# Patient Record
Sex: FEMALE | Race: WHITE
[De-identification: names, ages, dates, MRNs, and addresses within clinical notes are randomized per-mention and may not be internally consistent; named-entity substitution may affect disease eponyms.]

---

## 2018-10-02 ENCOUNTER — HOSPITAL ENCOUNTER (EMERGENCY)
Dept: HOSPITAL 8 - ED | Age: 83
Discharge: HOME | End: 2018-10-02
Payer: COMMERCIAL

## 2018-10-02 VITALS — SYSTOLIC BLOOD PRESSURE: 159 MMHG | DIASTOLIC BLOOD PRESSURE: 83 MMHG

## 2018-10-02 VITALS — BODY MASS INDEX: 28.6 KG/M2 | WEIGHT: 155.43 LBS | HEIGHT: 62 IN

## 2018-10-02 DIAGNOSIS — I10: Primary | ICD-10-CM

## 2018-10-02 LAB
ALBUMIN SERPL-MCNC: 3.3 G/DL (ref 3.4–5)
ALP SERPL-CCNC: 118 U/L (ref 45–117)
ALT SERPL-CCNC: 16 U/L (ref 12–78)
ANION GAP SERPL CALC-SCNC: 9 MMOL/L (ref 5–15)
BASOPHILS # BLD AUTO: 0.01 X10^3/UL (ref 0–0.1)
BASOPHILS NFR BLD AUTO: 0 % (ref 0–1)
BILIRUB SERPL-MCNC: 0.7 MG/DL (ref 0.2–1)
CALCIUM SERPL-MCNC: 9.1 MG/DL (ref 8.5–10.1)
CHLORIDE SERPL-SCNC: 109 MMOL/L (ref 98–107)
CREAT SERPL-MCNC: 0.66 MG/DL (ref 0.55–1.02)
EOSINOPHIL # BLD AUTO: 0.03 X10^3/UL (ref 0–0.4)
EOSINOPHIL NFR BLD AUTO: 1 % (ref 1–7)
ERYTHROCYTE [DISTWIDTH] IN BLOOD BY AUTOMATED COUNT: 14.5 % (ref 9.6–15.2)
LYMPHOCYTES # BLD AUTO: 1.26 X10^3/UL (ref 1–3.4)
LYMPHOCYTES NFR BLD AUTO: 38 % (ref 22–44)
MCH RBC QN AUTO: 30.1 PG (ref 27–34.8)
MCHC RBC AUTO-ENTMCNC: 33.5 G/DL (ref 32.4–35.8)
MCV RBC AUTO: 89.7 FL (ref 80–100)
MD: NO
MONOCYTES # BLD AUTO: 0.24 X10^3/UL (ref 0.2–0.8)
MONOCYTES NFR BLD AUTO: 7 % (ref 2–9)
NEUTROPHILS # BLD AUTO: 1.74 X10^3/UL (ref 1.8–6.8)
NEUTROPHILS NFR BLD AUTO: 53 % (ref 42–75)
PLATELET # BLD AUTO: 171 X10^3/UL (ref 130–400)
PMV BLD AUTO: 7.6 FL (ref 7.4–10.4)
PROT SERPL-MCNC: 7.2 G/DL (ref 6.4–8.2)
RBC # BLD AUTO: 4.39 X10^6/UL (ref 3.82–5.3)
T4 (THYROXINE): 10.2 MCG/DL (ref 4.8–13.9)
TROPONIN I SERPL-MCNC: < 0.015 NG/ML (ref 0–0.04)
TSH SERPL-ACNC: 2.06 MIU/L (ref 0.36–3.74)

## 2018-10-02 PROCEDURE — 84443 ASSAY THYROID STIM HORMONE: CPT

## 2018-10-02 PROCEDURE — 84484 ASSAY OF TROPONIN QUANT: CPT

## 2018-10-02 PROCEDURE — 96376 TX/PRO/DX INJ SAME DRUG ADON: CPT

## 2018-10-02 PROCEDURE — 96374 THER/PROPH/DIAG INJ IV PUSH: CPT

## 2018-10-02 PROCEDURE — 80053 COMPREHEN METABOLIC PANEL: CPT

## 2018-10-02 PROCEDURE — 85025 COMPLETE CBC W/AUTO DIFF WBC: CPT

## 2018-10-02 PROCEDURE — 71045 X-RAY EXAM CHEST 1 VIEW: CPT

## 2018-10-02 PROCEDURE — 84436 ASSAY OF TOTAL THYROXINE: CPT

## 2018-10-02 PROCEDURE — 99285 EMERGENCY DEPT VISIT HI MDM: CPT

## 2018-10-02 PROCEDURE — 36415 COLL VENOUS BLD VENIPUNCTURE: CPT

## 2020-12-17 ENCOUNTER — APPOINTMENT (OUTPATIENT)
Dept: RADIOLOGY | Facility: MEDICAL CENTER | Age: 85
DRG: 065 | End: 2020-12-17
Attending: EMERGENCY MEDICINE
Payer: MEDICARE

## 2020-12-17 ENCOUNTER — APPOINTMENT (OUTPATIENT)
Dept: RADIOLOGY | Facility: MEDICAL CENTER | Age: 85
DRG: 065 | End: 2020-12-17
Attending: INTERNAL MEDICINE
Payer: MEDICARE

## 2020-12-17 ENCOUNTER — HOSPITAL ENCOUNTER (INPATIENT)
Facility: MEDICAL CENTER | Age: 85
LOS: 2 days | DRG: 065 | End: 2020-12-20
Attending: EMERGENCY MEDICINE | Admitting: INTERNAL MEDICINE
Payer: MEDICARE

## 2020-12-17 DIAGNOSIS — R09.89 SYMPTOMS OF CEREBROVASCULAR ACCIDENT (CVA): ICD-10-CM

## 2020-12-17 DIAGNOSIS — I48.91 ATRIAL FIBRILLATION, UNSPECIFIED TYPE (HCC): ICD-10-CM

## 2020-12-17 DIAGNOSIS — M79.604 RIGHT LEG PAIN: ICD-10-CM

## 2020-12-17 PROBLEM — E87.1 HYPONATREMIA: Status: ACTIVE | Noted: 2020-12-17

## 2020-12-17 PROBLEM — R79.89 ELEVATED TROPONIN: Status: ACTIVE | Noted: 2020-12-17

## 2020-12-17 PROBLEM — D72.829 LEUKOCYTOSIS: Status: ACTIVE | Noted: 2020-12-17

## 2020-12-17 PROBLEM — E78.5 HYPERLIPIDEMIA: Status: ACTIVE | Noted: 2020-12-17

## 2020-12-17 PROBLEM — R29.898 WEAKNESS OF RIGHT LEG: Status: ACTIVE | Noted: 2020-12-17

## 2020-12-17 PROBLEM — I10 ESSENTIAL HYPERTENSION: Status: ACTIVE | Noted: 2020-12-17

## 2020-12-17 LAB
ABO + RH BLD: NORMAL
ABO GROUP BLD: ABNORMAL
ALBUMIN SERPL BCP-MCNC: 2.9 G/DL (ref 3.2–4.9)
ALBUMIN/GLOB SERPL: 0.8 G/DL
ALP SERPL-CCNC: 124 U/L (ref 30–99)
ALT SERPL-CCNC: 10 U/L (ref 2–50)
ANION GAP SERPL CALC-SCNC: 11 MMOL/L (ref 7–16)
APTT PPP: 27.2 SEC (ref 24.7–36)
AST SERPL-CCNC: 13 U/L (ref 12–45)
BASOPHILS # BLD AUTO: 0.3 % (ref 0–1.8)
BASOPHILS # BLD: 0.03 K/UL (ref 0–0.12)
BILIRUB SERPL-MCNC: 1.8 MG/DL (ref 0.1–1.5)
BLD GP AB SCN SERPL QL: ABNORMAL
BUN SERPL-MCNC: 28 MG/DL (ref 8–22)
CALCIUM SERPL-MCNC: 9.3 MG/DL (ref 8.5–10.5)
CHLORIDE SERPL-SCNC: 95 MMOL/L (ref 96–112)
CO2 SERPL-SCNC: 21 MMOL/L (ref 20–33)
CORTIS SERPL-MCNC: 30.3 UG/DL (ref 0–23)
COVID ORDER STATUS COVID19: NORMAL
CREAT SERPL-MCNC: 0.75 MG/DL (ref 0.5–1.4)
EKG IMPRESSION: NORMAL
EOSINOPHIL # BLD AUTO: 0.03 K/UL (ref 0–0.51)
EOSINOPHIL NFR BLD: 0.3 % (ref 0–6.9)
ERYTHROCYTE [DISTWIDTH] IN BLOOD BY AUTOMATED COUNT: 44.5 FL (ref 35.9–50)
EST. AVERAGE GLUCOSE BLD GHB EST-MCNC: 131 MG/DL
GLOBULIN SER CALC-MCNC: 3.7 G/DL (ref 1.9–3.5)
GLUCOSE SERPL-MCNC: 139 MG/DL (ref 65–99)
HBA1C MFR BLD: 6.2 % (ref 0–5.6)
HCT VFR BLD AUTO: 41.4 % (ref 37–47)
HGB BLD-MCNC: 13.5 G/DL (ref 12–16)
IMM GRANULOCYTES # BLD AUTO: 0.1 K/UL (ref 0–0.11)
IMM GRANULOCYTES NFR BLD AUTO: 0.8 % (ref 0–0.9)
INR PPP: 1.03 (ref 0.87–1.13)
LYMPHOCYTES # BLD AUTO: 0.82 K/UL (ref 1–4.8)
LYMPHOCYTES NFR BLD: 6.9 % (ref 22–41)
MAGNESIUM SERPL-MCNC: 1.9 MG/DL (ref 1.5–2.5)
MCH RBC QN AUTO: 28.5 PG (ref 27–33)
MCHC RBC AUTO-ENTMCNC: 32.6 G/DL (ref 33.6–35)
MCV RBC AUTO: 87.5 FL (ref 81.4–97.8)
MONOCYTES # BLD AUTO: 1.18 K/UL (ref 0–0.85)
MONOCYTES NFR BLD AUTO: 10 % (ref 0–13.4)
NEUTROPHILS # BLD AUTO: 9.68 K/UL (ref 2–7.15)
NEUTROPHILS NFR BLD: 81.7 % (ref 44–72)
NRBC # BLD AUTO: 0 K/UL
NRBC BLD-RTO: 0 /100 WBC
OSMOLALITY SERPL: 286 MOSM/KG H2O (ref 278–298)
PLATELET # BLD AUTO: 183 K/UL (ref 164–446)
PMV BLD AUTO: 9.2 FL (ref 9–12.9)
POTASSIUM SERPL-SCNC: 3.6 MMOL/L (ref 3.6–5.5)
PROCALCITONIN SERPL-MCNC: 2.55 NG/ML
PROT SERPL-MCNC: 6.6 G/DL (ref 6–8.2)
PROTHROMBIN TIME: 13.8 SEC (ref 12–14.6)
RBC # BLD AUTO: 4.73 M/UL (ref 4.2–5.4)
RH BLD: ABNORMAL
SARS-COV-2 RNA RESP QL NAA+PROBE: NOTDETECTED
SODIUM SERPL-SCNC: 127 MMOL/L (ref 135–145)
SPECIMEN SOURCE: NORMAL
TROPONIN T SERPL-MCNC: 99 NG/L (ref 6–19)
TSH SERPL DL<=0.005 MIU/L-ACNC: 2.04 UIU/ML (ref 0.38–5.33)
WBC # BLD AUTO: 11.8 K/UL (ref 4.8–10.8)

## 2020-12-17 PROCEDURE — 71045 X-RAY EXAM CHEST 1 VIEW: CPT

## 2020-12-17 PROCEDURE — 85730 THROMBOPLASTIN TIME PARTIAL: CPT

## 2020-12-17 PROCEDURE — 86850 RBC ANTIBODY SCREEN: CPT

## 2020-12-17 PROCEDURE — G0378 HOSPITAL OBSERVATION PER HR: HCPCS

## 2020-12-17 PROCEDURE — 73552 X-RAY EXAM OF FEMUR 2/>: CPT | Mod: RT

## 2020-12-17 PROCEDURE — 72148 MRI LUMBAR SPINE W/O DYE: CPT

## 2020-12-17 PROCEDURE — 80053 COMPREHEN METABOLIC PANEL: CPT

## 2020-12-17 PROCEDURE — 84484 ASSAY OF TROPONIN QUANT: CPT

## 2020-12-17 PROCEDURE — 94760 N-INVAS EAR/PLS OXIMETRY 1: CPT

## 2020-12-17 PROCEDURE — 700117 HCHG RX CONTRAST REV CODE 255: Performed by: EMERGENCY MEDICINE

## 2020-12-17 PROCEDURE — 99285 EMERGENCY DEPT VISIT HI MDM: CPT

## 2020-12-17 PROCEDURE — 82533 TOTAL CORTISOL: CPT

## 2020-12-17 PROCEDURE — 700102 HCHG RX REV CODE 250 W/ 637 OVERRIDE(OP): Performed by: INTERNAL MEDICINE

## 2020-12-17 PROCEDURE — U0003 INFECTIOUS AGENT DETECTION BY NUCLEIC ACID (DNA OR RNA); SEVERE ACUTE RESPIRATORY SYNDROME CORONAVIRUS 2 (SARS-COV-2) (CORONAVIRUS DISEASE [COVID-19]), AMPLIFIED PROBE TECHNIQUE, MAKING USE OF HIGH THROUGHPUT TECHNOLOGIES AS DESCRIBED BY CMS-2020-01-R: HCPCS

## 2020-12-17 PROCEDURE — 70551 MRI BRAIN STEM W/O DYE: CPT

## 2020-12-17 PROCEDURE — C9803 HOPD COVID-19 SPEC COLLECT: HCPCS | Performed by: EMERGENCY MEDICINE

## 2020-12-17 PROCEDURE — 99220 PR INITIAL OBSERVATION CARE,LEVL III: CPT | Mod: AI | Performed by: INTERNAL MEDICINE

## 2020-12-17 PROCEDURE — 86900 BLOOD TYPING SEROLOGIC ABO: CPT

## 2020-12-17 PROCEDURE — 700105 HCHG RX REV CODE 258: Performed by: INTERNAL MEDICINE

## 2020-12-17 PROCEDURE — 72170 X-RAY EXAM OF PELVIS: CPT

## 2020-12-17 PROCEDURE — 72146 MRI CHEST SPINE W/O DYE: CPT

## 2020-12-17 PROCEDURE — 83930 ASSAY OF BLOOD OSMOLALITY: CPT

## 2020-12-17 PROCEDURE — 96374 THER/PROPH/DIAG INJ IV PUSH: CPT

## 2020-12-17 PROCEDURE — 73610 X-RAY EXAM OF ANKLE: CPT | Mod: RT

## 2020-12-17 PROCEDURE — 700101 HCHG RX REV CODE 250: Performed by: STUDENT IN AN ORGANIZED HEALTH CARE EDUCATION/TRAINING PROGRAM

## 2020-12-17 PROCEDURE — 70496 CT ANGIOGRAPHY HEAD: CPT

## 2020-12-17 PROCEDURE — 84145 PROCALCITONIN (PCT): CPT

## 2020-12-17 PROCEDURE — 87040 BLOOD CULTURE FOR BACTERIA: CPT

## 2020-12-17 PROCEDURE — 70450 CT HEAD/BRAIN W/O DYE: CPT

## 2020-12-17 PROCEDURE — 70498 CT ANGIOGRAPHY NECK: CPT

## 2020-12-17 PROCEDURE — 93005 ELECTROCARDIOGRAM TRACING: CPT | Performed by: EMERGENCY MEDICINE

## 2020-12-17 PROCEDURE — 83036 HEMOGLOBIN GLYCOSYLATED A1C: CPT

## 2020-12-17 PROCEDURE — 85025 COMPLETE CBC W/AUTO DIFF WBC: CPT

## 2020-12-17 PROCEDURE — 85610 PROTHROMBIN TIME: CPT

## 2020-12-17 PROCEDURE — 51798 US URINE CAPACITY MEASURE: CPT

## 2020-12-17 PROCEDURE — 86901 BLOOD TYPING SEROLOGIC RH(D): CPT

## 2020-12-17 PROCEDURE — 84443 ASSAY THYROID STIM HORMONE: CPT

## 2020-12-17 PROCEDURE — 0042T CT-CEREBRAL PERFUSION ANALYSIS: CPT

## 2020-12-17 PROCEDURE — A9270 NON-COVERED ITEM OR SERVICE: HCPCS | Performed by: INTERNAL MEDICINE

## 2020-12-17 PROCEDURE — 83735 ASSAY OF MAGNESIUM: CPT

## 2020-12-17 RX ORDER — POLYETHYLENE GLYCOL 3350 17 G/17G
1 POWDER, FOR SOLUTION ORAL
Status: DISCONTINUED | OUTPATIENT
Start: 2020-12-17 | End: 2020-12-20 | Stop reason: HOSPADM

## 2020-12-17 RX ORDER — IBUPROFEN 200 MG
200 TABLET ORAL EVERY 6 HOURS PRN
Status: ON HOLD | COMMUNITY
End: 2020-12-20

## 2020-12-17 RX ORDER — AMOXICILLIN 250 MG
2 CAPSULE ORAL 2 TIMES DAILY
Status: DISCONTINUED | OUTPATIENT
Start: 2020-12-17 | End: 2020-12-20 | Stop reason: HOSPADM

## 2020-12-17 RX ORDER — ASPIRIN 81 MG/1
324 TABLET, CHEWABLE ORAL DAILY
Status: DISCONTINUED | OUTPATIENT
Start: 2020-12-17 | End: 2020-12-18

## 2020-12-17 RX ORDER — ACETAMINOPHEN 325 MG/1
650 TABLET ORAL EVERY 6 HOURS PRN
Status: DISCONTINUED | OUTPATIENT
Start: 2020-12-17 | End: 2020-12-20 | Stop reason: HOSPADM

## 2020-12-17 RX ORDER — ONDANSETRON 4 MG/1
TABLET, FILM COATED ORAL
COMMUNITY
Start: 2020-12-16 | End: 2020-12-17

## 2020-12-17 RX ORDER — METOPROLOL TARTRATE 1 MG/ML
2.5 INJECTION, SOLUTION INTRAVENOUS ONCE
Status: COMPLETED | OUTPATIENT
Start: 2020-12-17 | End: 2020-12-17

## 2020-12-17 RX ORDER — METOPROLOL SUCCINATE 100 MG/1
150 TABLET, EXTENDED RELEASE ORAL EVERY MORNING
Status: ON HOLD | COMMUNITY
Start: 2020-12-03 | End: 2021-01-08

## 2020-12-17 RX ORDER — ONDANSETRON 4 MG/1
4 TABLET, ORALLY DISINTEGRATING ORAL EVERY 4 HOURS PRN
Status: DISCONTINUED | OUTPATIENT
Start: 2020-12-17 | End: 2020-12-20 | Stop reason: HOSPADM

## 2020-12-17 RX ORDER — ASPIRIN 325 MG
325 TABLET ORAL DAILY
Status: DISCONTINUED | OUTPATIENT
Start: 2020-12-17 | End: 2020-12-18

## 2020-12-17 RX ORDER — LABETALOL HYDROCHLORIDE 5 MG/ML
10 INJECTION, SOLUTION INTRAVENOUS EVERY 4 HOURS PRN
Status: DISCONTINUED | OUTPATIENT
Start: 2020-12-17 | End: 2020-12-20 | Stop reason: HOSPADM

## 2020-12-17 RX ORDER — ATORVASTATIN CALCIUM 80 MG/1
80 TABLET, FILM COATED ORAL EVERY EVENING
Status: DISCONTINUED | OUTPATIENT
Start: 2020-12-17 | End: 2020-12-18

## 2020-12-17 RX ORDER — BISACODYL 10 MG
10 SUPPOSITORY, RECTAL RECTAL
Status: DISCONTINUED | OUTPATIENT
Start: 2020-12-17 | End: 2020-12-20 | Stop reason: HOSPADM

## 2020-12-17 RX ORDER — SODIUM CHLORIDE 9 MG/ML
INJECTION, SOLUTION INTRAVENOUS CONTINUOUS
Status: DISCONTINUED | OUTPATIENT
Start: 2020-12-17 | End: 2020-12-20

## 2020-12-17 RX ORDER — ASPIRIN 300 MG/1
300 SUPPOSITORY RECTAL DAILY
Status: DISCONTINUED | OUTPATIENT
Start: 2020-12-17 | End: 2020-12-18

## 2020-12-17 RX ORDER — ONDANSETRON 2 MG/ML
4 INJECTION INTRAMUSCULAR; INTRAVENOUS EVERY 4 HOURS PRN
Status: DISCONTINUED | OUTPATIENT
Start: 2020-12-17 | End: 2020-12-20 | Stop reason: HOSPADM

## 2020-12-17 RX ADMIN — IOHEXOL 100 ML: 350 INJECTION, SOLUTION INTRAVENOUS at 07:31

## 2020-12-17 RX ADMIN — SODIUM CHLORIDE: 9 INJECTION, SOLUTION INTRAVENOUS at 14:10

## 2020-12-17 RX ADMIN — IOHEXOL 40 ML: 350 INJECTION, SOLUTION INTRAVENOUS at 07:28

## 2020-12-17 RX ADMIN — ATORVASTATIN CALCIUM 80 MG: 80 TABLET, FILM COATED ORAL at 17:16

## 2020-12-17 RX ADMIN — ASPIRIN 324 MG: 81 TABLET, CHEWABLE ORAL at 10:24

## 2020-12-17 RX ADMIN — METOPROLOL TARTRATE 2.5 MG: 1 INJECTION, SOLUTION INTRAVENOUS at 21:56

## 2020-12-17 RX ADMIN — DOCUSATE SODIUM 50 MG AND SENNOSIDES 8.6 MG 2 TABLET: 8.6; 5 TABLET, FILM COATED ORAL at 17:16

## 2020-12-17 SDOH — HEALTH STABILITY: MENTAL HEALTH: HOW OFTEN DO YOU HAVE A DRINK CONTAINING ALCOHOL?: NEVER

## 2020-12-17 ASSESSMENT — LIFESTYLE VARIABLES
TOTAL SCORE: 0
ALCOHOL_USE: NO
HAVE PEOPLE ANNOYED YOU BY CRITICIZING YOUR DRINKING: NO
AVERAGE NUMBER OF DAYS PER WEEK YOU HAVE A DRINK CONTAINING ALCOHOL: 0
DOES PATIENT WANT TO STOP DRINKING: NO
ON A TYPICAL DAY WHEN YOU DRINK ALCOHOL HOW MANY DRINKS DO YOU HAVE: 0
EVER_SMOKED: NEVER
HAVE YOU EVER FELT YOU SHOULD CUT DOWN ON YOUR DRINKING: NO
EVER HAD A DRINK FIRST THING IN THE MORNING TO STEADY YOUR NERVES TO GET RID OF A HANGOVER: NO
TOTAL SCORE: 0
HAVE YOU EVER FELT YOU SHOULD CUT DOWN ON YOUR DRINKING: NO
TOTAL SCORE: 0
HAVE PEOPLE ANNOYED YOU BY CRITICIZING YOUR DRINKING: NO
TOTAL SCORE: 0
HOW MANY TIMES IN THE PAST YEAR HAVE YOU HAD 5 OR MORE DRINKS IN A DAY: 0
CONSUMPTION TOTAL: INCOMPLETE
ALCOHOL_USE: NO
EVER HAD A DRINK FIRST THING IN THE MORNING TO STEADY YOUR NERVES TO GET RID OF A HANGOVER: NO
DOES PATIENT WANT TO STOP DRINKING: NO
EVER FELT BAD OR GUILTY ABOUT YOUR DRINKING: NO
EVER FELT BAD OR GUILTY ABOUT YOUR DRINKING: NO
TOTAL SCORE: 0
CONSUMPTION TOTAL: NEGATIVE
TOTAL SCORE: 0

## 2020-12-17 ASSESSMENT — COGNITIVE AND FUNCTIONAL STATUS - GENERAL
SUGGESTED CMS G CODE MODIFIER MOBILITY: CL
STANDING UP FROM CHAIR USING ARMS: A LOT
TOILETING: A LOT
MOVING TO AND FROM BED TO CHAIR: A LOT
PERSONAL GROOMING: A LOT
PERSONAL GROOMING: A LOT
TURNING FROM BACK TO SIDE WHILE IN FLAT BAD: A LOT
MOVING TO AND FROM BED TO CHAIR: A LITTLE
MOBILITY SCORE: 14
WALKING IN HOSPITAL ROOM: A LOT
HELP NEEDED FOR BATHING: A LOT
DRESSING REGULAR UPPER BODY CLOTHING: A LOT
DRESSING REGULAR LOWER BODY CLOTHING: A LOT
HELP NEEDED FOR BATHING: A LOT
MOVING FROM LYING ON BACK TO SITTING ON SIDE OF FLAT BED: A LOT
TOILETING: A LOT
STANDING UP FROM CHAIR USING ARMS: A LOT
TURNING FROM BACK TO SIDE WHILE IN FLAT BAD: A LITTLE
SUGGESTED CMS G CODE MODIFIER MOBILITY: CL
DRESSING REGULAR LOWER BODY CLOTHING: A LOT
CLIMB 3 TO 5 STEPS WITH RAILING: A LOT
SUGGESTED CMS G CODE MODIFIER DAILY ACTIVITY: CL
DRESSING REGULAR UPPER BODY CLOTHING: A LOT
MOBILITY SCORE: 12
WALKING IN HOSPITAL ROOM: A LOT
CLIMB 3 TO 5 STEPS WITH RAILING: A LOT
MOVING FROM LYING ON BACK TO SITTING ON SIDE OF FLAT BED: A LOT
EATING MEALS: A LOT
DAILY ACTIVITIY SCORE: 12

## 2020-12-17 ASSESSMENT — PATIENT HEALTH QUESTIONNAIRE - PHQ9
SUM OF ALL RESPONSES TO PHQ9 QUESTIONS 1 AND 2: 0
2. FEELING DOWN, DEPRESSED, IRRITABLE, OR HOPELESS: NOT AT ALL
1. LITTLE INTEREST OR PLEASURE IN DOING THINGS: NOT AT ALL

## 2020-12-17 ASSESSMENT — COPD QUESTIONNAIRES
COPD SCREENING SCORE: 0
DURING THE PAST 4 WEEKS HOW MUCH DID YOU FEEL SHORT OF BREATH: NONE/LITTLE OF THE TIME
HAVE YOU SMOKED AT LEAST 100 CIGARETTES IN YOUR ENTIRE LIFE: NO/DON'T KNOW
DO YOU EVER COUGH UP ANY MUCUS OR PHLEGM?: NO/ONLY WITH OCCASIONAL COLDS OR INFECTIONS

## 2020-12-17 NOTE — ED NOTES
Pt is currently still at MRI. Report was called to Cherie RODRIGES for S182-2. Pt will go from MRI to the floor. Family was made aware visiting hours arent until 3pm upstairs.

## 2020-12-17 NOTE — ED NOTES
Updated daughter in law ([place]) on pt progress and plan of care. Patient able to pass swallow screen without issues. Pt awaiting MRI/Admission

## 2020-12-17 NOTE — ASSESSMENT & PLAN NOTE
Seems to be new onset  Started on Eliquis   Continue on metoprolol  Telemetry  TTE shows mod AS  TSH is normal

## 2020-12-17 NOTE — PROGRESS NOTES
Two RN skin check completed with Bess SEAMAN RN    Skin is fragile throughout.   Dry, flaky, boggy heels.   Moisture related blanchable redness and forming fissure to coccyx.

## 2020-12-17 NOTE — ED NOTES
Pharmacy Medication Reconciliation      Medication reconciliation updated and complete per pt at bedside  Allergies have been verified and updated   No oral ABX within the last 14 days  Patient home pharmacy:[facility]

## 2020-12-17 NOTE — ED NOTES
Liudmila Pulido  Chief Complaint   Patient presents with   • Possible Stroke     R sided weakness, noticed by daughter around 0300 this morning; LKN 1900 yesterday     Pt JUAN C MELANIE from home. Pt lives at home with daughter. Per TAYO NAVARRO around 190 last night. MELANIE report pt had GLF around 0300 this morning which daughter noticed R sided facial droop and R leg/arm weakness. No thinners. On arrival, pt oriented and able to respond appropriately. JOSE MARIA.     During assessment, pt states she cannot move her R leg - when moved by staff, pt c/o R knee and R ankle pain. Pt has small abrasion to R knee cap.    Report given to ANTELMO Serrano.

## 2020-12-17 NOTE — ASSESSMENT & PLAN NOTE
She denies chest pain  EKG shows afib and LBBB, no prior to compare to.   Record of angiogram in 1/2019 at [facility], with nonobstructive CAD  Likely demand ischemia from afib  She has been started on Eliquis and lipitor

## 2020-12-17 NOTE — ED PROVIDER NOTES
ED Provider Note    Chief Complaint:   Right sided weakness    HPI:  Liudmila Pulido is a 88 y.o. female who presents as a stroke IR activation out of concern for CVA outside of the window for alteplase.  Family last noticed she was normal at 7:00 yesterday evening, 12 hours prior to arrival.  At 3:00 this morning they tried to help her into bed and she seemed a little weaker than usual, possibly a little more off balance.  When she woke from sleep this morning, family reports that she had right-sided weakness, so they activated EMS.  On arrival to the emergency department, she states she is not having any headache, no neck or back pain, no chest pain, no shortness of breath.  Paramedics report that she was seen at [facility] yesterday and treated for dehydration.  She is not having any active nausea or vomiting, her speech is understandable.  She has not had any recent fevers, no upper respiratory symptoms, no known COVID-19 positive contacts.  She is unable to identify any exacerbating or alleviating factors.  She was taken immediately from EMS triage to CT.    Review of Systems:  See HPI for pertinent positives and negatives. All other systems negative.    Past Medical History:       Social History:  Social History     Tobacco Use   • Smoking status: Not on file   • Smokeless tobacco: Never Used   Substance and Sexual Activity   • Alcohol use: Never     Frequency: Never   • Drug use: Not on file   • Sexual activity: Not on file       Surgical History:  patient denies any surgical history    Current Medications:  Home Medications     Reviewed by Justina Lira R.N. (Registered Nurse) on 12/17/20 at 0733  Med List Status: Unable to Obtain   Medication Last Dose Status        Patient Keny Taking any Medications                       Allergies:  Allergies   Allergen Reactions   • Demerol    • Erythromycin        Physical Exam:  Vital Signs: /92   Pulse (!) 141   Temp 36.6 °C (97.9 °F)  "(Oral)   Resp 17   Ht 1.676 m (5' 6\")   Wt 68 kg (150 lb)   SpO2 96%   BMI 24.21 kg/m²   Constitutional: Alert, no acute distress  HENT: Normocephalic, atraumatic  Eyes: Pupils equal and reactive, normal conjunctiva  Neck: Supple, normal range of motion, no stridor  Cardiovascular: Extremities are warm and well perfused, irregular rhythm  Pulmonary: No respiratory distress, normal work of breathing, no accessory muscule usage  Abdomen: Soft, non-distended, non-tender to palpation, no peritoneal signs  Skin: Warm, dry, no rashes or lesions  Musculoskeletal: Normal range of motion in all extremities, no swelling or deformity noted, right hip, right knee, right ankle are nontender to palpation however are painful when passively ranged, soft compartments throughout the right lower extremity  Neurologic: Alert, awake, responsive, mild right-sided facial asymmetry, oriented to self and age, easily able to name objects, no aphasia, no neglect, 5/5 upper extremity strength, 5 out of 5 left lower extremity strength, 2/5 strength in the right lower extremity though this appears as though it may be limited by pain  Psychiatric: Normal and appropriate mood and affect    Medical records reviewed for continuity of care.  No prior medical records available for review.  Per EMS report, patient has previously been seen at [facility], as well as [facility] here in [place].    EKG: Rate 121, no definitive P waves identified, multiple PVCs present, no ST elevation    Labs:  Labs Reviewed   CBC WITH DIFFERENTIAL - Abnormal; Notable for the following components:       Result Value    WBC 11.8 (*)     MCHC 32.6 (*)     Neutrophils-Polys 81.70 (*)     Lymphocytes 6.90 (*)     Neutrophils (Absolute) 9.68 (*)     Lymphs (Absolute) 0.82 (*)     Monos (Absolute) 1.18 (*)     All other components within normal limits    Narrative:     Indicate which anticoagulants the patient is on:->UNKNOWN   COMP METABOLIC PANEL " - Abnormal; Notable for the following components:    Sodium 127 (*)     Chloride 95 (*)     Glucose 139 (*)     Bun 28 (*)     Alkaline Phosphatase 124 (*)     Total Bilirubin 1.8 (*)     Albumin 2.9 (*)     Globulin 3.7 (*)     All other components within normal limits    Narrative:     Indicate which anticoagulants the patient is on:->UNKNOWN   COD (ADULT) - Abnormal; Notable for the following components:    ABO Grouping Only A (*)     All other components within normal limits   TROPONIN - Abnormal; Notable for the following components:    Troponin T 99 (*)     All other components within normal limits    Narrative:     Indicate which anticoagulants the patient is on:->UNKNOWN   PROTHROMBIN TIME    Narrative:     Indicate which anticoagulants the patient is on:->UNKNOWN   APTT    Narrative:     Indicate which anticoagulants the patient is on:->UNKNOWN   COVID/SARS COV-2    Narrative:     Is patient being admitted?->Yes  Does this patient meet criteria for Rush/Cepheid per [facility]  Inpatient Workflow? (See workflow link below)->No  Expected turn around time?->Routine (In-House PCR up to 24  hours)  Have you been in close contact with a person who is suspected  or known to be positive for COVID-19 within the last 30 days  (e.g. last seen that person < 30 days ago)->Unknown   ESTIMATED GFR    Narrative:     Indicate which anticoagulants the patient is on:->UNKNOWN   SARS-COV-2, PCR (IN-HOUSE)    Narrative:     Is patient being admitted?->Yes  Does this patient meet criteria for Rush/Cepheid per [facility]  Inpatient Workflow? (See workflow link below)->No  Expected turn around time?->Routine (In-House PCR up to 24  hours)  Have you been in close contact with a person who is suspected  or known to be positive for COVID-19 within the last 30 days  (e.g. last seen that person < 30 days ago)->Unknown   ABO RH CONFIRM       Radiology:  DX-ANKLE 3+ VIEWS RIGHT   Final Result         1.  No acute traumatic bony injury.   2.  Pes  planus   3.  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  No radiographic evidence of acute traumatic injury.      DX-PELVIS-1 OR 2 VIEWS   Final Result         1.  No acute traumatic bony injury.   2.  Atherosclerosis   3.  Calcified uterine fibroid      DX-CHEST-PORTABLE (1 VIEW)   Final Result         1.  No focal infiltrates.   2.  Perihilar interstitial prominence and bronchial wall cuffing, appearance suggests changes of underlying bronchial inflammation, consider bronchitis.      CT-CTA NECK WITH & W/O-POST PROCESSING   Final Result         1.  CT angiogram of the neck within normal limits.         CT-CTA HEAD WITH & W/O-POST PROCESS   Final Result         1.  No large vessel occlusion or aneurysm identified.   2.  Approximately 50% narrowing of the left P2 segment, could represent vessel spasm, stenosis, or small nonocclusive thrombus.      CT-CEREBRAL PERFUSION ANALYSIS   Final Result         1.  Cerebral blood flow less than 30% likely representing completed infarct = 0 mL.      2.  T Max more than 6 seconds likely representing combination of completed infarct and ischemia = 0 mL.      3.  Mismatched volume likely representing ischemic brain/penumbra = None      4.  Please note that the cerebral perfusion was performed on the limited brain tissue around the basal ganglia region. Infarct/ischemia outside the CT perfusion sections can be missed in this study.      CT-HEAD W/O   Final Result         1.  No acute intracranial abnormality is identified, there are nonspecific white matter changes, commonly associated with small vessel ischemic disease.  Associated mild cerebral atrophy is noted.   2.  Atherosclerosis.           ED Medications Administered:  Medications   iohexol (OMNIPAQUE) 350 mg/mL (40 mL Intravenous Given 12/17/20 0728)   iohexol (OMNIPAQUE) 350 mg/mL (100 mL Intravenous Given 12/17/20 0731)       Differential diagnosis:  CVA, electrolyte  abnormality, Accu-Chek normal in the prehospital setting less likely hypoglycemia, intracranial bleed, TIA    MDM:  Patient presents as a stroke IR activation 12 hours after her last known normal.  She is outside of the window for alteplase.  Her NIH stroke scale on presentation is 4.  She denies any chest pain, denies thoracic back pain, this is less concerning for aortic dissection.  On my initial physical exam, it is unclear if her right lower extremity movement is limited by true weakness, or pain.    On laboratory evaluation troponin is 99, EKG shows multiple abnormalities but no evidence of ST elevation MI.  NSTEMI is a consideration, as is new onset atrial fibrillation.  Sodium is 127 with no prior value available for comparison, creatinine is within normal limits.  White blood count is mildly elevated to 11.8, she has a normal hemoglobin and normal platelet count.    Plain film of the right ankle demonstrates no acute bony injury, plain film of the right femur is negative for acute bony injury, plain film of the pelvis is negative for acute traumatic bony injury.  Calcified uterine fibroid noted.  Chest x-ray demonstrates no focal infiltrates.    CTA of the neck is within normal limits, CT of the head is negative for large vessel occlusion or aneurysm.  Noncontrasted CT of the head is negative for evidence of acute bleed.  EKG is significant for atrial fibrillation, with heart rate ranging between 90 and 120.  No rate controlling medications were immediately given, as we do not have any medical history at all on this patient, she is not hypotensive with systolic blood pressure of 150, she has no altered mental status, and on my reassessment remains awake and alert.  Medical records were obtained from [facility] and reviewed by the hospitalist.    At this time, she does not have any residual deficit, she does continue to report pain localized to the right lower extremity, however plain films  are negative, I do not see any evidence of septic joint, I do not see any evidence of traumatic injury, she has no evidence of compartment syndrome, and no evidence of arterial embolism.    Personal protective equipment including N95 surgical respirator, goggles, and gloves were used during this encounter.       Disposition:  Admit to hospitalist in guarded condition    Final Impression:  1. Symptoms of cerebrovascular accident (CVA)    2. Right leg pain    3. Atrial fibrillation, unspecified type (HCC)        Electronically signed by: Sari Coello MD, 12/17/2020 10:19 AM

## 2020-12-18 ENCOUNTER — APPOINTMENT (OUTPATIENT)
Dept: CARDIOLOGY | Facility: MEDICAL CENTER | Age: 85
DRG: 065 | End: 2020-12-18
Attending: INTERNAL MEDICINE
Payer: MEDICARE

## 2020-12-18 PROBLEM — E11.9 TYPE 2 DIABETES MELLITUS WITHOUT COMPLICATION, WITHOUT LONG-TERM CURRENT USE OF INSULIN (HCC): Status: ACTIVE | Noted: 2020-12-18

## 2020-12-18 PROBLEM — I35.0 AORTIC STENOSIS: Status: ACTIVE | Noted: 2020-12-18

## 2020-12-18 PROBLEM — I63.9 CVA (CEREBRAL VASCULAR ACCIDENT) (HCC): Status: ACTIVE | Noted: 2020-12-17

## 2020-12-18 LAB
ALBUMIN SERPL BCP-MCNC: 2.7 G/DL (ref 3.2–4.9)
ALP SERPL-CCNC: 113 U/L (ref 30–99)
ALT SERPL-CCNC: 10 U/L (ref 2–50)
ANION GAP SERPL CALC-SCNC: 11 MMOL/L (ref 7–16)
AST SERPL-CCNC: 28 U/L (ref 12–45)
BASOPHILS # BLD AUTO: 0.4 % (ref 0–1.8)
BASOPHILS # BLD: 0.05 K/UL (ref 0–0.12)
BILIRUB CONJ SERPL-MCNC: 0.7 MG/DL (ref 0.1–0.5)
BILIRUB INDIRECT SERPL-MCNC: 0.6 MG/DL (ref 0–1)
BILIRUB SERPL-MCNC: 1.3 MG/DL (ref 0.1–1.5)
BUN SERPL-MCNC: 26 MG/DL (ref 8–22)
CALCIUM SERPL-MCNC: 9.3 MG/DL (ref 8.5–10.5)
CHLORIDE SERPL-SCNC: 99 MMOL/L (ref 96–112)
CHOLEST SERPL-MCNC: 86 MG/DL (ref 100–199)
CO2 SERPL-SCNC: 21 MMOL/L (ref 20–33)
CREAT SERPL-MCNC: 0.65 MG/DL (ref 0.5–1.4)
EKG IMPRESSION: NORMAL
EOSINOPHIL # BLD AUTO: 0.02 K/UL (ref 0–0.51)
EOSINOPHIL NFR BLD: 0.2 % (ref 0–6.9)
ERYTHROCYTE [DISTWIDTH] IN BLOOD BY AUTOMATED COUNT: 44.3 FL (ref 35.9–50)
GLUCOSE BLD-MCNC: 159 MG/DL (ref 65–99)
GLUCOSE BLD-MCNC: 160 MG/DL (ref 65–99)
GLUCOSE BLD-MCNC: 175 MG/DL (ref 65–99)
GLUCOSE SERPL-MCNC: 126 MG/DL (ref 65–99)
HCT VFR BLD AUTO: 38.8 % (ref 37–47)
HDLC SERPL-MCNC: 17 MG/DL
HGB BLD-MCNC: 12.8 G/DL (ref 12–16)
IMM GRANULOCYTES # BLD AUTO: 0.18 K/UL (ref 0–0.11)
IMM GRANULOCYTES NFR BLD AUTO: 1.6 % (ref 0–0.9)
LDLC SERPL CALC-MCNC: 48 MG/DL
LV EJECT FRACT  99904: 75
LV EJECT FRACT MOD 2C 99903: 73.86
LV EJECT FRACT MOD 4C 99902: 73.4
LV EJECT FRACT MOD BP 99901: 73.43
LYMPHOCYTES # BLD AUTO: 0.75 K/UL (ref 1–4.8)
LYMPHOCYTES NFR BLD: 6.5 % (ref 22–41)
MCH RBC QN AUTO: 28.8 PG (ref 27–33)
MCHC RBC AUTO-ENTMCNC: 33 G/DL (ref 33.6–35)
MCV RBC AUTO: 87.2 FL (ref 81.4–97.8)
MONOCYTES # BLD AUTO: 1.13 K/UL (ref 0–0.85)
MONOCYTES NFR BLD AUTO: 9.8 % (ref 0–13.4)
NEUTROPHILS # BLD AUTO: 9.35 K/UL (ref 2–7.15)
NEUTROPHILS NFR BLD: 81.5 % (ref 44–72)
NRBC # BLD AUTO: 0 K/UL
NRBC BLD-RTO: 0 /100 WBC
PLATELET # BLD AUTO: 184 K/UL (ref 164–446)
PMV BLD AUTO: 9.8 FL (ref 9–12.9)
POTASSIUM SERPL-SCNC: 3.5 MMOL/L (ref 3.6–5.5)
PROCALCITONIN SERPL-MCNC: 1.6 NG/ML
PROT SERPL-MCNC: 5.8 G/DL (ref 6–8.2)
RBC # BLD AUTO: 4.45 M/UL (ref 4.2–5.4)
SODIUM SERPL-SCNC: 131 MMOL/L (ref 135–145)
TRIGL SERPL-MCNC: 105 MG/DL (ref 0–149)
TROPONIN T SERPL-MCNC: 210 NG/L (ref 6–19)
TROPONIN T SERPL-MCNC: 223 NG/L (ref 6–19)
TROPONIN T SERPL-MCNC: 251 NG/L (ref 6–19)
WBC # BLD AUTO: 11.5 K/UL (ref 4.8–10.8)

## 2020-12-18 PROCEDURE — 80076 HEPATIC FUNCTION PANEL: CPT

## 2020-12-18 PROCEDURE — 97162 PT EVAL MOD COMPLEX 30 MIN: CPT

## 2020-12-18 PROCEDURE — 96372 THER/PROPH/DIAG INJ SC/IM: CPT

## 2020-12-18 PROCEDURE — 80048 BASIC METABOLIC PNL TOTAL CA: CPT

## 2020-12-18 PROCEDURE — 770020 HCHG ROOM/CARE - TELE (206)

## 2020-12-18 PROCEDURE — 700102 HCHG RX REV CODE 250 W/ 637 OVERRIDE(OP): Performed by: INTERNAL MEDICINE

## 2020-12-18 PROCEDURE — 700111 HCHG RX REV CODE 636 W/ 250 OVERRIDE (IP): Performed by: INTERNAL MEDICINE

## 2020-12-18 PROCEDURE — 36415 COLL VENOUS BLD VENIPUNCTURE: CPT

## 2020-12-18 PROCEDURE — 97165 OT EVAL LOW COMPLEX 30 MIN: CPT

## 2020-12-18 PROCEDURE — 80061 LIPID PANEL: CPT

## 2020-12-18 PROCEDURE — 84145 PROCALCITONIN (PCT): CPT

## 2020-12-18 PROCEDURE — 93306 TTE W/DOPPLER COMPLETE: CPT

## 2020-12-18 PROCEDURE — 92610 EVALUATE SWALLOWING FUNCTION: CPT

## 2020-12-18 PROCEDURE — A9270 NON-COVERED ITEM OR SERVICE: HCPCS | Performed by: NURSE PRACTITIONER

## 2020-12-18 PROCEDURE — 85025 COMPLETE CBC W/AUTO DIFF WBC: CPT

## 2020-12-18 PROCEDURE — 82962 GLUCOSE BLOOD TEST: CPT | Mod: 91

## 2020-12-18 PROCEDURE — A9270 NON-COVERED ITEM OR SERVICE: HCPCS | Performed by: INTERNAL MEDICINE

## 2020-12-18 PROCEDURE — 99223 1ST HOSP IP/OBS HIGH 75: CPT | Performed by: PSYCHIATRY & NEUROLOGY

## 2020-12-18 PROCEDURE — 93010 ELECTROCARDIOGRAM REPORT: CPT | Performed by: INTERNAL MEDICINE

## 2020-12-18 PROCEDURE — 93306 TTE W/DOPPLER COMPLETE: CPT | Mod: 26 | Performed by: INTERNAL MEDICINE

## 2020-12-18 PROCEDURE — 84484 ASSAY OF TROPONIN QUANT: CPT

## 2020-12-18 PROCEDURE — 99233 SBSQ HOSP IP/OBS HIGH 50: CPT | Performed by: INTERNAL MEDICINE

## 2020-12-18 PROCEDURE — 93005 ELECTROCARDIOGRAM TRACING: CPT | Performed by: INTERNAL MEDICINE

## 2020-12-18 PROCEDURE — 700102 HCHG RX REV CODE 250 W/ 637 OVERRIDE(OP): Performed by: NURSE PRACTITIONER

## 2020-12-18 RX ORDER — ATORVASTATIN CALCIUM 40 MG/1
40 TABLET, FILM COATED ORAL EVERY EVENING
Status: DISCONTINUED | OUTPATIENT
Start: 2020-12-18 | End: 2020-12-20 | Stop reason: HOSPADM

## 2020-12-18 RX ADMIN — INSULIN HUMAN 1 UNITS: 100 INJECTION, SOLUTION PARENTERAL at 15:23

## 2020-12-18 RX ADMIN — ENOXAPARIN SODIUM 40 MG: 40 INJECTION SUBCUTANEOUS at 04:35

## 2020-12-18 RX ADMIN — ATORVASTATIN CALCIUM 40 MG: 40 TABLET, FILM COATED ORAL at 17:23

## 2020-12-18 RX ADMIN — APIXABAN 5 MG: 5 TABLET, FILM COATED ORAL at 17:23

## 2020-12-18 RX ADMIN — DOCUSATE SODIUM 50 MG AND SENNOSIDES 8.6 MG 2 TABLET: 8.6; 5 TABLET, FILM COATED ORAL at 17:23

## 2020-12-18 RX ADMIN — INSULIN HUMAN 1 UNITS: 100 INJECTION, SOLUTION PARENTERAL at 20:09

## 2020-12-18 RX ADMIN — ACETAMINOPHEN 650 MG: 325 TABLET, FILM COATED ORAL at 09:04

## 2020-12-18 RX ADMIN — INSULIN HUMAN 1 UNITS: 100 INJECTION, SOLUTION PARENTERAL at 17:37

## 2020-12-18 RX ADMIN — ASPIRIN 300 MG: 300 SUPPOSITORY RECTAL at 04:35

## 2020-12-18 SDOH — HEALTH STABILITY: MENTAL HEALTH: HOW OFTEN DO YOU HAVE 6 OR MORE DRINKS ON ONE OCCASION?: NEVER

## 2020-12-18 SDOH — HEALTH STABILITY: MENTAL HEALTH: HOW MANY STANDARD DRINKS CONTAINING ALCOHOL DO YOU HAVE ON A TYPICAL DAY?: 1 OR 2

## 2020-12-18 SDOH — HEALTH STABILITY: MENTAL HEALTH: HOW OFTEN DO YOU HAVE A DRINK CONTAINING ALCOHOL?: MONTHLY OR LESS

## 2020-12-18 ASSESSMENT — FIBROSIS 4 INDEX: FIB4 SCORE: 1.97

## 2020-12-18 ASSESSMENT — COGNITIVE AND FUNCTIONAL STATUS - GENERAL
CLIMB 3 TO 5 STEPS WITH RAILING: TOTAL
DRESSING REGULAR UPPER BODY CLOTHING: A LITTLE
MOBILITY SCORE: 7
HELP NEEDED FOR BATHING: A LOT
MOVING FROM LYING ON BACK TO SITTING ON SIDE OF FLAT BED: UNABLE
DAILY ACTIVITIY SCORE: 16
MOVING TO AND FROM BED TO CHAIR: UNABLE
WALKING IN HOSPITAL ROOM: TOTAL
SUGGESTED CMS G CODE MODIFIER DAILY ACTIVITY: CK
DRESSING REGULAR LOWER BODY CLOTHING: A LOT
SUGGESTED CMS G CODE MODIFIER MOBILITY: CM
TOILETING: TOTAL
STANDING UP FROM CHAIR USING ARMS: A LOT
TURNING FROM BACK TO SIDE WHILE IN FLAT BAD: UNABLE

## 2020-12-18 ASSESSMENT — ENCOUNTER SYMPTOMS
COUGH: 0
NAUSEA: 0
PALPITATIONS: 0
FOCAL WEAKNESS: 1
BACK PAIN: 0
ABDOMINAL PAIN: 0
FEVER: 0
HEADACHES: 0
TINGLING: 1
BLURRED VISION: 0
DIZZINESS: 0
VOMITING: 0
SENSORY CHANGE: 1
SHORTNESS OF BREATH: 0
CHILLS: 0
NERVOUS/ANXIOUS: 0
SPEECH CHANGE: 0

## 2020-12-18 ASSESSMENT — PAIN DESCRIPTION - PAIN TYPE
TYPE: ACUTE PAIN

## 2020-12-18 ASSESSMENT — ACTIVITIES OF DAILY LIVING (ADL): TOILETING: INDEPENDENT

## 2020-12-18 ASSESSMENT — GAIT ASSESSMENTS: GAIT LEVEL OF ASSIST: UNABLE TO PARTICIPATE

## 2020-12-18 NOTE — DIETARY
"Nutrition services: Day 0 of admit.  Liudmila Pulido is a 88 y.o. female with admitting DX of Weakness of right lower extremity. History includes Diabetes, HTN, Hyperlipidemia.    Consult received for MST score of 5 per nutrition screen for unplanned weight loss of 34 or more lb x 6 months with poor PO intake. Consult for Diabetes education.    Assessment:  Height: 167.6 cm (5' 6\")  Weight: 77.9 kg (171 lb 11.8 oz)  Body mass index is 27.72 kg/m²., BMI classification: Overweight  Diet/Intake: Level 6 - soft and bite sized, thin liquid. PO intake not yet recorded.    Evaluation:   1. Pt states she weighed 240 lb \"some time ago\". She was unclear on time frame.   2. States she eats small meals at home. Often 2 meals per day. Her daughter cooks.  3. No significant fat or muscle loss noted.  4. PO intake not yet recorded. Per RN, pt eating well today and ate 50-75% of breakfast and lunch.  5. Labs: A1C 6.2 (H). Glu 126 - 139.  6. Medications include SSI. Pt takes metformin at home.  7. Diet education provided for Diabetes as ordered. See education tab. Pt states she has a book at home on Diabetes. A1C does indicate glucose under good control.    Malnutrition Risk: Criteria not met.    Recommendations/Plan:  1. Level 6 diet per SLP. Consider Diabetic diet restriction if glucose control worsens.  2. Encourage continued good intake of meals.  3. Document intake of all meals as % taken in ADL's to provide interdisciplinary communication across all shifts.   4. Monitor weight.  5. Nutrition rep will continue to see patient for ongoing meal and snack preferences.             "

## 2020-12-18 NOTE — THERAPY
Physical Therapy   Initial Evaluation     Patient Name: Liudmila Pulido  Age:  88 y.o., Sex:  female  Medical Record #: [id number]  Today's Date: 12/18/2020     Precautions: Fall Risk, Swallow Precautions ( See Comments)    Assessment  Patient is 88 y.o. female admitted for CVA, noted to have L posterior frontal and parietal lobe infarct on MRI, with acute onset Afib. 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 Pt presents to physical therapy with flaccid R LE, intact light touch to R LE and slight UE impairment (see OT notes for details), with R lateral lean upon sitting which becomes more profound in standing. PT to follow while in house.     Plan    Recommend Physical Therapy 4 times per week until therapy goals are met for the following treatments:  Bed Mobility, Equipment, Gait Training, Neuro Re-Education / Balance, Self Care/Home Evaluation, Stair Training, Therapeutic Activities and Therapeutic Exercises    DC Equipment Recommendations: Unable to determine at this time  Discharge Recommendations: Recommend post-acute placement for additional physical therapy services prior to discharge home       12/18/20 1226   Precautions   Precautions Fall Risk;Swallow Precautions ( See Comments)   Comments R LE flaccid   Vitals   O2 Delivery Device Nasal Cannula   Pain 0 - 10 Group   Therapist Pain Assessment During Activity;0;Nurse Notified   Prior Living Situation   Housing / Facility 1 Story House   Steps Into Home 3  (8 steps with railing depending on entrance)   Steps In Home 0   Bathroom Set up Walk In Shower;Grab Bars   Equipment Owned Front-Wheel Walker   Lives with - Patient's Self Care Capacity Adult Children  (Son and DIL)   Comments pt is from [place], [place] and has good family support locally upon DC home (Dtr also lives nearby)   Prior Level of Functional Mobility   Bed Mobility Independent   Transfer Status Independent    Ambulation Independent   Distance Ambulation (Feet)   (community)   Assistive Devices Used Front-Wheel Walker   Stairs Independent   Comments pt reports she recently started using FWW (past couple days following onset of symptoms)   Cognition    Cognition / Consciousness WDL   Level of Consciousness Alert   Comments pleasant and cooperative. pt is a retired RN, very thankful for the help   Passive ROM Lower Body   Passive ROM Lower Body WDL   Comments no report of pain with PROM   Active ROM Lower Body    Active ROM Lower Body  X   Comments L LE WFL, R LE flaccid   Strength Lower Body   Lower Body Strength  X   Comments as above   Neurological Concerns   Neurological Concerns Yes   Sitting Posture During ADL's Lateral Lean Right   Standing Posture During ADL's Lateral Lean Right  (stronger than in sitting)   Comments pt aware of the lean; however, requires cues to correct   Balance Assessment   Sitting Balance (Static) Poor +   Sitting Balance (Dynamic) Poor   Standing Balance (Static) Trace +   Standing Balance (Dynamic) Trace   Weight Shift Sitting Fair   Weight Shift Standing Poor   Comments w/ B HHA in standing   Gait Analysis   Gait Level Of Assist Unable to Participate   Bed Mobility    Supine to Sit Maximal Assist  (OOB to the L)   Sit to Supine Moderate Assist  (to B LE)   Scooting Moderate Assist   Rolling Maximum Assist to Lt.   Functional Mobility   Sit to Stand Maximal Assist  (x2 people, no R knee buckling noted)   Bed, Chair, Wheelchair Transfer Unable to Participate   Comments reported dizziness while EOB, BP taken noted to be 120/101- RN aware   Patient / Family Goals    Patient / Family Goal #1 to return home   Short Term Goals    Short Term Goal # 1 pt will perform supine <> sit with bed features and Min A in 6 vistis   Short Term Goal # 2 pt will maintain midline seated balance x5 min with intermittent Min A to prepare for transfers in 6 visits   Short Term Goal # 3 pt will perform sit <> stand  and functional transfers with LRAD and Mod A to improve mobility in 6 vistis   Short Term Goal # 4 pt will self propel WC x75 ft with Min A to improve mobility independence in 6 vistis   Short Term Goal # 5 pt will ambulate > 15 ft with LRAD and Max A in 6 visits   Problem List    Problems Impaired Bed Mobility;Impaired Transfers;Impaired Ambulation;Functional Strength Deficit;Impaired Balance;Decreased Activity Tolerance   Anticipated Discharge Equipment and Recommendations   DC Equipment Recommendations Unable to determine at this time   Discharge Recommendations Recommend post-acute placement for additional physical therapy services prior to discharge home

## 2020-12-18 NOTE — THERAPY
"Speech Language Pathology   Clinical Swallow Evaluation     Patient Name: Liudmila Pulido  AGE:  88 y.o., SEX:  female  Medical Record #: [id number]  Today's Date: 12/18/2020     Precautions  Precautions: (P) Swallow Precautions ( See Comments)    Assessment    89 YO female admitted 12/17/20 2/2 right leg weakness. PMHx: HLD, HTN. CMHx: weakness of right leg, AF, leukocytosis, elevated troponin, hyponatremia, HLD, HTN. Brain MRI 12/17/20: \"Small and clustered gyriform foci of bright diffusion signal involving the left far posterior parasagittal frontal lobe and left parietal lobe consistent with acute cerebral infarction. No hemorrhagic transformation.\" CXR 12/17 was unremarkable. Not seen by SLP at [facility].    Pt seen this date for clinical swallow evaluation 2/2 R facial droop. Pt denies hx of dysphagia. Oral [facility] exam completed, mild R labial droop appreciated, restriction retraction and protrusion of right-sided labial structures appreciated. Lingual ROM WNL. Pt is edentulous with upper dentures in place, pt reports lower dentures at home. PO trials of ice, MTL, liquidized, soft and bite sized and thins via cup and straw assessed. Hyolaryngeal elevation palpated as complete, timely initiation of swallow appreciated and vocal quality remained clear. Pt demonstrated functional mastication with tongue and upper denture plate for soft solids. No s/sx of aspiration appreciated with any consistencies consumed.     Recommend initiation of a soft and bite sized/thin liquid diet with adherence to the following: upright at 90* for PO, monitoring during meals, slow rate, straws okay. Anticipate advancement of diet once family brings lower denture plate. SLP following.         Plan    Recommend Speech Therapy 3 times per week until therapy goals are met for the following treatments:  Dysphagia Training and Patient / Family / Caregiver Education.    Discharge Recommendations: (P) Anticipate that the patient will have " no further speech therapy needs after discharge from the hospital    Subjective    Pt was awake, alert, followed directions and eager for PO.      Objective       12/18/20 0840   Oral Motor Eval    Is Patient Able to Complete Oral Motor Eval Yes but Impaired  (mild right labial droop)   Labial Function   Labial Structure At Rest Moderate   Labial Vowel Production / I /, / U / Minimal   Labial Sequence / I /, / U / Not Tested   Frown, Pucker Within Functional Limits   Lingual Function   Lingual Structure At Rest Within Functional Limits   Lingual Protrude Within Functional Limits   Lingual Retract Within Functional Limits   Elevate In Mouth Within Functional Limits   Elevate Outside Mouth Within Functional Limits   Lateralization No Impairment Right;No Impairment Left   Jaw   Jaw Structure At Rest Within Functional Limits   Bite ([place]) Not Tested   Chew ([place]) Within Functional Limits   Jaw Open / Resist Within Functional Limits   Jaw Close / Resist Within Functional Limits   Velar Function   / A / Prolonged Within Functional Limits   Laryngeal Function   Voice Quality Within Functional Limits   Volutional Cough Within Functional Limits   Excursion Upon Swallow Complete   Oral Food Presentation   Ice Chips Within Functional Limits   Single Swallow Mildly Thick (2) - (Nectar Thick)  Within Functional Limits   Single Swallow Thin (0) Within Functional Limits   Serial Swallow Thin (0) Within Functional Limits   Liquidised (3) Within Functional Limits   Soft & Bite-Sized (6) - (Dysphagia III) Within Functional Limits   Regular (7) Not Tested   Regular-Easy to Chew (7) Not Tested   Self Feeding Independent   Tracheostomy   Tracheostomy  No   Dysphagia Strategies / Recommendations   Strategies / Interventions Recommended (Yes / No) Yes   Compensatory Strategies Monitor During Meals;Head of Bed 90 Degrees During Eating / Drinking   Diet / Liquid Recommendation Thin (0);Soft & Bite-Sized (6) - (Dysphagia III)  "  Medication Administration  Whole with Liquid Wash   Therapy Interventions Dysphagia Therapy By Speech Language Pathologist   Dysphagia Rating   Nutritional Liquid Intake Rating Scale Non thickened beverages   Nutritional Food Intake Rating Scale Total oral diet with multiple consistencies but requiring special preparation or compensations   Patient / Family Goals   Patient / Family Goal #1 \"water\"   Short Term Goals   Short Term Goal # 1 Pt will consume a diet of SB6/TN0 with no s/sx of aspiration and min cues.          "

## 2020-12-18 NOTE — PROGRESS NOTES
Hospital Medicine Daily Progress Note    Date of Service  12/18/2020    Chief Complaint  88 y.o. female admitted 12/17/2020 with right side weakness    Hospital Course  89 yo woman with DM, HTN, HLD who woke with right sided weakness. CT stroke workup was unremarkable. She was found in new onset afib. MRI brain showed acute infarct of left frontal and left parietal lobes, old lacunar infarct left posterior frontal lobe. MRI T and L spine showed kyphosis and degenerative disease.     Interval Problem Update  She still has right leg weakness. She denies chest pain, palpitations, SOB, nausea, headache, dizziness, diaphoresis. She says she is followed by cardiology at [facility] and treated with metoprolol for high blood pressure. I spoke with her daughter that the patient had angiogram in 2019 after concerning testing, but did not have any stent placement. Daughter denies a history of valve disease, afib, or heart attacks.    Consultants/Specialty  Neurology    Code Status  Full Code    Disposition  PTOT  Eliquis new start    Review of Systems  Review of Systems   Constitutional: Negative for chills and fever.   HENT: Negative for congestion.    Eyes: Negative for blurred vision.   Respiratory: Negative for cough and shortness of breath.    Cardiovascular: Negative for chest pain, palpitations and leg swelling.   Gastrointestinal: Negative for abdominal pain, nausea and vomiting.   Musculoskeletal: Negative for back pain.   Neurological: Positive for tingling, sensory change and focal weakness. Negative for dizziness, speech change and headaches.   Psychiatric/Behavioral: The patient is not nervous/anxious.         Physical Exam  Temp:  [36.1 °C (96.9 °F)-37.2 °C (99 °F)] 36.5 °C (97.7 °F)  Pulse:  [] 105  Resp:  [16-17] 16  BP: ()/() 130/61  SpO2:  [92 %-97 %] 94 %    Physical Exam  Vitals signs and nursing note reviewed.   Constitutional:       General: She is not in acute distress.      Appearance: She is not ill-appearing.      Comments: Thin, frail   HENT:      Head: Normocephalic.      Mouth/Throat:      Mouth: Mucous membranes are moist.   Eyes:      General:         Right eye: No discharge.         Left eye: No discharge.      Extraocular Movements: Extraocular movements intact.      Pupils: Pupils are equal, round, and reactive to light.   Cardiovascular:      Rate and Rhythm: Normal rate. Rhythm irregular.   Pulmonary:      Effort: Pulmonary effort is normal. No respiratory distress.      Breath sounds: No wheezing or rales.   Abdominal:      Palpations: Abdomen is soft.      Tenderness: There is no abdominal tenderness.   Musculoskeletal:      Right lower leg: No edema.      Left lower leg: No edema.   Skin:     General: Skin is warm and dry.   Neurological:      Mental Status: She is alert and oriented to person, place, and time.      Comments: 5/5 strength UE and LLE  No movement of RLE   Psychiatric:         Mood and Affect: Mood normal.         Behavior: Behavior normal.         Fluids  No intake or output data in the 24 hours ending 12/18/20 1542    Laboratory  Recent Labs     12/17/20  0706 12/18/20  0245   WBC 11.8* 11.5*   RBC 4.73 4.45   HEMOGLOBIN 13.5 12.8   HEMATOCRIT 41.4 38.8   MCV 87.5 87.2   MCH 28.5 28.8   MCHC 32.6* 33.0*   RDW 44.5 44.3   PLATELETCT 183 184   MPV 9.2 9.8     Recent Labs     12/17/20  0706 12/18/20  0245   SODIUM 127* 131*   POTASSIUM 3.6 3.5*   CHLORIDE 95* 99   CO2 21 21   GLUCOSE 139* 126*   BUN 28* 26*   CREATININE 0.75 0.65   CALCIUM 9.3 9.3     Recent Labs     12/17/20  0706   APTT 27.2   INR 1.03         Recent Labs     12/18/20  0245   TRIGLYCERIDE 105   HDL 17*   LDL 48       Imaging  EC-ECHOCARDIOGRAM COMPLETE W/O CONT   Final Result      MR-THORACIC SPINE-W/O   Final Result      1.  Midthoracic dorsal kyphosis. No underlying vertebral abnormalities or wedge compression deformities. No evidence of old or recent/subacute insufficiency compression  fracture.   2.  T2-T3, T7-T8, and T8-T9 ventral extradural defects consistent with disc or disc/osteophyte change. This is most prominent at T7-T8 where there is effacement of ventral subarachnoid space and contour impression on the ventral cord. However, there is    no overall central stenosis as the dorsal subarachnoid space is generous at all of these levels.      MR-LUMBAR SPINE-W/O   Final Result      1.  Cholelithiasis.   2.  L5-S1 grade 2 spondylolisthesis with bilateral L5 spondylolysis. Degenerative disc space narrowing and vacuum disc phenomenon at this level.   3.  L3-4 minimal left lateral disc bulging. No central or foraminal stenosis.   4.  L4-5 tiny midline disc bulge or protrusion with underlying tiny midline annular fissure. No central or foraminal stenosis.   5.  L5-S1 mild pseudo-disc bulging associated with spondylolisthesis. No central stenosis. Marked left foraminal stenosis and severe right foraminal stenosis with horizontal deformity of the neural foramina associated with the spondylolisthesis.      MR-BRAIN-W/O   Final Result      1.  Moderate-advanced cerebral atrophy. Age-appropriate.   2.  Small and clustered gyriform foci of bright diffusion signal involving the left far posterior parasagittal frontal lobe and left parietal lobe consistent with acute cerebral infarction. No hemorrhagic transformation.   3.  Moderate supratentorial white matter disease most consistent with microvascular ischemic change.   4.  Old lacunar infarct left posterior frontal lobe white matter at the level of the centrum semiovale.   5.  No evidence of acute hemorrhage or mass lesion.      DX-ANKLE 3+ VIEWS RIGHT   Final Result         1.  No acute traumatic bony injury.   2.  Pes planus   3.  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  No radiographic evidence of acute traumatic injury.      DX-PELVIS-1 OR 2 VIEWS   Final Result         1.  No acute traumatic bony  injury.   2.  Atherosclerosis   3.  Calcified uterine fibroid      DX-CHEST-PORTABLE (1 VIEW)   Final Result         1.  No focal infiltrates.   2.  Perihilar interstitial prominence and bronchial wall cuffing, appearance suggests changes of underlying bronchial inflammation, consider bronchitis.      CT-CTA NECK WITH & W/O-POST PROCESSING   Final Result         1.  CT angiogram of the neck within normal limits.         CT-CTA HEAD WITH & W/O-POST PROCESS   Final Result         1.  No large vessel occlusion or aneurysm identified.   2.  Approximately 50% narrowing of the left P2 segment, could represent vessel spasm, stenosis, or small nonocclusive thrombus.      CT-CEREBRAL PERFUSION ANALYSIS   Final Result         1.  Cerebral blood flow less than 30% likely representing completed infarct = 0 mL.      2.  T Max more than 6 seconds likely representing combination of completed infarct and ischemia = 0 mL.      3.  Mismatched volume likely representing ischemic brain/penumbra = None      4.  Please note that the cerebral perfusion was performed on the limited brain tissue around the basal ganglia region. Infarct/ischemia outside the CT perfusion sections can be missed in this study.      CT-HEAD W/O   Final Result         1.  No acute intracranial abnormality is identified, there are nonspecific white matter changes, commonly associated with small vessel ischemic disease.  Associated mild cerebral atrophy is noted.   2.  Atherosclerosis.           Assessment/Plan  * CVA (cerebral vascular accident) (HCC)- (present on admission)  Assessment & Plan  MRI brain showed acute infarct of left frontal and left parietal lobes, old lacunar infarct left posterior frontal lobe.  Neurology consulted  Due to afib  Started on Eliquis, lipitor  Telemetry  PTOTST    AF (atrial fibrillation) (HCC)- (present on admission)  Assessment & Plan  Seems to be new onset  Rate controlled. Holding jabari blockers in setting of permissive  HTN  Started on Eliquis   Telemetry  TTE shows mod AS  TSH is normal  Obtaining further records from [facility]    Elevated troponin- (present on admission)  Assessment & Plan  Troponin rising  She denies chest pain  EKG shows afib and LBBB, no prior to compare to. Record has written note that LBBB was not present before  Record of angiogram in 1/2019 at [facility], with nonobstructive CAD  May be demand ischemia, but I will consult cardiology  Trend troponin  She has been started on Eliquis and lipitor    Leukocytosis- (present on admission)  Assessment & Plan  No localizing signs of infection  Monitor clinically      Hyponatremia- (present on admission)  Assessment & Plan  Improved with IVF, continue to trend    Aortic stenosis  Assessment & Plan  Moderate on TTE  Seen previously on TTE at [facility]    Type 2 diabetes mellitus without complication, without long-term current use of insulin (HCC)  Assessment & Plan  A1c 6.2%  Takes metformin, hold for now  ISS    Hyperlipidemia- (present on admission)  Assessment & Plan  LDL 48  On lipitor    Essential hypertension- (present on admission)  Assessment & Plan  Permissive HTN for stroke       VTE prophylaxis: Eliquis

## 2020-12-18 NOTE — THERAPY
"Occupational Therapy   Initial Evaluation     Patient Name: Liudmila Pulido  Age:  88 y.o., Sex:  female  Medical Record #: [id number]  Today's Date: 12/18/2020       Precautions: Fall Risk, Swallow Precautions   Comments: R LE flaccid    Assessment    Patient is 88 y.o. female with h/o HTN, HLD, admitted with RLE weakness. Pt dx with L frontal and parietal infarcts. Seen now for OT eval. Pt presents with intact basic cognition, intact BUE AROM/strength/coordination/sensation. Pt demos significant RLE weakness and postural impairments in sitting and standing (R lean). Max A (2-person assist) to EOB, then able to sit with min/mod A. Pt stood x 1 with blocking to RLE. Unable to transfer. Pt appears highly motivated and reports good family support. Will benefit from ongoing acute OT with recommendation for physiatry consult.     Plan    Recommend Occupational Therapy 4 times per week until therapy goals are met for the following treatments:  Adaptive Equipment, Neuro Re-Education / Balance, Self Care/Activities of Daily Living, Therapeutic Activities and Therapeutic Exercises.    DC Equipment Recommendations: Unable to determine at this time  Discharge Recommendations: Recommend post-acute placement for additional occupational therapy services prior to discharge home     Subjective    \"My daughter in law tells me I was falling to the right.\"     Objective       12/18/20 1233   Prior Living Situation   Housing / Facility 1 Story House   Steps Into Home 3   Rail Both Rail (Steps into Home)   Bathroom Set up Walk In Shower;Grab Bars   Equipment Owned Front-Wheel Walker   Comments Pt's son and DIL live with her in [place] and can assist as needed.    Prior Level of ADL Function   Self Feeding Independent   Grooming / Hygiene Independent   Bathing Independent   Dressing Independent   Toileting Independent   Prior Level of IADL Function   Medication Management Independent   Laundry Independent   Kitchen Mobility " Independent   Finances Independent   Home Management Requires Assist   Shopping Requires Assist   Prior Level Of Mobility Independent Without Device in Community;Independent Without Device in Home   Driving / Transportation Driving Independent;Relatives / Others Provide Transportation  (pt drives short local distances )   Cognition    Cognition / Consciousness WDL   Comments pleasant & cooperative   Active ROM Upper Body   Active ROM Upper Body  WDL   Strength Upper Body   Upper Body Strength  WDL   Sensation Upper Body   Upper Extremity Sensation  WDL   Upper Body Muscle Tone   Upper Body Muscle Tone  WDL   Coordination Upper Body   Coordination WDL   Balance Assessment   Sitting Balance (Static) Poor +   Sitting Balance (Dynamic) Poor   Standing Balance (Static) Trace +   Standing Balance (Dynamic) Trace   Weight Shift Sitting Poor   Weight Shift Standing Absent   Bed Mobility    Supine to Sit Maximal Assist   Sit to Supine Maximal Assist   Scooting Moderate Assist   Rolling Maximum Assist to Lt.   ADL Assessment   Grooming Supervision;Seated  (bed level oral care)   Lower Body Dressing Maximal Assist  (don L sock )   Toileting   (NT; Pure Wik, no BM )   Functional Mobility   Sit to Stand Maximal Assist  (2-person )   Bed, Chair, Wheelchair Transfer Unable to Participate   Short Term Goals   Short Term Goal # 1 Pt will complete ADL transfers with min A using slide board as needed    Short Term Goal # 2 Pt will sit EOB with supv for >10 min to participate in ADL task    Short Term Goal # 3 Pt will complete LB dressing with min A using AE

## 2020-12-18 NOTE — CONSULTS
Neurology Initial Consult H&P  Neurohospitalist Service, [facility]    Referring Physician: Lamont Gayle M.D.    Chief Complaint   Patient presents with   • Possible Stroke     R sided weakness, noticed by daughter around 0300 this morning; LKN 1900 yesterday       History of Present Illess: Liudmila Pulido is a 88 y.o. female with history of hypertension on Metoprolol, hyperlipidemia, and DM type II on Metformin who presented to [facility] on 12/17/20 for right leg weakness and difficulty walking and consulted for acute ischemic stroke. Family reported to the ERP and Hospitalist that the patient was last seen normal at 19:00 prior to sleep on 12/16/20. Patient states she first noticed the right leg weakness yesterday morning (12/17/20) at 03:00 when she awoke from sleep and her daughter was helping her get out of bed. EMS was notified and patient was transported to [facility]. As the patient presented greater than 4.5 hours from last known well, she was not given tPA. CTA head and neck revealed no large vessel occlusion, thus patient was not an IR thrombectomy candidate. ECG was completed and revealed new onset Afib.   Currently, patient is lying in bed, awake, alert, fully oriented, and following commands. She complains of persistent right lower extremity weakness and noow distal decreased sensation also. Additionally, reports she fell when getting out of bed prior to hospitalization and has some right posterior head soreness. Otherwise, she denies headache, vision changes, facial weakness, speech or language difficulty, abnormal movements, loss of consciousness, or episodes of confusion.     Past medical history:   Past Medical History:   Diagnosis Date   • Hyperlipidemia    • Hypertension    • Type 2 diabetes mellitus without complication, without long-term current use of insulin ([facility]) 12/18/2020       Past surgical history:   History reviewed. No pertinent surgical  history.    Family history:   History reviewed. No pertinent family history.    Social history:   Social History     Socioeconomic History   • Marital status:      Spouse name: Not on file   • Number of children: 3   • Years of education: Not on file   • Highest education level: Not on file   Occupational History     Employer: Retired   Social Needs   • Financial resource strain: Not on file   • Food insecurity     Worry: Not on file     Inability: Not on file   • Transportation needs     Medical: Not on file     Non-medical: Not on file   Tobacco Use   • Smoking status: Never Smoker   • Smokeless tobacco: Never Used   Substance and Sexual Activity   • Alcohol use: Yes     Frequency: Monthly or less     Drinks per session: 1 or 2     Binge frequency: Never   • Drug use: Never   • Sexual activity: Not on file   Lifestyle   • Physical activity     Days per week: Not on file     Minutes per session: Not on file   • Stress: Not on file   Relationships   • Social connections     Talks on phone: Not on file     Gets together: Not on file     Attends Spiritism service: Not on file     Active member of club or organization: Not on file     Attends meetings of clubs or organizations: Not on file     Relationship status: Not on file   • Intimate partner violence     Fear of current or ex partner: Not on file     Emotionally abused: Not on file     Physically abused: Not on file     Forced sexual activity: Not on file   Other Topics Concern   • Not on file   Social History Narrative   • Not on file       Allergies:  Allergies   Allergen Reactions   • Demerol Unspecified     Convulsions     • Penicillins      Per daughter, Laura   • Erythromycin Vomiting and Nausea       Medications:    Current Facility-Administered Medications:   •  atorvastatin (LIPITOR) tablet 40 mg, 40 mg, Oral, Q EVENING, Ankush Johns, A.P.R.N.  •  apixaban (ELIQUIS) tablet 5 mg, 5 mg, Oral, BID, Ankush Johns, A.P.R.N.  •  insulin regular  (HumuLIN R,NovoLIN R) injection, 1-6 Units, Subcutaneous, 4X/DAY ACHS, Lamont Gayle M.D.  •  Respiratory Therapy Consult, , Nebulization, Continuous RT, Mehul Oreilly M.D.  •  acetaminophen (Tylenol) tablet 650 mg, 650 mg, Oral, Q6HRS PRN, Mehul Oreilly M.D., 650 mg at 12/18/20 0904  •  labetalol (NORMODYNE/TRANDATE) injection 10 mg, 10 mg, Intravenous, Q4HRS PRN, Mehul Oreilly M.D.  •  ondansetron (ZOFRAN) syringe/vial injection 4 mg, 4 mg, Intravenous, Q4HRS PRN, Mehul Oreilly M.D.  •  ondansetron (ZOFRAN ODT) dispertab 4 mg, 4 mg, Oral, Q4HRS PRN, Mehul Oreilly M.D.  •  senna-docusate (PERICOLACE or SENOKOT S) 8.6-50 MG per tablet 2 Tab, 2 Tab, Oral, BID, Stopped at 12/18/20 0600 **AND** polyethylene glycol/lytes (MIRALAX) PACKET 1 Packet, 1 Packet, Oral, QDAY PRN **AND** magnesium hydroxide (MILK OF MAGNESIA) suspension 30 mL, 30 mL, Oral, QDAY PRN **AND** bisacodyl (DULCOLAX) suppository 10 mg, 10 mg, Rectal, QDAY PRN, Mehul Oreilly M.D.  •  NS infusion, , Intravenous, Continuous, Mehul Oreilly M.D., Last Rate: 100 mL/hr at 12/17/20 1410, New Bag at 12/17/20 1410    Review of systems: In addition to what is detailed in the HPI above, all other systems reviewed and are negative.      Physical Examination:     Vitals:    12/18/20 0000 12/18/20 0400 12/18/20 0500 12/18/20 0809   BP: 145/87 119/59  (!) 97/47   Pulse: 93 (!) 102  (!) 109   Resp: 17 17  16   Temp: 36.3 °C (97.4 °F) 36.7 °C (98 °F)  36.1 °C (96.9 °F)   TempSrc: Temporal Temporal  Temporal   SpO2: 97% 93%  93%   Weight:   77.9 kg (171 lb 11.8 oz)    Height:           General: Patient in no acute distress, pleasant and cooperative.  HEENT: Normocephalic, no signs of acute trauma.   Neck: Supple, no meningeal signs or carotid bruits. There is normal range of motion. No tenderness on exam.   Chest: Clear to auscultation. No cough.   CV: RRR, no murmurs.   Skin: No signs of acute rashes or trauma.   Musculoskeletal: Joints exhibit full range  "of motion, without any pain to palpation. There are no signs of joint or muscle swelling. There is no tenderness to deep palpation of muscles.   Psychiatric: No hallucinatory behavior. Denies symptoms of depression or suicidal ideation. Mood and affect appear normal on exam.     NEUROLOGICAL EXAM:   Mental status, orientation: Awake, alert, following commands, and fully oriented.   Speech and language: Speech is clear and fluent. The patient is able to name, repeat, and comprehend.   Memory: There is slightly impaired recollection of recent but not remote events. Patient states having memory problems off and on \"for some time.\"  Cranial nerve exam: Pupils are 3-4 mm bilaterally and equally reactive to light and accommodation. Visual fields are full to field test. There is no nystagmus on primary or secondary gaze. Intact full EOM in all directions of gaze. Face appears asymmetric with slight right nasolabial fold flattening, not present with activation. Sensation in the face is intact to light touch. Uvula is midline. Palate elevates symmetrically. Tongue is midline and without any signs of tongue biting or fasciculations. Sternocleidomastoid muscles exhibit is normal strength bilaterally. Shoulder shrug is intact bilaterally.   Motor exam: Strength is 5/5 in BUE and LLE both distally and proximally. RLE 1/5 with noxious stimuli distally with no movement proximally. Tone is normal. No abnormal movements were seen on exam.   Sensory exam Reveals decreased sense of light touch and pinprick in to anterior distal RLE, but normal in all other extremities.   Deep tendon reflexes:  2+ throughout. Plantar responses are up-going to right, down-going to left. There is no clonus.   Coordination: Shows a normal finger-nose-finger to BUE, normal heel-down-shin to LLE. Unable to perform coordination testing to RLE secondary to hemiplegia. Normal rapidly alternating movements.   Gait: Deferred as patient is high fall risk.     NIH " Stroke Scale  12/18/2020    1a. Level of Consciousness (Alert, drowsy, etc): 0= Alert    1b. LOC Questions (Month, age): 0= Answers both correctly    1c. LOC Commands (Open/close eyes make fist/let go): 0= Obeys both correctly    2.   Best Gaze (Eyes open - patient follows examiner's finger on face): 0= Normal    3.   Visual Fields (introduce visual stimulus/threat to patient's field quadrants): 0= No visual loss    4.   Facial Paresis (Show teeth, raise eyebrows and squeeze eyes shut): 1= Minor     5a. Motor Arm - Left (Elevate arm to 90 degrees if patient is sitting, 45 degrees if  supine): 0= No drift    5b. Motor Arm - Right (Elevate arm to 90 degrees if patient is sitting, 45 degrees if supine): 0= No drift    6a. Motor Leg - Left (Elevate leg 30 degrees with patient supine): 0= No drift    6b. Motor Leg - Right  (Elevate leg 30 degrees with patient supine): 4= No movement    7.   Limb Ataxia (Finger-nose, heel down shin): 0= No ataxia    8.   Sensory (Pin prick to face, arm, trunk and leg - compare side to side): 1= Partial loss    9.  Best Language (Name item, describe a picture and read sentences): 0= No aphasia    10. Dysarthria (Evaluate speech clarity by patient repeating listed words): 0= Normal articulation    11. Extinction and Inattention (Use information from prior testing to identify neglect or  double simultaneous stimuli testing): 0= No neglect    Total NIH Score: 6    Pre-stroke Modified [place] Scale (MRS): 0 = No symptoms    Presumed Mechanism by TOAST:  Cardioembolic    ANCILLARY DATA REVIEWED:     Labs:  Lab Results   Component Value Date/Time    PROTHROMBTM 13.8 12/17/2020 07:06 AM    INR 1.03 12/17/2020 07:06 AM      Lab Results   Component Value Date/Time    WBC 11.5 (H) 12/18/2020 02:45 AM    RBC 4.45 12/18/2020 02:45 AM    HEMOGLOBIN 12.8 12/18/2020 02:45 AM    HEMATOCRIT 38.8 12/18/2020 02:45 AM    MCV 87.2 12/18/2020 02:45 AM    MCH 28.8 12/18/2020 02:45 AM    MCHC 33.0 (L) 12/18/2020  02:45 AM    MPV 9.8 12/18/2020 02:45 AM    NEUTSPOLYS 81.50 (H) 12/18/2020 02:45 AM    LYMPHOCYTES 6.50 (L) 12/18/2020 02:45 AM    MONOCYTES 9.80 12/18/2020 02:45 AM    EOSINOPHILS 0.20 12/18/2020 02:45 AM    BASOPHILS 0.40 12/18/2020 02:45 AM      Lab Results   Component Value Date/Time    SODIUM 131 (L) 12/18/2020 02:45 AM    POTASSIUM 3.5 (L) 12/18/2020 02:45 AM    CHLORIDE 99 12/18/2020 02:45 AM    CO2 21 12/18/2020 02:45 AM    GLUCOSE 126 (H) 12/18/2020 02:45 AM    BUN 26 (H) 12/18/2020 02:45 AM    CREATININE 0.65 12/18/2020 02:45 AM      Lab Results   Component Value Date/Time    CHOLSTRLTOT 86 (L) 12/18/2020 02:45 AM    LDL 48 12/18/2020 02:45 AM    HDL 17 (A) 12/18/2020 02:45 AM    TRIGLYCERIDE 105 12/18/2020 02:45 AM       Lab Results   Component Value Date/Time    ALKPHOSPHAT 124 (H) 12/17/2020 07:06 AM    ASTSGOT 13 12/17/2020 07:06 AM    ALTSGPT 10 12/17/2020 07:06 AM    TBILIRUBIN 1.8 (H) 12/17/2020 07:06 AM        Imaging/Testing:    I interpreted and/or reviewed the patient's neuroimaging    EC-ECHOCARDIOGRAM COMPLETE W/O CONT   Final Result      MR-THORACIC SPINE-W/O   Final Result      1.  Midthoracic dorsal kyphosis. No underlying vertebral abnormalities or wedge compression deformities. No evidence of old or recent/subacute insufficiency compression fracture.   2.  T2-T3, T7-T8, and T8-T9 ventral extradural defects consistent with disc or disc/osteophyte change. This is most prominent at T7-T8 where there is effacement of ventral subarachnoid space and contour impression on the ventral cord. However, there is    no overall central stenosis as the dorsal subarachnoid space is generous at all of these levels.      MR-LUMBAR SPINE-W/O   Final Result      1.  Cholelithiasis.   2.  L5-S1 grade 2 spondylolisthesis with bilateral L5 spondylolysis. Degenerative disc space narrowing and vacuum disc phenomenon at this level.   3.  L3-4 minimal left lateral disc bulging. No central or foraminal stenosis.    4.  L4-5 tiny midline disc bulge or protrusion with underlying tiny midline annular fissure. No central or foraminal stenosis.   5.  L5-S1 mild pseudo-disc bulging associated with spondylolisthesis. No central stenosis. Marked left foraminal stenosis and severe right foraminal stenosis with horizontal deformity of the neural foramina associated with the spondylolisthesis.      MR-BRAIN-W/O   Final Result      1.  Moderate-advanced cerebral atrophy. Age-appropriate.   2.  Small and clustered gyriform foci of bright diffusion signal involving the left far posterior parasagittal frontal lobe and left parietal lobe consistent with acute cerebral infarction. No hemorrhagic transformation.   3.  Moderate supratentorial white matter disease most consistent with microvascular ischemic change.   4.  Old lacunar infarct left posterior frontal lobe white matter at the level of the centrum semiovale.   5.  No evidence of acute hemorrhage or mass lesion.      DX-ANKLE 3+ VIEWS RIGHT   Final Result         1.  No acute traumatic bony injury.   2.  Pes planus   3.  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  No radiographic evidence of acute traumatic injury.      DX-PELVIS-1 OR 2 VIEWS   Final Result         1.  No acute traumatic bony injury.   2.  Atherosclerosis   3.  Calcified uterine fibroid      DX-CHEST-PORTABLE (1 VIEW)   Final Result         1.  No focal infiltrates.   2.  Perihilar interstitial prominence and bronchial wall cuffing, appearance suggests changes of underlying bronchial inflammation, consider bronchitis.      CT-CTA NECK WITH & W/O-POST PROCESSING   Final Result         1.  CT angiogram of the neck within normal limits.         CT-CTA HEAD WITH & W/O-POST PROCESS   Final Result         1.  No large vessel occlusion or aneurysm identified.   2.  Approximately 50% narrowing of the left P2 segment, could represent vessel spasm, stenosis, or small nonocclusive  thrombus.      CT-CEREBRAL PERFUSION ANALYSIS   Final Result         1.  Cerebral blood flow less than 30% likely representing completed infarct = 0 mL.      2.  T Max more than 6 seconds likely representing combination of completed infarct and ischemia = 0 mL.      3.  Mismatched volume likely representing ischemic brain/penumbra = None      4.  Please note that the cerebral perfusion was performed on the limited brain tissue around the basal ganglia region. Infarct/ischemia outside the CT perfusion sections can be missed in this study.      CT-HEAD W/O   Final Result         1.  No acute intracranial abnormality is identified, there are nonspecific white matter changes, commonly associated with small vessel ischemic disease.  Associated mild cerebral atrophy is noted.   2.  Atherosclerosis.            Assessment:    Liudmila Pulido is a 88 y.o. female with relevant history of hypertension on Metoprolol, hyperlipidemia, and DM type II on Metformin who presented on 12/17/20 for right leg weakness and difficulty walking for which neurology was consulted to address acute ischemic stroke. As the patient presented greater than 4.5 hours from last known well, she was not given tPA. CTA head and neck revealed no large vessel occlusion, thus patient was not an IR thrombectomy candidate. MRI brain wo contrast revealed small cluster gyriform foci of left posterior parasagittal frontal and left parietal lobes without hemorrhagic transformation. TTE with bubble study showed EF 70%, moderate aortic stenosis, and mildly dilated left atrium.  These findings are consistent with atrial fibrillation, and correlates with A. fib seen on ECG in ER. Neurological exam is significant for mild right nasolabial fold flattening, right lower extremity hemiplegia, and distal anterior right lower extremity decreased sensation. NIHSS of 6 currently.  Exam correlates with the motor or sensory cortex controlling right lower extremity sensation  and movement.  Etiology of the left posterior frontal and parietal lobe acute ischemic stroke is cardioembolic given new onset A. fib.    Plan:    -q4h and PRN neuro assessment. VS per nursing/unit protocol.   -Permissive HTN ok until 19:00 today, not to exceed SBP > 220, DBP > 105. Then, BP goal < 140/90. Antihypertensives per primary team.   -Telemetry; currently SR. Afib seen on telemetry and initial ECG.    -Eliquis 5mg PO BID, first dose at 18:00 today.  -Atorvastatin 80 mg PO q HS.  Note lipid panel LDL 48 (within goal<70) and HDL 17 (goal> 40).   -Recommend aggressive BG management per primary team. Avoid IVF with Dextrose. BG goal 140-180. Note hemoglobin A1c 6.2 (within goal <7).   -PT/OT/SLP eval and treat.   -Counseled patient at length regarding life style and risk factor modification for secondary stroke prevention.   -[facility].  Referral placed.  -Recommend Cardiology consult given new onset A. fib.  -All other medical management per primary team.   -DVT PPX: SCDs and Eliquis.      No further recommendations or further studies from a neurological standpoint at this time. Please re-consult if you have further questions or there is a change in status.    The evaluation of the patient, and recommended management, was discussed with Dr. Sanabria, Dr. Gayle, and bedside RN.     LOIDA Mg.   Nurse Practitioner, [facility]  [place] [facility]  (t) [phone]  (f) [phone]

## 2020-12-19 LAB
ALBUMIN SERPL BCP-MCNC: 2.1 G/DL (ref 3.2–4.9)
ALBUMIN/GLOB SERPL: 0.6 G/DL
ALP SERPL-CCNC: 140 U/L (ref 30–99)
ALT SERPL-CCNC: 16 U/L (ref 2–50)
ANION GAP SERPL CALC-SCNC: 7 MMOL/L (ref 7–16)
AST SERPL-CCNC: 40 U/L (ref 12–45)
BASOPHILS # BLD AUTO: 0.2 % (ref 0–1.8)
BASOPHILS # BLD: 0.02 K/UL (ref 0–0.12)
BILIRUB SERPL-MCNC: 0.8 MG/DL (ref 0.1–1.5)
BUN SERPL-MCNC: 22 MG/DL (ref 8–22)
CALCIUM SERPL-MCNC: 8.7 MG/DL (ref 8.5–10.5)
CHLORIDE SERPL-SCNC: 101 MMOL/L (ref 96–112)
CO2 SERPL-SCNC: 22 MMOL/L (ref 20–33)
COVID ORDER STATUS COVID19: NORMAL
CREAT SERPL-MCNC: 0.59 MG/DL (ref 0.5–1.4)
EOSINOPHIL # BLD AUTO: 0.03 K/UL (ref 0–0.51)
EOSINOPHIL NFR BLD: 0.3 % (ref 0–6.9)
ERYTHROCYTE [DISTWIDTH] IN BLOOD BY AUTOMATED COUNT: 44.1 FL (ref 35.9–50)
GLOBULIN SER CALC-MCNC: 3.4 G/DL (ref 1.9–3.5)
GLUCOSE BLD-MCNC: 109 MG/DL (ref 65–99)
GLUCOSE BLD-MCNC: 118 MG/DL (ref 65–99)
GLUCOSE BLD-MCNC: 136 MG/DL (ref 65–99)
GLUCOSE BLD-MCNC: 154 MG/DL (ref 65–99)
GLUCOSE SERPL-MCNC: 132 MG/DL (ref 65–99)
HCT VFR BLD AUTO: 36.9 % (ref 37–47)
HGB BLD-MCNC: 11.9 G/DL (ref 12–16)
IMM GRANULOCYTES # BLD AUTO: 0.1 K/UL (ref 0–0.11)
IMM GRANULOCYTES NFR BLD AUTO: 1.1 % (ref 0–0.9)
LYMPHOCYTES # BLD AUTO: 0.61 K/UL (ref 1–4.8)
LYMPHOCYTES NFR BLD: 6.6 % (ref 22–41)
MCH RBC QN AUTO: 27.8 PG (ref 27–33)
MCHC RBC AUTO-ENTMCNC: 32.2 G/DL (ref 33.6–35)
MCV RBC AUTO: 86.2 FL (ref 81.4–97.8)
MONOCYTES # BLD AUTO: 0.63 K/UL (ref 0–0.85)
MONOCYTES NFR BLD AUTO: 6.8 % (ref 0–13.4)
NEUTROPHILS # BLD AUTO: 7.86 K/UL (ref 2–7.15)
NEUTROPHILS NFR BLD: 85 % (ref 44–72)
NRBC # BLD AUTO: 0 K/UL
NRBC BLD-RTO: 0 /100 WBC
PLATELET # BLD AUTO: 192 K/UL (ref 164–446)
PMV BLD AUTO: 10 FL (ref 9–12.9)
POTASSIUM SERPL-SCNC: 3.4 MMOL/L (ref 3.6–5.5)
PROT SERPL-MCNC: 5.5 G/DL (ref 6–8.2)
RBC # BLD AUTO: 4.28 M/UL (ref 4.2–5.4)
SARS-COV-2 RNA RESP QL NAA+PROBE: NOTDETECTED
SODIUM SERPL-SCNC: 130 MMOL/L (ref 135–145)
SPECIMEN SOURCE: NORMAL
TROPONIN T SERPL-MCNC: 238 NG/L (ref 6–19)
TROPONIN T SERPL-MCNC: 243 NG/L (ref 6–19)
WBC # BLD AUTO: 9.3 K/UL (ref 4.8–10.8)

## 2020-12-19 PROCEDURE — 700102 HCHG RX REV CODE 250 W/ 637 OVERRIDE(OP): Performed by: NURSE PRACTITIONER

## 2020-12-19 PROCEDURE — 700102 HCHG RX REV CODE 250 W/ 637 OVERRIDE(OP): Performed by: INTERNAL MEDICINE

## 2020-12-19 PROCEDURE — A9270 NON-COVERED ITEM OR SERVICE: HCPCS | Performed by: INTERNAL MEDICINE

## 2020-12-19 PROCEDURE — 82962 GLUCOSE BLOOD TEST: CPT | Mod: 91

## 2020-12-19 PROCEDURE — U0003 INFECTIOUS AGENT DETECTION BY NUCLEIC ACID (DNA OR RNA); SEVERE ACUTE RESPIRATORY SYNDROME CORONAVIRUS 2 (SARS-COV-2) (CORONAVIRUS DISEASE [COVID-19]), AMPLIFIED PROBE TECHNIQUE, MAKING USE OF HIGH THROUGHPUT TECHNOLOGIES AS DESCRIBED BY CMS-2020-01-R: HCPCS

## 2020-12-19 PROCEDURE — C9803 HOPD COVID-19 SPEC COLLECT: HCPCS | Performed by: PHYSICAL MEDICINE & REHABILITATION

## 2020-12-19 PROCEDURE — 99223 1ST HOSP IP/OBS HIGH 75: CPT | Performed by: INTERNAL MEDICINE

## 2020-12-19 PROCEDURE — A9270 NON-COVERED ITEM OR SERVICE: HCPCS | Performed by: NURSE PRACTITIONER

## 2020-12-19 PROCEDURE — 93005 ELECTROCARDIOGRAM TRACING: CPT | Performed by: INTERNAL MEDICINE

## 2020-12-19 PROCEDURE — 99232 SBSQ HOSP IP/OBS MODERATE 35: CPT | Performed by: INTERNAL MEDICINE

## 2020-12-19 PROCEDURE — 36415 COLL VENOUS BLD VENIPUNCTURE: CPT

## 2020-12-19 PROCEDURE — 770020 HCHG ROOM/CARE - TELE (206)

## 2020-12-19 PROCEDURE — 84484 ASSAY OF TROPONIN QUANT: CPT | Mod: 91

## 2020-12-19 PROCEDURE — 80053 COMPREHEN METABOLIC PANEL: CPT

## 2020-12-19 PROCEDURE — 85025 COMPLETE CBC W/AUTO DIFF WBC: CPT

## 2020-12-19 RX ORDER — METOPROLOL TARTRATE 1 MG/ML
5 INJECTION, SOLUTION INTRAVENOUS EVERY 6 HOURS PRN
Status: DISCONTINUED | OUTPATIENT
Start: 2020-12-19 | End: 2020-12-20 | Stop reason: HOSPADM

## 2020-12-19 RX ORDER — POTASSIUM CHLORIDE 20 MEQ/1
20 TABLET, EXTENDED RELEASE ORAL 2 TIMES DAILY
Status: COMPLETED | OUTPATIENT
Start: 2020-12-19 | End: 2020-12-19

## 2020-12-19 RX ADMIN — METOPROLOL TARTRATE 50 MG: 25 TABLET, FILM COATED ORAL at 07:54

## 2020-12-19 RX ADMIN — INSULIN HUMAN 1 UNITS: 100 INJECTION, SOLUTION PARENTERAL at 18:01

## 2020-12-19 RX ADMIN — APIXABAN 5 MG: 5 TABLET, FILM COATED ORAL at 06:15

## 2020-12-19 RX ADMIN — ATORVASTATIN CALCIUM 40 MG: 40 TABLET, FILM COATED ORAL at 17:20

## 2020-12-19 RX ADMIN — POTASSIUM CHLORIDE 20 MEQ: 1500 TABLET, EXTENDED RELEASE ORAL at 07:54

## 2020-12-19 RX ADMIN — METOPROLOL TARTRATE 50 MG: 25 TABLET, FILM COATED ORAL at 17:20

## 2020-12-19 RX ADMIN — APIXABAN 5 MG: 5 TABLET, FILM COATED ORAL at 17:20

## 2020-12-19 RX ADMIN — DOCUSATE SODIUM 50 MG AND SENNOSIDES 8.6 MG 2 TABLET: 8.6; 5 TABLET, FILM COATED ORAL at 06:15

## 2020-12-19 RX ADMIN — POTASSIUM CHLORIDE 20 MEQ: 1500 TABLET, EXTENDED RELEASE ORAL at 17:20

## 2020-12-19 ASSESSMENT — ENCOUNTER SYMPTOMS
NAUSEA: 0
ABDOMINAL PAIN: 0
SENSORY CHANGE: 1
HEADACHES: 0
SHORTNESS OF BREATH: 0
CHILLS: 0
PALPITATIONS: 0
TINGLING: 1
DIZZINESS: 0
SPEECH CHANGE: 0
COUGH: 0
BLURRED VISION: 0
FOCAL WEAKNESS: 1
NERVOUS/ANXIOUS: 0
FEVER: 0
BACK PAIN: 0
VOMITING: 0

## 2020-12-19 ASSESSMENT — CHA2DS2 SCORE
HYPERTENSION: YES
AGE 65 TO 74: NO
VASCULAR DISEASE: NO
AGE 75 OR GREATER: YES
DIABETES: YES
CHF OR LEFT VENTRICULAR DYSFUNCTION: NO
CHA2DS2 VASC SCORE: 7
PRIOR STROKE OR TIA OR THROMBOEMBOLISM: YES
SEX: FEMALE

## 2020-12-19 NOTE — CONSULTS
Medical chart review completed.     Patient is a 88 y.o. year-old female with a past medical history significant for diabetes mellitus, aortic stenosis, essential hypertension, hyperlipidemia admitted to [facility] on 12/17/2020  7:03 AM with right leg weakness.  The patient was last seen normal at 7 PM the day prior and around 3 AM they noticed she was weaker when they helped her into bed.  Patient was brought to the emergency department where she was found to be tachycardic.  Atrial fibrillation was seen on EKG monitor.  She did have elevated white blood cell count and hyponatremia at 127.  Troponin was elevated.  Head CT negative for any acute finding.  CT perfusion was normal.  CTA of head and neck showed no large vessel occlusion. Review of her records showed she had an echocardiogram in January 2020 with normal EF, mild aortic regurgitation and tricuspid regurgitation, and grade 1 diastolic dysfunction, with no mention of atrial fibrillation.   Echocardiogram from 12/2020 revealed hyperdynamic LV with an EF of 75% and moderate aortic stenosis.  Patient was called code stroke.  MRI brain was positive for high frontal infarct.  Given new onset atrial fibrillation patient has been placed on Eliquis 5 mg twice daily.  Patient also had rising troponin.  EKG again showed A. fib and LBBB which was not present prior.  Angiogram from [place] 1/2019 revealed nonobstructive CAD.  Suspected to be demand ischemia.  Cardiology was consulted.  Patient also found to have moderate aortic stenosis on echocardiogram.  This requires outpatient follow-up and not thought to contribute to her current issues.  Currently not a candidate for cardioversion for her atrial fibrillation given her recent CVA.  Could consider as an outpatient.     Review of therapy notes reveals that patient is highly motivated and reports that she has good family support.  With occupational therapy she is mostly moderate to max assist.  With  occupational therapy she is moderate to maximal assist.  Cognitively she is largely within functional limits and is on soft-bite sized (6) and thin liquid diet.  Patient continues to have hyponatremia at 130 on 12/19, leukocytosis resolved.  Troponin still elevated.    PMH:  Past Medical History:   Diagnosis Date   • Hyperlipidemia    • Hypertension    • Type 2 diabetes mellitus without complication, without long-term current use of insulin ([facility]) 12/18/2020       PSH:  History reviewed. No pertinent surgical history.    FAMILY HISTORY:  History reviewed. No pertinent family history.    MEDICATIONS:  Current Facility-Administered Medications   Medication Dose   • potassium chloride SA (Kdur) tablet 20 mEq  20 mEq   • metoprolol (LOPRESSOR) tablet 50 mg  50 mg   • Metoprolol Tartrate (LOPRESSOR) injection 5 mg  5 mg   • atorvastatin (LIPITOR) tablet 40 mg  40 mg   • apixaban (ELIQUIS) tablet 5 mg  5 mg   • insulin regular (HumuLIN R,NovoLIN R) injection  1-6 Units   • Respiratory Therapy Consult     • acetaminophen (Tylenol) tablet 650 mg  650 mg   • labetalol (NORMODYNE/TRANDATE) injection 10 mg  10 mg   • ondansetron (ZOFRAN) syringe/vial injection 4 mg  4 mg   • ondansetron (ZOFRAN ODT) dispertab 4 mg  4 mg   • senna-docusate (PERICOLACE or SENOKOT S) 8.6-50 MG per tablet 2 Tab  2 Tab    And   • polyethylene glycol/lytes (MIRALAX) PACKET 1 Packet  1 Packet    And   • magnesium hydroxide (MILK OF MAGNESIA) suspension 30 mL  30 mL    And   • bisacodyl (DULCOLAX) suppository 10 mg  10 mg   • NS infusion       Imaging:     Imaging:  DX-ANKLE 3+ VIEWS RIGHT   Final Result           1.  No acute traumatic bony injury.   2.  Pes planus   3.  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  No radiographic evidence of acute traumatic injury.       DX-PELVIS-1 OR 2 VIEWS   Final Result           1.  No acute traumatic bony injury.   2.  Atherosclerosis   3.  Calcified uterine  fibroid       DX-CHEST-PORTABLE (1 VIEW)   Final Result           1.  No focal infiltrates.   2.  Perihilar interstitial prominence and bronchial wall cuffing, appearance suggests changes of underlying bronchial inflammation, consider bronchitis.       CT-CTA NECK WITH & W/O-POST PROCESSING   Final Result           1.  CT angiogram of the neck within normal limits.           CT-CTA HEAD WITH & W/O-POST PROCESS   Final Result           1.  No large vessel occlusion or aneurysm identified.   2.  Approximately 50% narrowing of the left P2 segment, could represent vessel spasm, stenosis, or small nonocclusive thrombus.       CT-CEREBRAL PERFUSION ANALYSIS   Final Result           1.  Cerebral blood flow less than 30% likely representing completed infarct = 0 mL.       2.  T Max more than 6 seconds likely representing combination of completed infarct and ischemia = 0 mL.       3.  Mismatched volume likely representing ischemic brain/penumbra = None       4.  Please note that the cerebral perfusion was performed on the limited brain tissue around the basal ganglia region. Infarct/ischemia outside the CT perfusion sections can be missed in this study.       CT-HEAD W/O   Final Result           1.  No acute intracranial abnormality is identified, there are nonspecific white matter changes, commonly associated with small vessel ischemic disease.  Associated mild cerebral atrophy is noted.   2.  Atherosclerosis.     MR-THORACIC SPINE-W/O   Final Result       1.  Midthoracic dorsal kyphosis. No underlying vertebral abnormalities or wedge compression deformities. No evidence of old or recent/subacute insufficiency compression fracture.   2.  T2-T3, T7-T8, and T8-T9 ventral extradural defects consistent with disc or disc/osteophyte change. This is most prominent at T7-T8 where there is effacement of ventral subarachnoid space and contour impression on the ventral cord. However, there is    no overall central stenosis as the  dorsal subarachnoid space is generous at all of these levels.       MR-LUMBAR SPINE-W/O   Final Result       1.  Cholelithiasis.   2.  L5-S1 grade 2 spondylolisthesis with bilateral L5 spondylolysis. Degenerative disc space narrowing and vacuum disc phenomenon at this level.   3.  L3-4 minimal left lateral disc bulging. No central or foraminal stenosis.   4.  L4-5 tiny midline disc bulge or protrusion with underlying tiny midline annular fissure. No central or foraminal stenosis.   5.  L5-S1 mild pseudo-disc bulging associated with spondylolisthesis. No central stenosis. Marked left foraminal stenosis and severe right foraminal stenosis with horizontal deformity of the neural foramina associated with the spondylolisthesis.       MR-BRAIN-W/O   Final Result       1.  Moderate-advanced cerebral atrophy. Age-appropriate.   2.  Small and clustered gyriform foci of bright diffusion signal involving the left far posterior parasagittal frontal lobe and left parietal lobe consistent with acute cerebral infarction. No hemorrhagic transformation.   3.  Moderate supratentorial white matter disease most consistent with microvascular ischemic change.   4.  Old lacunar infarct left posterior frontal lobe white matter at the level of the centrum semiovale.   5.  No evidence of acute hemorrhage or mass lesion.         ALLERGIES:  Demerol, Penicillins, and Erythromycin    PSYCHOSOCIAL HISTORY:  Living Site:  Home  Living With:  family  Caregiver's availability:  Likely will always have somebody that can check in on her.  Number of stairs:  3  Substance use history:  Unknown      The patient presents functional deficits in mobility and self-care as well as cognitive deficits and swallowing/speech, and Moderate  de-conditioning. Pre-morbidly, this patient lived in a single level home with Three steps to enter,with family  The patient was evaluated by acute care Physical Therapy, Occupational Therapy and Speech Language Pathology;  currently requiring maximal assistance for mobility and maximal assistance for ADLs, also with ongoing cognitive, speech and swallowing deficits. The patient's current diet is Dysphagia III with Thin liquids.     The patient is   a Very Good candidate for an acute inpatient rehabilitation program with a coordinated program of care at an intensity and frequency not available at a lower level of care.     Note: This recommendation requires that patient has at least CGA/Minimal Assistance needs in at least two therapy disciplines.  If patient progresses to no longer need CGA/Skyler with at least two therapy disciplines they may be more appropriate for Skilled nursing facility versus home with home health.      This recommendation is substantiated by the patient's current medical condition with intervention and assessment of medical issues requiring an acute level of care for patient's safety and maximum outcome. A coordinated program of care will be provided by an interdisciplinary team including physical therapy, occupational therapy, speech language pathology, physiatry, rehab nursing and rehab psychology. Rehab goals include improved cognition, speech and swallowing, mobility, self-care management, strength and conditioning/endurance, pain management, bowel and bladder management, mood and affect, and safety with independent home management including caregiver training. Estimated length of stay is approximately 14-18 days. Rehab potential: Very Good. Disposition: to pre-morbid independent living setting with supportive care of patient's family. We will continue to follow with you in anticipation of discharge to acute inpatient rehabilitation when medically stable to do so at the discretion of the attending physician. Thank you for allowing us to participate in this patient's care. Please call with any questions regarding this recommendation.    Xochilt Causey D.O.

## 2020-12-19 NOTE — PROGRESS NOTES
Pts daughter is bedside requesting to speak with social work, made a call to [employer] and messaged her, daughter brought insurance information as well as DPOA paperwork-copies were placed in the front of the hard chart. Daughter would like to speak wit a  ASAP.

## 2020-12-19 NOTE — PROGRESS NOTES
Monitor summary: Atrial fibrillation 101-119, [place] -, QRS 0.12, QT -, with PVCs per strip from monitor room

## 2020-12-19 NOTE — CONSULTS
Cardiology Consult Note:    Manuel Enamorado M.D.  Date & Time note created:    12/19/2020   7:42 AM     Referring MD:  Dr. Gayle    Patient ID:   Name:             Liudmila Pulido   YOB: 1932  Age:                 88 y.o.  female   MRN:               [id number]                                                             Reason for Consult:      CVA, a-fib    History of Present Illness:    88-year-old female with past medical history of aortic stenosis, hypertension, hyperlipidemia.  She was in her normal state of health when she developed symptoms of a CVA.  She presented to the ER for that reason was found to be in atrial fibrillation with rapid ventricular response.  She denies a past medical history of A. fib.  She is never been on a blood thinner.  She was previously followed at [place] for symptoms of palpitations but does not carry diagnosis of this.  She does not know that she has a at least moderately stenosed aortic valve.  Otherwise she was having no chest pain or shortness of breath as an outpatient.    Review of Systems:      Constitutional: Denies fevers, Denies weight changes  Eyes: Denies changes in vision, no eye pain  Ears/Nose/Throat/Mouth: Denies nasal congestion or sore throat   Cardiovascular: no chest pain, no palpitations   Respiratory: no shortness of breath , Denies cough  Gastrointestinal/Hepatic: Denies abdominal pain, nausea, vomiting, diarrhea, constipation or GI bleeding   Genitourinary: Denies dysuria or frequency  Musculoskeletal/Rheum: Denies  joint pain and swelling, no edema  Skin: Denies rash  Neurological: Denies headache, confusion, memory loss or focal weakness/parasthesias  Psychiatric: denies mood disorder   Endocrine: Nena thyroid problems  Heme/Oncology/Lymph Nodes: Denies enlarged lymph nodes, denies brusing or known bleeding disorder  All other systems were reviewed and are negative (AMA/CMS criteria)                Past Medical History:   Past  Medical History:   Diagnosis Date   • Hyperlipidemia    • Hypertension    • Type 2 diabetes mellitus without complication, without long-term current use of insulin ([facility]) 12/18/2020     Active Hospital Problems    Diagnosis   • CVA (cerebral vascular accident) ([facility]) [I63.9]     Priority: High   • Elevated troponin [R77.8]     Priority: High   • AF (atrial fibrillation) ([facility]) [I48.91]     Priority: High   • Hyponatremia [E87.1]     Priority: Medium   • Leukocytosis [D72.829]     Priority: Medium   • Essential hypertension [I10]     Priority: Low   • Hyperlipidemia [E78.5]     Priority: Low   • Type 2 diabetes mellitus without complication, without long-term current use of insulin ([facility]) [E11.9]   • Aortic stenosis [I35.0]       Past Surgical History:  History reviewed. No pertinent surgical history.    Hospital Medications:  Current Facility-Administered Medications   Medication Dose   • potassium chloride SA (Kdur) tablet 20 mEq  20 mEq   • metoprolol (LOPRESSOR) tablet 50 mg  50 mg   • Metoprolol Tartrate (LOPRESSOR) injection 5 mg  5 mg   • atorvastatin (LIPITOR) tablet 40 mg  40 mg   • apixaban (ELIQUIS) tablet 5 mg  5 mg   • insulin regular (HumuLIN R,NovoLIN R) injection  1-6 Units   • Respiratory Therapy Consult     • acetaminophen (Tylenol) tablet 650 mg  650 mg   • labetalol (NORMODYNE/TRANDATE) injection 10 mg  10 mg   • ondansetron (ZOFRAN) syringe/vial injection 4 mg  4 mg   • ondansetron (ZOFRAN ODT) dispertab 4 mg  4 mg   • senna-docusate (PERICOLACE or SENOKOT S) 8.6-50 MG per tablet 2 Tab  2 Tab    And   • polyethylene glycol/lytes (MIRALAX) PACKET 1 Packet  1 Packet    And   • magnesium hydroxide (MILK OF MAGNESIA) suspension 30 mL  30 mL    And   • bisacodyl (DULCOLAX) suppository 10 mg  10 mg   • NS infusion           Current Outpatient Medications:  Medications Prior to Admission   Medication Sig Dispense Refill Last Dose   • metFORMIN (GLUCOPHAGE) 500 MG Tab Take 1,000 mg by mouth 2 times a day,  with meals.   12/15/2020 at am   • ibuprofen (MOTRIN) 200 MG Tab Take 200 mg by mouth every 6 hours as needed for Mild Pain or Headache.   12/16/2020 at pm   • FIBER PO Take 1 Tab by mouth 1 time a day as needed (constipation).   prn at prn   • Ascorbic Acid (VITAMIN C PO) Take 1 Tab by mouth every morning.   12/16/2020 at am   • metoprolol SR (TOPROL XL) 100 MG TABLET SR 24 HR Take 150 mg by mouth every morning.   12/15/2020 at am       Medication Allergy:  Allergies   Allergen Reactions   • Demerol Unspecified     Convulsions     • Penicillins      Per daughter, Laura   • Erythromycin Vomiting and Nausea       Family History:  History reviewed. No pertinent family history.    Social History:  Social History     Socioeconomic History   • Marital status:      Spouse name: Not on file   • Number of children: 3   • Years of education: Not on file   • Highest education level: Not on file   Occupational History     Employer: Retired   Social Needs   • Financial resource strain: Not on file   • Food insecurity     Worry: Not on file     Inability: Not on file   • Transportation needs     Medical: Not on file     Non-medical: Not on file   Tobacco Use   • Smoking status: Never Smoker   • Smokeless tobacco: Never Used   Substance and Sexual Activity   • Alcohol use: Yes     Frequency: Monthly or less     Drinks per session: 1 or 2     Binge frequency: Never   • Drug use: Never   • Sexual activity: Not on file   Lifestyle   • Physical activity     Days per week: Not on file     Minutes per session: Not on file   • Stress: Not on file   Relationships   • Social connections     Talks on phone: Not on file     Gets together: Not on file     Attends [employer] service: Not on file     Active member of club or organization: Not on file     Attends meetings of clubs or organizations: Not on file     Relationship status: Not on file   • Intimate partner violence     Fear of current or ex partner: Not on file     Emotionally  "abused: Not on file     Physically abused: Not on file     Forced sexual activity: Not on file   Other Topics Concern   • Not on file   Social History Narrative   • Not on file         Physical Exam:  Vitals/ General Appearance:   Weight/BMI: Body mass index is 27.72 kg/m².  /62   Pulse (!) 101   Temp 36.9 °C (98.5 °F) (Temporal)   Resp 18   Ht 1.676 m (5' 6\")   Wt 77.9 kg (171 lb 11.8 oz)   SpO2 96%   Vitals:    12/18/20 1700 12/18/20 2000 12/19/20 0000 12/19/20 0400   BP: 122/82 129/74 130/59 131/62   Pulse: (!) 111 (!) 109 (!) 111 (!) 101   Resp: 16 16 16 18   Temp: 37 °C (98.6 °F) 36.4 °C (97.6 °F) 36.9 °C (98.5 °F) 36.9 °C (98.5 °F)   TempSrc: Temporal Temporal Temporal Temporal   SpO2: 91% 98% 93% 96%   Weight:       Height:         Oxygen Therapy:  Pulse Oximetry: 96 %, O2 (LPM): 1, O2 Delivery Device: None - Room Air    Constitutional:   Well developed, Well nourished, No acute distress  HENMT:  Normocephalic, Atraumatic, Oropharynx moist mucous membranes, No oral exudates, Nose normal.  No thyromegaly.  Eyes:  EOMI, Conjunctiva normal, No discharge.  Neck:  Normal range of motion, No cervical tenderness,  no JVD.  Cardiovascular:  Normal heart rate, Normal rhythm, No murmurs, No rubs, No gallops.   Extremitites with intact distal pulses, no cyanosis, or edema.  Lungs:  Normal breath sounds, breath sounds clear to auscultation bilaterally,  no crackles, no wheezing.   Abdomen: Bowel sounds normal, Soft, No tenderness, No guarding, No rebound, No masses, No hepatosplenomegaly.  Skin: Warm, Dry, No erythema, No rash, no induration.  Neurologic: Alert & oriented x 3, No focal deficits noted, cranial nerves II through X are grossly intact.  Psychiatric: Affect normal, Judgment normal, Mood normal.      MDM (Data Review):     Records reviewed and summarized in current documentation    Lab Data Review:  Recent Results (from the past 24 hour(s))   EKG    Collection Time: 12/18/20  1:00 PM   Result Value " Ref Range    Report       [facility]    Test Date:  2020  Pt Name:    APRIL ADRIAN            Department: [facility]  MRN:        [id number]                      Room:       S182  Gender:     Female                       Technician: [facility]  :        1932                   Requested By:VALENTIN URIBE  Order #:    [id number]                    Reading MD: Manuel Enamorado MD    Measurements  Intervals                                Axis  Rate:       108                          P:  IN:                                      QRS:        -33  QRSD:       128                          T:          138  QT:         372  QTc:        499    Interpretive Statements  SINUS RHYTHM  LEFT BUNDLE BRANCH BLOCK  Compared to ECG 2020 07:38:45  No significant changes  Electronically Signed On 2020 14:39:09 PST by Manuel Enamorado MD     ACCU-CHEK GLUCOSE    Collection Time: 20  3:19 PM   Result Value Ref Range    Glucose - Accu-Ck 160 (H) 65 - 99 mg/dL   TROPONIN    Collection Time: 20  3:48 PM   Result Value Ref Range    Troponin T 210 (H) 6 - 19 ng/L   ACCU-CHEK GLUCOSE    Collection Time: 20  5:21 PM   Result Value Ref Range    Glucose - Accu-Ck 175 (H) 65 - 99 mg/dL   ACCU-CHEK GLUCOSE    Collection Time: 20  8:04 PM   Result Value Ref Range    Glucose - Accu-Ck 159 (H) 65 - 99 mg/dL   TROPONIN    Collection Time: 20  9:48 PM   Result Value Ref Range    Troponin T 223 (H) 6 - 19 ng/L   CBC WITH DIFFERENTIAL    Collection Time: 20  3:51 AM   Result Value Ref Range    WBC 9.3 4.8 - 10.8 K/uL    RBC 4.28 4.20 - 5.40 M/uL    Hemoglobin 11.9 (L) 12.0 - 16.0 g/dL    Hematocrit 36.9 (L) 37.0 - 47.0 %    MCV 86.2 81.4 - 97.8 fL    MCH 27.8 27.0 - 33.0 pg    MCHC 32.2 (L) 33.6 - 35.0 g/dL    RDW 44.1 35.9 - 50.0 fL    Platelet Count 192 164 - 446 K/uL    MPV 10.0 9.0 - 12.9 fL    Neutrophils-Polys 85.00 (H) 44.00 - 72.00 %    Lymphocytes 6.60 (L) 22.00 - 41.00 %    Monocytes 6.80  0.00 - 13.40 %    Eosinophils 0.30 0.00 - 6.90 %    Basophils 0.20 0.00 - 1.80 %    Immature Granulocytes 1.10 (H) 0.00 - 0.90 %    Nucleated RBC 0.00 /100 WBC    Neutrophils (Absolute) 7.86 (H) 2.00 - 7.15 K/uL    Lymphs (Absolute) 0.61 (L) 1.00 - 4.80 K/uL    Monos (Absolute) 0.63 0.00 - 0.85 K/uL    Eos (Absolute) 0.03 0.00 - 0.51 K/uL    Baso (Absolute) 0.02 0.00 - 0.12 K/uL    Immature Granulocytes (abs) 0.10 0.00 - 0.11 K/uL    NRBC (Absolute) 0.00 K/uL   Comp Metabolic Panel    Collection Time: 12/19/20  3:51 AM   Result Value Ref Range    Sodium 130 (L) 135 - 145 mmol/L    Potassium 3.4 (L) 3.6 - 5.5 mmol/L    Chloride 101 96 - 112 mmol/L    Co2 22 20 - 33 mmol/L    Anion Gap 7.0 7.0 - 16.0    Glucose 132 (H) 65 - 99 mg/dL    Bun 22 8 - 22 mg/dL    Creatinine 0.59 0.50 - 1.40 mg/dL    Calcium 8.7 8.5 - 10.5 mg/dL    AST(SGOT) 40 12 - 45 U/L    ALT(SGPT) 16 2 - 50 U/L    Alkaline Phosphatase 140 (H) 30 - 99 U/L    Total Bilirubin 0.8 0.1 - 1.5 mg/dL    Albumin 2.1 (L) 3.2 - 4.9 g/dL    Total Protein 5.5 (L) 6.0 - 8.2 g/dL    Globulin 3.4 1.9 - 3.5 g/dL    A-G Ratio 0.6 g/dL   TROPONIN    Collection Time: 12/19/20  3:51 AM   Result Value Ref Range    Troponin T 238 (H) 6 - 19 ng/L   ESTIMATED GFR    Collection Time: 12/19/20  3:51 AM   Result Value Ref Range    GFR If African American >60 >60 mL/min/1.73 m 2    GFR If Non African American >60 >60 mL/min/1.73 m 2       Imaging/Procedures Review:    Chest Xray:  Reviewed    EKG:   Dated 12/17/2020 personally reviewed interpreted myself showing atrial fibrillation with rapid ventricular response.  Left bundle branch block noted with a QRS of 124.    ECHO:  Dated 12/17/2020 personally viewed and interpreted myself showing a hyperdynamic LV with an EF of 75% with moderate aortic stenosis with a valve area of 1.2 cm.    MDM (Assessment and Plan):     Active Hospital Problems    Diagnosis   • CVA (cerebral vascular accident) ([facility]) [I63.9]     Priority: High   •  Elevated troponin [R77.8]     Priority: High   • AF (atrial fibrillation) ([facility]) [I48.91]     Priority: High   • Hyponatremia [E87.1]     Priority: Medium   • Leukocytosis [D72.829]     Priority: Medium   • Essential hypertension [I10]     Priority: Low   • Hyperlipidemia [E78.5]     Priority: Low   • Type 2 diabetes mellitus without complication, without long-term current use of insulin ([facility]) [E11.9]   • Aortic stenosis [I35.0]     88-year-old female with new onset atrial fibrillation and a CVA in the setting of moderate aortic stenosis.  Her aortic stenosis require follow-up as an outpatient but is not an active inpatient problem.  I do not think is contributing to her issues.  For her atrial fibrillation I would continue the anticoagulation as you are doing.  She is not a candidate for cardioversion at this point given her recent CVA.  Please continue her on metoprolol 50 twice daily.  We can consider either an A. fib ablation or a cardioversion as outpatient however at the moment she needs to recover from her CVA.

## 2020-12-19 NOTE — CARE PLAN
Problem: Communication  Goal: The ability to communicate needs accurately and effectively will improve  Outcome: PROGRESSING AS EXPECTED      Problem: Safety  Goal: Will remain free from injury  Outcome: PROGRESSING AS EXPECTED      Problem: Infection  Goal: Will remain free from infection  Outcome: PROGRESSING AS EXPECTED      Problem: Venous Thromboembolism (VTW)/Deep Vein Thrombosis (DVT) Prevention:  Goal: Patient will participate in Venous Thrombosis (VTE)/Deep Vein Thrombosis (DVT)Prevention Measures  Outcome: PROGRESSING AS EXPECTED      Problem: Knowledge Deficit  Goal: Knowledge of disease process/condition, treatment plan, diagnostic tests, and medications will improve  Outcome: PROGRESSING AS EXPECTED

## 2020-12-19 NOTE — PROGRESS NOTES
Hospital Medicine Daily Progress Note    Date of Service  12/19/2020    Chief Complaint  88 y.o. female admitted 12/17/2020 with right side weakness    Hospital Course  87 yo woman with DM, HTN, HLD who woke with right sided weakness. CT stroke workup was unremarkable. She was found in new onset afib. MRI brain showed acute infarct of left frontal and left parietal lobes, old lacunar infarct left posterior frontal lobe. MRI T and L spine showed kyphosis and degenerative disease. TTE showed moderate AS, similar to previous TTE at [facility]. Neurology was consulted. Stroke is likely thromboembolic. She was started on Eliquis and lipitor. Cardiology says she can consider cardioversion as outpatient.    Interval Problem Update  Her right leg weakness is unchanged. She's worried about being able to go home    Consultants/Specialty  Neurology  Cardiology    Code Status  Full Code    Disposition  Acute rehab  Eliquis new start    Review of Systems  Review of Systems   Constitutional: Negative for chills and fever.   HENT: Negative for congestion.    Eyes: Negative for blurred vision.   Respiratory: Negative for cough and shortness of breath.    Cardiovascular: Negative for chest pain, palpitations and leg swelling.   Gastrointestinal: Negative for abdominal pain, nausea and vomiting.   Musculoskeletal: Negative for back pain.   Neurological: Positive for tingling, sensory change and focal weakness. Negative for dizziness, speech change and headaches.   Psychiatric/Behavioral: The patient is not nervous/anxious.         Physical Exam  Temp:  [36.4 °C (97.6 °F)-37 °C (98.6 °F)] 36.8 °C (98.2 °F)  Pulse:  [] 88  Resp:  [16-18] 17  BP: (122-139)/() 139/108  SpO2:  [91 %-98 %] 97 %    Physical Exam  Vitals signs and nursing note reviewed.   Constitutional:       General: She is not in acute distress.     Appearance: She is not ill-appearing.      Comments: Thin, frail   HENT:      Head: Normocephalic.       Mouth/Throat:      Mouth: Mucous membranes are moist.   Eyes:      General:         Right eye: No discharge.         Left eye: No discharge.      Extraocular Movements: Extraocular movements intact.      Pupils: Pupils are equal, round, and reactive to light.   Cardiovascular:      Rate and Rhythm: Normal rate. Rhythm irregular.      Heart sounds: Murmur present.   Pulmonary:      Effort: Pulmonary effort is normal. No respiratory distress.      Breath sounds: No wheezing or rales.   Abdominal:      Palpations: Abdomen is soft.      Tenderness: There is no abdominal tenderness.   Musculoskeletal:      Right lower leg: No edema.      Left lower leg: No edema.   Skin:     General: Skin is warm and dry.   Neurological:      Mental Status: She is alert and oriented to person, place, and time.      Comments: 5/5 strength UE and LLE  No movement of RLE   Psychiatric:         Mood and Affect: Mood normal.         Behavior: Behavior normal.         Fluids    Intake/Output Summary (Last 24 hours) at 12/19/2020 1400  Last data filed at 12/19/2020 0532  Gross per 24 hour   Intake --   Output 400 ml   Net -400 ml       Laboratory  Recent Labs     12/17/20  0706 12/18/20  0245 12/19/20  0351   WBC 11.8* 11.5* 9.3   RBC 4.73 4.45 4.28   HEMOGLOBIN 13.5 12.8 11.9*   HEMATOCRIT 41.4 38.8 36.9*   MCV 87.5 87.2 86.2   MCH 28.5 28.8 27.8   MCHC 32.6* 33.0* 32.2*   RDW 44.5 44.3 44.1   PLATELETCT 183 184 192   MPV 9.2 9.8 10.0     Recent Labs     12/17/20  0706 12/18/20  0245 12/19/20  0351   SODIUM 127* 131* 130*   POTASSIUM 3.6 3.5* 3.4*   CHLORIDE 95* 99 101   CO2 21 21 22   GLUCOSE 139* 126* 132*   BUN 28* 26* 22   CREATININE 0.75 0.65 0.59   CALCIUM 9.3 9.3 8.7     Recent Labs     12/17/20  0706   APTT 27.2   INR 1.03         Recent Labs     12/18/20  0245   TRIGLYCERIDE 105   HDL 17*   LDL 48       Imaging  EC-ECHOCARDIOGRAM COMPLETE W/O CONT   Final Result      MR-THORACIC SPINE-W/O   Final Result      1.  Midthoracic dorsal  kyphosis. No underlying vertebral abnormalities or wedge compression deformities. No evidence of old or recent/subacute insufficiency compression fracture.   2.  T2-T3, T7-T8, and T8-T9 ventral extradural defects consistent with disc or disc/osteophyte change. This is most prominent at T7-T8 where there is effacement of ventral subarachnoid space and contour impression on the ventral cord. However, there is    no overall central stenosis as the dorsal subarachnoid space is generous at all of these levels.      MR-LUMBAR SPINE-W/O   Final Result      1.  Cholelithiasis.   2.  L5-S1 grade 2 spondylolisthesis with bilateral L5 spondylolysis. Degenerative disc space narrowing and vacuum disc phenomenon at this level.   3.  L3-4 minimal left lateral disc bulging. No central or foraminal stenosis.   4.  L4-5 tiny midline disc bulge or protrusion with underlying tiny midline annular fissure. No central or foraminal stenosis.   5.  L5-S1 mild pseudo-disc bulging associated with spondylolisthesis. No central stenosis. Marked left foraminal stenosis and severe right foraminal stenosis with horizontal deformity of the neural foramina associated with the spondylolisthesis.      MR-BRAIN-W/O   Final Result      1.  Moderate-advanced cerebral atrophy. Age-appropriate.   2.  Small and clustered gyriform foci of bright diffusion signal involving the left far posterior parasagittal frontal lobe and left parietal lobe consistent with acute cerebral infarction. No hemorrhagic transformation.   3.  Moderate supratentorial white matter disease most consistent with microvascular ischemic change.   4.  Old lacunar infarct left posterior frontal lobe white matter at the level of the centrum semiovale.   5.  No evidence of acute hemorrhage or mass lesion.      DX-ANKLE 3+ VIEWS RIGHT   Final Result         1.  No acute traumatic bony injury.   2.  Pes planus   3.  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  No radiographic evidence of acute traumatic injury.      DX-PELVIS-1 OR 2 VIEWS   Final Result         1.  No acute traumatic bony injury.   2.  Atherosclerosis   3.  Calcified uterine fibroid      DX-CHEST-PORTABLE (1 VIEW)   Final Result         1.  No focal infiltrates.   2.  Perihilar interstitial prominence and bronchial wall cuffing, appearance suggests changes of underlying bronchial inflammation, consider bronchitis.      CT-CTA NECK WITH & W/O-POST PROCESSING   Final Result         1.  CT angiogram of the neck within normal limits.         CT-CTA HEAD WITH & W/O-POST PROCESS   Final Result         1.  No large vessel occlusion or aneurysm identified.   2.  Approximately 50% narrowing of the left P2 segment, could represent vessel spasm, stenosis, or small nonocclusive thrombus.      CT-CEREBRAL PERFUSION ANALYSIS   Final Result         1.  Cerebral blood flow less than 30% likely representing completed infarct = 0 mL.      2.  T Max more than 6 seconds likely representing combination of completed infarct and ischemia = 0 mL.      3.  Mismatched volume likely representing ischemic brain/penumbra = None      4.  Please note that the cerebral perfusion was performed on the limited brain tissue around the basal ganglia region. Infarct/ischemia outside the CT perfusion sections can be missed in this study.      CT-HEAD W/O   Final Result         1.  No acute intracranial abnormality is identified, there are nonspecific white matter changes, commonly associated with small vessel ischemic disease.  Associated mild cerebral atrophy is noted.   2.  Atherosclerosis.           Assessment/Plan  * CVA (cerebral vascular accident) (HCC)- (present on admission)  Assessment & Plan  MRI brain showed acute infarct of left frontal and left parietal lobes, old lacunar infarct left posterior frontal lobe.  Neurology consulted  Due to afib  Started on Eliquis, lipitor  Telemetry  PTOTST    AF (atrial  fibrillation) (HCC)- (present on admission)  Assessment & Plan  Seems to be new onset  Started on Eliquis   Continue on metoprolol  Telemetry  TTE shows mod AS  TSH is normal    Elevated troponin- (present on admission)  Assessment & Plan  She denies chest pain  EKG shows afib and LBBB, no prior to compare to.   Record of angiogram in 1/2019 at [facility], with nonobstructive CAD  Likely demand ischemia from afib  She has been started on Eliquis and lipitor    Leukocytosis- (present on admission)  Assessment & Plan  No localizing signs of infection  WBC has normalized, likely reactive      Hyponatremia- (present on admission)  Assessment & Plan  Improved with IVF, continue to trend    Aortic stenosis  Assessment & Plan  Moderate on TTE  Seen previously on TTE at [facility]    Type 2 diabetes mellitus without complication, without long-term current use of insulin (HCC)  Assessment & Plan  A1c 6.2%  Takes metformin, hold for now  ISS    Hyperlipidemia- (present on admission)  Assessment & Plan  LDL 48  On lipitor    Essential hypertension- (present on admission)  Assessment & Plan  Ordered for metoprolol       VTE prophylaxis: Eliquis

## 2020-12-19 NOTE — DISCHARGE PLANNING
Dr. Causey is recommending [facility] pending sufficient D/C resources/support, negative PCR COVID (wet swab) and medical clearance.  Msg left for Keyonna, daughter to look into D/C resources/support.  Evie CHERYN is aware.  TCC to f/u.

## 2020-12-19 NOTE — PROGRESS NOTES
Monitor Summary: Afib 103-115, SD -, QRS 0.12, QT -, with F PVCs/R couplets/in and out of BBB, per strip from monitor room.

## 2020-12-19 NOTE — DISCHARGE PLANNING
[facility] Transitional Care Coordination     Referral from:  Dr. Gayle    Facesheet indicates: MCR/ANT    Potential Rehab Diagnosis: CVA    Chart review indicates patient may have on going medical management and may have therapy needs to possibly meet inpatient rehab facility criteria with the goal of returning to community.    D/C support: TBD     Physiatry consultation forwarded per protocol.      CVA.  Request for a Physiatry consult referral forwarded to Physiatry.  Waiting on additional information to determine appropriateness for acute inpatient rehabilitation. Will continue to follow.     Last Covid test date: 12-17-20 PCR negative    Thank you for the referral.

## 2020-12-20 ENCOUNTER — HOSPITAL ENCOUNTER (INPATIENT)
Facility: REHABILITATION | Age: 85
LOS: 20 days | DRG: 057 | End: 2021-01-09
Attending: PHYSICAL MEDICINE & REHABILITATION | Admitting: PHYSICAL MEDICINE & REHABILITATION
Payer: MEDICARE

## 2020-12-20 VITALS
WEIGHT: 171.74 LBS | HEART RATE: 80 BPM | RESPIRATION RATE: 16 BRPM | BODY MASS INDEX: 27.6 KG/M2 | OXYGEN SATURATION: 96 % | HEIGHT: 66 IN | TEMPERATURE: 97.1 F | DIASTOLIC BLOOD PRESSURE: 104 MMHG | SYSTOLIC BLOOD PRESSURE: 146 MMHG

## 2020-12-20 PROBLEM — D72.829 LEUKOCYTOSIS: Status: RESOLVED | Noted: 2020-12-17 | Resolved: 2020-12-20

## 2020-12-20 LAB
ALBUMIN SERPL BCP-MCNC: 2.3 G/DL (ref 3.2–4.9)
ALBUMIN/GLOB SERPL: 0.7 G/DL
ALP SERPL-CCNC: 208 U/L (ref 30–99)
ALT SERPL-CCNC: 28 U/L (ref 2–50)
ANION GAP SERPL CALC-SCNC: 9 MMOL/L (ref 7–16)
AST SERPL-CCNC: 78 U/L (ref 12–45)
BASOPHILS # BLD AUTO: 0.2 % (ref 0–1.8)
BASOPHILS # BLD: 0.02 K/UL (ref 0–0.12)
BILIRUB SERPL-MCNC: 0.6 MG/DL (ref 0.1–1.5)
BUN SERPL-MCNC: 11 MG/DL (ref 8–22)
CALCIUM SERPL-MCNC: 8.9 MG/DL (ref 8.5–10.5)
CHLORIDE SERPL-SCNC: 104 MMOL/L (ref 96–112)
CO2 SERPL-SCNC: 20 MMOL/L (ref 20–33)
CREAT SERPL-MCNC: 0.42 MG/DL (ref 0.5–1.4)
EKG IMPRESSION: NORMAL
EOSINOPHIL # BLD AUTO: 0.08 K/UL (ref 0–0.51)
EOSINOPHIL NFR BLD: 0.9 % (ref 0–6.9)
ERYTHROCYTE [DISTWIDTH] IN BLOOD BY AUTOMATED COUNT: 43.5 FL (ref 35.9–50)
GLOBULIN SER CALC-MCNC: 3.4 G/DL (ref 1.9–3.5)
GLUCOSE BLD-MCNC: 110 MG/DL (ref 65–99)
GLUCOSE BLD-MCNC: 125 MG/DL (ref 65–99)
GLUCOSE BLD-MCNC: 156 MG/DL (ref 65–99)
GLUCOSE SERPL-MCNC: 148 MG/DL (ref 65–99)
HCT VFR BLD AUTO: 37.7 % (ref 37–47)
HGB BLD-MCNC: 12.2 G/DL (ref 12–16)
IMM GRANULOCYTES # BLD AUTO: 0.07 K/UL (ref 0–0.11)
IMM GRANULOCYTES NFR BLD AUTO: 0.7 % (ref 0–0.9)
LYMPHOCYTES # BLD AUTO: 0.82 K/UL (ref 1–4.8)
LYMPHOCYTES NFR BLD: 8.8 % (ref 22–41)
MCH RBC QN AUTO: 27.7 PG (ref 27–33)
MCHC RBC AUTO-ENTMCNC: 32.4 G/DL (ref 33.6–35)
MCV RBC AUTO: 85.5 FL (ref 81.4–97.8)
MONOCYTES # BLD AUTO: 0.63 K/UL (ref 0–0.85)
MONOCYTES NFR BLD AUTO: 6.7 % (ref 0–13.4)
NEUTROPHILS # BLD AUTO: 7.75 K/UL (ref 2–7.15)
NEUTROPHILS NFR BLD: 82.7 % (ref 44–72)
NRBC # BLD AUTO: 0 K/UL
NRBC BLD-RTO: 0 /100 WBC
PLATELET # BLD AUTO: 218 K/UL (ref 164–446)
PMV BLD AUTO: 9.5 FL (ref 9–12.9)
POTASSIUM SERPL-SCNC: 4 MMOL/L (ref 3.6–5.5)
PROT SERPL-MCNC: 5.7 G/DL (ref 6–8.2)
RBC # BLD AUTO: 4.41 M/UL (ref 4.2–5.4)
SODIUM SERPL-SCNC: 133 MMOL/L (ref 135–145)
TROPONIN T SERPL-MCNC: 214 NG/L (ref 6–19)
WBC # BLD AUTO: 9.4 K/UL (ref 4.8–10.8)

## 2020-12-20 PROCEDURE — 84484 ASSAY OF TROPONIN QUANT: CPT

## 2020-12-20 PROCEDURE — 93010 ELECTROCARDIOGRAM REPORT: CPT | Performed by: INTERNAL MEDICINE

## 2020-12-20 PROCEDURE — 770010 HCHG ROOM/CARE - REHAB SEMI PRIVAT*

## 2020-12-20 PROCEDURE — 82962 GLUCOSE BLOOD TEST: CPT | Mod: 91

## 2020-12-20 PROCEDURE — 80053 COMPREHEN METABOLIC PANEL: CPT

## 2020-12-20 PROCEDURE — 36415 COLL VENOUS BLD VENIPUNCTURE: CPT

## 2020-12-20 PROCEDURE — 99239 HOSP IP/OBS DSCHRG MGMT >30: CPT | Performed by: INTERNAL MEDICINE

## 2020-12-20 PROCEDURE — 700102 HCHG RX REV CODE 250 W/ 637 OVERRIDE(OP): Performed by: PHYSICAL MEDICINE & REHABILITATION

## 2020-12-20 PROCEDURE — A9270 NON-COVERED ITEM OR SERVICE: HCPCS | Performed by: PHYSICAL MEDICINE & REHABILITATION

## 2020-12-20 PROCEDURE — 700102 HCHG RX REV CODE 250 W/ 637 OVERRIDE(OP): Performed by: INTERNAL MEDICINE

## 2020-12-20 PROCEDURE — 85025 COMPLETE CBC W/AUTO DIFF WBC: CPT

## 2020-12-20 PROCEDURE — 99223 1ST HOSP IP/OBS HIGH 75: CPT | Mod: AI | Performed by: PHYSICAL MEDICINE & REHABILITATION

## 2020-12-20 PROCEDURE — 82962 GLUCOSE BLOOD TEST: CPT

## 2020-12-20 PROCEDURE — A9270 NON-COVERED ITEM OR SERVICE: HCPCS | Performed by: NURSE PRACTITIONER

## 2020-12-20 PROCEDURE — A9270 NON-COVERED ITEM OR SERVICE: HCPCS | Performed by: INTERNAL MEDICINE

## 2020-12-20 PROCEDURE — 700102 HCHG RX REV CODE 250 W/ 637 OVERRIDE(OP): Performed by: NURSE PRACTITIONER

## 2020-12-20 PROCEDURE — 94760 N-INVAS EAR/PLS OXIMETRY 1: CPT

## 2020-12-20 RX ORDER — ONDANSETRON 4 MG/1
4 TABLET, ORALLY DISINTEGRATING ORAL 4 TIMES DAILY PRN
Status: DISCONTINUED | OUTPATIENT
Start: 2020-12-20 | End: 2021-01-09 | Stop reason: HOSPADM

## 2020-12-20 RX ORDER — AMOXICILLIN 250 MG
2 CAPSULE ORAL 2 TIMES DAILY
Status: CANCELLED | OUTPATIENT
Start: 2020-12-20

## 2020-12-20 RX ORDER — BISACODYL 10 MG
10 SUPPOSITORY, RECTAL RECTAL
Status: CANCELLED | OUTPATIENT
Start: 2020-12-20

## 2020-12-20 RX ORDER — ALUMINA, MAGNESIA, AND SIMETHICONE 2400; 2400; 240 MG/30ML; MG/30ML; MG/30ML
20 SUSPENSION ORAL
Status: DISCONTINUED | OUTPATIENT
Start: 2020-12-20 | End: 2021-01-09 | Stop reason: HOSPADM

## 2020-12-20 RX ORDER — LISINOPRIL 5 MG/1
5 TABLET ORAL DAILY
Qty: 30 TAB | Status: ON HOLD
Start: 2020-12-21 | End: 2021-01-08

## 2020-12-20 RX ORDER — LISINOPRIL 5 MG/1
5 TABLET ORAL
Status: DISCONTINUED | OUTPATIENT
Start: 2020-12-21 | End: 2020-12-23

## 2020-12-20 RX ORDER — ATORVASTATIN CALCIUM 40 MG/1
40 TABLET, FILM COATED ORAL EVERY EVENING
Status: DISCONTINUED | OUTPATIENT
Start: 2020-12-20 | End: 2021-01-09 | Stop reason: HOSPADM

## 2020-12-20 RX ORDER — ACETAMINOPHEN 325 MG/1
650 TABLET ORAL EVERY 6 HOURS PRN
Status: CANCELLED | OUTPATIENT
Start: 2020-12-20

## 2020-12-20 RX ORDER — ONDANSETRON 2 MG/ML
4 INJECTION INTRAMUSCULAR; INTRAVENOUS 4 TIMES DAILY PRN
Status: DISCONTINUED | OUTPATIENT
Start: 2020-12-20 | End: 2021-01-09 | Stop reason: HOSPADM

## 2020-12-20 RX ORDER — POLYETHYLENE GLYCOL 3350 17 G/17G
1 POWDER, FOR SOLUTION ORAL
Status: CANCELLED | OUTPATIENT
Start: 2020-12-20

## 2020-12-20 RX ORDER — ATORVASTATIN CALCIUM 40 MG/1
40 TABLET, FILM COATED ORAL EVERY EVENING
Status: CANCELLED | OUTPATIENT
Start: 2020-12-20

## 2020-12-20 RX ORDER — POLYVINYL ALCOHOL 14 MG/ML
1 SOLUTION/ DROPS OPHTHALMIC PRN
Status: DISCONTINUED | OUTPATIENT
Start: 2020-12-20 | End: 2021-01-09 | Stop reason: HOSPADM

## 2020-12-20 RX ORDER — AMOXICILLIN 250 MG
2 CAPSULE ORAL 2 TIMES DAILY
Status: DISCONTINUED | OUTPATIENT
Start: 2020-12-20 | End: 2020-12-24

## 2020-12-20 RX ORDER — LISINOPRIL 5 MG/1
5 TABLET ORAL
Status: CANCELLED | OUTPATIENT
Start: 2020-12-21

## 2020-12-20 RX ORDER — ECHINACEA PURPUREA EXTRACT 125 MG
2 TABLET ORAL PRN
Status: DISCONTINUED | OUTPATIENT
Start: 2020-12-20 | End: 2021-01-09 | Stop reason: HOSPADM

## 2020-12-20 RX ORDER — TRAZODONE HYDROCHLORIDE 50 MG/1
50 TABLET ORAL
Status: DISCONTINUED | OUTPATIENT
Start: 2020-12-20 | End: 2021-01-09 | Stop reason: HOSPADM

## 2020-12-20 RX ORDER — LISINOPRIL 5 MG/1
5 TABLET ORAL
Status: DISCONTINUED | OUTPATIENT
Start: 2020-12-20 | End: 2020-12-20 | Stop reason: HOSPADM

## 2020-12-20 RX ORDER — ACETAMINOPHEN 325 MG/1
650 TABLET ORAL EVERY 6 HOURS PRN
Status: DISCONTINUED | OUTPATIENT
Start: 2020-12-20 | End: 2021-01-05

## 2020-12-20 RX ORDER — ATORVASTATIN CALCIUM 40 MG/1
40 TABLET, FILM COATED ORAL EVERY EVENING
Qty: 30 TAB | Status: ON HOLD
Start: 2020-12-20 | End: 2021-01-08 | Stop reason: SDUPTHER

## 2020-12-20 RX ORDER — BISACODYL 10 MG
10 SUPPOSITORY, RECTAL RECTAL
Status: DISCONTINUED | OUTPATIENT
Start: 2020-12-20 | End: 2020-12-24

## 2020-12-20 RX ORDER — HYDRALAZINE HYDROCHLORIDE 25 MG/1
25 TABLET, FILM COATED ORAL EVERY 8 HOURS PRN
Status: DISCONTINUED | OUTPATIENT
Start: 2020-12-20 | End: 2021-01-09 | Stop reason: HOSPADM

## 2020-12-20 RX ORDER — POLYETHYLENE GLYCOL 3350 17 G/17G
1 POWDER, FOR SOLUTION ORAL
Status: DISCONTINUED | OUTPATIENT
Start: 2020-12-20 | End: 2020-12-24

## 2020-12-20 RX ADMIN — ATORVASTATIN CALCIUM 40 MG: 40 TABLET, FILM COATED ORAL at 20:44

## 2020-12-20 RX ADMIN — METOPROLOL TARTRATE 50 MG: 25 TABLET, FILM COATED ORAL at 17:47

## 2020-12-20 RX ADMIN — METOPROLOL TARTRATE 50 MG: 25 TABLET, FILM COATED ORAL at 05:05

## 2020-12-20 RX ADMIN — INSULIN HUMAN 1 UNITS: 100 INJECTION, SOLUTION PARENTERAL at 20:45

## 2020-12-20 RX ADMIN — APIXABAN 5 MG: 5 TABLET, FILM COATED ORAL at 20:44

## 2020-12-20 RX ADMIN — APIXABAN 5 MG: 5 TABLET, FILM COATED ORAL at 05:05

## 2020-12-20 RX ADMIN — LISINOPRIL 5 MG: 5 TABLET ORAL at 08:58

## 2020-12-20 ASSESSMENT — LIFESTYLE VARIABLES
TOTAL SCORE: 0
HAVE PEOPLE ANNOYED YOU BY CRITICIZING YOUR DRINKING: NO
TOTAL SCORE: 0
EVER_SMOKED: NEVER
HAVE YOU EVER FELT YOU SHOULD CUT DOWN ON YOUR DRINKING: NO
EVER FELT BAD OR GUILTY ABOUT YOUR DRINKING: NO
CONSUMPTION TOTAL: NEGATIVE
AVERAGE NUMBER OF DAYS PER WEEK YOU HAVE A DRINK CONTAINING ALCOHOL: 0
ON A TYPICAL DAY WHEN YOU DRINK ALCOHOL HOW MANY DRINKS DO YOU HAVE: 0
TOTAL SCORE: 0
EVER HAD A DRINK FIRST THING IN THE MORNING TO STEADY YOUR NERVES TO GET RID OF A HANGOVER: NO
HOW MANY TIMES IN THE PAST YEAR HAVE YOU HAD 5 OR MORE DRINKS IN A DAY: 0

## 2020-12-20 ASSESSMENT — FIBROSIS 4 INDEX: FIB4 SCORE: 4.04

## 2020-12-20 ASSESSMENT — ENCOUNTER SYMPTOMS
DIARRHEA: 0
CONSTIPATION: 1
SORE THROAT: 0
FALLS: 0
SHORTNESS OF BREATH: 0
PALPITATIONS: 0
FEVER: 0
COUGH: 0
CHILLS: 0
FOCAL WEAKNESS: 1
BRUISES/BLEEDS EASILY: 0
MEMORY LOSS: 0

## 2020-12-20 ASSESSMENT — PATIENT HEALTH QUESTIONNAIRE - PHQ9
2. FEELING DOWN, DEPRESSED, IRRITABLE, OR HOPELESS: NOT AT ALL
SUM OF ALL RESPONSES TO PHQ9 QUESTIONS 1 AND 2: 0
1. LITTLE INTEREST OR PLEASURE IN DOING THINGS: NOT AT ALL

## 2020-12-20 ASSESSMENT — PAIN DESCRIPTION - PAIN TYPE: TYPE: ACUTE PAIN

## 2020-12-20 NOTE — FLOWSHEET NOTE
"   12/20/20 1550   Incentive Spirometry Treatment   Height 1.676 m (5' 5.98\")   Predicted Inspiratory Capacity Unable to calculate   60% of predicted IS capacity 0 mL   40% of predicted IS capacity 0 mL   Incentive Spirometer Volume 500 mL  (predicted: 1800/1080/720)     "

## 2020-12-20 NOTE — DISCHARGE INSTRUCTIONS
Cardiology outpatient - afib, aortic stenosis moderate  I placed [facility] clinic referral for [facility]  Neurology stroke bridge clinic  HTN management  Discharge Instructions    Discharged to other by medical transportation with escort. Discharged via wheelchair, hospital escort: Yes.  Special equipment needed: Not Applicable    Be sure to schedule a follow-up appointment with your primary care doctor or any specialists as instructed.     Discharge Plan:   Diet Plan: Discussed  Activity Level: Discussed  Confirmed Follow up Appointment: Patient to Call and Schedule Appointment  Confirmed Symptoms Management: Discussed  Medication Reconciliation Updated: Yes  Influenza Vaccine Indication: Not indicated: Previously immunized this influenza season and > 8 years of age    I understand that a diet low in cholesterol, fat, and sodium is recommended for good health. Unless I have been given specific instructions below for another diet, I accept this instruction as my diet prescription.   Other diet: Cardiac    Special Instructions: None    · Is patient discharged on Warfarin / Coumadin?   No     Depression / Suicide Risk    As you are discharged from this [facility] facility, it is important to learn how to keep safe from harming yourself.    Recognize the warning signs:  · Abrupt changes in personality, positive or negative- including increase in energy   · Giving away possessions  · Change in eating patterns- significant weight changes-  positive or negative  · Change in sleeping patterns- unable to sleep or sleeping all the time   · Unwillingness or inability to communicate  · Depression  · Unusual sadness, discouragement and loneliness  · Talk of wanting to die  · Neglect of personal appearance   · Rebelliousness- reckless behavior  · Withdrawal from people/activities they love  · Confusion- inability to concentrate     If you or a loved one observes any of these behaviors or has concerns about self-harm, here's what  you can do:  · Talk about it- your feelings and reasons for harming yourself  · Remove any means that you might use to hurt yourself (examples: pills, rope, extension cords, firearm)  · Get professional help from the community (Mental Health, Substance Abuse, psychological counseling)  · Do not be alone:Call your Safe Contact- someone whom you trust who will be there for you.  · Call your local CRISIS HOTLINE 632-9813 or 592-174-9886  · Call your local Children's Mobile Crisis Response Team [place] (054) 319-6558 or wwwWorkCast  · Call the toll free National Suicide Prevention Hotlines   · National Suicide Prevention Lifeline 026-245-GJND (7935)  · National Hope Line Network 800-SUICIDE (118-1847)          Stroke Prevention  Some medical conditions and lifestyle choices can lead to a higher risk for a stroke. You can help to prevent a stroke by making nutrition, lifestyle, and other changes.  What nutrition changes can be made?    · Eat healthy foods.  ? Choose foods that are high in fiber. These include:  § Fresh fruits.  § Fresh vegetables.  § Whole grains.  ? Eat at least 5 or more servings of fruits and vegetables each day. Try to fill half of your plate at each meal with fruits and vegetables.  ? Choose lean protein foods. These include:  § Lowfat (lean) cuts of meat.  § Chicken without skin.  § Fish.  § Tofu.  § Beans.  § Nuts.  ? Eat low-fat dairy products.  ? Avoid foods that:  § Are high in salt (sodium).  § Have saturated fat.  § Have trans fat.  § Have cholesterol.  § Are processed.  § Are premade.  · Follow eating guidelines as told by your doctor. These may include:  ? Reducing how many calories you eat and drink each day.  ? Limiting how much salt you eat or drink each day to 1,500 milligrams (mg).  ? Using only healthy fats for cooking. These include:  § Olive oil.  § Canola oil.  § Sunflower oil.  ? Counting how many carbohydrates you eat and drink each day.  What lifestyle changes can  be made?  · Try to stay at a healthy weight. Talk to your doctor about what a good weight is for you.  · Get at least 30 minutes of moderate physical activity at least 5 days a week. This can include:  ? Fast walking.  ? Biking.  ? Swimming.  · Do not use any products that have nicotine or tobacco. This includes cigarettes and e-cigarettes. If you need help quitting, ask your doctor. Avoid being around tobacco smoke in general.  · Limit how much alcohol you drink to no more than 1 drink a day for nonpregnant women and 2 drinks a day for men. One drink equals 12 oz of beer, 5 oz of wine, or 1½ oz of hard liquor.  · Do not use drugs.  · Avoid taking birth control pills. Talk to your doctor about the risks of taking birth control pills if:  ? You are over 35 years old.  ? You smoke.  ? You get migraines.  ? You have had a blood clot.  What other changes can be made?  · Manage your cholesterol.  ? It is important to eat a healthy diet.  ? If your cholesterol cannot be managed through your diet, you may also need to take medicines. Take medicines as told by your doctor.  · Manage your diabetes.  ? It is important to eat a healthy diet and to exercise regularly.  ? If your blood sugar cannot be managed through diet and exercise, you may need to take medicines. Take medicines as told by your doctor.  · Control your high blood pressure (hypertension).  ? Try to keep your blood pressure below 130/80. This can help lower your risk of stroke.  ? It is important to eat a healthy diet and to exercise regularly.  ? If your blood pressure cannot be managed through diet and exercise, you may need to take medicines. Take medicines as told by your doctor.  ? Ask your doctor if you should check your blood pressure at home.  ? Have your blood pressure checked every year. Do this even if your blood pressure is normal.  · Talk to your doctor about getting checked for a sleep disorder. Signs of this can include:  ? Snoring a  "lot.  ? Feeling very tired.  · Take over-the-counter and prescription medicines only as told by your doctor. These may include aspirin or blood thinners (antiplatelets or anticoagulants).  · Make sure that any other medical conditions you have are managed.  Where to find more information  · American Stroke Association: www.strokeassociation.org  · National Stroke Association: www.stroke.org  Get help right away if:  · You have any symptoms of stroke. \"BE FAST\" is an easy way to remember the main warning signs:  ? B - Balance. Signs are dizziness, sudden trouble walking, or loss of balance.  ? E - Eyes. Signs are trouble seeing or a sudden change in how you see.  ? F - Face. Signs are sudden weakness or loss of feeling of the face, or the face or eyelid drooping on one side.  ? A - Arms. Signs are weakness or loss of feeling in an arm. This happens suddenly and usually on one side of the body.  ? S - Speech. Signs are sudden trouble speaking, slurred speech, or trouble understanding what people say.  ? T - Time. Time to call emergency services. Write down what time symptoms started.  · You have other signs of stroke, such as:  ? A sudden, very bad headache with no known cause.  ? Feeling sick to your stomach (nausea).  ? Throwing up (vomiting).  ? Jerky movements you cannot control (seizure).  These symptoms may represent a serious problem that is an emergency. Do not wait to see if the symptoms will go away. Get medical help right away. Call your local emergency services (911 in the [place] Do not drive yourself to the hospital.  Summary  · You can prevent a stroke by eating healthy, exercising, not smoking, drinking less alcohol, and treating other health problems, such as diabetes, high blood pressure, or high cholesterol.  · Do not use any products that contain nicotine or tobacco, such as cigarettes and e-cigarettes.  · Get help right away if you have any signs or symptoms of a stroke.  This information is not " intended to replace advice given to you by your health care provider. Make sure you discuss any questions you have with your health care provider.  Document Released: 06/18/2013 Document Revised: 02/13/2020 Document Reviewed: 03/21/2018  Elsevier Patient Education © 2020 [employer]        Atrial Fibrillation    Atrial fibrillation is a type of heartbeat that is irregular or fast (rapid). If you have this condition, your heart beats without any order. This makes it hard for your heart to pump blood in a normal way. Having this condition gives you more risk for stroke, heart failure, and other heart problems.  Atrial fibrillation may start all of a sudden and then stop on its own, or it may become a long-lasting problem.  What are the causes?  This condition may be caused by heart conditions, such as:  · High blood pressure.  · Heart failure.  · Heart valve disease.  · Heart surgery.  Other causes include:  · Pneumonia.  · Obstructive sleep apnea.  · Lung cancer.  · Thyroid disease.  · Drinking too much alcohol.  Sometimes the cause is not known.  What increases the risk?  You are more likely to develop this condition if:  · You smoke.  · You are older.  · You have diabetes.  · You are overweight.  · You have a family history of this condition.  · You exercise often and hard.  What are the signs or symptoms?  Common symptoms of this condition include:  · A feeling like your heart is beating very fast.  · Chest pain.  · Feeling short of breath.  · Feeling light-headed or weak.  · Getting tired easily.  Follow these instructions at home:  Medicines  · Take over-the-counter and prescription medicines only as told by your doctor.  · If your doctor gives you a blood-thinning medicine, take it exactly as told. Taking too much of it can cause bleeding. Taking too little of it does not protect you against clots. Clots can cause a stroke.  Lifestyle         · Do not use any tobacco products. These include cigarettes,  "chewing tobacco, and e-cigarettes. If you need help quitting, ask your doctor.  · Do not drink alcohol.  · Do not drink beverages that have caffeine. These include coffee, soda, and tea.  · Follow diet instructions as told by your doctor.  · Exercise regularly as told by your doctor.  General instructions  · If you have a condition that causes breathing to stop for a short period of time (apnea), treat it as told by your doctor.  · Keep a healthy weight. Do not use diet pills unless your doctor says they are safe for you. Diet pills may make heart problems worse.  · Keep all follow-up visits as told by your doctor. This is important.  Contact a doctor if:  · You notice a change in the speed, rhythm, or strength of your heartbeat.  · You are taking a blood-thinning medicine and you see more bruising.  · You get tired more easily when you move or exercise.  · You have a sudden change in weight.  Get help right away if:    · You have pain in your chest or your belly (abdomen).  · You have trouble breathing.  · You have blood in your vomit, poop, or pee (urine).  · You have any signs of a stroke. \"BE FAST\" is an easy way to remember the main warning signs:  ? B - Balance. Signs are dizziness, sudden trouble walking, or loss of balance.  ? E - Eyes. Signs are trouble seeing or a change in how you see.  ? F - Face. Signs are sudden weakness or loss of feeling in the face, or the face or eyelid drooping on one side.  ? A - Arms. Signs are weakness or loss of feeling in an arm. This happens suddenly and usually on one side of the body.  ? S - Speech. Signs are sudden trouble speaking, slurred speech, or trouble understanding what people say.  ? T - Time. Time to call emergency services. Write down what time symptoms started.  · You have other signs of a stroke, such as:  ? A sudden, very bad headache with no known cause.  ? Feeling sick to your stomach (nausea).  ? Throwing up (vomiting).  ? Jerky movements you cannot " control (seizure).  These symptoms may be an emergency. Do not wait to see if the symptoms will go away. Get medical help right away. Call your local emergency services (911 in the [place] Do not drive yourself to the hospital.  Summary  · Atrial fibrillation is a type of heartbeat that is irregular or fast (rapid).  · You are at higher risk of this condition if you smoke, are older, have diabetes, or are overweight.  · Follow your doctor's instructions about medicines, diet, exercise, and follow-up visits.  · Get help right away if you think that you have signs of a stroke.  This information is not intended to replace advice given to you by your health care provider. Make sure you discuss any questions you have with your health care provider.  Document Released: 09/26/2009 Document Revised: 02/21/2019 Document Reviewed: 02/08/2019  Elsevier Patient Education © 2020 [employer]

## 2020-12-20 NOTE — FLOWSHEET NOTE
12/20/20 1548   Patient History   Pulmonary Diagnosis none   Procedures Relevant to Respiratory Status no   Home O2 No   Nocturnal CPAP No   Home Treatments/Frequency No   Sleep Apnea Screening   Have you had a sleep study? No   Have you been diagnosed with sleep apnea? No

## 2020-12-20 NOTE — H&P
REHABILITATION HISTORY AND PHYSICAL/POST ADMISSION PHYSICAL EVALUATION    Liudmila Pulido  Room/bed info not found    [facility] Code / Diagnosis to Support: 0001.2 - Stroke: Right Body Involvement (Left Brain)  Etiologic Diagnosis: CVA (cerebral vascular accident) (HCC)    CC: Tired.     HPI:  Per Dr. Causey consult note:  Patient is a 88 y.o. year-old female with a past medical history significant for diabetes mellitus, aortic stenosis, essential hypertension, hyperlipidemia admitted to [facility] on 12/17/2020  7:03 AM with right leg weakness.  The patient was last seen normal at 7 PM the day prior and around 3 AM they noticed she was weaker when they helped her into bed.  Patient was brought to the emergency department where she was found to be tachycardic.  Atrial fibrillation was seen on EKG monitor.  She did have elevated white blood cell count and hyponatremia at 127.  Troponin was elevated.  Head CT negative for any acute finding.  CT perfusion was normal.  CTA of head and neck showed no large vessel occlusion. Review of her records showed she had an echocardiogram in January 2020 with normal EF, mild aortic regurgitation and tricuspid regurgitation, and grade 1 diastolic dysfunction, with no mention of atrial fibrillation.   Echocardiogram from 12/2020 revealed hyperdynamic LV with an EF of 75% and moderate aortic stenosis.  Patient was called code stroke.  MRI brain was positive for high frontal infarct.  Given new onset atrial fibrillation patient has been placed on Eliquis 5 mg twice daily.  Patient also had rising troponin.  EKG again showed A. fib and LBBB which was not present prior.  Angiogram from [place] 1/2019 revealed nonobstructive CAD.  Suspected to be demand ischemia.  Cardiology was consulted.  Patient also found to have moderate aortic stenosis on echocardiogram.  This requires outpatient follow-up and not thought to contribute to her current issues.  Currently not a candidate for  cardioversion for her atrial fibrillation given her recent CVA.  Could consider as an outpatient.      Review of therapy notes reveals that patient is highly motivated and reports that she has good family support.  With occupational therapy she is mostly moderate to max assist.  With occupational therapy she is moderate to maximal assist.  Cognitively she is largely within functional limits and is on soft-bite sized (6) and thin liquid diet.  Patient continues to have hyponatremia at 130 on 12/19 - not rechecked prior to admission, leukocytosis resolved.  Troponin still elevated, not rechecked prior to admission to rehab.     Patient was evaluated by Rehab Medicine physician and Physical Therapy, Occupational Therapy and Speech Therapy and determined to be appropriate for acute inpatient rehab and was transferred to [facility] on 12/20/20.     Pre-mobidly, the patient lived in a single level home with family.  With this acute therapeutic intervention, this patient hopes to improve her functional status, and return to independent living with the supportive care of family.    REVIEW OF SYSTEMS:     Review of Systems   Constitutional: Positive for malaise/fatigue. Negative for chills and fever.   HENT: Negative for congestion and sore throat.    Respiratory: Negative for cough and shortness of breath.    Cardiovascular: Negative for palpitations and leg swelling.   Gastrointestinal: Positive for constipation. Negative for diarrhea.   Genitourinary: Negative for dysuria, frequency and urgency.   Musculoskeletal: Negative for falls and joint pain.   Neurological: Positive for focal weakness.   Endo/Heme/Allergies: Does not bruise/bleed easily.   Psychiatric/Behavioral: Negative for memory loss.     PMH:  Past Medical History:   Diagnosis Date   • Hyperlipidemia    • Hypertension    • Type 2 diabetes mellitus without complication, without long-term current use of insulin ([facility]) 12/18/2020     PSH:  No  past surgical history on file.    FAMILY HISTORY:  Family history was reviewed with the patient, there is no pertinent family history to report.      MEDICATIONS:  Current Facility-Administered Medications   Medication Dose   • Respiratory Therapy Consult     • Pharmacy Consult Request ...Pain Management Review 1 Each  1 Each   • hydrALAZINE (APRESOLINE) tablet 25 mg  25 mg   • artificial tears ophthalmic solution 1 Drop  1 Drop   • benzocaine-menthol (CEPACOL) lozenge 1 Lozenge  1 Lozenge   • mag hydrox-al hydrox-simeth (MAALOX PLUS ES or MYLANTA DS) suspension 20 mL  20 mL   • ondansetron (ZOFRAN ODT) dispertab 4 mg  4 mg    Or   • ondansetron (ZOFRAN) syringe/vial injection 4 mg  4 mg   • traZODone (DESYREL) tablet 50 mg  50 mg   • sodium chloride (OCEAN) 0.65 % nasal spray 2 Spray  2 Spray   • senna-docusate (PERICOLACE or SENOKOT S) 8.6-50 MG per tablet 2 Tab  2 Tab    And   • polyethylene glycol/lytes (MIRALAX) PACKET 1 Packet  1 Packet    And   • magnesium hydroxide (MILK OF MAGNESIA) suspension 30 mL  30 mL    And   • bisacodyl (DULCOLAX) suppository 10 mg  10 mg   • acetaminophen (Tylenol) tablet 650 mg  650 mg   • atorvastatin (LIPITOR) tablet 40 mg  40 mg   • apixaban (ELIQUIS) tablet 5 mg  5 mg   • insulin regular (HumuLIN R,NovoLIN R) injection  1-6 Units   • [START ON 12/21/2020] lisinopril (PRINIVIL) tablet 5 mg  5 mg   • metoprolol (LOPRESSOR) tablet 50 mg  50 mg     ALLERGIES:  Demerol, Penicillins, and Erythromycin    PSYCHOSOCIAL HISTORY:  Living Site:  Home  Living With:  family  Caregiver's availability:  Adult Children - Lives with son and daughter-in-law who are retired and can provide 24/7 supervision and assistance.  Number of stairs:  3-4  Substance use history: None.    LEVEL OF FUNCTION PRIOR TO DISABILTY:  Housing / Facility: 1 Story House  Steps Into Home: 3  Steps In Home: 0  Rail: Both Rail (Steps into Home)  Bathroom Set up: Walk In Shower, Grab Bars  Equipment Owned: Front-Wheel  "Walker  Lives with - Patient's Self Care Capacity: Adult Children  Bed Mobility: Independent  Transfer Status: Independent  Ambulation: Independent  Distance Ambulation (Feet): (community)  Assistive Devices Used: Front-Wheel Walker  Stairs: Independent    LEVEL OF FUNCTION PRIOR TO ADMISSION to [facility]:  Gait Level Of Assist: Unable to Participate  Supine to Sit: Maximal Assist  Sit to Supine: Maximal Assist  Scooting: Moderate Assist  Rolling: Maximum Assist to Lt.  Comments: HOB elevated, used rail   Sit to Stand: Maximal Assist(2-person )  Bed, Chair, Wheelchair Transfer: Unable to Participate  Sitting in Chair: NT, unable   Sitting Edge of Bed: 10 min   Standin seconds   Occupational Therapy:   Self Feeding: Independent  Grooming / Hygiene: Independent  Bathing: Independent  Dressing: Independent  Toileting: Independent  Medication Management: Independent  Laundry: Independent  Kitchen Mobility: Independent  Finances: Independent  Home Management: Requires Assist  Shopping: Requires Assist  Prior Level Of Mobility: Independent Without Device in Community, Independent Without Device in Home  Driving / Transportation: Driving Independent, Relatives / Others Provide Transportation(pt drives short local distances )  Prior Services: None, Other (Comments)(Family assists with I-ADL )  Housing / Facility: [address] [place] [address]  Current Level of Function:   Lower Body Dressing: Maximal Assist(don L sock )  Toileting: (NT; Pure Wik, no BM )  Speech Language Pathology:   Problem List: Dysphagia  Diet / Liquid Recommendation: Thin (0), Soft & Bite-Sized (6) - (Dysphagia III)  Rehabilitation Prognosis/Potential: Good  Estimated Length of Stay: 14-21 days    CURRENT LEVEL OF FUNCTION:   Same as level of function prior to admission to [facility]    PHYSICAL EXAM:     VITAL SIGNS:   height is 1.676 m (5' 6\") and weight is 75 kg (165 lb 6.4 oz). Her oral temperature is 36.4 °C (97.6 °F). " Her blood pressure is 142/61 and her pulse is 51 (abnormal). Her respiration is 18 and oxygen saturation is 94%.     GENERAL: No apparent distress  HEENT: Normocephalic/atraumatic and EOMI  CARDIAC: Irregular rhythm, bradycardia  LUNGS: Clear to auscultation, mild dry cough  ABDOMINAL: soft, nontender and nondistended    EXTREMITIES: no contractures, spasticity, or edema.  No calf tenderness bilaterally, 2+ bilateral DP/PT pulses.    NEURO:  Mental status:  A&Ox4 (person, place, date, situation) answers questions appropriately follows commands  Speech: fluent, no aphasia or dysarthria    CRANIAL NERVES:  2,3: visual acuity grossly intact, PERRL  3,4,6: EOMI bilaterally, no nystagmus or diplopia  7: no facial asymmetry  8: hearing grossly intact  9,10: symmetric palate elevation  11: SCM/Trapezius strength 5/5 bilaterally  12: tongue protrudes midline    Motor:    Shoulder flexors:  Right -  4/5, Left -  5/5  Elbow flexors:  Right -  3+/5, Left -  5/5  Wrist extensors:  Right -  4/5, Left -  5/5  Elbow extensors:  Right -  3+/5, Left -  5/5  Finger flexors:  Right -  4/5, Left -  5/5  Finger abductors:  Right -  4/5, Left -  5/5  Hip flexors:  Right -  0/5, Left -  4/5  Knee ext:  Right -  0/5, Left -  4/5  Dorsiflexors:  Right -  0/5, Left -  4/5  EHL:  Right -  0/5, Left -  4/5  Plantar flexors:  Right -  1/5, Left -  5/5    DTRs: 2+ in bilateral biceps and patellar tendons  Negative babinski b/l  Negative Rock b/l  Tone: no spasticity noted    RADIOLOGY:  Imaging:  DX-ANKLE 3+ VIEWS RIGHT   Final Result           1.  No acute traumatic bony injury.   2.  Pes planus   3.  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  No radiographic evidence of acute traumatic injury.       DX-PELVIS-1 OR 2 VIEWS   Final Result           1.  No acute traumatic bony injury.   2.  Atherosclerosis   3.  Calcified uterine fibroid       DX-CHEST-PORTABLE (1 VIEW)   Final Result            1.  No focal infiltrates.   2.  Perihilar interstitial prominence and bronchial wall cuffing, appearance suggests changes of underlying bronchial inflammation, consider bronchitis.       CT-CTA NECK WITH & W/O-POST PROCESSING   Final Result           1.  CT angiogram of the neck within normal limits.           CT-CTA HEAD WITH & W/O-POST PROCESS   Final Result           1.  No large vessel occlusion or aneurysm identified.   2.  Approximately 50% narrowing of the left P2 segment, could represent vessel spasm, stenosis, or small nonocclusive thrombus.       CT-CEREBRAL PERFUSION ANALYSIS   Final Result           1.  Cerebral blood flow less than 30% likely representing completed infarct = 0 mL.       2.  T Max more than 6 seconds likely representing combination of completed infarct and ischemia = 0 mL.       3.  Mismatched volume likely representing ischemic brain/penumbra = None       4.  Please note that the cerebral perfusion was performed on the limited brain tissue around the basal ganglia region. Infarct/ischemia outside the CT perfusion sections can be missed in this study.       CT-HEAD W/O   Final Result           1.  No acute intracranial abnormality is identified, there are nonspecific white matter changes, commonly associated with small vessel ischemic disease.  Associated mild cerebral atrophy is noted.   2.  Atherosclerosis.      MR-THORACIC SPINE-W/O   Final Result       1.  Midthoracic dorsal kyphosis. No underlying vertebral abnormalities or wedge compression deformities. No evidence of old or recent/subacute insufficiency compression fracture.   2.  T2-T3, T7-T8, and T8-T9 ventral extradural defects consistent with disc or disc/osteophyte change. This is most prominent at T7-T8 where there is effacement of ventral subarachnoid space and contour impression on the ventral cord. However, there is    no overall central stenosis as the dorsal subarachnoid space is generous at all of these levels.        MR-LUMBAR SPINE-W/O   Final Result       1.  Cholelithiasis.   2.  L5-S1 grade 2 spondylolisthesis with bilateral L5 spondylolysis. Degenerative disc space narrowing and vacuum disc phenomenon at this level.   3.  L3-4 minimal left lateral disc bulging. No central or foraminal stenosis.   4.  L4-5 tiny midline disc bulge or protrusion with underlying tiny midline annular fissure. No central or foraminal stenosis.   5.  L5-S1 mild pseudo-disc bulging associated with spondylolisthesis. No central stenosis. Marked left foraminal stenosis and severe right foraminal stenosis with horizontal deformity of the neural foramina associated with the spondylolisthesis.       MR-BRAIN-W/O   Final Result       1.  Moderate-advanced cerebral atrophy. Age-appropriate.   2.  Small and clustered gyriform foci of bright diffusion signal involving the left far posterior parasagittal frontal lobe and left parietal lobe consistent with acute cerebral infarction. No hemorrhagic transformation.   3.  Moderate supratentorial white matter disease most consistent with microvascular ischemic change.   4.  Old lacunar infarct left posterior frontal lobe white matter at the level of the centrum semiovale.   5.  No evidence of acute hemorrhage or mass lesion.          LABS:  Recent Labs     12/18/20  0245 12/19/20  0351   SODIUM 131* 130*   POTASSIUM 3.5* 3.4*   CHLORIDE 99 101   CO2 21 22   GLUCOSE 126* 132*   BUN 26* 22   CREATININE 0.65 0.59   CALCIUM 9.3 8.7     Recent Labs     12/18/20  0245 12/19/20  0351 12/20/20  0541   WBC 11.5* 9.3 9.4   RBC 4.45 4.28 4.41   HEMOGLOBIN 12.8 11.9* 12.2   HEMATOCRIT 38.8 36.9* 37.7   MCV 87.2 86.2 85.5   MCH 28.8 27.8 27.7   MCHC 33.0* 32.2* 32.4*   RDW 44.3 44.1 43.5   PLATELETCT 184 192 218   MPV 9.8 10.0 9.5             PRIMARY REHAB DIAGNOSIS:    This patient is a 88 y.o. female admitted for acute inpatient rehabilitation with CVA (cerebral vascular accident) (HCC).    IMPAIRMENTS:    Cognitive  ADLs/IADLs  Mobility  Speech  Swallow    SECONDARY DIAGNOSIS/MEDICAL CO-MORBIDITIES AFFECTING FUNCTION:  Hyperlipidemia, atrial fibrillation, aortic stenosis, hyponatremia, troponinemia, essential hypertension    RELEVANT CHANGES SINCE PREADMISSION EVALUATION:    Status unchanged    The patient's rehabilitation potential is Very Good  The patient's medical prognosis is good    PLAN:   I performed a complete drug regimen review and did not identify any potential clinically significant medication issues.     Discussion and Recommendations:   1. The patient requires an acute inpatient rehabilitation program with a coordinated program of care at an intensity and frequency not available at a lower level of care. This recommendation is substantiated by the patient's medical physicians who recommend that the patient's intervention and assessment of medical issues needs to be done at an acute level of care for patient's safety and maximum outcome.   2. A coordinated program of care will be supplied by an interdisciplinary team of physical therapy, occupational therapy, rehab physician, rehab nursing, and, if needed, speech therapy and rehab psychology. Rehab team presents a patient-specific rehabilitation and education program concentrating on prevention of future problems related to accessibility, mobility, skin, bowel, bladder, sexuality, and psychosocial and medical/surgical problems.   3. Need for Rehabilitation Physician: The rehab physician will be evaluating the patient on a multi-weekly basis to help coordinate the program of care. The rehab physician communicates between medical physicians, therapists, and nurses to maximize the patient's potential outcome. Specific areas in which the rehab physician will be providing daily assessment include the following:   A. Assessing the patient's heart rate and blood pressure response (vitals monitoring) to activity and making adjustments in medications or  conservative measures as needed.   B. The rehab physician will be assessing the frequency at which the program can be increased to allow the patient to reach optimal functional outcome.   C. The rehab physician will also provide assessments in daily skin care, especially in light of patient's impairments in mobility.   D. The rehab physician will provide special expertise in understanding how to work with functional impairment and recommend appropriate interventions, compensatory techniques, and education that will facilitate the patient's outcome.   4. Rehab R.N.   The rehab RN will be working with patient to carry over in room mobility and activities of daily living when the patient is not in 3 hours of skilled therapy. Rehab nursing will be working in conjunction with rehab physician to address all the medical issues above and continue to assess laboratory work and discuss abnormalities with the treating physicians, assess vitals, and response to activity, and discuss and report abnormalities with the rehab physician. Rehab RN will also continue daily skin care, supervise bladder/bowel program, instruct in medication administration, and ensure patient safety.   5. Rehab Therapy: Therapies to treat at intensity and frequency of (may change after completion of evaluation by all therapeutic disciplines):       PT:  Physical therapy to address mobility, transfer, gait training and evaluation for adaptive equipment needs 1 hour/day at least 5 days/week for the duration of the ELOS (see below)       OT:  Occupational therapy to address ADLs, self-care, home management training, functional mobility/transfers and assistive device evaluation, and community re-integration 1  hour/day at least 5 days/week for the duration of the ELOS (see below).        ST/Dysphagia:  Speech therapy to address speech, language, and cognitive deficits as well as swallowing difficulties with retraining/dysphagia management and community  re-integration with comprehension, expression, cognitive training 1  hour/day at least 5 days/week for the duration of the ELOS (see below).     Medical management / Rehabilitation Issues/ Adverse Potential as part of rehabilitation plan     REHABILITATION ISSUES/ADVERSE POTENTIAL:  1.  CVA (Cerebrovascular Accident): Cont Eliquis and statin for secondary prophylaxis as well as lipid and blood pressure management. Patient demonstrates functional deficits in strength, balance, coordination, and ADL's. Patient is admitted to [facility] for comprehensive rehabilitation therapy as described below.   Rehabilitation nursing monitors bowel and bladder control, educates on medication administration, co-morbidities and monitors patient safety.    2.  Neurostimulants: None at this time but continue to assess daily for need to initiate should status change.    3.  DVT prophylaxis:  Patient is on Eliquis for anticoagulation upon transfer. Encourage OOB. Monitor daily for signs and symptoms of DVT including but not limited to swelling and pain to prevent the development of DVT that may interfere with therapies.    4.  GI prophylaxis:  On prilosec to prevent gastritis/dyspepsia which may interfere with therapies.    5.  Pain: No issues with pain currently    6.  Nutrition/Dysphagia: Dietician monitors nutrient intake, recommend supplements prn and provide nutrition education to pt/family to promote optimal nutrition for wound healing/recovery.     7.  Bladder/bowel:  Start bowel and bladder program as described below, to prevent constipation, urinary retention (which may lead to UTI), and urinary incontinence (which will impact upon pt's functional independence).   - TV Q3h while awake with post void bladder scans, I&O cath for PVRs >400  - up to commode after meal     8.  Skin/dermal ulcer prophylaxis: Monitor for new skin conditions with q.2 h. turns as required to prevent the development of skin breakdown.      9.  Cognition/Behavior:  Psychologist Dr. Coleman provides adjustment counseling to illness and psychosocial barriers that may be potential barriers to rehabilitation.     10. Respiratory therapy: RT performs O2 management prn, breathing retraining, pulmonary hygiene and bronchospasm management prn to optimize participation in therapies.     MEDICAL CO-MORBIDITIES/ADVERSE POTENTIAL AFFECTING FUNCTION:    Acute ischemic stroke: Left parietal and frontal lobes.  Thromboembolic in setting of new diagnosis of atrial fibrillation.  CT head negative.  Perfusion analysis showing complete blood loss and 30% representing completed infarct.  CTA head and neck negative for large vessel occlusion -did have 50% narrowing of left P2 segment (vessel spasm versus stenosis versus small nonocclusive thrombus).  Echocardiogram showing hyperdynamic LV function with EF 75%, moderate aortic stenosis.  -Initiate aggressive inpatient rehabilitation including physical therapy, Occupational Therapy, speech therapy.  -Secondary stroke prophylaxis: Eliquis 5 mg twice daily + atorvastatin 40 mg q. evening.  BP control.    Atrial fibrillation, new onset: Follows with Dr. Aide Loera, [place].   -Continue Eliquis 5 mg twice daily + metoprolol 50 mg twice daily  -Outpatient follow-up with cardiology to consider cardioversion    Essential hypertension  -Continue lisinopril 5 mg daily + metoprolol 50 mg twice daily    Elevated troponins:  Thought to be demand ischemia. Followed by cardiology at [place].  Nonobstructive CAD on catheterization 1/2019.  Not followed to resolution at acute hospital.  -Continue to monitor    Moderate aortic stenosis: Cards consult 12/18 -not contributory to current clinical state.  Follows with [place] cardiology.  -Outpatient cards follow-up     Diabetes mellitus  -Continue sliding scale insulin    Anemia Improved 11.9/36.9 ->12.2/37.7    Hyponatremia: Declining prior to admission 131 12/18 -> 130 12/19  -  CMP today 12/20; tomorrow 12/21    Hypokalemia 3.5 12/18 -> 3.4 12/19  - CMP today 12/20; tomorrow 12/21    Elevated Alk Phos 113 12/18 ->140 12/19  - CMP today 12/20; tomorrow 12/21    GI: Diet level 6+ thin liquids  -Consult speech therapy    Endo: Vitamin D screen  -Follow-up a.m. vitamin D    DVT prophylaxis:  -Eliquis    I personally performed a complete drug regimen review and no potential clinically significant medication issues were identified.     Pt was seen today for 75 min, and entire time spent in face-to-face contact was >50% in counseling and coordination of care as detailed in A/P above.        GOALS/EXPECTED LEVEL OF FUNCTION BASED ON CURRENT MEDICAL AND FUNCTIONAL STATUS (may change based on patient's medical status and rate of impairment recovery):  Transfers: Modified Independent  Mobility/Gait: Modified Independent  ADL's: Modified Independent  Cognition: Modified independent  Swallowing: Modified independent    DISPOSITION: Discharge to pre-morbid independent living setting with the supportive care of patient's family.    ELOS: 14-21 days

## 2020-12-20 NOTE — FLOWSHEET NOTE
12/20/20 1547   Events/Summary/Plan   Events/Summary/Plan RT assessment and IS   Vital Signs   Pulse (!) 59   Respiration 18   Pulse Oximetry 93 %   $ Pulse Oximetry (Spot Check) Yes   Respiratory Assessment   Level of Consciousness Alert   Breath Sounds   RLL Breath Sounds Diminished   LLL Breath Sounds Diminished   Secretions   Cough Dry   Oxygen   O2 Delivery Device None - Room Air   Smoking History   Have you ever smoked Never

## 2020-12-20 NOTE — PROGRESS NOTES
Patient admitted to facility at 1145 via wheelchair; accompanied by hospital transport.  Patient assisted to room and positioned in bed for comfort and safety; call light within reach.  Patient assisted with stowing belongings and oriented to room and facility.  Admission assessment performed and documented in computer. Patient received and reviewed education binder. Admission paperwork completed; signed copies placed in chart.  Will continue to monitor.

## 2020-12-20 NOTE — PROGRESS NOTES
Monitor summary: Afin , NM 0.156, QRS 0.10, QT -, PVC couplet and bigem per strip from monitor room.

## 2020-12-20 NOTE — DISCHARGE PLANNING
Hospital Care Management Discharge Planning       Anticipated Discharge Disposition:   · [facility]     Action:   · RN CM received update from ALEX Stringer, that Pt has been accepted to [facility] and transport is scheduled for 1100  · RN KJ met with the Pt at the bedside to discuss above   · Pt agreeable and happy with d/c plan  · Verbal consent received to d/c to [facility]  · RN KJ completed COBRA and placed it in Pt's hard chart   · Bedside RN updated of above      Barriers to Discharge:   · N/A     Plan:    · Hospital Care Management Team to continue to provide support services and assistance with discharge planning as needed.

## 2020-12-20 NOTE — DISCHARGE SUMMARY
Discharge Summary    CHIEF COMPLAINT ON ADMISSION  Chief Complaint   Patient presents with   • Possible Stroke     R sided weakness, noticed by daughter around 0300 this morning; LKN 1900 yesterday       Reason for Admission  Stroke     CODE STATUS  Full Code    HPI & HOSPITAL COURSE  87 yo woman with DM, HTN, HLD who woke with right sided weakness. CT stroke workup was unremarkable. She was found in new onset afib. MRI brain showed acute infarct of left frontal and left parietal lobes, old lacunar infarct left posterior frontal lobe. MRI T and L spine showed kyphosis and degenerative disease. TTE showed moderate AS, similar to previous TTE at [facility]. Neurology was consulted. Stroke is likely thromboembolic. She was started on Eliquis and lipitor.   Cardiology says she can consider cardioversion as outpatient. She is followed by cardiology at [facility]. Her afib is rate controlled on metoprolol. She remains hypertensive so lisinopril was added and may need additional titration.       Therefore, she is discharged in good and stable condition to an inpatient rehabilitation hospital.    The patient met 2-midnight criteria for an inpatient stay at the time of discharge.      FOLLOW UP ITEMS POST DISCHARGE  Cardiology outpatient - afib, aortic stenosis moderate  I placed anticoag clinic referral for [facility]  Neurology stroke bridge clinic  HTN management    DISCHARGE DIAGNOSES  Principal Problem:    CVA (cerebral vascular accident) (HCC) POA: Yes  Active Problems:    Elevated troponin POA: Yes    AF (atrial fibrillation) (HCC) POA: Yes    Hyponatremia POA: Yes    Type 2 diabetes mellitus without complication, without long-term current use of insulin (HCC) POA: Yes    Aortic stenosis POA: Yes    Essential hypertension POA: Yes    Hyperlipidemia POA: Yes  Resolved Problems:    Leukocytosis POA: Yes      FOLLOW UP  No follow-up provider specified.    MEDICATIONS ON DISCHARGE     Medication List      START taking  these medications      Instructions   apixaban 5mg Tabs  Commonly known as: ELIQUIS   Take 1 Tab by mouth 2 Times a Day. Indications: Thromboembolism secondary to Atrial Fibrillation  Dose: 5 mg     atorvastatin 40 MG Tabs  Commonly known as: LIPITOR   Take 1 Tab by mouth every evening.  Dose: 40 mg     guaiFENesin 100 MG/5ML syrup  Commonly known as: Cough Syrup   Take 10 mL by mouth 3 times a day as needed for Cough.  Dose: 10 mL     lisinopril 5 MG Tabs  Start taking on: December 21, 2020  Commonly known as: PRINIVIL   Take 1 Tab by mouth every day.  Dose: 5 mg        CONTINUE taking these medications      Instructions   FIBER PO   Take 1 Tab by mouth 1 time a day as needed (constipation).  Dose: 1 Tab     metFORMIN 500 MG Tabs  Commonly known as: GLUCOPHAGE   Take 1,000 mg by mouth 2 times a day, with meals.  Dose: 1,000 mg     metoprolol  MG Tb24  Commonly known as: TOPROL XL   Take 150 mg by mouth every morning.  Dose: 150 mg     VITAMIN C PO   Take 1 Tab by mouth every morning.  Dose: 1 Tab        STOP taking these medications    ibuprofen 200 MG Tabs  Commonly known as: MOTRIN            Allergies  Allergies   Allergen Reactions   • Demerol Unspecified     Convulsions     • Penicillins      Per daughterLaura   • Erythromycin Vomiting and Nausea       DIET  Orders Placed This Encounter   Procedures   • Diet Order Diet: Level 6 - Soft and Bite Sized; Liquid level: Level 0 - Thin     Standing Status:   Standing     Number of Occurrences:   1     Order Specific Question:   Diet:     Answer:   Level 6 - Soft and Bite Sized [23]     Order Specific Question:   Liquid level     Answer:   Level 0 - Thin       ACTIVITY  As tolerated.  Weight bearing as tolerated    LINES, DRAINS, AND WOUNDS  This is an automated list. Peripheral IVs will be removed prior to discharge.  Peripheral IV 12/17/20 22 G Anterior;Distal;Left Wrist (Active)   Site Assessment Clean;Dry;Intact 12/19/20 2000   Dressing Type Transparent  12/19/20 2000   Line Status Infusing 12/19/20 2000   Dressing Status Clean;Dry;Intact 12/19/20 2000   Dressing Intervention N/A 12/19/20 2000   Infiltration Grading ([facility], CV) 0 12/19/20 2000   Phlebitis Scale ([facility] Only) 0 12/19/20 2000          Peripheral IV 12/17/20 22 G Anterior;Distal;Left Wrist (Active)   Site Assessment Clean;Dry;Intact 12/19/20 2000   Dressing Type Transparent 12/19/20 2000   Line Status Infusing 12/19/20 2000   Dressing Status Clean;Dry;Intact 12/19/20 2000   Dressing Intervention N/A 12/19/20 2000   Infiltration Grading ([facility], CV) 0 12/19/20 2000   Phlebitis Scale ([facility] Only) 0 12/19/20 2000               MENTAL STATUS ON TRANSFER  Level of Consciousness: Alert  Orientation : Oriented x 4  Speech: Speech Clear    CONSULTATIONS  Neurology  Cardiology  PMR    PROCEDURES  EC-ECHOCARDIOGRAM COMPLETE W/O CONT   Final Result      MR-THORACIC SPINE-W/O   Final Result      1.  Midthoracic dorsal kyphosis. No underlying vertebral abnormalities or wedge compression deformities. No evidence of old or recent/subacute insufficiency compression fracture.   2.  T2-T3, T7-T8, and T8-T9 ventral extradural defects consistent with disc or disc/osteophyte change. This is most prominent at T7-T8 where there is effacement of ventral subarachnoid space and contour impression on the ventral cord. However, there is    no overall central stenosis as the dorsal subarachnoid space is generous at all of these levels.      MR-LUMBAR SPINE-W/O   Final Result      1.  Cholelithiasis.   2.  L5-S1 grade 2 spondylolisthesis with bilateral L5 spondylolysis. Degenerative disc space narrowing and vacuum disc phenomenon at this level.   3.  L3-4 minimal left lateral disc bulging. No central or foraminal stenosis.   4.  L4-5 tiny midline disc bulge or protrusion with underlying tiny midline annular fissure. No central or foraminal stenosis.   5.  L5-S1 mild pseudo-disc bulging associated with spondylolisthesis. No  central stenosis. Marked left foraminal stenosis and severe right foraminal stenosis with horizontal deformity of the neural foramina associated with the spondylolisthesis.      MR-BRAIN-W/O   Final Result      1.  Moderate-advanced cerebral atrophy. Age-appropriate.   2.  Small and clustered gyriform foci of bright diffusion signal involving the left far posterior parasagittal frontal lobe and left parietal lobe consistent with acute cerebral infarction. No hemorrhagic transformation.   3.  Moderate supratentorial white matter disease most consistent with microvascular ischemic change.   4.  Old lacunar infarct left posterior frontal lobe white matter at the level of the centrum semiovale.   5.  No evidence of acute hemorrhage or mass lesion.      DX-ANKLE 3+ VIEWS RIGHT   Final Result         1.  No acute traumatic bony injury.   2.  Pes planus   3.  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  No radiographic evidence of acute traumatic injury.      DX-PELVIS-1 OR 2 VIEWS   Final Result         1.  No acute traumatic bony injury.   2.  Atherosclerosis   3.  Calcified uterine fibroid      DX-CHEST-PORTABLE (1 VIEW)   Final Result         1.  No focal infiltrates.   2.  Perihilar interstitial prominence and bronchial wall cuffing, appearance suggests changes of underlying bronchial inflammation, consider bronchitis.      CT-CTA NECK WITH & W/O-POST PROCESSING   Final Result         1.  CT angiogram of the neck within normal limits.         CT-CTA HEAD WITH & W/O-POST PROCESS   Final Result         1.  No large vessel occlusion or aneurysm identified.   2.  Approximately 50% narrowing of the left P2 segment, could represent vessel spasm, stenosis, or small nonocclusive thrombus.      CT-CEREBRAL PERFUSION ANALYSIS   Final Result         1.  Cerebral blood flow less than 30% likely representing completed infarct = 0 mL.      2.  T Max more than 6 seconds likely  representing combination of completed infarct and ischemia = 0 mL.      3.  Mismatched volume likely representing ischemic brain/penumbra = None      4.  Please note that the cerebral perfusion was performed on the limited brain tissue around the basal ganglia region. Infarct/ischemia outside the CT perfusion sections can be missed in this study.      CT-HEAD W/O   Final Result         1.  No acute intracranial abnormality is identified, there are nonspecific white matter changes, commonly associated with small vessel ischemic disease.  Associated mild cerebral atrophy is noted.   2.  Atherosclerosis.            LABORATORY  Lab Results   Component Value Date    SODIUM 130 (L) 12/19/2020    POTASSIUM 3.4 (L) 12/19/2020    CHLORIDE 101 12/19/2020    CO2 22 12/19/2020    GLUCOSE 132 (H) 12/19/2020    BUN 22 12/19/2020    CREATININE 0.59 12/19/2020        Lab Results   Component Value Date    WBC 9.4 12/20/2020    HEMOGLOBIN 12.2 12/20/2020    HEMATOCRIT 37.7 12/20/2020    PLATELETCT 218 12/20/2020        Total time of the discharge process exceeds 34 minutes.

## 2020-12-20 NOTE — PROGRESS NOTES
Pt given DC instructions, medication education & information on follow up appointments and importance of seeking medical attention if needed. Pt verbalized understanding of all information given; daughter was also in the room and verbalized understanidng of follow up. All lines/devices discontinued without complications. Patient discharged via wheelchair to . All belongings sent home with patient including glasses and dentures.

## 2020-12-20 NOTE — CARE PLAN
Problem: Safety  Goal: Will remain free from injury  Note: Patient calling appropriately for assistance, does not attempt to transfer self without staff present. Call light within reach. Non skid footwear in place. Bed locked and in lowest position. Hourly rounding in place.       Problem: Urinary Elimination:  Goal: Ability to reestablish a normal urinary elimination pattern will improve  Note: Pt. Has urinary retention. She was straight catheterized for 600 ml during this shift. Will continue to monitor.     Problem: Bowels  Per report patient had 3 loose bowel movements yesterday. Will hold bowel medications.

## 2020-12-20 NOTE — PREADMISSION SCREENING NOTE
Pre-Admission Screening Form    Patient Information:   Name: Liudmila Pulido     MRN: [id number]       : 1932      Age: 88 y.o.   Gender: female      Race: White [7]       Marital Status:  [5]  Family Contact: Keyonna Martinez        Relationship: Daughter [2]  Home Phone: [phone]           Cell Phone: [phone]  Advanced Directives: Yes  Code Status:  FULL  Current Attending Provider: Lamont aGyle M.D.  Referring Physician: Dr. Gayle   Physiatrist Consult: Dr. Causey    Referral Date: 20  Primary Payor Source:  MEDICARE  Secondary Payor Source:  [facility]    Medical Information:   Date of Admission to Acute Care [facility]  Room Number: S182/01  Rehabilitation Diagnosis: 0001.2 - Stroke: Right Body Involvement (Left Brain)    There is no immunization history on file for this patient.  Allergies   Allergen Reactions   • Demerol Unspecified     Convulsions     • Penicillins      Per daughter, Laura   • Erythromycin Vomiting and Nausea     Past Medical History:   Diagnosis Date   • Hyperlipidemia    • Hypertension    • Type 2 diabetes mellitus without complication, without long-term current use of insulin ([facility]) 2020     History reviewed. No pertinent surgical history.    History Leading to Admission, Conditions that Caused the Need for Rehab (CMS):     Dr. Oreilly H&P:  Chief Complaint   Patient presents with   • Possible Stroke       R sided weakness, noticed by daughter around 0300 this morning; LKN 1900 yesterday         History of Presenting Illness  88 y.o. female with hypertension, hyperlipidemia, with no history of diabetes mellitus or prior history of atrial fibrillation, who presented 2020 with right leg weakness.  Patient was last seen normal at 7 PM yesterday by family, and at around 3 AM family noticed that she is a little weaker than usual when they helped her into her bed.  In the morning, family and patient noticed right sided leg weakness, with the patient stating  that she could not move her right lower extremity.  She could move the rest of her body normally.  She had no dysarthria, visual field defects, numbness or tingling.  She had no other complaints, and in fact she states she felt well until this morning.  She denied any fevers, chills, cough, shortness of breath, chest pain, bowel movement or urinary changes.  She denies any pain on the leg or back.  She was then brought to the ED by EMS.     Assessment/Plan:  I anticipate this patient is appropriate for observation status at this time.     * Weakness of right leg, rule-out acute CVA- (present on admission)  Assessment & Plan  -Concerning for acute CVA, although no other neurologic deficits aside from right leg weakness.  No large vessel occlusion on CT angiogram of the head and neck.  Head perfusion CT was negative.  -start empiric full dose aspirin, along with high-intensity statin with Lipitor 80 mg daily.  Check lipid profile and hemoglobin A1c for further risk stratification.  -will obtain MRI of the brain to rule out acute CVA.  Will also get echocardiogram with bubble study for completion.    -She does have atrial fibrillation, which puts her at risk for acute CVA.  Continue to monitor on telemetry.    -Monitor closely, with neuro checks every 4 hours per CVA protocol.  -Start permissive hypertension in the acute phase in the next 24-48 hour, and allow blood pressure to go as high as 220/120 to preserve cerebral perfusion. Hold home antihypertensives for now.   -Completion of work-up, I will obtain MRI of the thoracic and lumbar spines to rule out spine pathology causing isolated right lower extremity weakness.  -will get PT/OT/SLP to evaluate.      AF (atrial fibrillation) ([facility])- (present on admission)  Assessment & Plan  -Her heart rate is fluctuating, but generally rate controlled between 100-110s.  She does not appear to have history of atrial fibrillation, at least according to limited recent records from  [facility].  -Holding off on further rate controller, with stroke rule out and permissive hypertension.  Monitor on telemetry.  Check echocardiogram.  If she needs further rate control for rapid A. fib, may need to load with digoxin, or amiodarone.  If stroke ruled out, will start on beta-blocker, or CCB.      Leukocytosis- (present on admission)  Assessment & Plan  -She does not appear to be septic.  No focus of infection initially identified, aside from findings of bronchial wall cuffing on chest x-ray.  -Hold off on antibiotics.  Check procalcitonin and trend.  For completion, send for urinalysis and cultures.  -Trend WBC count, and watch for fevers.        Elevated troponin- (present on admission)  Assessment & Plan  -Likely demand ischemia.  No ACS.  EKG showed no dynamic ischemic changes.  -Trend troponins.  Check echocardiogram.  She will be on aspirin and Lipitor for stroke rule-out.     Hyponatremia- (present on admission)  Assessment & Plan  -Could be hypovolemic hyponatremia, as she is a little clinically dry.  -Start gentle hydration with IV NS at 100 cc/h.  Check urine sodium, and osmolality, along with plasma osmolality, TSH, and cortisol level.  Trend sodium levels.     Hyperlipidemia- (present on admission)  Assessment & Plan  -Start high intensity Lipitor at 80 mg at bedtime for stroke rule out.  Check lipid profile.     Essential hypertension- (present on admission)  Assessment & Plan  -She will be on permissive hypertension for now until stroke is ruled out.  Start as needed IV labetalol for significant hypertension beyond 220/120.        VTE prophylaxis: Lovenox SQ        Dr. Causey (Physiatry) recommendations:  The patient presents functional deficits in mobility and self-care as well as cognitive deficits and swallowing/speech, and Moderate  de-conditioning. Pre-morbidly, this patient lived in a single level home with Three steps to enter,with family  The patient was evaluated by acute care  Physical Therapy, Occupational Therapy and Speech Language Pathology; currently requiring maximal assistance for mobility and maximal assistance for ADLs, also with ongoing cognitive, speech and swallowing deficits. The patient's current diet is Dysphagia III with Thin liquids.      The patient is   a Very Good candidate for an acute inpatient rehabilitation program with a coordinated program of care at an intensity and frequency not available at a lower level of care.      Note: This recommendation requires that patient has at least CGA/Minimal Assistance needs in at least two therapy disciplines.  If patient progresses to no longer need CGA/Skyler with at least two therapy disciplines they may be more appropriate for Skilled nursing facility versus home with home health.       This recommendation is substantiated by the patient's current medical condition with intervention and assessment of medical issues requiring an acute level of care for patient's safety and maximum outcome. A coordinated program of care will be provided by an interdisciplinary team including physical therapy, occupational therapy, speech language pathology, physiatry, rehab nursing and rehab psychology. Rehab goals include improved cognition, speech and swallowing, mobility, self-care management, strength and conditioning/endurance, pain management, bowel and bladder management, mood and affect, and safety with independent home management including caregiver training. Estimated length of stay is approximately 14-18 days. Rehab potential: Very Good. Disposition: to pre-morbid independent living setting with supportive care of patient's family. We will continue to follow with you in anticipation of discharge to acute inpatient rehabilitation when medically stable to do so at the discretion of the attending physician. Thank you for allowing us to participate in this patient's care. Please call with any questions regarding this  recommendation.     Dr. Enamorado (Cardiology) recommendations:  Active Hospital Problems     Diagnosis   • CVA (cerebral vascular accident) ([facility]) [I63.9]       Priority: High   • Elevated troponin [R77.8]       Priority: High   • AF (atrial fibrillation) ([facility]) [I48.91]       Priority: High   • Hyponatremia [E87.1]       Priority: Medium   • Leukocytosis [D72.829]       Priority: Medium   • Essential hypertension [I10]       Priority: Low   • Hyperlipidemia [E78.5]       Priority: Low   • Type 2 diabetes mellitus without complication, without long-term current use of insulin ([facility]) [E11.9]   • Aortic stenosis [I35.0]      88-year-old female with new onset atrial fibrillation and a CVA in the setting of moderate aortic stenosis.  Her aortic stenosis require follow-up as an outpatient but is not an active inpatient problem.  I do not think is contributing to her issues.  For her atrial fibrillation I would continue the anticoagulation as you are doing.  She is not a candidate for cardioversion at this point given her recent CVA.  Please continue her on metoprolol 50 twice daily.  We can consider either an A. fib ablation or a cardioversion as outpatient however at the moment she needs to recover from her CVA.      XIMENA Johns (Neurology) recommendations:  Assessment:     Liudmila Pulido is a 88 y.o. female with relevant history of hypertension on Metoprolol, hyperlipidemia, and DM type II on Metformin who presented on 12/17/20 for right leg weakness and difficulty walking for which neurology was consulted to address acute ischemic stroke. As the patient presented greater than 4.5 hours from last known well, she was not given tPA. CTA head and neck revealed no large vessel occlusion, thus patient was not an IR thrombectomy candidate. MRI brain wo contrast revealed small cluster gyriform foci of left posterior parasagittal frontal and left parietal lobes without hemorrhagic transformation. TTE with bubble study showed EF  70%, moderate aortic stenosis, and mildly dilated left atrium.  These findings are consistent with atrial fibrillation, and correlates with A. fib seen on ECG in ER. Neurological exam is significant for mild right nasolabial fold flattening, right lower extremity hemiplegia, and distal anterior right lower extremity decreased sensation. NIHSS of 6 currently.  Exam correlates with the motor or sensory cortex controlling right lower extremity sensation and movement.  Etiology of the left posterior frontal and parietal lobe acute ischemic stroke is cardioembolic given new onset A. fib.     Plan:     -q4h and PRN neuro assessment. VS per nursing/unit protocol.   -Permissive HTN ok until 19:00 today, not to exceed SBP > 220, DBP > 105. Then, BP goal < 140/90. Antihypertensives per primary team.   -Telemetry; currently SR. Afib seen on telemetry and initial ECG.    -Eliquis 5mg PO BID, first dose at 18:00 today.  -Atorvastatin 80 mg PO q HS.  Note lipid panel LDL 48 (within goal<70) and HDL 17 (goal> 40).   -Recommend aggressive BG management per primary team. Avoid IVF with Dextrose. BG goal 140-180. Note hemoglobin A1c 6.2 (within goal <7).   -PT/OT/SLP eval and treat.   -Counseled patient at length regarding life style and risk factor modification for secondary stroke prevention.   -[facility].  Referral placed.  -Recommend Cardiology consult given new onset A. fib.  -All other medical management per primary team.   -DVT PPX: SCDs and Eliquis.      No further recommendations or further studies from a neurological standpoint at this time. Please re-consult if you have further questions or there is a change in status.    Brain MRI 12-17-20:  1.  Moderate-advanced cerebral atrophy. Age-appropriate.  2.  Small and clustered gyriform foci of bright diffusion signal involving the left far posterior parasagittal frontal lobe and left parietal lobe consistent with acute cerebral infarction. No  "hemorrhagic transformation.  3.  Moderate supratentorial white matter disease most consistent with microvascular ischemic change.  4.  Old lacunar infarct left posterior frontal lobe white matter at the level of the centrum semiovale.  5.  No evidence of acute hemorrhage or mass lesion.    Co-morbidities: See [facility]  Potential Risk - Complications: Aphasia, Cognitive Impairment, Contractures, Deep Vein Thrombosis, Dysphagia, Incontinence, Malnutrition, Pain, Paralysis, Perceptual Impairment, Pneumonia, Pressure Ulcer and Urinary Tract Infection  Level of Risk: High    Ongoing Medical Management Needed (Medical/Nursing Needs):   Patient Active Problem List    Diagnosis Date Noted   • CVA (cerebral vascular accident) ([facility]) 12/17/2020     Priority: High   • Elevated troponin 12/17/2020     Priority: High   • AF (atrial fibrillation) ([facility]) 12/17/2020     Priority: High   • Hyponatremia 12/17/2020     Priority: Medium   • Essential hypertension 12/17/2020     Priority: Low   • Hyperlipidemia 12/17/2020     Priority: Low   • Type 2 diabetes mellitus without complication, without long-term current use of insulin ([facility]) 12/18/2020   • Aortic stenosis 12/18/2020     Alert with periods of forgetfulness.    Current Vital Signs:   Temperature: 36.2 °C (97.1 °F) Pulse: 80 Respiration: 16 Blood Pressure : 146/104  Weight: 77.9 kg (171 lb 11.8 oz) Height: 167.6 cm (5' 6\")  Pulse Oximetry: 96 % O2 (LPM): 0      Completed Laboratory Reports:  Recent Labs     12/18/20  0245 12/18/20  1519 12/18/20  1721 12/18/20  2004 12/19/20  0351 12/19/20  0754 12/19/20  1310 12/19/20  1749 12/19/20  2101 12/20/20  0541 12/20/20  0819   WBC 11.5*  --   --   --  9.3  --   --   --   --  9.4  --    HEMOGLOBIN 12.8  --   --   --  11.9*  --   --   --   --  12.2  --    HEMATOCRIT 38.8  --   --   --  36.9*  --   --   --   --  37.7  --    PLATELETCT 184  --   --   --  192  --   --   --   --  218  --    SODIUM 131*  --   --   --  130*  --   --   --   --   --   " --    POTASSIUM 3.5*  --   --   --  3.4*  --   --   --   --   --   --    BUN 26*  --   --   --  22  --   --   --   --   --   --    CREATININE 0.65  --   --   --  0.59  --   --   --   --   --   --    ALBUMIN 2.7*  --   --   --  2.1*  --   --   --   --   --   --    GLUCOSE 126*  --   --   --  132*  --   --   --   --   --   --    POCGLUCOSE  --  160* 175* 159*  --  118* 109* 154* 136*  --  125*     Additional Labs: Not Applicable    Prior Living Situation:   Housing / Facility: 1 Story House  Steps Into Home: 3  Steps In Home: 0  Lives with - Patient's Self Care Capacity: Adult Children  Equipment Owned: Front-Wheel Walker    Prior Level of Function / Living Situation:   Physical Therapy: Prior Services: None, Other (Comments)(Family assists with I-ADL )  Housing / Facility: 1 Story House  Steps Into Home: 3  Steps In Home: 0  Rail: Both Rail (Steps into Home)  Bathroom Set up: Walk In Shower, Grab Bars  Equipment Owned: Front-Wheel Walker  Lives with - Patient's Self Care Capacity: Adult Children  Bed Mobility: Independent  Transfer Status: Independent  Ambulation: Independent  Distance Ambulation (Feet): (community)  Assistive Devices Used: Front-Wheel Walker  Stairs: Independent  Current Level of Function:   Gait Level Of Assist: Unable to Participate  Supine to Sit: Maximal Assist  Sit to Supine: Maximal Assist  Scooting: Moderate Assist  Rolling: Maximum Assist to Lt.  Comments: HOB elevated, used rail   Sit to Stand: Maximal Assist(2-person )  Bed, Chair, Wheelchair Transfer: Unable to Participate  Sitting in Chair: NT, unable   Sitting Edge of Bed: 10 min   Standin seconds   Occupational Therapy:   Self Feeding: Independent  Grooming / Hygiene: Independent  Bathing: Independent  Dressing: Independent  Toileting: Independent  Medication Management: Independent  Laundry: Independent  Kitchen Mobility: Independent  Finances: Independent  Home Management: Requires Assist  Shopping: Requires Assist  Prior Level  Of Mobility: Independent Without Device in Community, Independent Without Device in Home  Driving / Transportation: Driving Independent, Relatives / Others Provide Transportation(pt drives short local distances )  Prior Services: None, Other (Comments)(Family assists with I-ADL )  Housing / Facility: [address] [place] [address]  Current Level of Function:   Lower Body Dressing: Maximal Assist(don L sock )  Toileting: (NT; Pure Wik, no BM )  Speech Language Pathology:   Problem List: Dysphagia  Diet / Liquid Recommendation: Thin (0), Soft & Bite-Sized (6) - (Dysphagia III)  Rehabilitation Prognosis/Potential: Good  Estimated Length of Stay: 14-21 days    Nursing:   Orientation : Oriented x 4  Incontinent    Scope/Intensity of Services Recommended:  Physical Therapy: 1 hr / day  5 days / week. Therapeutic Interventions Required: Maximize Endurance, Mobility, Strength and Safety  Occupational Therapy: 1 hr / day 5 days / week. Therapeutic Interventions Required: Maximize Self Care, ADLs, IADLs and Energy Conservation  Speech & Language Pathology: 1 hr / day 5 days / week. Therapeutic Interventions Required: Maximize Cognition, Swallowing and Safety  Rehabilitation Nursin/7. Therapeutic Interventions Required: Monitor Pain, Skin, Vital Signs, Intake and Output, Labs, Safety, Aspiration Risk and Family Training  Rehabilitation Physician: 3 - 5 days / week. Therapeutic Interventions Required: Medical Management    She requires 24-hour rehabilitation nursing to manage bowel and bladder function, skin care, nutrition and fluid intake, pain control, safety, medication management and patient/family goals. In addition, rehabilitation nursing will reiterate and reinforce therapy skills and equipment use, including ADLs, as well as provide education to the patient and family. Liudmila PICHARDO Cindyisidra is willing to participate in and is able to tolerate the proposed plan of care.    Rehabilitation Goals and Plan (Expected frequency & duration  of treatment in the IRF):   Return to the Community, Minimal Assist Level of Care and Family Able to Provide 24/7 Assistance  Anticipated Date of Rehabilitation Admission: 12-20-20  Patient/Family oriented IRF level of care/facility/plan: Yes  Patient/Family willing to participate in IRF care/facility/plan: Yes  Patient able to tolerate IRF level of care proposed: Yes  Patient has potential to benefit IRF level of care proposed: Yes  Comments: Not Applicable    Special Needs or Precautions - Medical Necessity:  Safety Concerns/Precautions:  Fall Risk / High Risk for Falls, Balance, Cognition and Bed / Chair Alarm  Pain Management  IV Site: Peripheral  Current Medications:    Current Facility-Administered Medications Ordered in Epic   Medication Dose Route Frequency Provider Last Rate Last Admin   • lisinopril (PRINIVIL) tablet 5 mg  5 mg Oral Q DAY Lamont Gayle M.D.   5 mg at 12/20/20 0858   • metoprolol (LOPRESSOR) tablet 50 mg  50 mg Oral TWICE DAILY Lamont Gayle M.D.   50 mg at 12/20/20 0505   • Metoprolol Tartrate (LOPRESSOR) injection 5 mg  5 mg Intravenous Q6HRS PRN Lamont Gayle M.D.       • atorvastatin (LIPITOR) tablet 40 mg  40 mg Oral Q EVENING Ankush Johns A.P.R.N.   40 mg at 12/19/20 1720   • apixaban (ELIQUIS) tablet 5 mg  5 mg Oral BID Ankush Johns A.P.R.N.   5 mg at 12/20/20 0505   • insulin regular (HumuLIN R,NovoLIN R) injection  1-6 Units Subcutaneous 4X/DAY FAZAL Gayle M.D.   Stopped at 12/19/20 2100   • Respiratory Therapy Consult   Nebulization Continuous RT Mehul Oreilly M.D.       • acetaminophen (Tylenol) tablet 650 mg  650 mg Oral Q6HRS PRN Mehul Oreilly M.D.   650 mg at 12/18/20 0904   • labetalol (NORMODYNE/TRANDATE) injection 10 mg  10 mg Intravenous Q4HRS PRN Mehul Oreilly M.D.       • ondansetron (ZOFRAN) syringe/vial injection 4 mg  4 mg Intravenous Q4HRS PRN Mehul Oreilly M.D.       • ondansetron (ZOFRAN ODT) dispertab 4 mg  4 mg Oral Q4HRS PRN Mehul Oreilly,  M.D.       • senna-docusate (PERICOLACE or SENOKOT S) 8.6-50 MG per tablet 2 Tab  2 Tab Oral BID Mehul Oreilly M.D.   Stopped at 12/19/20 1800    And   • polyethylene glycol/lytes (MIRALAX) PACKET 1 Packet  1 Packet Oral QDAY PRN Mehul Oreilly M.D.        And   • magnesium hydroxide (MILK OF MAGNESIA) suspension 30 mL  30 mL Oral QDAY PRN Mehul Oreilly M.D.        And   • bisacodyl (DULCOLAX) suppository 10 mg  10 mg Rectal QDAY PRN Mehul Oreilly M.D.         Current Outpatient Medications Ordered in Epic   Medication Sig Dispense Refill   • apixaban (ELIQUIS) 5mg Tab Take 1 Tab by mouth 2 Times a Day. Indications: Thromboembolism secondary to Atrial Fibrillation 60 Tab    • atorvastatin (LIPITOR) 40 MG Tab Take 1 Tab by mouth every evening. 30 Tab    • [START ON 12/21/2020] lisinopril (PRINIVIL) 5 MG Tab Take 1 Tab by mouth every day. 30 Tab      Diet:   DIET ORDERS (From admission to next 24h)     Start     Ordered    12/18/20 0841  Diet Order Diet: Level 6 - Soft and Bite Sized; Liquid level: Level 0 - Thin  ALL MEALS     Question Answer Comment   Diet: Level 6 - Soft and Bite Sized    Liquid level Level 0 - Thin        12/18/20 0840                Anticipated Discharge Destination / Patient/Family Goal:  Destination: Home with Assistance Support System: Son & D.I.L.  Anticipated home health services: OT and PT  Previously used HH service/ provider: Not Applicable  Anticipated DME Needs: Walker, Ramp, [employer] and [employer]  Outpatient Services: OT and PT  Alternative resources to address additional identified needs:     Pre-Screen Completed: 12/20/2020 9:18 AM Myke Abbott L.P.N.

## 2020-12-20 NOTE — DISCHARGE PLANNING
Spoke with Keyonna, daughter regarding [facility] and D/C resources/support.  She is hopeful/agreeable for an admission.  Liudmila lives with her son, Wes and D.I.L. in a 1LV home with 4ST to enter.  They are all ready looking into having a ramp installed.  Son & D.I.L. are retired and provide 24/7 supervision/assistance.  [place] evals reviewed.  PCR COVID is negative. Dr. Gayle has medically cleared.  Dr. Causey has accepted.  Transport has been arranged for 1100.  Dr. Gayle, Xochilt BSN, Fabienne UNDERWOOD & Keyonna daughter are aware.

## 2020-12-20 NOTE — PROGRESS NOTES
2 RN skin check completed with Jennifer VENTURA  Moisture fissure on gluteal cleft.   Redness around buttocks (it seems like a bedpan cecily). Mepilex applied.  Nickolas score 15  Wound consult sent.  Wound RN protocol initiated.

## 2020-12-20 NOTE — PROGRESS NOTES
Monitor summary: A Fib 60-99, LA -, QRS 0.12, QT -, with frequent PVCs and rare couplets, bigem, and trigem, per strip from monitor room.

## 2020-12-21 PROBLEM — R33.9 URINARY RETENTION: Status: ACTIVE | Noted: 2020-12-21

## 2020-12-21 PROBLEM — R13.19 OTHER DYSPHAGIA: Status: ACTIVE | Noted: 2020-12-21

## 2020-12-21 PROBLEM — E55.9 VITAMIN D DEFICIENCY: Status: ACTIVE | Noted: 2020-12-21

## 2020-12-21 PROBLEM — R41.89 COGNITIVE DEFICITS: Status: ACTIVE | Noted: 2020-12-21

## 2020-12-21 PROBLEM — E83.39 HYPOPHOSPHATEMIA: Status: ACTIVE | Noted: 2020-12-21

## 2020-12-21 LAB
25(OH)D3 SERPL-MCNC: 21 NG/ML (ref 30–100)
ALBUMIN SERPL BCP-MCNC: 2.4 G/DL (ref 3.2–4.9)
ALBUMIN/GLOB SERPL: 0.7 G/DL
ALP SERPL-CCNC: 247 U/L (ref 30–99)
ALT SERPL-CCNC: 34 U/L (ref 2–50)
ANION GAP SERPL CALC-SCNC: 6 MMOL/L (ref 7–16)
AST SERPL-CCNC: 75 U/L (ref 12–45)
BASOPHILS # BLD AUTO: 0.1 % (ref 0–1.8)
BASOPHILS # BLD: 0.01 K/UL (ref 0–0.12)
BILIRUB SERPL-MCNC: 0.7 MG/DL (ref 0.1–1.5)
BUN SERPL-MCNC: 10 MG/DL (ref 8–22)
CALCIUM SERPL-MCNC: 9.3 MG/DL (ref 8.5–10.5)
CHLORIDE SERPL-SCNC: 105 MMOL/L (ref 96–112)
CO2 SERPL-SCNC: 24 MMOL/L (ref 20–33)
CREAT SERPL-MCNC: 0.49 MG/DL (ref 0.5–1.4)
EOSINOPHIL # BLD AUTO: 0.14 K/UL (ref 0–0.51)
EOSINOPHIL NFR BLD: 1.5 % (ref 0–6.9)
ERYTHROCYTE [DISTWIDTH] IN BLOOD BY AUTOMATED COUNT: 45.2 FL (ref 35.9–50)
GLOBULIN SER CALC-MCNC: 3.6 G/DL (ref 1.9–3.5)
GLUCOSE BLD-MCNC: 137 MG/DL (ref 65–99)
GLUCOSE BLD-MCNC: 179 MG/DL (ref 65–99)
GLUCOSE SERPL-MCNC: 135 MG/DL (ref 65–99)
HCT VFR BLD AUTO: 38.4 % (ref 37–47)
HGB BLD-MCNC: 12.5 G/DL (ref 12–16)
IMM GRANULOCYTES # BLD AUTO: 0.06 K/UL (ref 0–0.11)
IMM GRANULOCYTES NFR BLD AUTO: 0.7 % (ref 0–0.9)
LYMPHOCYTES # BLD AUTO: 0.75 K/UL (ref 1–4.8)
LYMPHOCYTES NFR BLD: 8.2 % (ref 22–41)
MAGNESIUM SERPL-MCNC: 1.7 MG/DL (ref 1.5–2.5)
MCH RBC QN AUTO: 28.2 PG (ref 27–33)
MCHC RBC AUTO-ENTMCNC: 32.6 G/DL (ref 33.6–35)
MCV RBC AUTO: 86.5 FL (ref 81.4–97.8)
MONOCYTES # BLD AUTO: 0.55 K/UL (ref 0–0.85)
MONOCYTES NFR BLD AUTO: 6 % (ref 0–13.4)
NEUTROPHILS # BLD AUTO: 7.68 K/UL (ref 2–7.15)
NEUTROPHILS NFR BLD: 83.5 % (ref 44–72)
NRBC # BLD AUTO: 0 K/UL
NRBC BLD-RTO: 0 /100 WBC
PHOSPHATE SERPL-MCNC: 2.2 MG/DL (ref 2.5–4.5)
PLATELET # BLD AUTO: 252 K/UL (ref 164–446)
PMV BLD AUTO: 9.4 FL (ref 9–12.9)
POTASSIUM SERPL-SCNC: 3.6 MMOL/L (ref 3.6–5.5)
PROT SERPL-MCNC: 6 G/DL (ref 6–8.2)
RBC # BLD AUTO: 4.44 M/UL (ref 4.2–5.4)
SODIUM SERPL-SCNC: 135 MMOL/L (ref 135–145)
TSH SERPL DL<=0.005 MIU/L-ACNC: 5.3 UIU/ML (ref 0.38–5.33)
WBC # BLD AUTO: 9.2 K/UL (ref 4.8–10.8)

## 2020-12-21 PROCEDURE — A9270 NON-COVERED ITEM OR SERVICE: HCPCS | Performed by: PHYSICAL MEDICINE & REHABILITATION

## 2020-12-21 PROCEDURE — 700102 HCHG RX REV CODE 250 W/ 637 OVERRIDE(OP): Performed by: PHYSICAL MEDICINE & REHABILITATION

## 2020-12-21 PROCEDURE — 82306 VITAMIN D 25 HYDROXY: CPT

## 2020-12-21 PROCEDURE — 99233 SBSQ HOSP IP/OBS HIGH 50: CPT | Performed by: PHYSICAL MEDICINE & REHABILITATION

## 2020-12-21 PROCEDURE — 82962 GLUCOSE BLOOD TEST: CPT

## 2020-12-21 PROCEDURE — 83735 ASSAY OF MAGNESIUM: CPT

## 2020-12-21 PROCEDURE — 85025 COMPLETE CBC W/AUTO DIFF WBC: CPT

## 2020-12-21 PROCEDURE — 82962 GLUCOSE BLOOD TEST: CPT | Mod: 91

## 2020-12-21 PROCEDURE — 92610 EVALUATE SWALLOWING FUNCTION: CPT

## 2020-12-21 PROCEDURE — 97530 THERAPEUTIC ACTIVITIES: CPT

## 2020-12-21 PROCEDURE — 84443 ASSAY THYROID STIM HORMONE: CPT

## 2020-12-21 PROCEDURE — 97163 PT EVAL HIGH COMPLEX 45 MIN: CPT

## 2020-12-21 PROCEDURE — 92523 SPEECH SOUND LANG COMPREHEN: CPT

## 2020-12-21 PROCEDURE — 770010 HCHG ROOM/CARE - REHAB SEMI PRIVAT*

## 2020-12-21 PROCEDURE — 84100 ASSAY OF PHOSPHORUS: CPT

## 2020-12-21 PROCEDURE — 97535 SELF CARE MNGMENT TRAINING: CPT

## 2020-12-21 PROCEDURE — 36415 COLL VENOUS BLD VENIPUNCTURE: CPT

## 2020-12-21 PROCEDURE — 97167 OT EVAL HIGH COMPLEX 60 MIN: CPT

## 2020-12-21 PROCEDURE — 80053 COMPREHEN METABOLIC PANEL: CPT

## 2020-12-21 RX ORDER — TAMSULOSIN HYDROCHLORIDE 0.4 MG/1
0.4 CAPSULE ORAL
Status: DISCONTINUED | OUTPATIENT
Start: 2020-12-21 | End: 2021-01-09 | Stop reason: HOSPADM

## 2020-12-21 RX ORDER — VITAMIN B COMPLEX
1000 TABLET ORAL DAILY
Status: DISCONTINUED | OUTPATIENT
Start: 2020-12-21 | End: 2021-01-09 | Stop reason: HOSPADM

## 2020-12-21 RX ADMIN — LISINOPRIL 5 MG: 5 TABLET ORAL at 05:41

## 2020-12-21 RX ADMIN — Medication 1000 UNITS: at 11:50

## 2020-12-21 RX ADMIN — DIBASIC SODIUM PHOSPHATE, MONOBASIC POTASSIUM PHOSPHATE AND MONOBASIC SODIUM PHOSPHATE 250 MG: 852; 155; 130 TABLET ORAL at 11:50

## 2020-12-21 RX ADMIN — DOCUSATE SODIUM 50 MG AND SENNOSIDES 8.6 MG 2 TABLET: 8.6; 5 TABLET, FILM COATED ORAL at 21:02

## 2020-12-21 RX ADMIN — METOPROLOL TARTRATE 50 MG: 25 TABLET, FILM COATED ORAL at 17:24

## 2020-12-21 RX ADMIN — ACETAMINOPHEN 650 MG: 325 TABLET, FILM COATED ORAL at 16:29

## 2020-12-21 RX ADMIN — TAMSULOSIN HYDROCHLORIDE 0.4 MG: 0.4 CAPSULE ORAL at 21:02

## 2020-12-21 RX ADMIN — ATORVASTATIN CALCIUM 40 MG: 40 TABLET, FILM COATED ORAL at 21:02

## 2020-12-21 RX ADMIN — METOPROLOL TARTRATE 50 MG: 25 TABLET, FILM COATED ORAL at 05:41

## 2020-12-21 RX ADMIN — APIXABAN 5 MG: 5 TABLET, FILM COATED ORAL at 08:54

## 2020-12-21 RX ADMIN — DIBASIC SODIUM PHOSPHATE, MONOBASIC POTASSIUM PHOSPHATE AND MONOBASIC SODIUM PHOSPHATE 250 MG: 852; 155; 130 TABLET ORAL at 21:02

## 2020-12-21 RX ADMIN — ACETAMINOPHEN 650 MG: 325 TABLET, FILM COATED ORAL at 11:25

## 2020-12-21 RX ADMIN — INSULIN HUMAN 1 UNITS: 100 INJECTION, SOLUTION PARENTERAL at 11:36

## 2020-12-21 RX ADMIN — APIXABAN 5 MG: 5 TABLET, FILM COATED ORAL at 21:02

## 2020-12-21 RX ADMIN — DIBASIC SODIUM PHOSPHATE, MONOBASIC POTASSIUM PHOSPHATE AND MONOBASIC SODIUM PHOSPHATE 250 MG: 852; 155; 130 TABLET ORAL at 16:30

## 2020-12-21 ASSESSMENT — CHA2DS2 SCORE
AGE 65 TO 74: NO
VASCULAR DISEASE: NO
PRIOR STROKE OR TIA OR THROMBOEMBOLISM: YES
HYPERTENSION: YES
CHF OR LEFT VENTRICULAR DYSFUNCTION: NO
AGE 75 OR GREATER: YES
CHA2DS2 VASC SCORE: 7
SEX: FEMALE
DIABETES: YES

## 2020-12-21 ASSESSMENT — BRIEF INTERVIEW FOR MENTAL STATUS (BIMS)
INITIAL REPETITION OF BED BLUE SOCK - FIRST ATTEMPT: 3
BIMS SUMMARY SCORE: 12
WHAT MONTH IS IT: ACCURATE WITHIN 5 DAYS
BIMS SUMMARY SCORE: 12
ASKED TO RECALL BED: NO, COULD NOT RECALL
WHAT YEAR IS IT: CORRECT
ASKED TO RECALL SOCK: YES, NO CUE REQUIRED
WHAT DAY OF THE WEEK IS IT: CORRECT
WHAT YEAR IS IT: CORRECT
INITIAL REPETITION OF BED BLUE SOCK - FIRST ATTEMPT: 3
WHAT DAY OF THE WEEK IS IT: CORRECT
ASKED TO RECALL BLUE: YES, AFTER CUEING (A COLOR")"
ASKED TO RECALL SOCK: YES, NO CUE REQUIRED
ASKED TO RECALL BED: NO, COULD NOT RECALL
ASKED TO RECALL BLUE: YES, AFTER CUEING (A COLOR")"
WHAT MONTH IS IT: ACCURATE WITHIN 5 DAYS

## 2020-12-21 ASSESSMENT — PATIENT HEALTH QUESTIONNAIRE - PHQ9
1. LITTLE INTEREST OR PLEASURE IN DOING THINGS: NOT AT ALL
SUM OF ALL RESPONSES TO PHQ9 QUESTIONS 1 AND 2: 0
2. FEELING DOWN, DEPRESSED, IRRITABLE, OR HOPELESS: NOT AT ALL

## 2020-12-21 ASSESSMENT — ACTIVITIES OF DAILY LIVING (ADL)
TOILETING_LEVEL_OF_ASSIST_DESCRIPTION: ASSIST FOR HYGIENE;ASSIST TO PULL PANTS UP;ASSIST TO PULL PANTS DOWN;ASSIST FOR STANDING BALANCE;GRAB BAR;INCREASED TIME;SET-UP OF EQUIPMENT;SUPERVISION FOR SAFETY;VERBAL CUEING
TOILET_TRANSFER_DESCRIPTION: GRAB BAR;INCREASED TIME;INITIAL PREPARATION FOR TASK;REQUIRES LIFT;SET-UP OF EQUIPMENT;SUPERVISION FOR SAFETY;VERBAL CUEING
TOILETING: INDEPENDENT
TUB_SHOWER_TRANSFER_DESCRIPTION: OTHER (COMMENT)

## 2020-12-21 ASSESSMENT — GAIT ASSESSMENTS: GAIT LEVEL OF ASSIST: UNABLE TO PARTICIPATE

## 2020-12-21 NOTE — CARE PLAN
Problem: Safety  Goal: Will remain free from injury  Note: Call light with in reach. Redirection to use call light for assistance. Non skid socks. Upper siderails up x2 while in bed.      Problem: Bowel/Gastric:  Goal: Normal bowel function is maintained or improved  Note: Patient refused HS bowel medication, redirection with no good effect. Encouraged increase fluids intake.     Problem: Urinary Elimination:  Goal: Ability to reestablish a normal urinary elimination pattern will improve  Note: Incontinence of bladder, kept clean and dry.

## 2020-12-21 NOTE — THERAPY
"Speech Language Pathology   Initial Assessment     Patient Name: Liudmila Pulido  AGE:  88 y.o., SEX:  female  Medical Record #: [id number]  Today's Date: 12/21/2020     Subjective    Per Dr. Causey consult note: \"Patient is a 88 y.o. year-old female with a past medical history significant for diabetes mellitus, aortic stenosis, essential hypertension, hyperlipidemia admitted to [facility] on 12/17/2020  7:03 AM with right leg weakness.  The patient was last seen normal at 7 PM the day prior and around 3 AM they noticed she was weaker when they helped her into bed.  Patient was brought to the emergency department where she was found to be tachycardic.  Atrial fibrillation was seen on EKG monitor.  She did have elevated white blood cell count and hyponatremia at 127.  Troponin was elevated.  Head CT negative for any acute finding.  CT perfusion was normal.  CTA of head and neck showed no large vessel occlusion. Review of her records showed she had an echocardiogram in January 2020 with normal EF, mild aortic regurgitation and tricuspid regurgitation, and grade 1 diastolic dysfunction, with no mention of atrial fibrillation.   Echocardiogram from 12/2020 revealed hyperdynamic LV with an EF of 75% and moderate aortic stenosis.  Patient was called code stroke.  MRI brain was positive for high frontal infarct.  Given new onset atrial fibrillation patient has been placed on Eliquis 5 mg twice daily.  Patient also had rising troponin.  EKG again showed A. fib and LBBB which was not present prior.  Angiogram from [place] 1/2019 revealed nonobstructive CAD.  Suspected to be demand ischemia.  Cardiology was consulted.  Patient also found to have moderate aortic stenosis on echocardiogram.  This requires outpatient follow-up and not thought to contribute to her current issues.  Currently not a candidate for cardioversion for her atrial fibrillation given her recent CVA.  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)   Cognitive Pattern Assessment   Cognitive Pattern Assessment Used BIMS   Brief Interview for Mental Status (BIMS)   Repetition of Three Words (First Attempt) 3   Temporal Orientation: Year Correct   Temporal Orientation: Month Accurate within 5 days   Temporal Orientation: Day Correct   Recall: \"Sock\" Yes, no cue required   Recall: \"Blue\" Yes, after cueing (\"a color\")   Recall: \"Bed\" No, could not recall   BIMS Summary Score 12   Labial Function   Labial Structure At Rest Minimal   Lingual Function   Lingual Function (WDL) WDL   Jaw Function   Jaw Function (WDL) WDL   Laryngeal Function   Voice Quality Minimal   Volutional Cough Minimal   Swallowing   Thin Liquid Minimal   Masticated Foods Moderate   Dysphagia Strategies / Recommendations   Strategies / Interventions Recommended (Yes / No) Yes   Compensatory Strategies Monitor During Meals;Head of Bed 90 Degrees During Eating / Drinking;Cough / Clear Wet Vocal Quality Post Swallow;No Straws;Single Sips / Bites;Multiple Swallows;No Talking During Eating / Drinking   Diet / Liquid Recommendation Soft & Bite-Sized (6) - (Dysphagia III);Thin (0)  (with ground meats due to dentition)   Medication Administration  Float Whole with Puree  (one at a time.)   Therapy Interventions Dysphagia Therapy By Speech Language Pathologist;MBSS Evaluation;Therapeutic Dining For Meals;Pharyngeal " / Laryngeal Exercises   Follow Up SLP Evaluation MBSS to rule out aspiration    Barriers To Oral Feeding   Barriers To Oral Feeding Impulsivity / Impaired Safety;Impaired Cognition / Attention  (dentition )   Cervical Ausculatation Not Tested   Pre-Feeding Oral Stimulation Trial Not Tested   Swallowing/Nutritional Status   Swallowing/Nutritional Status Modified food consistency   Eating   Assistance Needed Set-up / clean-up;Verbal cues;Supervision   Physical Assistance Level No physical assistance   CARE Score 4   Eating Discharge Goal   Discharge Goal Independent   Functional Level of Assist   Eating Supervision   Eating Description Modified diet;Insert dentures;Increased time;Supervision for safety;Verbal cueing   Comprehension Supervision   Expression Supervision   Social Interaction Supervision   Problem Solving Moderate Assist   Problem Solving Description Increased time;Therapy schedule;Verbal cueing;Supervision   Memory Maximal Assist   Memory Description Verbal cueing;Therapy schedule;Supervision;Increased time   Outcome Measures   Outcome Measures Utilized SCCAN   SCCAN (Scales of Cognitive and Communicative Ability for Neurorehabilitation)   Oral Expression - Raw Score 16   Oral Expression - Scale Performance Score 84   Orientation - Raw Score 9   Orientation - Scale Performance Score 75   Memory - Raw Score 7   Memory - Scale Performance Score 37   Speech Comprehension - Raw Score 11   Speech Comprehension - Scale Performance Score 85   Reading Comprehension - Raw Score 10   Reading Comprehension - Scale Performance Score 83   Writing - Raw Score 4   Writing - Scale Performance Score 57   Attention - Raw Score 8   Attention - Scale Performance Score 50   Problem Solving - Raw Score 15   Problem Solving - Scale Performance Score 65   SCCAN Total Raw Score 64   SCCAN Degree of Severity Moderate Impairment   Problem List   Problem List Dysphagia;Attention Deficit;Memory Deficit;Executive Function  "Deficit;Numerical Function Deficit;Impaired Judgement;Impaired Safety   Current Discharge Plan   Current Discharge Plan Return to Prior Living Situation   Benefit   Therapy Benefit Patient would benefit from Inpatient Rehab Speech-Language Pathology to address above identified deficits.   Interdisciplinary Plan of Care Collaboration   IDT Collaboration with  Nursing;Physician;Occupational Therapist   Patient Position at End of Therapy Seated;Chair Alarm On  (RN present with meds, RN to transfer pt to bed.)   Collaboration Comments Results of assessment and POC   Speech Language Pathologist Assigned   Assigned SLP / Extension LW 60+ NO SPLIT   SLP Total Time Spent   SLP Individual Total Time Spent (Mins) 60   Evaluation Charges   Charges Yes   SLP Speech Language Evaluation Speech Sound Language Comprehension   SLP Oral Pharyngeal Evaluation Oral Pharyngeal Evaluation       Assessment    Patient is 88 y.o. female with a diagnosis of L CVA.  Additional factors influencing patient status/progress (ie: cognitive factors, co-morbidities, social support, etc): per pt report she was indep and able to care for self prior to recent hospitalization. 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      BSE: Oral mech exam completed, pt edentulous with upper dentures present, pt reports having lower dentures but unable to retrieve from family. Pt presenting with slight right side facial droop otherwise structures determined to be WFL. Pt assessed with soft and bite size textures with thin liquids. Pt with immediate cough following first drink of coffee. When asked why cough occurred pt stated \"it went the wrong way, that happens sometimes.\" Pt presenting with intermittent cough on thin liquids throughout meal, benefited from verbal cues to decrease " "sip size (pt often demonstrating serial swallows). Pt with consistent throat clear throughout meal. Unsafe for YSP at this time. Pt declining soft and bite size sausage stating it was \"too tough to chew without dentures\" and declined offer for ground sausage at this time. Medication administered, one at a time with thin liquid wash, again pt with cough resulting in long coughing episode. It is recommended at this time that pt remain on a soft and bite size diet with meats to be ground, thin liquids, with medications to be administered one at a time floated in puree. MBSS to be completed to rule out aspiration with assessment of pts tolerance of serial swallows as for pt demonstrates consistent use unless cued.     COGNITIVE EVALUATION: Cognitive evaluation completed with use of SCCAN. Pt achieved a raw score of 64 which indicates MODERATE cognitive impairments overall. Pt with moderate to severe deficits in the areas of attention and STM recall. Pt motivated to participate in cognitive assessment however indep verbalized awareness of decreased performance. Pt would benefit from cognitive intervention to address attention, recall, functional problem solving related to medication and finances with goal to achieve indep as this was pts PLOF however if assistance and support necessary pt has the family support to do so.     Plan  Recommend Speech Therapy 30-60 minutes per day 5-6 days per week for 3 weeks for the following treatments:  SLP Swallowing Dysfunction Treatment, SLP Oral Pharyngeal Evaluation, SLP Video Swallow Evaluation, SLP Self Care / ADL Training , SLP Cognitive Skill Development and SLP Group Treatment.    Goals:  Long term and short term goals have been discussed with patient and they are in agreement.    Speech Therapy Problems     Problem: Memory STGs     Dates: Start: 12/21/20       Goal: STG-Within one week, patient will     Dates: Start: 12/21/20       Description: 1) Individualized goal:  learn " and implement functional memory strategies with use of memory book with 80% accuracy provided MOD A.   2) Interventions:  SLP Self Care / ADL Training , SLP Cognitive Skill Development, and SLP Group Treatment                Problem: Problem Solving STGs     Dates: Start: 12/21/20       Goal: STG-Within one week, patient will     Dates: Start: 12/21/20       Description: 1) Individualized goal:  complete sustained attention tasks with 80% accuracy provided MIN A.   2) Interventions:  SLP Self Care / ADL Training , SLP Cognitive Skill Development, and SLP Group Treatment                  Problem: Speech/Swallowing LTGs     Dates: Start: 12/21/20       Goal: LTG-By discharge, patient will safely swallow     Dates: Start: 12/21/20       Description: 1) Individualized goal:  the least restrictive diet with no overt s/sx of asp/pen noted across meals with 100% accuracy provided MOD I.  2) Interventions:  SLP Swallowing Dysfunction Treatment, SLP Oral Pharyngeal Evaluation, SLP Video Swallow Evaluation, SLP Self Care / ADL Training , SLP Cognitive Skill Development, and SLP Group Treatment              Goal: LTG-By discharge, patient will     Dates: Start: 12/21/20       Description: 1) Individualized goal:  complete functional problem solving and recall with 80% accuracy provided SPV.   2) Interventions:  SLP Self Care / ADL Training , SLP Cognitive Skill Development, and SLP Group Treatment                  Problem: Swallowing STGs     Dates: Start: 12/21/20       Goal: STG-Within one week, patient will safely swallow     Dates: Start: 12/21/20       Description: 1) Individualized goal:  soft and bite size diet with thin liquids with no overt s/sx of asp/pen noted across 2/2 meals provided MIN cues.   2) Interventions:  SLP Swallowing Dysfunction Treatment, SLP Oral Pharyngeal Evaluation, SLP Video Swallow Evaluation, SLP Self Care / ADL Training , SLP Cognitive Skill Development, and SLP Group Treatment          Goal:  STG-Within one week, patient will     Dates: Start: 12/21/20       Description: 1) Individualized goal:  participate in MBSS to rule out aspiration within one week.  2) Interventions:  SLP Swallowing Dysfunction Treatment, SLP Oral Pharyngeal Evaluation, SLP Video Swallow Evaluation, SLP Self Care / ADL Training , SLP Cognitive Skill Development, and SLP Group Treatment

## 2020-12-21 NOTE — PROGRESS NOTES
"Rehab Progress Note     Date of Service: 12/21/2020  Chief Complaint: Follow-up stroke    Interval Events (Subjective)    Patient seen and examined today in her room.  She reports she is mostly affected by her stroke in terms of her right leg not moving very well.  Per speech therapy she does need a modified barium swallow study.  She was found to have moderate cognitive deficits.  Per nursing staff patient is complaining of right knee pain.  When I returned to the room in the afternoon the patient was sleeping.    Objective:  VITAL SIGNS: /69   Pulse 74   Temp 36.6 °C (97.8 °F) (Oral)   Resp 18   Ht 1.676 m (5' 5.98\")   Wt 75 kg (165 lb 6.4 oz)   SpO2 98%   BMI 26.71 kg/m²   Gen: alert, no apparent distress  Neuro: notable for flaccid right leg      Recent Results (from the past 72 hour(s))   ACCU-CHEK GLUCOSE    Collection Time: 12/18/20  5:21 PM   Result Value Ref Range    Glucose - Accu-Ck 175 (H) 65 - 99 mg/dL   ACCU-CHEK GLUCOSE    Collection Time: 12/18/20  8:04 PM   Result Value Ref Range    Glucose - Accu-Ck 159 (H) 65 - 99 mg/dL   TROPONIN    Collection Time: 12/18/20  9:48 PM   Result Value Ref Range    Troponin T 223 (H) 6 - 19 ng/L   CBC WITH DIFFERENTIAL    Collection Time: 12/19/20  3:51 AM   Result Value Ref Range    WBC 9.3 4.8 - 10.8 K/uL    RBC 4.28 4.20 - 5.40 M/uL    Hemoglobin 11.9 (L) 12.0 - 16.0 g/dL    Hematocrit 36.9 (L) 37.0 - 47.0 %    MCV 86.2 81.4 - 97.8 fL    MCH 27.8 27.0 - 33.0 pg    MCHC 32.2 (L) 33.6 - 35.0 g/dL    RDW 44.1 35.9 - 50.0 fL    Platelet Count 192 164 - 446 K/uL    MPV 10.0 9.0 - 12.9 fL    Neutrophils-Polys 85.00 (H) 44.00 - 72.00 %    Lymphocytes 6.60 (L) 22.00 - 41.00 %    Monocytes 6.80 0.00 - 13.40 %    Eosinophils 0.30 0.00 - 6.90 %    Basophils 0.20 0.00 - 1.80 %    Immature Granulocytes 1.10 (H) 0.00 - 0.90 %    Nucleated RBC 0.00 /100 WBC    Neutrophils (Absolute) 7.86 (H) 2.00 - 7.15 K/uL    Lymphs (Absolute) 0.61 (L) 1.00 - 4.80 K/uL    Monos " (Absolute) 0.63 0.00 - 0.85 K/uL    Eos (Absolute) 0.03 0.00 - 0.51 K/uL    Baso (Absolute) 0.02 0.00 - 0.12 K/uL    Immature Granulocytes (abs) 0.10 0.00 - 0.11 K/uL    NRBC (Absolute) 0.00 K/uL   Comp Metabolic Panel    Collection Time: 20  3:51 AM   Result Value Ref Range    Sodium 130 (L) 135 - 145 mmol/L    Potassium 3.4 (L) 3.6 - 5.5 mmol/L    Chloride 101 96 - 112 mmol/L    Co2 22 20 - 33 mmol/L    Anion Gap 7.0 7.0 - 16.0    Glucose 132 (H) 65 - 99 mg/dL    Bun 22 8 - 22 mg/dL    Creatinine 0.59 0.50 - 1.40 mg/dL    Calcium 8.7 8.5 - 10.5 mg/dL    AST(SGOT) 40 12 - 45 U/L    ALT(SGPT) 16 2 - 50 U/L    Alkaline Phosphatase 140 (H) 30 - 99 U/L    Total Bilirubin 0.8 0.1 - 1.5 mg/dL    Albumin 2.1 (L) 3.2 - 4.9 g/dL    Total Protein 5.5 (L) 6.0 - 8.2 g/dL    Globulin 3.4 1.9 - 3.5 g/dL    A-G Ratio 0.6 g/dL   TROPONIN    Collection Time: 20  3:51 AM   Result Value Ref Range    Troponin T 238 (H) 6 - 19 ng/L   ESTIMATED GFR    Collection Time: 20  3:51 AM   Result Value Ref Range    GFR If African American >60 >60 mL/min/1.73 m 2    GFR If Non African American >60 >60 mL/min/1.73 m 2   ACCU-CHEK GLUCOSE    Collection Time: 20  7:54 AM   Result Value Ref Range    Glucose - Accu-Ck 118 (H) 65 - 99 mg/dL   TROPONIN    Collection Time: 20  9:38 AM   Result Value Ref Range    Troponin T 243 (H) 6 - 19 ng/L   EKG    Collection Time: 20 12:53 PM   Result Value Ref Range    Report       [facility]    Test Date:  2020  Pt Name:    APRIL ADRIAN            Department: [facility]  MRN:        [id number]                      Room:       S182  Gender:     Female                       Technician: ARNOLD  :        [id number]                   Requested By:VALENTIN URIBE  Order #:    [id number]                    Reading MD: Nasir De Jesus MD    Measurements  Intervals                                Axis  Rate:       82                           P:  SC:                                       QRS:        -23  QRSD:       122                          T:          199  QT:         436  QTc:        510    Interpretive Statements  ATRIAL FIBRILLATION, V-RATE  65- 95  VENTRICULAR BIGEMINY  LEFT BUNDLE BRANCH BLOCK  Compared to ECG 12/18/2020 13:00:24  Ventricular premature complex(es) now present  Sinus rhythm no longer present  Electronically Signed On 12- 5:40:43 PST by Nasir De Jesus MD     ACCU-CHEK GLUCOSE    Collection Time: 12/19/20  1:10 PM   Result Value Ref Range    Glucose - Accu-Ck 109 (H) 65 - 99 mg/dL   COVID/SARS CoV-2 PCR    Collection Time: 12/19/20  3:11 PM    Specimen: Nasopharyngeal; Respirate   Result Value Ref Range    COVID Order Status Received    SARS-CoV-2, PCR (In-House)    Collection Time: 12/19/20  3:11 PM   Result Value Ref Range    SARS-CoV-2 Source NP Swab     SARS-CoV-2 by PCR NotDetected    ACCU-CHEK GLUCOSE    Collection Time: 12/19/20  5:49 PM   Result Value Ref Range    Glucose - Accu-Ck 154 (H) 65 - 99 mg/dL   ACCU-CHEK GLUCOSE    Collection Time: 12/19/20  9:01 PM   Result Value Ref Range    Glucose - Accu-Ck 136 (H) 65 - 99 mg/dL   CBC WITH DIFFERENTIAL    Collection Time: 12/20/20  5:41 AM   Result Value Ref Range    WBC 9.4 4.8 - 10.8 K/uL    RBC 4.41 4.20 - 5.40 M/uL    Hemoglobin 12.2 12.0 - 16.0 g/dL    Hematocrit 37.7 37.0 - 47.0 %    MCV 85.5 81.4 - 97.8 fL    MCH 27.7 27.0 - 33.0 pg    MCHC 32.4 (L) 33.6 - 35.0 g/dL    RDW 43.5 35.9 - 50.0 fL    Platelet Count 218 164 - 446 K/uL    MPV 9.5 9.0 - 12.9 fL    Neutrophils-Polys 82.70 (H) 44.00 - 72.00 %    Lymphocytes 8.80 (L) 22.00 - 41.00 %    Monocytes 6.70 0.00 - 13.40 %    Eosinophils 0.90 0.00 - 6.90 %    Basophils 0.20 0.00 - 1.80 %    Immature Granulocytes 0.70 0.00 - 0.90 %    Nucleated RBC 0.00 /100 WBC    Neutrophils (Absolute) 7.75 (H) 2.00 - 7.15 K/uL    Lymphs (Absolute) 0.82 (L) 1.00 - 4.80 K/uL    Monos (Absolute) 0.63 0.00 - 0.85 K/uL    Eos (Absolute) 0.08 0.00 - 0.51 K/uL    Baso (Absolute)  0.02 0.00 - 0.12 K/uL    Immature Granulocytes (abs) 0.07 0.00 - 0.11 K/uL    NRBC (Absolute) 0.00 K/uL   ACCU-CHEK GLUCOSE    Collection Time: 12/20/20  8:19 AM   Result Value Ref Range    Glucose - Accu-Ck 125 (H) 65 - 99 mg/dL   Comp Metabolic Panel    Collection Time: 12/20/20  1:00 PM   Result Value Ref Range    Sodium 133 (L) 135 - 145 mmol/L    Potassium 4.0 3.6 - 5.5 mmol/L    Chloride 104 96 - 112 mmol/L    Co2 20 20 - 33 mmol/L    Anion Gap 9.0 7.0 - 16.0    Glucose 148 (H) 65 - 99 mg/dL    Bun 11 8 - 22 mg/dL    Creatinine 0.42 (L) 0.50 - 1.40 mg/dL    Calcium 8.9 8.5 - 10.5 mg/dL    AST(SGOT) 78 (H) 12 - 45 U/L    ALT(SGPT) 28 2 - 50 U/L    Alkaline Phosphatase 208 (H) 30 - 99 U/L    Total Bilirubin 0.6 0.1 - 1.5 mg/dL    Albumin 2.3 (L) 3.2 - 4.9 g/dL    Total Protein 5.7 (L) 6.0 - 8.2 g/dL    Globulin 3.4 1.9 - 3.5 g/dL    A-G Ratio 0.7 g/dL   TROPONIN    Collection Time: 12/20/20  1:00 PM   Result Value Ref Range    Troponin T 214 (H) 6 - 19 ng/L   ESTIMATED GFR    Collection Time: 12/20/20  1:00 PM   Result Value Ref Range    GFR If African American >60 >60 mL/min/1.73 m 2    GFR If Non African American >60 >60 mL/min/1.73 m 2   ACCU-CHEK GLUCOSE    Collection Time: 12/20/20  5:43 PM   Result Value Ref Range    Glucose - Accu-Ck 110 (H) 65 - 99 mg/dL   ACCU-CHEK GLUCOSE    Collection Time: 12/20/20  7:38 PM   Result Value Ref Range    Glucose - Accu-Ck 156 (H) 65 - 99 mg/dL   Comp Metabolic Panel (CMP)    Collection Time: 12/21/20  6:13 AM   Result Value Ref Range    Sodium 135 135 - 145 mmol/L    Potassium 3.6 3.6 - 5.5 mmol/L    Chloride 105 96 - 112 mmol/L    Co2 24 20 - 33 mmol/L    Anion Gap 6.0 (L) 7.0 - 16.0    Glucose 135 (H) 65 - 99 mg/dL    Bun 10 8 - 22 mg/dL    Creatinine 0.49 (L) 0.50 - 1.40 mg/dL    Calcium 9.3 8.5 - 10.5 mg/dL    AST(SGOT) 75 (H) 12 - 45 U/L    ALT(SGPT) 34 2 - 50 U/L    Alkaline Phosphatase 247 (H) 30 - 99 U/L    Total Bilirubin 0.7 0.1 - 1.5 mg/dL    Albumin 2.4 (L)  3.2 - 4.9 g/dL    Total Protein 6.0 6.0 - 8.2 g/dL    Globulin 3.6 (H) 1.9 - 3.5 g/dL    A-G Ratio 0.7 g/dL   CBC with Differential    Collection Time: 12/21/20  6:13 AM   Result Value Ref Range    WBC 9.2 4.8 - 10.8 K/uL    RBC 4.44 4.20 - 5.40 M/uL    Hemoglobin 12.5 12.0 - 16.0 g/dL    Hematocrit 38.4 37.0 - 47.0 %    MCV 86.5 81.4 - 97.8 fL    MCH 28.2 27.0 - 33.0 pg    MCHC 32.6 (L) 33.6 - 35.0 g/dL    RDW 45.2 35.9 - 50.0 fL    Platelet Count 252 164 - 446 K/uL    MPV 9.4 9.0 - 12.9 fL    Neutrophils-Polys 83.50 (H) 44.00 - 72.00 %    Lymphocytes 8.20 (L) 22.00 - 41.00 %    Monocytes 6.00 0.00 - 13.40 %    Eosinophils 1.50 0.00 - 6.90 %    Basophils 0.10 0.00 - 1.80 %    Immature Granulocytes 0.70 0.00 - 0.90 %    Nucleated RBC 0.00 /100 WBC    Neutrophils (Absolute) 7.68 (H) 2.00 - 7.15 K/uL    Lymphs (Absolute) 0.75 (L) 1.00 - 4.80 K/uL    Monos (Absolute) 0.55 0.00 - 0.85 K/uL    Eos (Absolute) 0.14 0.00 - 0.51 K/uL    Baso (Absolute) 0.01 0.00 - 0.12 K/uL    Immature Granulocytes (abs) 0.06 0.00 - 0.11 K/uL    NRBC (Absolute) 0.00 K/uL   Magnesium    Collection Time: 12/21/20  6:13 AM   Result Value Ref Range    Magnesium 1.7 1.5 - 2.5 mg/dL   Phosphorus    Collection Time: 12/21/20  6:13 AM   Result Value Ref Range    Phosphorus 2.2 (L) 2.5 - 4.5 mg/dL   TSH with Reflex to FT4    Collection Time: 12/21/20  6:13 AM   Result Value Ref Range    TSH 5.300 0.380 - 5.330 uIU/mL   Vitamin D, 25-hydroxy (blood)    Collection Time: 12/21/20  6:13 AM   Result Value Ref Range    25-Hydroxy   Vitamin D 25 21 (L) 30 - 100 ng/mL   ESTIMATED GFR    Collection Time: 12/21/20  6:13 AM   Result Value Ref Range    GFR If African American >60 >60 mL/min/1.73 m 2    GFR If Non African American >60 >60 mL/min/1.73 m 2   ACCU-CHEK GLUCOSE    Collection Time: 12/21/20  7:35 AM   Result Value Ref Range    Glucose - Accu-Ck 137 (H) 65 - 99 mg/dL   ACCU-CHEK GLUCOSE    Collection Time: 12/21/20 11:27 AM   Result Value Ref Range     Glucose - Accu-Ck 179 (H) 65 - 99 mg/dL       Current Facility-Administered Medications   Medication Frequency   • vitamin D (cholecalciferol) tablet 1,000 Units DAILY   • phosphorus (K-Phos-Neutral) per tablet 250 mg TID   • Respiratory Therapy Consult Continuous RT   • Pharmacy Consult Request ...Pain Management Review 1 Each PHARMACY TO DOSE   • hydrALAZINE (APRESOLINE) tablet 25 mg Q8HRS PRN   • artificial tears ophthalmic solution 1 Drop PRN   • benzocaine-menthol (CEPACOL) lozenge 1 Lozenge Q2HRS PRN   • mag hydrox-al hydrox-simeth (MAALOX PLUS ES or MYLANTA DS) suspension 20 mL Q2HRS PRN   • ondansetron (ZOFRAN ODT) dispertab 4 mg 4X/DAY PRN    Or   • ondansetron (ZOFRAN) syringe/vial injection 4 mg 4X/DAY PRN   • traZODone (DESYREL) tablet 50 mg QHS PRN   • sodium chloride (OCEAN) 0.65 % nasal spray 2 Spray PRN   • senna-docusate (PERICOLACE or SENOKOT S) 8.6-50 MG per tablet 2 Tab BID    And   • polyethylene glycol/lytes (MIRALAX) PACKET 1 Packet QDAY PRN    And   • magnesium hydroxide (MILK OF MAGNESIA) suspension 30 mL QDAY PRN    And   • bisacodyl (DULCOLAX) suppository 10 mg QDAY PRN   • acetaminophen (Tylenol) tablet 650 mg Q6HRS PRN   • atorvastatin (LIPITOR) tablet 40 mg Q EVENING   • apixaban (ELIQUIS) tablet 5 mg BID   • insulin regular (HumuLIN R,NovoLIN R) injection 4X/DAY ACHS   • lisinopril (PRINIVIL) tablet 5 mg Q DAY   • metoprolol (LOPRESSOR) tablet 50 mg TWICE DAILY   • guaiFENesin (ROBITUSSIN) 100 MG/5ML solution 100 mg Q6HRS PRN       Orders Placed This Encounter   Procedures   • Diet Order Diet: Level 6 - Soft and Bite Sized (Please ground all meats - medications whole one at a time floated in puree); Liquid level: Level 0 - Thin     Standing Status:   Standing     Number of Occurrences:   1     Order Specific Question:   Diet:     Answer:   Level 6 - Soft and Bite Sized [23]     Comments:   Please ground all meats - medications whole one at a time floated in puree     Order Specific  Question:   Liquid level     Answer:   Level 0 - Thin       Assessment:  Active Hospital Problems    Diagnosis   • *CVA (cerebral vascular accident) ([facility])   • AF (atrial fibrillation) ([facility])   • Elevated troponin   • Hyponatremia   • Essential hypertension   • Hyperlipidemia   • Vitamin D deficiency   • Other dysphagia   • Cognitive deficits   • Hypophosphatemia   • Type 2 diabetes mellitus without complication, without long-term current use of insulin (HCC)   • Aortic stenosis       Medical Decision Making and Plan:    High left frontal stroke  Cardio-embolic etiology  Right leg weakness  Cognitive deficits  Dysphagia  Continue full rehab program  PT/OT/SLP, 1 hr each discipline, 5 days per week  Continue Eliquis and statin for secondary stroke prophylaxis    ?Right leg pain  Re-assess in am as patient sleeping    Hypertension  Continue lisinopril 5 mg    Atrial fibrillation  Continue metoprolol 50 mg twice a day  Outpatient follow-up with cardiology    Vitamin D deficiency  Start supplementation    Hypophosphatemia  Start supplementation    Bowel program  Continue bowel medications  Scheduled S[place]  PRN Miralax, MOM, bisacodyl suppository  Last BM 12/19    Bladder program  Acute urinary retention  Check PVRs - 258, 240  Start Flomax  Bladder scan for no voids  ICP for over 400 cc -required once overnight  Scheduled toileting    DVT prophylaxis  Eliquis      Total time:  35 minutes.  I spent greater than 50% of the time for patient care, counseling, and coordination on this date, including patient face-to face time, unit/floor time with review of records/pertinent lab data and studies, as well as discussing diagnostic evaluation/work up, planned therapeutic interventions, and future disposition of care, as per the interval events/subjective and the assessment and plan as noted above.      Tangela Noel M.D.   Physical Medicine and Rehabilitation

## 2020-12-21 NOTE — CARE PLAN
Problem: Other Problem (see comments)  Goal: Other Goal  Description: Patient will consume 50-75% of meals.   Outcome: NOT MET

## 2020-12-21 NOTE — THERAPY
Occupational Therapy   Initial Evaluation     Patient Name: Liudmila Pulido  Age:  88 y.o., Sex:  female  Medical Record #: [id number]  Today's Date: 12/21/2020     Subjective    Pt received resting in bed, agreeable to OT evaluation. Pt reported dizziness with upright sitting and R lateral lean present. Pt motivated for therapy to improve her balance and right sided strength.     Objective       12/21/20 0701   Prior Living Situation   Prior Services Home-Independent;Other (Comments)  (intermittent assist with IADLs)   Housing / Facility [address]  (in [place])   Steps Into Home 3   Steps In Home 0   Rail Both Rail (Steps into Home)   Elevator No   Bathroom Set up Bathtub / Shower Combination;Shower Curtain   Equipment Owned Front-Wheel Walker;Tub / Shower Seat   Lives with - Patient's Self Care Capacity Adult Children   Comments Pt lives with her son and daughter in law. She reports independent PLOF for ADLs, recieves assist for some IADLs. Pt reports that her family is planning to have bathroom rennovated with walk in shower and GB vs tub/shower.   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)   Leisure Interests Other (Comments)   Prior Functioning: Everyday Activities   Self Care Independent   Indoor Mobility (Ambulation) Independent   Stairs Independent   Functional Cognition Unknown   Prior Device Use Walker  "  Cognition    Cognition / Consciousness X   Orientation Level Oriented x 4   Level of Consciousness Alert   Safety Awareness Impaired   Sequencing Impaired   ABS (Agitated Behavior Scale)   Agitated Behavior Scale Performed Yes   Short Attention Span, Easy Distractibility, Inability to Concentrate 1   Impulsive, Impatient, Low Tolerance for Pain or Frustration 1   Uncooperative, Resistant to Care, Demanding 1   Violent and/or Threatening Violence Toward People or Property 1   Explosive and/or Unpredictable Anger 1   Rocking, Rubbing, Moaning, Other Self-Stimulating Behavior 1   Pulling at Tubes, Restraints, etc. 1   Wandering from Treatment Area 1   Restlessness, Pacing, Excessive Movement 1   Repetitive Behaviors, Motor and/or Verbal 1   Rapid, Loud or Excessive Talking 1   Sudden Changes of Mood 1   Easily Initiated - Excessive Crying and/or Laughter 1   Self-Abusiveness, Physical and/or Verbal 1   Agitated Behavior Scale Total Score 14   Level of Severity No Agitation   Cognitive Pattern Assessment   Cognitive Pattern Assessment Used BIMS   Brief Interview for Mental Status (BIMS)   Repetition of Three Words (First Attempt) 3   Temporal Orientation: Year Correct   Temporal Orientation: Month Accurate within 5 days   Temporal Orientation: Day Correct   Recall: \"Sock\" Yes, no cue required   Recall: \"Blue\" Yes, after cueing (\"a color\")   Recall: \"Bed\" No, could not recall   BIMS Summary Score 12   Vision Screen   Vision Not tested  (glasses, reports increased blurry vision, to further assess)   Passive ROM Upper Body   Passive ROM Upper Body WDL   Active ROM Upper Body   Active ROM Upper Body  WDL   Dominant Hand Right   Strength Upper Body   Upper Body Strength  X   Comments ALVERTO DAVID 3+-4/5; to further assess    Sensation Upper Body   Upper Extremity Sensation  WDL   Comments intact to light touch throughout UB.   Upper Body Muscle Tone   Upper Body Muscle Tone  WDL   Balance Assessment   Sitting Balance " (Static) Poor +  (R lean)   Sitting Balance (Dynamic) Poor  (R lean)   Standing Balance (Static) Trace +   Standing Balance (Dynamic) Trace   Weight Shift Sitting Poor   Weight Shift Standing Absent   Comments observed during ADLs and bathroom transfers. R later lean in unsupported sitting, requires blocking of RLE during transfers.   Bed Mobility    Supine to Sit Maximal Assist   Sit to Stand Maximal Assist   Scooting Maximal Assist   Rolling Maximal Assist to Rt.   Coordination Upper Body   Coordination X   Fine Motor Coordination impaired RUE, but integrates into ADL tasks   Gross Motor Coordination impaired RUE but integrates into ADL tasks.   Eating   Assistance Needed Set-up / clean-up;Supervision;Verbal cues   Physical Assistance Level No physical assistance   CARE Score 4   Eating Discharge Goal   Discharge Goal Independent   Oral Hygiene   Assistance Needed Set-up / clean-up;Supervision   Physical Assistance Level No physical assistance   CARE Score 4   Oral Hygiene Discharge Goal   Discharge Goal Independent   Shower/Bathe Self   Reason if not Attempted Safety concerns  (dizziness, significan R lateral lean)   CARE Score 88   Shower/Bathe Self Discharge Goal   Discharge Goal Supervision or touching assistance   Upper Body Dressing   Assistance Needed Incidental touching   Physical Assistance Level 25% or less   CARE Score 3   Upper Body Dressing Discharge Goal   Discharge Goal Independent   Lower Body Dressing   Assistance Needed Physical assistance   Physical Assistance Level Total assistance   CARE Score 1   Lower Body Dressing Discharge Goal   Discharge Goal Supervision or touching assistance   Putting On/Taking Off Footwear   Assistance Needed Physical assistance   Physical Assistance Level Total assistance   CARE Score 1   Putting On/Taking Off Footwear Discharge Goal   Discharge Goal Supervision or touching assistance   Toileting Hygiene   Assistance Needed Physical assistance   Physical Assistance  Level Total assistance   CARE Score 1   Toileting Hygiene Discharge Goal   Discharge Goal Supervision or touching assistance   Toilet Transfer   Assistance Needed Physical assistance   Physical Assistance Level 76% or more   CARE Score 2   Toilet Transfer Discharge Goal   Discharge Goal Supervision or touching assistance   Hearing, Speech, and Vision   Expression of Ideas and Wants Without difficulty   Understanding Verbal and Non-Verbal Content Understands   Functional Level of Assist   Eating Supervision   Eating Description Modified diet;Increased time;Supervision for safety;Set-up of equipment or meal/tube feeding   Grooming Supervision;Seated  (integrates RUE without cues)   Grooming Description Seated in wheelchair at sink;Increased time;Set-up of equipment;Supervision for safety  (oral care, insert dentures, comb hair and wash face)   Bathing   (not tested, safety concerns-sig R lateral lean, dizziness)   Bathing Description Other (comment)   Upper Body Dressing Minimal Assist   Upper Body Dressing Description Increased time;Set-up of equipment;Supervision for safety  (assist to pull shirt down in back)   Lower Body Dressing Total Assist  (total A tread socks)   Lower Body Dressing Description Assist with threading into pant leg;Increased time;Initial preparation for task;Set-up of equipment;Supervision for safety;Verbal cueing  (assist for threading LEs, blocking of RLE in standing at GB)   Toileting Total Assist   Toileting Description Assist for hygiene;Assist to pull pants up;Assist to pull pants down;Assist for standing balance;Grab bar;Increased time;Set-up of equipment;Supervision for safety;Verbal cueing  (blocking of RLE in standing)   Toilet Transfers Maximal Assist   Toilet Transfer Description Grab bar;Increased time;Initial preparation for task;Requires lift;Set-up of equipment;Supervision for safety;Verbal cueing  (w/c<>commode over toilet with blocking of RLE)   Tub / Shower Transfers Unable to  Participate   Tub Shower Transfer Description Other (comment)  (safety concerns-dizziness and significant R lateral lean)   Problem List   Problem List Decreased Active Daily Living Skills;Decreased Homemaking Skills;Decreased Upper Extremity Strength Right;Decreased Functional Mobility;Decreased Activity Tolerance;Impaired Coordination Right Upper Extremity;Impaired Cognitive Function;Impaired Vision;Impaired Postural Control / Balance   Precautions   Precautions Fall Risk;Swallow Precautions ( See Comments)   Comments R lizeth (RLE>RUE), R lateral lean   Current Discharge Plan   Current Discharge Plan Return to Prior Living Situation   Benefit    Therapy Benefit Patient Would Benefit from Inpatient Rehab Occupational Therapy to Maximize [place] with ADLs, IADLs and Functional Mobility.   Interdisciplinary Plan of Care Collaboration   IDT Collaboration with  Nursing;Physical Therapist;Speech Therapist   Patient Position at End of Therapy Seated;Chair Alarm On;Self Releasing Lap Belt Applied;Call Light within Reach;Tray Table within Reach;Phone within Reach   Collaboration Comments CLOF, POC   Equipment Needs   Assistive Device / DME Parallel Bars;Front-Wheel Walker;Wheelchair;Shower Chair;Tub Transfer Bench;Grab Bars In Shower / Tub;Grab Bars By Toilet;Raised Toilet Seat   Adaptive Equipment Other (Comments)  (TBD)   Strengths & Barriers   Strengths Alert and oriented;Independent prior level of function;Motivated for self care and independence;Pleasant and cooperative;Supportive family;Willingly participates in therapeutic activities   Barriers Bladder incontinence;Bowel incontinence;Decreased endurance;Fatigue;Hemiparesis;Home accessibility;Impaired activity tolerance;Impaired balance;Impaired functional cognition;Limited mobility;Visual impairment   OT Total Time Spent   OT Individual Total Time Spent (Mins) 60   OT Charge Group   Charges Yes   OT Self Care / ADL 1   OT Evaluation OT Evaluation High        Assessment  Patient is 88 y.o. female with a diagnosis of CVA.  Additional factors influencing patient status / progress (ie: cognitive factors, co-morbidities, social support, etc): Per H&P pt has PMHx significant for diabetes mellitus, aortic stenosis, essential hypertension, hyperlipidemia.    Pt lives in a [facility] with [address] in [place] with her son and daughter in law. She reports independent PLOF for ADLs, most IADLs and functional mobility with FWW. Pt reports that she was still driving short distances, and that she received assistance with shopping and home mgt tasks. Pt has a tub/shower, no GB and no shower chair. She reports that her son plans to rennovate bathroom to a walk in shower with GB. Pt will have 24/7 supv/assist at d/c from her son and daughter in law.    During OT evaluation pt required total A to supervision for ADLs, and max-total A for bathroom transfers from w/c level. Showering task unsafe at this time 2/2 pt reports of dizziness and significant R lateral lean. Pt presents with R hemiparesis (RLE>RUE), impaired sitting/standing balance, decreased endurance, impaired R sided coordination, and impaired cognition. OT services recommended at this time to maximize pt's independence with ADLs and functional mobility to decrease caregiver burden and promote safe d/c home with family support.      Plan  Recommend Occupational Therapy  minutes per day 5-7 days per week for 3 weeks for the following treatments:  OT E Stim Attended, OT Self Care/ADL, OT Cognitive Skill Dev, OT Community Reintegration, OT Manual Ther Technique, OT Neuro Re-Ed/Balance, OT Therapeutic Activity, OT Evaluation and OT Therapeutic Exercise.    Goals:  Long term and short term goals have been discussed with patient and they are in agreement.    Occupational Therapy Goals     Problem: Bathing     Dates: Start: 12/21/20       Goal: STG-Within one week, patient will bathe     Dates: Start: 12/21/20       Description: 1)  Individualized Goal:  mod A with AE/DME as needed.  2) Interventions:  OT E Stim Attended, OT Self Care/ADL, OT Cognitive Skill Dev, OT Community Reintegration, OT Manual Ther Technique, OT Neuro Re-Ed/Balance, OT Therapeutic Activity, OT Evaluation, and OT Therapeutic Exercise                  Problem: Dressing     Dates: Start: 12/21/20       Goal: STG-Within one week, patient will dress LB     Dates: Start: 12/21/20       Description: 1) Individualized Goal:  mod A with AE as needed.  2) Interventions:  OT E Stim Attended, OT Self Care/ADL, OT Cognitive Skill Dev, OT Community Reintegration, OT Manual Ther Technique, OT Neuro Re-Ed/Balance, OT Therapeutic Activity, OT Evaluation, and OT Therapeutic Exercise                Problem: Functional Transfers     Dates: Start: 12/21/20       Goal: STG-Within one week, patient will transfer to toilet     Dates: Start: 12/21/20       Description: 1) Individualized Goal:  mod A with AD/DME as needed.  2) Interventions:  OT E Stim Attended, OT Self Care/ADL, OT Cognitive Skill Dev, OT Community Reintegration, OT Manual Ther Technique, OT Neuro Re-Ed/Balance, OT Therapeutic Activity, OT Evaluation, and OT Therapeutic Exercise                Problem: Grooming     Dates: Start: 12/21/20             Problem: OT Long Term Goals     Dates: Start: 12/21/20       Goal: LTG-By discharge, patient will complete basic self care tasks     Dates: Start: 12/21/20       Description: 1) Individualized Goal: Supervision to mod I level with AE/DME as needed.  2) Interventions:  OT E Stim Attended, OT Self Care/ADL, OT Cognitive Skill Dev, OT Community Reintegration, OT Manual Ther Technique, OT Neuro Re-Ed/Balance, OT Therapeutic Activity, OT Evaluation, and OT Therapeutic Exercise          Goal: LTG-By discharge, patient will perform bathroom transfers     Dates: Start: 12/21/20       Description: 1) Individualized Goal:  Supervision to mod I level with AD/DME as needed.  2) Interventions:   OT E Stim Attended, OT Self Care/ADL, OT Cognitive Skill Dev, OT Community Reintegration, OT Manual Ther Technique, OT Neuro Re-Ed/Balance, OT Therapeutic Activity, OT Evaluation, and OT Therapeutic Exercise                Problem: Toileting     Dates: Start: 12/21/20       Goal: STG-Within one week, patient will complete toileting tasks     Dates: Start: 12/21/20       Description: 1) Individualized Goal:  max A with AE/DME as needed.  2) Interventions:  OT E Stim Attended, OT Self Care/ADL, OT Cognitive Skill Dev, OT Community Reintegration, OT Manual Ther Technique, OT Neuro Re-Ed/Balance, OT Therapeutic Activity, OT Evaluation, and OT Therapeutic Exercise

## 2020-12-21 NOTE — DIETARY
"Nutrition services: Day 1 of admit.  Liudmila Pulido is a 88 y.o. female with admitting DX of stroke, right body involvement.   Consult received for Malnutrition screening tool score 5 ( wt loss 34+ lb in 6 months, Poor PO pta).      Per current department guidelines dietary staff not permitted to enter droplet isolation rooms at this time. Attempted to call pt on room phone w/ no answer.    Assessment:  Height: 167.6 cm (5' 5.98\")  Weight: 75 kg (165 lb 6.4 oz) via wheelchair scale on 12/20.   Body mass index is 26.71 kg/m²., BMI classification: WNL for age.   Diet/Intake: Soft and bite sized, ground meats, thin liquids; no meals documented at this time per flowsheets.       Evaluation:   1. Per chart review pt was seen by RD on 12/18. Notes pt \"states she weighed 240 lb \"some time ago\". She was unclear on time frame\"  2. Per chart review pt wt was 171 lb via bed scale on 12/18. Noted 6 lb wt loss in 3 days - likely related to differences in scales.   3. Additionally, [facility] RD did not note muscle or fat wasting.   4. On admit at [facility] pt ate 50-75% x2 meals on 12/18.   5. Per flowsheets noted with pressure injury to buttocks. Wound consult currently pending.   6. Last bowel movement 12/19. Pericolace on MAR, not yet given.   7. Pt w/ hx of T2DM. +SSI on MAR. Blood sugars have been <200 since admit. A1c 6.2%.     Malnutrition Risk: unable to fully assess at this time, though per RD on 12/18 criteria not met.     Recommendations/Plan:   1. Encourage intake of meals   2. Document intake of all meals as % taken in ADL's to provide interdisciplinary communication across all shifts.   3. RD to monitor for wound team note and   4. Monitor weight.  5. Nutrition rep will continue to call patient for ongoing meal and snack preferences.     RD following for adequate PO intake.             "

## 2020-12-21 NOTE — DISCHARGE PLANNING
CASE MANAGEMENT INITIAL ASSESSMENT    Admit Date:  12/20/2020     I met with spoke with patients daughter/POA to discuss role of case management / discharge planning / team conference.     Patient is a  88 y.o. female transferred from [facility].    PLOF: Independent at home with son and daughter in law.    PCP: Needs to establish at her local clinic.    ADM MD: Tangela Noel    Diagnosis: Thromboembolic stroke ([facility]) [I63.9]    Co-morbidities:   Patient Active Problem List    Diagnosis Date Noted   • CVA (cerebral vascular accident) ([facility]) 12/17/2020     Priority: High   • Elevated troponin 12/17/2020     Priority: High   • AF (atrial fibrillation) ([facility]) 12/17/2020     Priority: High   • Hyponatremia 12/17/2020     Priority: Medium   • Essential hypertension 12/17/2020     Priority: Low   • Hyperlipidemia 12/17/2020     Priority: Low   • Vitamin D deficiency 12/21/2020   • Type 2 diabetes mellitus without complication, without long-term current use of insulin ([facility]) 12/18/2020   • Aortic stenosis 12/18/2020     Prior Living Situation:  Housing / Facility: [address]  Lives with - Patient's Self Care Capacity: Adult Children    Prior Level of Function:  Medication Management: Independent  Finances: Independent  Home Management: Requires Assist  Shopping: Requires Assist  Prior Level Of Mobility: Independent With Device in Community, Supervision Without Device in Home  Driving / Transportation: Relatives / Others Provide Transportation(pt reports that she was driving short distances)    Support Systems:  Primary : Daughter  Other support systems: Wes Justice, Friend: [phone].   Advance Directives: Yes  Power of  (Name & Phone): Keyonna Matrinez, Dtr. [phone].    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 No    Do you feel you have and an understanding of what services  provide? Yes     Do you have any additional questions regarding case management?  No        CASE MANAGEMENT PLAN OF CARE   Individualized Goals:   1. Have ramp at DC  2. Bathroom getting remodeled  3. Establish with PCP at local clinic    Barriers:   1. Functional limitation      Plan:  1. Continue to follow patient through hospitalization and provide discharge planning in collaboration with patient, family, physicians and ancillary services.     2. Utilize community resources to ensure a safe discharge.

## 2020-12-21 NOTE — REHAB-DIETARY IDT TEAM NOTE
"Dietary   Nutrition  Dietary Problems     Problem: Inadequate nutrient intake     Goal: Patient to consume greater than or equal to 50% of meals                       Section completed by:  Bess Elizabeth R.D.    Nutrition services: Day 1 of admit.  Liudmila Pulido is a 88 y.o. female with admitting DX of stroke, right body involvement.   Consult received for Malnutrition screening tool score 5 ( wt loss 34+ lb in 6 months, Poor PO pta).       Per current department guidelines dietary staff not permitted to enter droplet isolation rooms at this time. Attempted to call pt on room phone w/ no answer.     Assessment:  Height: 167.6 cm (5' 5.98\")  Weight: 75 kg (165 lb 6.4 oz) via wheelchair scale on 12/20.   Body mass index is 26.71 kg/m²., BMI classification: WNL for age.   Diet/Intake: Soft and bite sized, ground meats, thin liquids; no meals documented at this time per flowsheets.        Evaluation:   1. Per chart review pt was seen by RD on 12/18. Notes pt \"states she weighed 240 lb \"some time ago\". She was unclear on time frame\"  2. Per chart review pt wt was 171 lb via bed scale on 12/18. Noted 6 lb wt loss in 3 days - likely related to differences in scales.   3. Additionally, [facility] RD did not note muscle or fat wasting.   4. On admit at [facility] pt ate 50-75% x2 meals on 12/18.   5. Per flowsheets noted with pressure injury to buttocks. Wound consult currently pending.   6. Last bowel movement 12/19. Pericolace on MAR, not yet given.   7. Pt w/ hx of T2DM. +SSI on MAR. Blood sugars have been <200 since admit. A1c 6.2%.      Malnutrition Risk: unable to fully assess at this time, though per RD on 12/18 criteria not met.      Recommendations/Plan:   1. Encourage intake of meals   2. Document intake of all meals as % taken in ADL's to provide interdisciplinary communication across all shifts.   3. RD to monitor for wound team note and   4. Monitor weight.  5. Nutrition rep will continue to call patient for " ongoing meal and snack preferences.      RD following for adequate PO intake.

## 2020-12-22 ENCOUNTER — APPOINTMENT (OUTPATIENT)
Dept: RADIOLOGY | Facility: REHABILITATION | Age: 85
DRG: 057 | End: 2020-12-22
Attending: PHYSICAL MEDICINE & REHABILITATION
Payer: MEDICARE

## 2020-12-22 ENCOUNTER — APPOINTMENT (OUTPATIENT)
Dept: PAIN MANAGEMENT | Facility: REHABILITATION | Age: 85
DRG: 057 | End: 2020-12-22
Attending: PHYSICAL MEDICINE & REHABILITATION
Payer: MEDICARE

## 2020-12-22 LAB
BACTERIA BLD CULT: NORMAL
BACTERIA BLD CULT: NORMAL
SIGNIFICANT IND 70042: NORMAL
SIGNIFICANT IND 70042: NORMAL
SITE SITE: NORMAL
SITE SITE: NORMAL
SOURCE SOURCE: NORMAL
SOURCE SOURCE: NORMAL

## 2020-12-22 PROCEDURE — A9270 NON-COVERED ITEM OR SERVICE: HCPCS | Performed by: PHYSICAL MEDICINE & REHABILITATION

## 2020-12-22 PROCEDURE — 700102 HCHG RX REV CODE 250 W/ 637 OVERRIDE(OP): Performed by: PHYSICAL MEDICINE & REHABILITATION

## 2020-12-22 PROCEDURE — 99232 SBSQ HOSP IP/OBS MODERATE 35: CPT | Performed by: PHYSICAL MEDICINE & REHABILITATION

## 2020-12-22 PROCEDURE — 97129 THER IVNTJ 1ST 15 MIN: CPT

## 2020-12-22 PROCEDURE — 92611 MOTION FLUOROSCOPY/SWALLOW: CPT

## 2020-12-22 PROCEDURE — 74230 X-RAY XM SWLNG FUNCJ C+: CPT

## 2020-12-22 PROCEDURE — 97530 THERAPEUTIC ACTIVITIES: CPT

## 2020-12-22 PROCEDURE — 97110 THERAPEUTIC EXERCISES: CPT

## 2020-12-22 PROCEDURE — 97535 SELF CARE MNGMENT TRAINING: CPT

## 2020-12-22 PROCEDURE — 97130 THER IVNTJ EA ADDL 15 MIN: CPT

## 2020-12-22 PROCEDURE — 97112 NEUROMUSCULAR REEDUCATION: CPT

## 2020-12-22 PROCEDURE — 770010 HCHG ROOM/CARE - REHAB SEMI PRIVAT*

## 2020-12-22 RX ORDER — GABAPENTIN 100 MG/1
100 CAPSULE ORAL 3 TIMES DAILY
Status: DISCONTINUED | OUTPATIENT
Start: 2020-12-22 | End: 2020-12-24

## 2020-12-22 RX ADMIN — TAMSULOSIN HYDROCHLORIDE 0.4 MG: 0.4 CAPSULE ORAL at 20:10

## 2020-12-22 RX ADMIN — ACETAMINOPHEN 650 MG: 325 TABLET, FILM COATED ORAL at 05:23

## 2020-12-22 RX ADMIN — DOCUSATE SODIUM 50 MG AND SENNOSIDES 8.6 MG 2 TABLET: 8.6; 5 TABLET, FILM COATED ORAL at 20:10

## 2020-12-22 RX ADMIN — APIXABAN 5 MG: 5 TABLET, FILM COATED ORAL at 20:10

## 2020-12-22 RX ADMIN — GABAPENTIN 100 MG: 100 CAPSULE ORAL at 20:10

## 2020-12-22 RX ADMIN — GABAPENTIN 100 MG: 100 CAPSULE ORAL at 15:53

## 2020-12-22 RX ADMIN — ACETAMINOPHEN 650 MG: 325 TABLET, FILM COATED ORAL at 20:10

## 2020-12-22 RX ADMIN — APIXABAN 5 MG: 5 TABLET, FILM COATED ORAL at 10:08

## 2020-12-22 RX ADMIN — Medication 1000 UNITS: at 10:08

## 2020-12-22 RX ADMIN — METOPROLOL TARTRATE 50 MG: 25 TABLET, FILM COATED ORAL at 18:28

## 2020-12-22 RX ADMIN — LISINOPRIL 5 MG: 5 TABLET ORAL at 05:23

## 2020-12-22 RX ADMIN — ATORVASTATIN CALCIUM 40 MG: 40 TABLET, FILM COATED ORAL at 20:10

## 2020-12-22 RX ADMIN — METOPROLOL TARTRATE 50 MG: 25 TABLET, FILM COATED ORAL at 05:23

## 2020-12-22 ASSESSMENT — ACTIVITIES OF DAILY LIVING (ADL)
TOILETING_LEVEL_OF_ASSIST_DESCRIPTION: ASSIST FOR HYGIENE;ASSIST TO PULL PANTS UP;ASSIST TO PULL PANTS DOWN;ASSIST FOR STANDING BALANCE;GRAB BAR;INCREASED TIME;SET-UP OF EQUIPMENT;SUPERVISION FOR SAFETY;VERBAL CUEING

## 2020-12-22 NOTE — THERAPY
"Speech Language Pathology  Daily Treatment     Patient Name: Liudmila Pulido  Age:  88 y.o., Sex:  female  Medical Record #: [id number]  Today's Date: 12/22/2020     Precautions  Precautions: Fall Risk, Swallow Precautions ( See Comments)  Comments: R lizeth (RLE>RUE), R lateral lean    Subjective    \"Can I stay up in my chair for a while?  It feels so good to sit up\"     Objective       12/22/20 0901   SLP Total Time Spent   SLP Individual Total Time Spent (Mins) 30   Treatment Charges   SLP Cognitive Skill Development First 15 Minutes 1   SLP Cognitive Skill Development Additional 15 Minutes 1       Assessment    Pt was able to follow 1 and 2 step written directions independently with 100% accuracy.  Pt required min cues for attention to details when completing complex written directions.  Pt reported that since her stroke she feels as though her thinking is slower.  Pt reported that she has a very supportive family that will be able to assist her when she discharges home.           Plan    Continue targeting complex attention, problem solving tasks     Speech Therapy Problems     Problem: Memory STGs     Dates: Start: 12/21/20       Goal: STG-Within one week, patient will     Dates: Start: 12/21/20       Description: 1) Individualized goal:  learn and implement functional memory strategies with use of memory book with 80% accuracy provided MOD A.   2) Interventions:  SLP Self Care / ADL Training , SLP Cognitive Skill Development, and SLP Group Treatment                Problem: Problem Solving STGs     Dates: Start: 12/21/20       Goal: STG-Within one week, patient will     Dates: Start: 12/21/20       Description: 1) Individualized goal:  complete sustained attention tasks with 80% accuracy provided MIN A.   2) Interventions:  SLP Self Care / ADL Training , SLP Cognitive Skill Development, and SLP Group Treatment                  Problem: Speech/Swallowing LTGs     Dates: Start: 12/21/20       Goal: LTG-By " discharge, patient will safely swallow     Dates: Start: 12/21/20       Description: 1) Individualized goal:  the least restrictive diet with no overt s/sx of asp/pen noted across meals with 100% accuracy provided MOD I.  2) Interventions:  SLP Swallowing Dysfunction Treatment, SLP Oral Pharyngeal Evaluation, SLP Video Swallow Evaluation, SLP Self Care / ADL Training , SLP Cognitive Skill Development, and SLP Group Treatment              Goal: LTG-By discharge, patient will     Dates: Start: 12/21/20       Description: 1) Individualized goal:  complete functional problem solving and recall with 80% accuracy provided SPV.   2) Interventions:  SLP Self Care / ADL Training , SLP Cognitive Skill Development, and SLP Group Treatment                  Problem: Swallowing STGs     Dates: Start: 12/21/20       Goal: STG-Within one week, patient will safely swallow     Dates: Start: 12/21/20       Description: 1) Individualized goal:  soft and bite size diet with thin liquids with no overt s/sx of asp/pen noted across 2/2 meals provided MIN cues.   2) Interventions:  SLP Swallowing Dysfunction Treatment, SLP Oral Pharyngeal Evaluation, SLP Video Swallow Evaluation, SLP Self Care / ADL Training , SLP Cognitive Skill Development, and SLP Group Treatment          Goal: STG-Within one week, patient will     Dates: Start: 12/21/20       Description: 1) Individualized goal:  participate in MBSS to rule out aspiration within one week.  2) Interventions:  SLP Swallowing Dysfunction Treatment, SLP Oral Pharyngeal Evaluation, SLP Video Swallow Evaluation, SLP Self Care / ADL Training , SLP Cognitive Skill Development, and SLP Group Treatment

## 2020-12-22 NOTE — THERAPY
"Occupational Therapy  Daily Treatment     Patient Name: Liudmila Pulido  Age:  88 y.o., Sex:  female  Medical Record #: [id number]  Today's Date: 12/22/2020     Precautions  Precautions: (P) Fall Risk, Swallow Precautions ( See Comments), Other (See Comments)  Comments: (P) R lizeth (RLE>RUE), R lateral lean    Safety   ADL Safety : (P) Requires Supervision for Safety, Requires Physical Assist for Safety, Requires Cueing for Safety  Bathroom Safety: (P) Requires Supervision for Safety, Requires Physical Assist for Safety, Requires Cuing for Safety  Comments: (P) see below notes for ADL performance details.    Subjective    \"I feel almost human after that shower\"     Objective       12/22/20 1001   Precautions   Precautions Fall Risk;Swallow Precautions ( See Comments);Other (See Comments)   Comments R lizeth (RLE>RUE), R lateral lean   Safety    ADL Safety  Requires Supervision for Safety;Requires Physical Assist for Safety;Requires Cueing for Safety   Bathroom Safety Requires Supervision for Safety;Requires Physical Assist for Safety;Requires Cuing for Safety   Comments see below notes for ADL performance details.   Cognition    Level of Consciousness Alert   Functional Level of Assist   Grooming Supervision;Seated   Grooming Description Set-up of equipment;Supervision for safety  (oral care, comb hair, wash face, integrated RUE w/o cues)   Bathing Total Assist x 2   Bathing Description Grab bar;Hand held shower;Tub bench;Assit with back;Assit wtih lower extremities;Increased time;Set-up of equipment;Supervision for safety;Verbal cueing  (A to wash distal LEs, feet, buttocks; 2nd person for safety)   Upper Body Dressing Supervision   Upper Body Dressing Description Increased time;Set-up of equipment;Supervision for safety  (don/doff pullover shirt)   Lower Body Dressing Total Assist x 2  (total A to don/doff tread socks)   Lower Body Dressing Description Grab bar;Assist with threading into pant leg;Increased " time;Initial preparation for task;Set-up of equipment;Supervision for safety;Verbal cueing  (A to thread LEs, 2 person assist to stand/pull up pants)   Toileting Total Assist x 2   Toileting Description Assist for hygiene;Assist to pull pants up;Assist to pull pants down;Assist for standing balance;Grab bar;Increased time;Set-up of equipment;Supervision for safety;Verbal cueing  (1 person A to stand/block RLE; 2nd person-hygiene/clothing)   Toilet Transfers Maximal Assist   Toilet Transfer Description Grab bar;Adaptive equipment;Increased time;Initial preparation for task;Requires lift;Set-up of equipment;Supervision for safety;Verbal cueing  (w/c<>commode over toilet with blocking of RLE)   Tub / Shower Transfers Total Assist X 2   Tub Shower Transfer Description Grab bar;Shower bench;Increased time;Assist with one limb;Requires lift;Initial preparation for task;Set-up of equipment;Supervision for safety;Verbal cueing  (w/c<>tub bench SPT with blocking of RLE, 2 person for safety)   Interdisciplinary Plan of Care Collaboration   IDT Collaboration with  Nursing;Physician;Therapy Tech   Patient Position at End of Therapy Seated;Chair Alarm On;Self Releasing Lap Belt Applied;Call Light within Reach;Tray Table within Reach;Phone within Reach   Collaboration Comments RN for medications; Dr. Noel for CLOF, POC; tech assist during session   OT Total Time Spent   OT Individual Total Time Spent (Mins) 90   OT Charge Group   OT Self Care / ADL 6       Assessment    Pt tolerated OT session well with focus on progression with ADLs and bathroom transfers. Pt demonstrated improved sitting tolerance and decreased R lateral lean in comparison to yesterday's evaluation. Pt integrated RUE into ADLs and transfers without cues. She requires blocking of RLE during transfers and 2nd person for safety with toileting and bathing.     Strengths: Alert and oriented, Independent prior level of function, Motivated for self care and  independence, Pleasant and cooperative, Supportive family, Willingly participates in therapeutic activities  Barriers: Bladder incontinence, Bowel incontinence, Decreased endurance, Fatigue, Hemiparesis, Home accessibility, Impaired activity tolerance, Impaired balance, Impaired functional cognition, Limited mobility, Visual impairment    Plan    ADLs and related functional mobility, sitting/standing balance, RUE strength/coordination, monitor vision.    Occupational Therapy Goals     Problem: Bathing     Dates: Start: 12/21/20       Goal: STG-Within one week, patient will bathe     Dates: Start: 12/21/20       Description: 1) Individualized Goal:  mod A with AE/DME as needed.  2) Interventions:  OT E Stim Attended, OT Self Care/ADL, OT Cognitive Skill Dev, OT Community Reintegration, OT Manual Ther Technique, OT Neuro Re-Ed/Balance, OT Therapeutic Activity, OT Evaluation, and OT Therapeutic Exercise                  Problem: Dressing     Dates: Start: 12/21/20       Goal: STG-Within one week, patient will dress LB     Dates: Start: 12/21/20       Description: 1) Individualized Goal:  mod A with AE as needed.  2) Interventions:  OT E Stim Attended, OT Self Care/ADL, OT Cognitive Skill Dev, OT Community Reintegration, OT Manual Ther Technique, OT Neuro Re-Ed/Balance, OT Therapeutic Activity, OT Evaluation, and OT Therapeutic Exercise                Problem: Functional Transfers     Dates: Start: 12/21/20       Goal: STG-Within one week, patient will transfer to toilet     Dates: Start: 12/21/20       Description: 1) Individualized Goal:  mod A with AD/DME as needed.  2) Interventions:  OT E Stim Attended, OT Self Care/ADL, OT Cognitive Skill Dev, OT Community Reintegration, OT Manual Ther Technique, OT Neuro Re-Ed/Balance, OT Therapeutic Activity, OT Evaluation, and OT Therapeutic Exercise                Problem: Grooming     Dates: Start: 12/21/20             Problem: OT Long Term Goals     Dates: Start: 12/21/20        Goal: LTG-By discharge, patient will complete basic self care tasks     Dates: Start: 12/21/20       Description: 1) Individualized Goal: Supervision to mod I level with AE/DME as needed.  2) Interventions:  OT E Stim Attended, OT Self Care/ADL, OT Cognitive Skill Dev, OT Community Reintegration, OT Manual Ther Technique, OT Neuro Re-Ed/Balance, OT Therapeutic Activity, OT Evaluation, and OT Therapeutic Exercise          Goal: LTG-By discharge, patient will perform bathroom transfers     Dates: Start: 12/21/20       Description: 1) Individualized Goal:  Supervision to mod I level with AD/DME as needed.  2) Interventions:  OT E Stim Attended, OT Self Care/ADL, OT Cognitive Skill Dev, OT Community Reintegration, OT Manual Ther Technique, OT Neuro Re-Ed/Balance, OT Therapeutic Activity, OT Evaluation, and OT Therapeutic Exercise                Problem: Toileting     Dates: Start: 12/21/20       Goal: STG-Within one week, patient will complete toileting tasks     Dates: Start: 12/21/20       Description: 1) Individualized Goal:  max A with AE/DME as needed.  2) Interventions:  OT E Stim Attended, OT Self Care/ADL, OT Cognitive Skill Dev, OT Community Reintegration, OT Manual Ther Technique, OT Neuro Re-Ed/Balance, OT Therapeutic Activity, OT Evaluation, and OT Therapeutic Exercise

## 2020-12-22 NOTE — THERAPY
Physical Therapy   Initial Evaluation     Patient Name: Liudmila Pulido  Age:  88 y.o., Sex:  female  Medical Record #: [id number]  Today's Date: 12/21/2020     Subjective    Pt reports she is agreebale to PT eval     Objective       12/21/20 1231   Prior Living Situation   Prior Services Home-Independent  (Kids helped with some IADLS)   Housing / Facility 1 Story House   Steps Into Home 3   Steps In Home 0   Rail Both Rail (Steps into Home)  (Being ramped)   Equipment Owned Front-Wheel Walker;Single Point Cane   Lives with - Patient's Self Care Capacity Alone and Able to Care For Self   Comments Pt lives with her son and daughter in law. She reports independent PLOF for ADLs, recieves assist for some IADLs. Pt reports that her family is planning to have bathroom rennovated with walk in shower and GB vs tub/shower.. Pt was still driving prevoioius   Prior Level of Functional Mobility   Bed Mobility Independent   Transfer Status Independent   Ambulation Independent   Distance Ambulation (Feet) 1000  (community)   Assistive Devices Used None   Wheelchair Other (Comments)  (NA)   Stairs Independent   Prior Functioning: Everyday Activities   Indoor Mobility (Ambulation) Independent   Stairs Independent   Prior Device Use None of the given options   Passive ROM Lower Body   Passive ROM Lower Body WDL   Active ROM Lower Body    Active ROM Lower Body  WDL   Strength Lower Body   Lower Body Strength  X   Comments RLE flaccid, LLE 5/5 quick screen globally   Sensation Lower Body   Lower Extremity Sensation   X   Rt Lower Extremity Proprioception Impaired   Lt Lower Extremity Proprioception Impaired   Lower Body Muscle Tone   Lower Body Muscle Tone  X   Rt Lower Extremity Muscle Tone Hypotonic   Balance Assessment   Sitting Balance (Static) Poor +   Sitting Balance (Dynamic) Poor -   Standing Balance (Static) Trace +   Standing Balance (Dynamic) Trace   Weight Shift Sitting Poor   Weight Shift Standing Absent   Bed Mobility     Supine to Sit Moderate Assist   Sit to Supine Maximal Assist   Sit to Stand Maximal Assist   Scooting Maximal Assist   Rolling Moderate Assist to Lt.;Moderate Assist to Rt.   Neurological Concerns   Sitting Posture During ADL's Lateral Lean Right   Coordination Lower Body    Comments NT dt strenght deficits   Roll Left and Right   Assistance Needed Physical assistance   Physical Assistance Level 26%-50%   CARE Score 3   Roll Left and Right Discharge Goal   Discharge Goal Independent   Sit to Lying   Assistance Needed Physical assistance   Physical Assistance Level 51%-75%   CARE Score 2   Sit to Lying Discharge Goal   Discharge Goal Partial/moderate assistance   Lying to Sitting on Side of Bed   Assistance Needed Physical assistance   Physical Assistance Level 26%-50%   CARE Score 3   Lying to Sitting on Side of Bed Discharge Goal   Discharge Goal Independent   Sit to Stand   Assistance Needed Physical assistance   Physical Assistance Level 51%-75%   CARE Score 2   Sit to Stand Discharge Goal   Discharge Goal Partial/moderate assistance   Chair/Bed-to-Chair Transfer   Assistance Needed Physical assistance   Physical Assistance Level 51%-75%   CARE Score 2   Chair/Bed-to-Chair Transfer Discharge Goal   Discharge Goal Partial/moderate assistance   Car Transfer   Reason if not Attempted Safety concerns   CARE Score 88   Car Transfer Discharge Goal   Discharge Goal Partial/moderate assistance   Walk 10 Feet   Reason if not Attempted Safety concerns   CARE Score 88   Walk 10 Feet Discharge Goal   Discharge Goal Partial/moderate assistance   Walk 50 Feet with Two Turns   Reason if not Attempted Safety concerns   CARE Score 88   Walk 50 Feet with Two Turns Discharge Goal   Discharge Goal Partial/moderate assistance   Walk 150 Feet   Reason if not Attempted Safety concerns   CARE Score 88   Walk 150 Feet Discharge Goal   Discharge Goal Partial/moderate assistance   Walking 10 Feet on Uneven Surfaces   Reason if not  Attempted Safety concerns   CARE Score 88   Walking 10 Feet on Uneven Surfaces Discharge Goal   Discharge Goal Partial/moderate assistance   1 Step (Curb)   Reason if not Attempted Safety concerns   CARE Score 88   1 Step (Curb) Discharge Goal   Discharge Goal Dependent   4 Steps   Reason if not Attempted Safety concerns   CARE Score 88   4 Steps Discharge Goal   Discharge Goal Dependent   12 Steps   Reason if not Attempted Safety concerns   CARE Score 88   12 Steps Discharge Goal   Discharge Goal Dependent   Picking Up Object   Reason if not Attempted Safety concerns   CARE Score 88   Picking Up Object Discharge Goal   Discharge Goal Partial/moderate assistance   Wheel 50 Feet with Two Turns   Assistance Needed Physical assistance   Physical Assistance Level 25% or less   CARE Score 3   Type of Wheelchair/Scooter Manual   Wheel 50 Feet with Two Turns Discharge Goal   Discharge Goal Independent   Wheel 150 Feet   Reason if not Attempted Safety concerns   CARE Score 88   Type of Wheelchair/Scooter Manual   Wheel 150 Feet Discharge Goal   Discharge Goal Independent   Gait Functional Level of Assist    Gait Level Of Assist Unable to Participate   Wheelchair Functional Level of Assist   Wheelchair Assist Minimal Assist   Distance Wheelchair (Feet or Distance) 50   Wheelchair Description Assistance with steering;Extra time;Leg rest management;Safety concerns;Limited by fatigue;Supervision for safety;Verbal cueing   Stairs Functional Level of Assist   Level of Assist with Stairs Unable to Participate   Transfer Functional Level of Assist   Bed, Chair, Wheelchair Transfer Maximal Assist   Bed Chair Wheelchair Transfer Description Assist with two limbs;Increased time;Requires lift;Verbal cueing   Problem List    Problems Impaired Bed Mobility;Impaired Transfers;Impaired Ambulation;Functional Strength Deficit;Impaired Balance;Impaired Coordination;Decreased Activity Tolerance;Motor Planning / Sequencing   Precautions    Precautions Fall Risk;Swallow Precautions ( See Comments)   Current Discharge Plan   Current Discharge Plan Return to Prior Living Situation   Interdisciplinary Plan of Care Collaboration   Patient Position at End of Therapy In Bed;Call Light within Reach;Tray Table within Reach   Benefit   Therapy Benefit Patient Would Benefit from Inpatient Rehabilitation Physical Therapy to Maximize Functional [place] with ADLs, IADLs and Mobility.   Strengths & Barriers   Strengths Able to follow instructions;Pleasant and cooperative;Motivated for self care and independence;Independent prior level of function   Barriers Decreased endurance;Impaired balance;Hemiparesis;Home accessibility;Limited mobility   PT Total Time Spent   PT Individual Total Time Spent (Mins) 60   PT Charge Group   PT Therapeutic Activities 1   PT Evaluation PT Evaluation High       Assessment  Patient is 88 y.o. female with a diagnosis of CVA.  Additional factors influencing patient status / progress (ie: cognitive factors, co-morbidities, social support, etc): See H&P for PMH, comorbidities, and other factors. See flow sheet for list of impairments, functional deficits, and social support.      Plan  Recommend Physical Therapy  minutes per day 5-7 days per week for 4 weeks for the following treatments:  PT E Stim Attended, PT Orthotics Training, PT Gait Training, PT Therapeutic Exercises, PT Neuro Re-Ed/Balance, PT Aquatic Therapy, PT Therapeutic Activity, PT Manual Therapy and PT Evaluation.    Goals:  Long term and short term goals have been discussed with patient and they are in agreement.    Physical Therapy Problems     Problem: Mobility     Dates: Start: 12/21/20       Goal: STG-Within one week, patient will propel wheelchair household distances     Dates: Start: 12/21/20       Description: 1) Individualized goal:  150 ft At SPV in order to improve activity tolerance propelling herself around facility  2) Interventions:  PT E Stim  Attended, PT Orthotics Training, PT Gait Training, PT Therapeutic Exercises, PT Neuro Re-Ed/Balance, PT Aquatic Therapy, PT Therapeutic Activity, PT Manual Therapy, and PT Evaluation                  Problem: Mobility Transfers     Dates: Start: 12/21/20       Goal: STG-Within one week, patient will transfer bed to chair     Dates: Start: 12/21/20       Description: 1) Individualized goal:  At mod A with LRD In order to maximize self mobility  2) Interventions:  PT E Stim Attended, PT Orthotics Training, PT Gait Training, PT Therapeutic Exercises, PT Neuro Re-Ed/Balance, PT Aquatic Therapy, PT Therapeutic Activity, PT Manual Therapy, and PT Evaluation                Problem: PT-Long Term Goals     Dates: Start: 12/21/20       Goal: LTG-By discharge, patient will propel wheelchair     Dates: Start: 12/21/20       Description: 1) Individualized goal:  150 ft At mod I in order to improve self mobility  2) Interventions:  PT E Stim Attended, PT Orthotics Training, PT Gait Training, PT Therapeutic Exercises, PT Neuro Re-Ed/Balance, PT Aquatic Therapy, PT Therapeutic Activity, PT Manual Therapy, and PT Evaluation          Goal: LTG-By discharge, patient will transfer one surface to another     Dates: Start: 12/21/20       Description: 1) Individualized goal:  At min A with LRD In order to maximize self mobility  2) Interventions:  PT E Stim Attended, PT Orthotics Training, PT Gait Training, PT Therapeutic Exercises, PT Neuro Re-Ed/Balance, PT Aquatic Therapy, PT Therapeutic Activity, PT Manual Therapy, and PT Evaluation          Goal: LTG-By discharge, patient will transfer in/out of a car     Dates: Start: 12/21/20       Description: 1) Individualized goal:  At min A with LRD In order to maximize self mobility  2) Interventions:  PT E Stim Attended, PT Orthotics Training, PT Gait Training, PT Therapeutic Exercises, PT Neuro Re-Ed/Balance, PT Aquatic Therapy, PT Therapeutic Activity, PT Manual Therapy, and PT Evaluation

## 2020-12-22 NOTE — CARE PLAN
Problem: Safety  Goal: Will remain free from falls  Intervention: Implement fall precautions  Note: Use of call light reinforced to make needs known.Fall precautions and frequent rounding in place for safety.Call light within reach.Will continue to monitor needs and safety.     Problem: Urinary Elimination:  Goal: Ability to reestablish a normal urinary elimination pattern will improve  Intervention: Record post-void residual volumes  Note: Pt is incontinent of bladder..Will continue to monitor.     Problem: Pain Management  Goal: Pain level will decrease to patient's comfort goal  Note: Pt denies any pain or discomfort.Repositioned with pillows for comfort.Will continue to monitor and assess pain level and medicate as needed.

## 2020-12-22 NOTE — PROGRESS NOTES
"Rehab Progress Note     Date of Service: 12/22/2020  Chief Complaint: Follow-up stroke    Interval Events (Subjective)    Patient seen and examined today in her room.  She does report some pain and hypersensitivity to touch on the right anterior shin.  She reports this is started since her stroke did not have this issue prior to being in the hospital.  She denies any pain in the calf however.  She took a shower this morning with occupational therapy and is doing much better today functionally compared to yesterday.    Patient denies any symptoms of Covid infection including fevers, chills, cough, shortness of breath, sore throat, congestion, body or joint aches, and/or diarrhea.      Objective:  VITAL SIGNS: /74   Pulse 90   Temp 35.9 °C (96.7 °F) (Temporal)   Resp 18   Ht 1.676 m (5' 5.98\")   Wt 75 kg (165 lb 6.4 oz)   SpO2 95%   BMI 26.71 kg/m²   Gen: alert, no apparent distress  CV: No peripheral edema  Neuro: notable for flaccid right leg, increased sensitivity to touch on the right anterior shin  Msk: No pain on squeezing the right calf      Recent Results (from the past 72 hour(s))   ACCU-CHEK GLUCOSE    Collection Time: 12/19/20  5:49 PM   Result Value Ref Range    Glucose - Accu-Ck 154 (H) 65 - 99 mg/dL   ACCU-CHEK GLUCOSE    Collection Time: 12/19/20  9:01 PM   Result Value Ref Range    Glucose - Accu-Ck 136 (H) 65 - 99 mg/dL   CBC WITH DIFFERENTIAL    Collection Time: 12/20/20  5:41 AM   Result Value Ref Range    WBC 9.4 4.8 - 10.8 K/uL    RBC 4.41 4.20 - 5.40 M/uL    Hemoglobin 12.2 12.0 - 16.0 g/dL    Hematocrit 37.7 37.0 - 47.0 %    MCV 85.5 81.4 - 97.8 fL    MCH 27.7 27.0 - 33.0 pg    MCHC 32.4 (L) 33.6 - 35.0 g/dL    RDW 43.5 35.9 - 50.0 fL    Platelet Count 218 164 - 446 K/uL    MPV 9.5 9.0 - 12.9 fL    Neutrophils-Polys 82.70 (H) 44.00 - 72.00 %    Lymphocytes 8.80 (L) 22.00 - 41.00 %    Monocytes 6.70 0.00 - 13.40 %    Eosinophils 0.90 0.00 - 6.90 %    Basophils 0.20 0.00 - 1.80 %    " Immature Granulocytes 0.70 0.00 - 0.90 %    Nucleated RBC 0.00 /100 WBC    Neutrophils (Absolute) 7.75 (H) 2.00 - 7.15 K/uL    Lymphs (Absolute) 0.82 (L) 1.00 - 4.80 K/uL    Monos (Absolute) 0.63 0.00 - 0.85 K/uL    Eos (Absolute) 0.08 0.00 - 0.51 K/uL    Baso (Absolute) 0.02 0.00 - 0.12 K/uL    Immature Granulocytes (abs) 0.07 0.00 - 0.11 K/uL    NRBC (Absolute) 0.00 K/uL   ACCU-CHEK GLUCOSE    Collection Time: 12/20/20  8:19 AM   Result Value Ref Range    Glucose - Accu-Ck 125 (H) 65 - 99 mg/dL   Comp Metabolic Panel    Collection Time: 12/20/20  1:00 PM   Result Value Ref Range    Sodium 133 (L) 135 - 145 mmol/L    Potassium 4.0 3.6 - 5.5 mmol/L    Chloride 104 96 - 112 mmol/L    Co2 20 20 - 33 mmol/L    Anion Gap 9.0 7.0 - 16.0    Glucose 148 (H) 65 - 99 mg/dL    Bun 11 8 - 22 mg/dL    Creatinine 0.42 (L) 0.50 - 1.40 mg/dL    Calcium 8.9 8.5 - 10.5 mg/dL    AST(SGOT) 78 (H) 12 - 45 U/L    ALT(SGPT) 28 2 - 50 U/L    Alkaline Phosphatase 208 (H) 30 - 99 U/L    Total Bilirubin 0.6 0.1 - 1.5 mg/dL    Albumin 2.3 (L) 3.2 - 4.9 g/dL    Total Protein 5.7 (L) 6.0 - 8.2 g/dL    Globulin 3.4 1.9 - 3.5 g/dL    A-G Ratio 0.7 g/dL   TROPONIN    Collection Time: 12/20/20  1:00 PM   Result Value Ref Range    Troponin T 214 (H) 6 - 19 ng/L   ESTIMATED GFR    Collection Time: 12/20/20  1:00 PM   Result Value Ref Range    GFR If African American >60 >60 mL/min/1.73 m 2    GFR If Non African American >60 >60 mL/min/1.73 m 2   ACCU-CHEK GLUCOSE    Collection Time: 12/20/20  5:43 PM   Result Value Ref Range    Glucose - Accu-Ck 110 (H) 65 - 99 mg/dL   ACCU-CHEK GLUCOSE    Collection Time: 12/20/20  7:38 PM   Result Value Ref Range    Glucose - Accu-Ck 156 (H) 65 - 99 mg/dL   Comp Metabolic Panel (CMP)    Collection Time: 12/21/20  6:13 AM   Result Value Ref Range    Sodium 135 135 - 145 mmol/L    Potassium 3.6 3.6 - 5.5 mmol/L    Chloride 105 96 - 112 mmol/L    Co2 24 20 - 33 mmol/L    Anion Gap 6.0 (L) 7.0 - 16.0    Glucose 135 (H)  65 - 99 mg/dL    Bun 10 8 - 22 mg/dL    Creatinine 0.49 (L) 0.50 - 1.40 mg/dL    Calcium 9.3 8.5 - 10.5 mg/dL    AST(SGOT) 75 (H) 12 - 45 U/L    ALT(SGPT) 34 2 - 50 U/L    Alkaline Phosphatase 247 (H) 30 - 99 U/L    Total Bilirubin 0.7 0.1 - 1.5 mg/dL    Albumin 2.4 (L) 3.2 - 4.9 g/dL    Total Protein 6.0 6.0 - 8.2 g/dL    Globulin 3.6 (H) 1.9 - 3.5 g/dL    A-G Ratio 0.7 g/dL   CBC with Differential    Collection Time: 12/21/20  6:13 AM   Result Value Ref Range    WBC 9.2 4.8 - 10.8 K/uL    RBC 4.44 4.20 - 5.40 M/uL    Hemoglobin 12.5 12.0 - 16.0 g/dL    Hematocrit 38.4 37.0 - 47.0 %    MCV 86.5 81.4 - 97.8 fL    MCH 28.2 27.0 - 33.0 pg    MCHC 32.6 (L) 33.6 - 35.0 g/dL    RDW 45.2 35.9 - 50.0 fL    Platelet Count 252 164 - 446 K/uL    MPV 9.4 9.0 - 12.9 fL    Neutrophils-Polys 83.50 (H) 44.00 - 72.00 %    Lymphocytes 8.20 (L) 22.00 - 41.00 %    Monocytes 6.00 0.00 - 13.40 %    Eosinophils 1.50 0.00 - 6.90 %    Basophils 0.10 0.00 - 1.80 %    Immature Granulocytes 0.70 0.00 - 0.90 %    Nucleated RBC 0.00 /100 WBC    Neutrophils (Absolute) 7.68 (H) 2.00 - 7.15 K/uL    Lymphs (Absolute) 0.75 (L) 1.00 - 4.80 K/uL    Monos (Absolute) 0.55 0.00 - 0.85 K/uL    Eos (Absolute) 0.14 0.00 - 0.51 K/uL    Baso (Absolute) 0.01 0.00 - 0.12 K/uL    Immature Granulocytes (abs) 0.06 0.00 - 0.11 K/uL    NRBC (Absolute) 0.00 K/uL   Magnesium    Collection Time: 12/21/20  6:13 AM   Result Value Ref Range    Magnesium 1.7 1.5 - 2.5 mg/dL   Phosphorus    Collection Time: 12/21/20  6:13 AM   Result Value Ref Range    Phosphorus 2.2 (L) 2.5 - 4.5 mg/dL   TSH with Reflex to FT4    Collection Time: 12/21/20  6:13 AM   Result Value Ref Range    TSH 5.300 0.380 - 5.330 uIU/mL   Vitamin D, 25-hydroxy (blood)    Collection Time: 12/21/20  6:13 AM   Result Value Ref Range    25-Hydroxy   Vitamin D 25 21 (L) 30 - 100 ng/mL   ESTIMATED GFR    Collection Time: 12/21/20  6:13 AM   Result Value Ref Range    GFR If African American >60 >60 mL/min/1.73  m 2    GFR If Non African American >60 >60 mL/min/1.73 m 2   ACCU-CHEK GLUCOSE    Collection Time: 12/21/20  7:35 AM   Result Value Ref Range    Glucose - Accu-Ck 137 (H) 65 - 99 mg/dL   ACCU-CHEK GLUCOSE    Collection Time: 12/21/20 11:27 AM   Result Value Ref Range    Glucose - Accu-Ck 179 (H) 65 - 99 mg/dL       Current Facility-Administered Medications   Medication Frequency   • gabapentin (NEURONTIN) capsule 100 mg TID   • vitamin D (cholecalciferol) tablet 1,000 Units DAILY   • tamsulosin (FLOMAX) capsule 0.4 mg QHS   • Respiratory Therapy Consult Continuous RT   • Pharmacy Consult Request ...Pain Management Review 1 Each PHARMACY TO DOSE   • hydrALAZINE (APRESOLINE) tablet 25 mg Q8HRS PRN   • artificial tears ophthalmic solution 1 Drop PRN   • benzocaine-menthol (CEPACOL) lozenge 1 Lozenge Q2HRS PRN   • mag hydrox-al hydrox-simeth (MAALOX PLUS ES or MYLANTA DS) suspension 20 mL Q2HRS PRN   • ondansetron (ZOFRAN ODT) dispertab 4 mg 4X/DAY PRN    Or   • ondansetron (ZOFRAN) syringe/vial injection 4 mg 4X/DAY PRN   • traZODone (DESYREL) tablet 50 mg QHS PRN   • sodium chloride (OCEAN) 0.65 % nasal spray 2 Spray PRN   • senna-docusate (PERICOLACE or SENOKOT S) 8.6-50 MG per tablet 2 Tab BID    And   • polyethylene glycol/lytes (MIRALAX) PACKET 1 Packet QDAY PRN    And   • magnesium hydroxide (MILK OF MAGNESIA) suspension 30 mL QDAY PRN    And   • bisacodyl (DULCOLAX) suppository 10 mg QDAY PRN   • acetaminophen (Tylenol) tablet 650 mg Q6HRS PRN   • atorvastatin (LIPITOR) tablet 40 mg Q EVENING   • apixaban (ELIQUIS) tablet 5 mg BID   • lisinopril (PRINIVIL) tablet 5 mg Q DAY   • metoprolol (LOPRESSOR) tablet 50 mg TWICE DAILY   • guaiFENesin (ROBITUSSIN) 100 MG/5ML solution 100 mg Q6HRS PRN       Orders Placed This Encounter   Procedures   • Diet Order Diet: Level 6 - Soft and Bite Sized (Please ground all meats - medications whole one at a time floated in puree); Liquid level: Level 0 - Thin     Standing Status:    Standing     Number of Occurrences:   1     Order Specific Question:   Diet:     Answer:   Level 6 - Soft and Bite Sized [23]     Comments:   Please ground all meats - medications whole one at a time floated in puree     Order Specific Question:   Liquid level     Answer:   Level 0 - Thin       Assessment:  Active Hospital Problems    Diagnosis   • *CVA (cerebral vascular accident) ([facility])   • AF (atrial fibrillation) ([facility])   • Elevated troponin   • Hyponatremia   • Essential hypertension   • Hyperlipidemia   • Vitamin D deficiency   • Other dysphagia   • Cognitive deficits   • Hypophosphatemia   • Type 2 diabetes mellitus without complication, without long-term current use of insulin ([facility])   • Aortic stenosis     This patient is a 88 y.o. female admitted for acute inpatient rehabilitation with CVA (cerebral vascular accident) ([facility]).    Medical Decision Making and Plan:    High left frontal stroke  Cardio-embolic etiology  Right leg weakness  Cognitive deficits  Dysphagia  Continue full rehab program  PT/OT/SLP, 1 hr each discipline, 5 days per week  Continue Eliquis and statin for secondary stroke prophylaxis    Right leg pain  Appears neuropathic  Start low-dose gabapentin 100 mg 3 times a day  Less likely DVT and patient is already on Eliquis    Hypertension  Continue lisinopril 5 mg 12/21  Continue metoprolol 50 mg twice a day  Monitor for 1 more day before increasing    Atrial fibrillation  Continue metoprolol 50 mg twice a day  Outpatient follow-up with cardiology    Aortic stenosis  Outpatient follow-up with cardiology    Vitamin D deficiency  Continue supplementation    Hypophosphatemia  Status post supplementation  Recheck on Thursday    Bowel program  Continue bowel medications  Scheduled Sennakot  PRN Miralax, MOM, bisacodyl suppository  Last BM 12/21    Bladder program  Acute urinary retention  Check PVRs - 258, 240  Started Flomax 0.4 mg 12/21  Bladder scan for no voids  ICP for over 400 cc -required  once overnight  Scheduled toileting    DVT prophylaxis  Eliquis      Total time:  26 minutes.  I spent greater than 50% of the time for patient care, counseling, and coordination on this date, including patient face-to face time, unit/floor time with review of records/pertinent lab data and studies, as well as discussing diagnostic evaluation/work up, planned therapeutic interventions, and future disposition of care, as per the interval events/subjective and the assessment and plan as noted above.    I have performed a physical exam, reviewed and updated ROS, as well as the assessment and plan today 12/22/2020. In review of note from 12/21/2020 there are no new changes except as documented above.            Tangela Noel M.D.   Physical Medicine and Rehabilitation

## 2020-12-22 NOTE — THERAPY
Speech Language Pathology  Video Swallow     Patient Name:  Liudmila Pulido  AGE:  88 y.o., SEX:  female  Medical Record #:  [id number]  Today's Date: 12/22/2020     Objective       12/22/20 0831   History / Background Information   Prior Level of Function for Eating / Swallowing WFL   Diagnosis CVA   Onset Date Of Dysphagia 12/17/20   Dysphagia Symptoms Warranting Video Swallow coughing with thin liquids    Dentition Edentulous  (Pt has top dentures, but not bottom dentures )   Procedure   Patient Seated in  wheelchair    Seated at (Degrees) 90   Views Completed Lateral   Fluoroscopy Time 134.4   Consistencies / Presentation Method   Consistencies / Presentation Method Tested   Thin (0) Cup;Teaspoon   Pureed (4) Teaspoon   Soft & Bite-Sized (6) - (Dysphagia III) Teaspoon   Barium Tablet Cup  (thin liquid wash )   Oral Phase   Oral Phase X   Thin (0) Oral Residue After the Swallow;Premature Spillage Into Pyriform Sinus   Pureed (4) Premature Spillage Into Valleculae;Oral Residue After the Swallow   Soft & Bite-Sized (6) - (Dysphagia III) Premature Spillage Into Valleculae;Oral Residue After the Swallow   Pharyngeal Phase   Pharyngeal Phase X   Thin (0) Residue In Valleculae;Penetration During Swallow;Aspiration During Swallow   Esophageal Phase   Esophageal Phase WDL   Compensatory Strategies Attempted   Compensatory Strategies Attempted Yes   Multiple Swallows Effective in clearing oral and pharyngeal residue    Impression   Dysphagia Present Yes   Oral - Pharyngeal Mild Impairment   Prognosis   Prognosis for Improvement Fair   Barriers to Improvement Age   Positive Indicators for Improvement Able to follow swallowing strategies   Recommendations   Diet / Liquid Recommendation Soft & Bite-Sized (6) - (Dysphagia III);Thin (0)   Medication Administration  Whole with Liquid Wash  (one at a time )   Strategies / Precautions Small Bites;Small Sips;Sitting Upright at 90 Degrees while Eating;Stay Upright at Least 30  Minutes After Meals;Oral Care After Meals;Monitor Temperature and Lung Sounds;Multiple Swallows   Interventions Compensatory Safe Swallow Strategy Training   SLP Contact Information (Name / Extension) Cornelius Yu MS, [facility]-SLP/ ex [id number]   Video Tape Number [id number]   SLP Total Time Spent   SLP Individual Total Time Spent (Mins) 30   Charge Group   SLP Video Swallow / FEES Videofluoroscopic Evaluation       Assessment    MBSS completed.  Pt trialed thin liquids (teaspoon, cup sip, straw sip), pudding, 6- Soft & Bite Sized textures and a barium capsule with a thin liquid wash.  Pt presented with mild oral-pharyngeal dysphagia characterized by the following: Premature spillage to the valleculae (puree, 6- Soft & Bite Sized); premature spillage to the pyriforms (thin liquids); Oral residue after all textures (increased residue noted with pudding); Penetration during the swallow with thin liquids; and aspiration during the swallow after a teaspoon of thin liquids that pt was able to clear out of her airway independently and a small amount on one other occasion that was not sensed.  Pt tolerated all other cup sips of thin liquids including one straw sip of thin liquids without aspiration.  Pt was able to tolerate a barium capsule with a thin liquid wash without difficulty.  Pt was wearing her upper dentures, but reported that she usually uses her bottom ones as well to eat but they are not with her in the hospital.  Pt declined trialing regular textures due to difficulty chewing.      Plan    Recommend pt remain on a 6- Soft & Bite Sized diet with thin liquids and will be seen during meals to reinforce safe swallowing strategies (small bites/sips, double swallows).  Pt unable to be enrolled in therapeutic dining for 4 more days due to COVID rule out contact precautions.

## 2020-12-23 PROCEDURE — 700102 HCHG RX REV CODE 250 W/ 637 OVERRIDE(OP): Performed by: PHYSICAL MEDICINE & REHABILITATION

## 2020-12-23 PROCEDURE — 97112 NEUROMUSCULAR REEDUCATION: CPT

## 2020-12-23 PROCEDURE — 99233 SBSQ HOSP IP/OBS HIGH 50: CPT | Performed by: PHYSICAL MEDICINE & REHABILITATION

## 2020-12-23 PROCEDURE — 97110 THERAPEUTIC EXERCISES: CPT

## 2020-12-23 PROCEDURE — 97530 THERAPEUTIC ACTIVITIES: CPT

## 2020-12-23 PROCEDURE — A9270 NON-COVERED ITEM OR SERVICE: HCPCS | Performed by: PHYSICAL MEDICINE & REHABILITATION

## 2020-12-23 PROCEDURE — 97129 THER IVNTJ 1ST 15 MIN: CPT

## 2020-12-23 PROCEDURE — 90791 PSYCH DIAGNOSTIC EVALUATION: CPT | Performed by: PSYCHOLOGIST

## 2020-12-23 PROCEDURE — 97130 THER IVNTJ EA ADDL 15 MIN: CPT

## 2020-12-23 PROCEDURE — 92526 ORAL FUNCTION THERAPY: CPT

## 2020-12-23 PROCEDURE — 770010 HCHG ROOM/CARE - REHAB SEMI PRIVAT*

## 2020-12-23 RX ORDER — LISINOPRIL 5 MG/1
10 TABLET ORAL
Status: DISCONTINUED | OUTPATIENT
Start: 2020-12-24 | End: 2020-12-24

## 2020-12-23 RX ADMIN — ATORVASTATIN CALCIUM 40 MG: 40 TABLET, FILM COATED ORAL at 19:52

## 2020-12-23 RX ADMIN — ACETAMINOPHEN 650 MG: 325 TABLET, FILM COATED ORAL at 18:48

## 2020-12-23 RX ADMIN — LISINOPRIL 5 MG: 5 TABLET ORAL at 05:27

## 2020-12-23 RX ADMIN — GABAPENTIN 100 MG: 100 CAPSULE ORAL at 08:07

## 2020-12-23 RX ADMIN — GABAPENTIN 100 MG: 100 CAPSULE ORAL at 19:52

## 2020-12-23 RX ADMIN — TAMSULOSIN HYDROCHLORIDE 0.4 MG: 0.4 CAPSULE ORAL at 19:52

## 2020-12-23 RX ADMIN — APIXABAN 5 MG: 5 TABLET, FILM COATED ORAL at 08:07

## 2020-12-23 RX ADMIN — METOPROLOL TARTRATE 50 MG: 25 TABLET, FILM COATED ORAL at 05:27

## 2020-12-23 RX ADMIN — APIXABAN 5 MG: 5 TABLET, FILM COATED ORAL at 19:52

## 2020-12-23 RX ADMIN — Medication 1000 UNITS: at 08:07

## 2020-12-23 RX ADMIN — METOPROLOL TARTRATE 50 MG: 25 TABLET, FILM COATED ORAL at 17:36

## 2020-12-23 RX ADMIN — GABAPENTIN 100 MG: 100 CAPSULE ORAL at 15:16

## 2020-12-23 RX ADMIN — HYDRALAZINE HYDROCHLORIDE 25 MG: 25 TABLET, FILM COATED ORAL at 08:07

## 2020-12-23 ASSESSMENT — ACTIVITIES OF DAILY LIVING (ADL): TOILET_TRANSFER_DESCRIPTION: GRAB BAR;INCREASED TIME;VERBAL CUEING;REQUIRES LIFT

## 2020-12-23 NOTE — CARE PLAN
Problem: Mobility  Goal: STG-Within one week, patient will propel wheelchair household distances  Description: 1) Individualized goal:  150 ft At [facility] in order to improve activity tolerance propelling herself around facility  2) Interventions:  PT E Stim Attended, PT Orthotics Training, PT Gait Training, PT Therapeutic Exercises, PT Neuro Re-Ed/Balance, PT Aquatic Therapy, PT Therapeutic Activity, PT Manual Therapy, and PT Evaluation    Outcome: PROGRESSING AS EXPECTED  Note: < 150 ft      Problem: Mobility Transfers  Goal: STG-Within one week, patient will transfer bed to chair  Description: 1) Individualized goal:  At mod A with LRD In order to maximize self mobility  2) Interventions:  PT E Stim Attended, PT Orthotics Training, PT Gait Training, PT Therapeutic Exercises, PT Neuro Re-Ed/Balance, PT Aquatic Therapy, PT Therapeutic Activity, PT Manual Therapy, and PT Evaluation  Outcome: PROGRESSING AS EXPECTED  Note: 2nd person assist

## 2020-12-23 NOTE — THERAPY
Speech Language Pathology  Daily Treatment     Patient Name: Liudmila Pulido  Age:  88 y.o., Sex:  female  Medical Record #: [id number]  Today's Date: 12/23/2020     Precautions  Precautions: Fall Risk, Swallow Precautions ( See Comments), Other (See Comments)  Comments: R lizeth (RLE>RUE), R lateral lean    Subjective    Pt pleasant and cooperative, reports being tired following completion of PT     Objective       12/23/20 0904   SLP Total Time Spent   SLP Individual Total Time Spent (Mins) 60   Treatment Charges   SLP Swallowing Dysfunction Treatment Swallowing Dysfunction Treatment   SLP Cognitive Skill Development First 15 Minutes 1   SLP Cognitive Skill Development Additional 15 Minutes 1       Assessment    Pt presented with medication error identification task. Pt completed with 56% accuracy indep. Pt required MOD verbal cues and breakdown of information in order to correct and achieve 100% accuracy. Pt assessed with soft and bite size textures with ground meat as well as thin liquids. Pt cont to demonstrate impulsivity with thin liquids with use of serial swallows however tolerated at this time. MOD cues required throughout meal. Current diet likely going to be baseline diet due to current dentition status (upper dentures present, lower dentures at home).          Plan    Cont to address simple medication management, finances and decreased impulsivity with intake.    Speech Therapy Problems     Problem: Memory STGs     Dates: Start: 12/21/20       Goal: STG-Within one week, patient will     Dates: Start: 12/21/20       Description: 1) Individualized goal:  learn and implement functional memory strategies with use of memory book with 80% accuracy provided MOD A.   2) Interventions:  SLP Self Care / ADL Training , SLP Cognitive Skill Development, and SLP Group Treatment                Problem: Problem Solving STGs     Dates: Start: 12/21/20       Goal: STG-Within one week, patient will     Dates: Start:  12/21/20       Description: 1) Individualized goal:  complete sustained attention tasks with 80% accuracy provided MIN A.   2) Interventions:  SLP Self Care / ADL Training , SLP Cognitive Skill Development, and SLP Group Treatment                  Problem: Speech/Swallowing LTGs     Dates: Start: 12/21/20       Goal: LTG-By discharge, patient will safely swallow     Dates: Start: 12/21/20       Description: 1) Individualized goal:  the least restrictive diet with no overt s/sx of asp/pen noted across meals with 100% accuracy provided MOD I.  2) Interventions:  SLP Swallowing Dysfunction Treatment, SLP Oral Pharyngeal Evaluation, SLP Video Swallow Evaluation, SLP Self Care / ADL Training , SLP Cognitive Skill Development, and SLP Group Treatment              Goal: LTG-By discharge, patient will     Dates: Start: 12/21/20       Description: 1) Individualized goal:  complete functional problem solving and recall with 80% accuracy provided SPV.   2) Interventions:  SLP Self Care / ADL Training , SLP Cognitive Skill Development, and SLP Group Treatment                  Problem: Swallowing STGs     Dates: Start: 12/21/20       Goal: STG-Within one week, patient will safely swallow     Dates: Start: 12/21/20       Description: 1) Individualized goal:  soft and bite size diet with thin liquids with no overt s/sx of asp/pen noted across 2/2 meals provided MIN cues.   2) Interventions:  SLP Swallowing Dysfunction Treatment, SLP Oral Pharyngeal Evaluation, SLP Video Swallow Evaluation, SLP Self Care / ADL Training , SLP Cognitive Skill Development, and SLP Group Treatment          Goal: STG-Within one week, patient will     Dates: Start: 12/21/20       Description: 1) Individualized goal:  participate in MBSS to rule out aspiration within one week.  2) Interventions:  SLP Swallowing Dysfunction Treatment, SLP Oral Pharyngeal Evaluation, SLP Video Swallow Evaluation, SLP Self Care / ADL Training , SLP Cognitive Skill  Development, and SLP Group Treatment

## 2020-12-23 NOTE — CARE PLAN
Problem: Swallowing STGs  Goal: STG-Within one week, patient will safely swallow  Description: 1) Individualized goal:  soft and bite size diet with thin liquids with no overt s/sx of asp/pen noted across 2/2 meals provided MIN cues.   2) Interventions:  SLP Swallowing Dysfunction Treatment, SLP Oral Pharyngeal Evaluation, SLP Video Swallow Evaluation, SLP Self Care / ADL Training , SLP Cognitive Skill Development, and SLP Group Treatment  Outcome: NOT MET  Note: Mod cues for safety and impulsivity      Problem: Memory STGs  Goal: STG-Within one week, patient will  Description: 1) Individualized goal:  learn and implement functional memory strategies with use of memory book with 80% accuracy provided MOD A.   2) Interventions:  SLP Self Care / ADL Training , SLP Cognitive Skill Development, and SLP Group Treatment  Outcome: NOT MET     Problem: Swallowing STGs  Goal: STG-Within one week, patient will  Description: 1) Individualized goal:  participate in MBSS to rule out aspiration within one week.  2) Interventions:  SLP Swallowing Dysfunction Treatment, SLP Oral Pharyngeal Evaluation, SLP Video Swallow Evaluation, SLP Self Care / ADL Training , SLP Cognitive Skill Development, and SLP Group Treatment      Outcome: MET     Problem: Problem Solving STGs  Goal: STG-Within one week, patient will  Description: 1) Individualized goal:  complete sustained attention tasks with 80% accuracy provided MIN A.   2) Interventions:  SLP Self Care / ADL Training , SLP Cognitive Skill Development, and SLP Group Treatment    Outcome: MET

## 2020-12-23 NOTE — THERAPY
"Occupational Therapy  Daily Treatment     Patient Name: Liudmila Pulido  Age:  88 y.o., Sex:  female  Medical Record #: [id number]  Today's Date: 12/23/2020     Precautions  Precautions: Fall Risk, Swallow Precautions ( See Comments), Other (See Comments)  Comments: R lizeth (RLE>RUE), R lateral lean    Safety   ADL Safety : Requires Supervision for Safety, Requires Physical Assist for Safety, Requires Cueing for Safety  Bathroom Safety: Requires Supervision for Safety, Requires Physical Assist for Safety, Requires Cuing for Safety  Comments: see below notes for ADL performance details.    Subjective    \"I've been a little off today\"     Objective       12/23/20 1431   Precautions   Precautions Fall Risk;Swallow Precautions ( See Comments);Other (See Comments)   Comments R lizeth (RLE>RUE), R lateral lean   Cognition    Level of Consciousness Alert   Strength Upper Body   Rt Shoulder Flexion Strength 4 (G)   Rt Shoulder Extension Strength 4 (G)   Rt Shoulder Abduction Strength 4 (G)   Rt Shoulder Adduction Strength 4 (G)   Rt Shoulder Ext Rotation Strength 4 (G)   Rt Shoulder Int Rotation Strength 4 (G)   Rt Elbow Flexion Strength 4 (G)   Rt Elbow Extension Strength 4 (G)   Rt Forearm Supination Strength 4 (G)   Rt Forearm Pronation Strength 4 (G)   Rt Wrist Flexion Strength 4 (G)   Rt Wrist Extension Strength 4 (G)   Supine Upper Body Exercises   Chest Fly 3 sets of 15;Right;Left;Weight (See Comments for lbs)   Chest Press 3 sets of 15;Right;Left;Weight (See Comments for lbs)   Front Arm Raise 3 sets of 15;Right;Left;Weight (See Comments for lbs)   Bicep Curl 3 sets of 15;Right;Left;Weight (See Comments for lbs)   Comments 3# weight used for UB exercises.   Interdisciplinary Plan of Care Collaboration   IDT Collaboration with  Physician   Patient Position at End of Therapy In Bed;Bed Alarm On;Call Light within Reach;Tray Table within Reach;Phone within Reach   Collaboration Comments Medical hold for missed minutes for " consult with Dr. Wasserman.   Therapy Missed   Missed Therapy (Minutes) 30   Reason For Missed Therapy Medical - Patient on Hold from Therapy;Medical - Other (Please Comment)  (Pt in consult with Dr. Wasserman)   OT Total Time Spent   OT Individual Total Time Spent (Mins) 30   OT Charge Group   OT Therapeutic Exercise  2       Assessment    Missed 30 min tx session- pt in consult with Dr. Wasserman from 14:00-14:30; med hold approved by Dr. Noel. Pt tolerated UB exercises well with intermittent rest breaks. Pt demonstrated 4/5 strength in RUE, and reports no difficulty with  and coordination. She also no longer reports difficulties with her vision. Pt notes that she feels more fatigued today-nursing reports that pt was up in w/c for most of the day.    Strengths: Alert and oriented, Independent prior level of function, Motivated for self care and independence, Pleasant and cooperative, Supportive family, Willingly participates in therapeutic activities  Barriers: Bladder incontinence, Bowel incontinence, Decreased endurance, Fatigue, Hemiparesis, Home accessibility, Impaired activity tolerance, Impaired balance, Impaired functional cognition, Limited mobility, Visual impairment    Plan    ADLs and related functional mobility, sitting/standing balance, RUE strength/coordination, RLE neuro re-ed, monitor vision.    Occupational Therapy Goals     Problem: Bathing     Dates: Start: 12/21/20       Goal: STG-Within one week, patient will bathe     Dates: Start: 12/21/20       Description: 1) Individualized Goal:  mod A with AE/DME as needed.  2) Interventions:  OT E Stim Attended, OT Self Care/ADL, OT Cognitive Skill Dev, OT Community Reintegration, OT Manual Ther Technique, OT Neuro Re-Ed/Balance, OT Therapeutic Activity, OT Evaluation, and OT Therapeutic Exercise      Note:     Goal Note filed on 12/23/20 0700 by Gris Padilla, OT    Ant A; pt evaluated on 12/21.                        Problem: Dressing     Dates: Start:  12/21/20       Goal: STG-Within one week, patient will dress LB     Dates: Start: 12/21/20       Description: 1) Individualized Goal:  mod A with AE as needed.  2) Interventions:  OT E Stim Attended, OT Self Care/ADL, OT Cognitive Skill Dev, OT Community Reintegration, OT Manual Ther Technique, OT Neuro Re-Ed/Balance, OT Therapeutic Activity, OT Evaluation, and OT Therapeutic Exercise    Note:     Goal Note filed on 12/23/20 0700 by Gris Padilla, OT    Total A x1-2; pt evaluated on 12/21.                        Problem: Functional Transfers     Dates: Start: 12/21/20       Goal: STG-Within one week, patient will transfer to toilet     Dates: Start: 12/21/20       Description: 1) Individualized Goal:  mod A with AD/DME as needed.  2) Interventions:  OT E Stim Attended, OT Self Care/ADL, OT Cognitive Skill Dev, OT Community Reintegration, OT Manual Ther Technique, OT Neuro Re-Ed/Balance, OT Therapeutic Activity, OT Evaluation, and OT Therapeutic Exercise    Note:     Goal Note filed on 12/23/20 0700 by Gris Padilla, OT    Max-total A; pt evaluated on 12/21.                        Problem: Grooming     Dates: Start: 12/21/20             Problem: OT Long Term Goals     Dates: Start: 12/21/20       Goal: LTG-By discharge, patient will complete basic self care tasks     Dates: Start: 12/21/20       Description: 1) Individualized Goal: Supervision to mod I level with AE/DME as needed.  2) Interventions:  OT E Stim Attended, OT Self Care/ADL, OT Cognitive Skill Dev, OT Community Reintegration, OT Manual Ther Technique, OT Neuro Re-Ed/Balance, OT Therapeutic Activity, OT Evaluation, and OT Therapeutic Exercise          Goal: LTG-By discharge, patient will perform bathroom transfers     Dates: Start: 12/21/20       Description: 1) Individualized Goal:  Supervision to mod I level with AD/DME as needed.  2) Interventions:  OT E Stim Attended, OT Self Care/ADL, OT Cognitive Skill Dev, OT Community Reintegration, OT  Manual Ther Technique, OT Neuro Re-Ed/Balance, OT Therapeutic Activity, OT Evaluation, and OT Therapeutic Exercise                Problem: Toileting     Dates: Start: 12/21/20       Goal: STG-Within one week, patient will complete toileting tasks     Dates: Start: 12/21/20       Description: 1) Individualized Goal:  max A with AE/DME as needed.  2) Interventions:  OT E Stim Attended, OT Self Care/ADL, OT Cognitive Skill Dev, OT Community Reintegration, OT Manual Ther Technique, OT Neuro Re-Ed/Balance, OT Therapeutic Activity, OT Evaluation, and OT Therapeutic Exercise    Note:     Goal Note filed on 12/23/20 0700 by Gris Padilla, OT    Total A x1-2; pt evaluated on 12/21.

## 2020-12-23 NOTE — DISCHARGE PLANNING
"CM spoke with patients daughter to update on IDT and DC date of 1/9/2020.  Answered questions.  Son in Law \"Logan\" will be doing the family training and he can be reached at [phone].  CM will continue to follow for DC needs.    "

## 2020-12-23 NOTE — THERAPY
Physical Therapy   Daily Treatment     Patient Name: Liudmila Pulido  Age:  88 y.o., Sex:  female  Medical Record #: [id number]  Today's Date: 12/23/2020     Precautions  Precautions: Fall Risk, Swallow Precautions ( See Comments), Other (See Comments)  Comments: R lizeth (RLE>RUE), R lateral lean    Subjective    Pt reports she has to have BM     Objective       12/23/20 0831   Transfer Functional Level of Assist   Bed, Chair, Wheelchair Transfer Total Assist X 2  (2nd person for safety )   Bed Chair Wheelchair Transfer Description Adaptive equipment;Assist with two limbs;Increased time;Requires lift;Verbal cueing   Toilet Transfers Total Assist X 2  (2nd person for safety and assist with clothing and hygeiene)   Toilet Transfer Description Grab bar;Increased time;Verbal cueing;Requires lift   Bed Mobility    Supine to Sit Moderate Assist   Sit to Stand Maximal Assist  (FWW in front, repeated practice, cues for safety and sequenc)   Scooting Maximal Assist   Rolling Moderate Assist to Rt.;Moderate Assist to Lt.   Interdisciplinary Plan of Care Collaboration   Patient Position at End of Therapy Seated;Call Light within Reach;Tray Table within Reach;Self Releasing Lap Belt Applied;Chair Alarm On   Strengths & Barriers   Strengths Able to follow instructions;Independent prior level of function;Good insight into deficits/needs   Barriers Decreased endurance;Hemiparesis;Home accessibility;Limited mobility;Impaired balance   PT Total Time Spent   PT Individual Total Time Spent (Mins) 60   PT Charge Group   PT Neuromuscular Re-Education / Balance 1   PT Therapeutic Activities 3     Pregait - WB tolerance, min A at R knee to maintain ext. FWW in front for balance and safety. Lateral wt shifting.    Assessment    Pt limited this session by need for time on toilet for BM, but able to participate with improved standing tolerance and quality this session and improved WB tolerance on RLE with assist at knee. Required 2 person  transfer this session dt increased fatigue and difficulty engaging mentally this session.    Strengths: Able to follow instructions, Independent prior level of function, Good insight into deficits/needs  Barriers: Decreased endurance, Hemiparesis, Home accessibility, Limited mobility, Impaired balance    Plan    Exercise/neuro re-ed, issue HEP for pt to perform in room to initiate their own care when able to complete AROM, NMES,  set up, Trial lite gait and standing in // bars when able to come out of roomTransfer training, sit/stand balance, Stroke edu    Physical Therapy Problems     Problem: Mobility     Dates: Start: 12/21/20       Goal: STG-Within one week, patient will propel wheelchair household distances     Dates: Start: 12/21/20       Description: 1) Individualized goal:  150 ft At SPV in order to improve activity tolerance propelling herself around facility  2) Interventions:  PT E Stim Attended, PT Orthotics Training, PT Gait Training, PT Therapeutic Exercises, PT Neuro Re-Ed/Balance, PT Aquatic Therapy, PT Therapeutic Activity, PT Manual Therapy, and PT Evaluation      Note:     Goal Note filed on 12/23/20 1129 by Carloz Azar, PT    < 150 ft                         Problem: Mobility Transfers     Dates: Start: 12/21/20       Goal: STG-Within one week, patient will transfer bed to chair     Dates: Start: 12/21/20       Description: 1) Individualized goal:  At mod A with LRD In order to maximize self mobility  2) Interventions:  PT E Stim Attended, PT Orthotics Training, PT Gait Training, PT Therapeutic Exercises, PT Neuro Re-Ed/Balance, PT Aquatic Therapy, PT Therapeutic Activity, PT Manual Therapy, and PT Evaluation    Note:     Goal Note filed on 12/23/20 1129 by Carloz Azar, PT    2nd person assist                         Problem: PT-Long Term Goals     Dates: Start: 12/21/20       Goal: LTG-By discharge, patient will propel wheelchair     Dates: Start: 12/21/20       Description:  1) Individualized goal:  150 ft At mod I in order to improve self mobility  2) Interventions:  PT E Stim Attended, PT Orthotics Training, PT Gait Training, PT Therapeutic Exercises, PT Neuro Re-Ed/Balance, PT Aquatic Therapy, PT Therapeutic Activity, PT Manual Therapy, and PT Evaluation          Goal: LTG-By discharge, patient will transfer one surface to another     Dates: Start: 12/21/20       Description: 1) Individualized goal:  At min A with LRD In order to maximize self mobility  2) Interventions:  PT E Stim Attended, PT Orthotics Training, PT Gait Training, PT Therapeutic Exercises, PT Neuro Re-Ed/Balance, PT Aquatic Therapy, PT Therapeutic Activity, PT Manual Therapy, and PT Evaluation          Goal: LTG-By discharge, patient will transfer in/out of a car     Dates: Start: 12/21/20       Description: 1) Individualized goal:  At min A with LRD In order to maximize self mobility  2) Interventions:  PT E Stim Attended, PT Orthotics Training, PT Gait Training, PT Therapeutic Exercises, PT Neuro Re-Ed/Balance, PT Aquatic Therapy, PT Therapeutic Activity, PT Manual Therapy, and PT Evaluation

## 2020-12-23 NOTE — PROGRESS NOTES
"Rehab Progress Note     Date of Service: 12/23/2020  Chief Complaint: Follow-up stroke    Interval Events (Subjective)    Patient seen and examined today in her room.  She is sitting up in her wheelchair and feels very tired.  She would like to go back to bed.  Encouraged her to stay up to lunch which will be delivered in about 20 minutes.  She denies any pain in her right leg at rest but reports its painful and sensitive to the touch.  She is very eager for recovery to occur in her leg.  We had a prolonged discussion about the need to be patient as it could take many months for recovery.  Patient reports today is not as good today as yesterday.  She is feeling very slightly depressed today.  Advised patient her mood is likely can fluctuate.  She denies a history of any depression.  We talked about her risk of becoming depressed as she was so physically active prior to the stroke.  Patient has no other questions concerns or complaints today.      Objective:  VITAL SIGNS: /84   Pulse 81   Temp 36.5 °C (97.7 °F) (Temporal)   Resp 18   Ht 1.676 m (5' 5.98\")   Wt 75 kg (165 lb 6.4 oz)   SpO2 94%   BMI 26.71 kg/m²   Gen: alert, no apparent distress  Neuro: notable for flaccid right leg, hypersensitive to touch on the anterior shin      Recent Results (from the past 72 hour(s))   Comp Metabolic Panel    Collection Time: 12/20/20  1:00 PM   Result Value Ref Range    Sodium 133 (L) 135 - 145 mmol/L    Potassium 4.0 3.6 - 5.5 mmol/L    Chloride 104 96 - 112 mmol/L    Co2 20 20 - 33 mmol/L    Anion Gap 9.0 7.0 - 16.0    Glucose 148 (H) 65 - 99 mg/dL    Bun 11 8 - 22 mg/dL    Creatinine 0.42 (L) 0.50 - 1.40 mg/dL    Calcium 8.9 8.5 - 10.5 mg/dL    AST(SGOT) 78 (H) 12 - 45 U/L    ALT(SGPT) 28 2 - 50 U/L    Alkaline Phosphatase 208 (H) 30 - 99 U/L    Total Bilirubin 0.6 0.1 - 1.5 mg/dL    Albumin 2.3 (L) 3.2 - 4.9 g/dL    Total Protein 5.7 (L) 6.0 - 8.2 g/dL    Globulin 3.4 1.9 - 3.5 g/dL    A-G Ratio 0.7 g/dL "   TROPONIN    Collection Time: 12/20/20  1:00 PM   Result Value Ref Range    Troponin T 214 (H) 6 - 19 ng/L   ESTIMATED GFR    Collection Time: 12/20/20  1:00 PM   Result Value Ref Range    GFR If African American >60 >60 mL/min/1.73 m 2    GFR If Non African American >60 >60 mL/min/1.73 m 2   ACCU-CHEK GLUCOSE    Collection Time: 12/20/20  5:43 PM   Result Value Ref Range    Glucose - Accu-Ck 110 (H) 65 - 99 mg/dL   ACCU-CHEK GLUCOSE    Collection Time: 12/20/20  7:38 PM   Result Value Ref Range    Glucose - Accu-Ck 156 (H) 65 - 99 mg/dL   Comp Metabolic Panel (CMP)    Collection Time: 12/21/20  6:13 AM   Result Value Ref Range    Sodium 135 135 - 145 mmol/L    Potassium 3.6 3.6 - 5.5 mmol/L    Chloride 105 96 - 112 mmol/L    Co2 24 20 - 33 mmol/L    Anion Gap 6.0 (L) 7.0 - 16.0    Glucose 135 (H) 65 - 99 mg/dL    Bun 10 8 - 22 mg/dL    Creatinine 0.49 (L) 0.50 - 1.40 mg/dL    Calcium 9.3 8.5 - 10.5 mg/dL    AST(SGOT) 75 (H) 12 - 45 U/L    ALT(SGPT) 34 2 - 50 U/L    Alkaline Phosphatase 247 (H) 30 - 99 U/L    Total Bilirubin 0.7 0.1 - 1.5 mg/dL    Albumin 2.4 (L) 3.2 - 4.9 g/dL    Total Protein 6.0 6.0 - 8.2 g/dL    Globulin 3.6 (H) 1.9 - 3.5 g/dL    A-G Ratio 0.7 g/dL   CBC with Differential    Collection Time: 12/21/20  6:13 AM   Result Value Ref Range    WBC 9.2 4.8 - 10.8 K/uL    RBC 4.44 4.20 - 5.40 M/uL    Hemoglobin 12.5 12.0 - 16.0 g/dL    Hematocrit 38.4 37.0 - 47.0 %    MCV 86.5 81.4 - 97.8 fL    MCH 28.2 27.0 - 33.0 pg    MCHC 32.6 (L) 33.6 - 35.0 g/dL    RDW 45.2 35.9 - 50.0 fL    Platelet Count 252 164 - 446 K/uL    MPV 9.4 9.0 - 12.9 fL    Neutrophils-Polys 83.50 (H) 44.00 - 72.00 %    Lymphocytes 8.20 (L) 22.00 - 41.00 %    Monocytes 6.00 0.00 - 13.40 %    Eosinophils 1.50 0.00 - 6.90 %    Basophils 0.10 0.00 - 1.80 %    Immature Granulocytes 0.70 0.00 - 0.90 %    Nucleated RBC 0.00 /100 WBC    Neutrophils (Absolute) 7.68 (H) 2.00 - 7.15 K/uL    Lymphs (Absolute) 0.75 (L) 1.00 - 4.80 K/uL    Monos  (Absolute) 0.55 0.00 - 0.85 K/uL    Eos (Absolute) 0.14 0.00 - 0.51 K/uL    Baso (Absolute) 0.01 0.00 - 0.12 K/uL    Immature Granulocytes (abs) 0.06 0.00 - 0.11 K/uL    NRBC (Absolute) 0.00 K/uL   Magnesium    Collection Time: 12/21/20  6:13 AM   Result Value Ref Range    Magnesium 1.7 1.5 - 2.5 mg/dL   Phosphorus    Collection Time: 12/21/20  6:13 AM   Result Value Ref Range    Phosphorus 2.2 (L) 2.5 - 4.5 mg/dL   TSH with Reflex to FT4    Collection Time: 12/21/20  6:13 AM   Result Value Ref Range    TSH 5.300 0.380 - 5.330 uIU/mL   Vitamin D, 25-hydroxy (blood)    Collection Time: 12/21/20  6:13 AM   Result Value Ref Range    25-Hydroxy   Vitamin D 25 21 (L) 30 - 100 ng/mL   ESTIMATED GFR    Collection Time: 12/21/20  6:13 AM   Result Value Ref Range    GFR If African American >60 >60 mL/min/1.73 m 2    GFR If Non African American >60 >60 mL/min/1.73 m 2   ACCU-CHEK GLUCOSE    Collection Time: 12/21/20  7:35 AM   Result Value Ref Range    Glucose - Accu-Ck 137 (H) 65 - 99 mg/dL   ACCU-CHEK GLUCOSE    Collection Time: 12/21/20 11:27 AM   Result Value Ref Range    Glucose - Accu-Ck 179 (H) 65 - 99 mg/dL       Current Facility-Administered Medications   Medication Frequency   • [START ON 12/24/2020] lisinopril (PRINIVIL) tablet 10 mg Q DAY   • gabapentin (NEURONTIN) capsule 100 mg TID   • vitamin D (cholecalciferol) tablet 1,000 Units DAILY   • tamsulosin (FLOMAX) capsule 0.4 mg QHS   • Respiratory Therapy Consult Continuous RT   • Pharmacy Consult Request ...Pain Management Review 1 Each PHARMACY TO DOSE   • hydrALAZINE (APRESOLINE) tablet 25 mg Q8HRS PRN   • artificial tears ophthalmic solution 1 Drop PRN   • benzocaine-menthol (CEPACOL) lozenge 1 Lozenge Q2HRS PRN   • mag hydrox-al hydrox-simeth (MAALOX PLUS ES or MYLANTA DS) suspension 20 mL Q2HRS PRN   • ondansetron (ZOFRAN ODT) dispertab 4 mg 4X/DAY PRN    Or   • ondansetron (ZOFRAN) syringe/vial injection 4 mg 4X/DAY PRN   • traZODone (DESYREL) tablet 50 mg  QHS PRN   • sodium chloride (OCEAN) 0.65 % nasal spray 2 Spray PRN   • senna-docusate (PERICOLACE or SENOKOT S) 8.6-50 MG per tablet 2 Tab BID    And   • polyethylene glycol/lytes (MIRALAX) PACKET 1 Packet QDAY PRN    And   • magnesium hydroxide (MILK OF MAGNESIA) suspension 30 mL QDAY PRN    And   • bisacodyl (DULCOLAX) suppository 10 mg QDAY PRN   • acetaminophen (Tylenol) tablet 650 mg Q6HRS PRN   • atorvastatin (LIPITOR) tablet 40 mg Q EVENING   • apixaban (ELIQUIS) tablet 5 mg BID   • metoprolol (LOPRESSOR) tablet 50 mg TWICE DAILY   • guaiFENesin (ROBITUSSIN) 100 MG/5ML solution 100 mg Q6HRS PRN       Orders Placed This Encounter   Procedures   • Diet Order Diet: Level 6 - Soft and Bite Sized (Please ground all meats - medications whole one at a time floated in puree); Liquid level: Level 0 - Thin     Standing Status:   Standing     Number of Occurrences:   1     Order Specific Question:   Diet:     Answer:   Level 6 - Soft and Bite Sized [23]     Comments:   Please ground all meats - medications whole one at a time floated in puree     Order Specific Question:   Liquid level     Answer:   Level 0 - Thin       Assessment:  Active Hospital Problems    Diagnosis   • *CVA (cerebral vascular accident) ([facility])   • AF (atrial fibrillation) ([facility])   • Elevated troponin   • Hyponatremia   • Essential hypertension   • Hyperlipidemia   • Vitamin D deficiency   • Other dysphagia   • Cognitive deficits   • Hypophosphatemia   • Type 2 diabetes mellitus without complication, without long-term current use of insulin ([facility])   • Aortic stenosis     This patient is a 88 y.o. female admitted for acute inpatient rehabilitation with CVA (cerebral vascular accident) ([facility]).    I led and attended the weekly conference today, and agree with the IDT conference documentation and plan of care as noted below.    Date of conference: 12/23/2020    Goals and barriers: See IDT note.    Biggest barriers: hypertension, right leg hemiplegia, poor  endurance    Goals in next week: psychology consult    CM/social support: son supportive    Anticipated DC date: 1/9    Home health: PT/OT/SLP/RN    Equip: doesn't need any    Follow up: PCP, stroke bridge, Dr. Causey, cariology        Medical Decision Making and Plan:    High left frontal stroke  Cardio-embolic etiology  Right leg hemiplegia  Cognitive deficits  Dysphagia  Continue full rehab program  PT/OT/SLP, 1 hr each discipline, 5 days per week  Continue Eliquis and statin for secondary stroke prophylaxis  Monitor for development of depression or spasticity  Consult Dr. Wasserman    Outpatient follow-up with the [facility]  Outpatient follow-up with Dr. Causey    Right leg pain  Appears neuropathic  Started low-dose gabapentin 100 mg 3 times a day 12/22  Less likely DVT and patient is already on Eliquis    Hypertension  Continue lisinopril 5 mg 12/21 --> 10 mg 12/23  Continue metoprolol 50 mg twice a day  As needed hydralazine    Atrial fibrillation  Continue metoprolol 50 mg twice a day  Outpatient follow-up with cardiology    Aortic stenosis  Outpatient follow-up with cardiology    Vitamin D deficiency  Continue supplementation    Hypophosphatemia  Status post supplementation  Recheck labs in a.m.    Elevated alk phosphatase  Elevated AST  Recheck labs in a.m.    Bowel program  Continue bowel medications  Scheduled Sennakot  PRN Miralax, MOM, bisacodyl suppository  Last BM 12/21    Bladder program  Acute urinary retention  Check PVRs - 258, 240  Started Flomax 0.4 mg 12/21  Bladder scan for no voids  ICP for over 400 cc - required once 12/20  Scheduled toileting    DVT prophylaxis  Eliquis    Total time:  40 minutes.  I spent greater than 50% of the time for patient care, counseling, and coordination on this date, including patient face-to face time, unit/floor time with review of records/pertinent lab data and studies, as well as discussing diagnostic evaluation/work up, planned therapeutic  interventions, and future disposition of care, as per the interval events/subjective and the assessment and plan as noted above.    Tangela Noel M.D.   Physical Medicine and Rehabilitation

## 2020-12-23 NOTE — CARE PLAN
Problem: Bathing  Goal: STG-Within one week, patient will bathe  Description: 1) Individualized Goal:  mod A with AE/DME as needed.  2) Interventions:  OT E Stim Attended, OT Self Care/ADL, OT Cognitive Skill Dev, OT Community Reintegration, OT Manual Ther Technique, OT Neuro Re-Ed/Balance, OT Therapeutic Activity, OT Evaluation, and OT Therapeutic Exercise    Outcome: NOT MET  Note: Max A; pt evaluated on 12/21.     Problem: Dressing  Goal: STG-Within one week, patient will dress LB  Description: 1) Individualized Goal:  mod A with AE as needed.  2) Interventions:  OT E Stim Attended, OT Self Care/ADL, OT Cognitive Skill Dev, OT Community Reintegration, OT Manual Ther Technique, OT Neuro Re-Ed/Balance, OT Therapeutic Activity, OT Evaluation, and OT Therapeutic Exercise  Outcome: NOT MET  Note: Total A x1-2; pt evaluated on 12/21.     Problem: Toileting  Goal: STG-Within one week, patient will complete toileting tasks  Description: 1) Individualized Goal:  max A with AE/DME as needed.  2) Interventions:  OT E Stim Attended, OT Self Care/ADL, OT Cognitive Skill Dev, OT Community Reintegration, OT Manual Ther Technique, OT Neuro Re-Ed/Balance, OT Therapeutic Activity, OT Evaluation, and OT Therapeutic Exercise  Outcome: NOT MET  Note: Total A x1-2; pt evaluated on 12/21.     Problem: Functional Transfers  Goal: STG-Within one week, patient will transfer to toilet  Description: 1) Individualized Goal:  mod A with AD/DME as needed.  2) Interventions:  OT E Stim Attended, OT Self Care/ADL, OT Cognitive Skill Dev, OT Community Reintegration, OT Manual Ther Technique, OT Neuro Re-Ed/Balance, OT Therapeutic Activity, OT Evaluation, and OT Therapeutic Exercise  Outcome: NOT MET  Note: Max-total A; pt evaluated on 12/21.

## 2020-12-23 NOTE — THERAPY
Physical Therapy   Daily Treatment     Patient Name: Liudmila Pulido  Age:  88 y.o., Sex:  female  Medical Record #: [id number]  Today's Date: 12/22/2020     Precautions  Precautions: Fall Risk, Swallow Precautions ( See Comments), Other (See Comments)  Comments: R lizeth (RLE>RUE), R lateral lean    Subjective    Pt reports she has no electrical implanted devices and agreeable to estim trial      Objective       12/22/20 1501   Supine Lower Body Exercise   Short Arc Quad 2 sets of 10;Right    Ankle Pumps 2 sets of 10;Right   Comments Stim assisted, not full range, cues for safety adn sequencing    Interdisciplinary Plan of Care Collaboration   IDT Collaboration with  Physician   Patient Position at End of Therapy In Bed;Call Light within Reach;Tray Table within Reach   Collaboration Comments Discussed pt pain symptoms in RLE - MD not concerned for DVT - cleared for estim trial   PT Total Time Spent   PT Individual Total Time Spent (Mins) 60   PT Charge Group   PT Therapeutic Exercise 1   PT Neuromuscular Re-Education / Balance 2   PT Therapeutic Activities 1     Pt edu on e-stim for goals and benefits. Pt assisted in setting intensity values for NMES focused on achieving stim-elicited contraction to tolerance in each muscle group. 8 min for both R tib anterior and R quads with cues for active contraction during 10 sec on cycle and relaxation during 10 sec off cycle.    Assessment    Pt demos good response to estim - able to demo stim elicited contractions in both mucles groups and tolerates intensity. Pt assisted in setting stim intensity via self report of sensation. Pt also able to demo active against gravity movement with stim today and carryover post stim for limited reps of continued exercise.     Strengths: Able to follow instructions, Pleasant and cooperative, Motivated for self care and independence, Independent prior level of function  Barriers: Decreased endurance, Impaired balance, Hemiparesis, Home  accessibility, Limited mobility    Plan    Exercise/neuro re-ed, issue HEP for pt to perform in room to initiate their own care when able to complete AROM, NMES,  set up, Trial lite gait and standing in // bars when able to come out of roomTransfer training, sit/stand balance, Stroke edu      Physical Therapy Problems     Problem: Mobility     Dates: Start: 12/21/20       Goal: STG-Within one week, patient will propel wheelchair household distances     Dates: Start: 12/21/20       Description: 1) Individualized goal:  150 ft At SPV in order to improve activity tolerance propelling herself around facility  2) Interventions:  PT E Stim Attended, PT Orthotics Training, PT Gait Training, PT Therapeutic Exercises, PT Neuro Re-Ed/Balance, PT Aquatic Therapy, PT Therapeutic Activity, PT Manual Therapy, and PT Evaluation                  Problem: Mobility Transfers     Dates: Start: 12/21/20       Goal: STG-Within one week, patient will transfer bed to chair     Dates: Start: 12/21/20       Description: 1) Individualized goal:  At mod A with LRD In order to maximize self mobility  2) Interventions:  PT E Stim Attended, PT Orthotics Training, PT Gait Training, PT Therapeutic Exercises, PT Neuro Re-Ed/Balance, PT Aquatic Therapy, PT Therapeutic Activity, PT Manual Therapy, and PT Evaluation                Problem: PT-Long Term Goals     Dates: Start: 12/21/20       Goal: LTG-By discharge, patient will propel wheelchair     Dates: Start: 12/21/20       Description: 1) Individualized goal:  150 ft At mod I in order to improve self mobility  2) Interventions:  PT E Stim Attended, PT Orthotics Training, PT Gait Training, PT Therapeutic Exercises, PT Neuro Re-Ed/Balance, PT Aquatic Therapy, PT Therapeutic Activity, PT Manual Therapy, and PT Evaluation          Goal: LTG-By discharge, patient will transfer one surface to another     Dates: Start: 12/21/20       Description: 1) Individualized goal:  At min A with LRD In order  to maximize self mobility  2) Interventions:  PT E Stim Attended, PT Orthotics Training, PT Gait Training, PT Therapeutic Exercises, PT Neuro Re-Ed/Balance, PT Aquatic Therapy, PT Therapeutic Activity, PT Manual Therapy, and PT Evaluation          Goal: LTG-By discharge, patient will transfer in/out of a car     Dates: Start: 12/21/20       Description: 1) Individualized goal:  At min A with LRD In order to maximize self mobility  2) Interventions:  PT E Stim Attended, PT Orthotics Training, PT Gait Training, PT Therapeutic Exercises, PT Neuro Re-Ed/Balance, PT Aquatic Therapy, PT Therapeutic Activity, PT Manual Therapy, and PT Evaluation

## 2020-12-23 NOTE — WOUND TEAM
Wound consult placed for buttocks, discussed with Hesham Chi, new photo uploaded. Skin is intact, nursing following RN protocol orders for pressure injury prevention and skin care. No advanced wound care needs at this time.

## 2020-12-24 ENCOUNTER — APPOINTMENT (OUTPATIENT)
Dept: RADIOLOGY | Facility: REHABILITATION | Age: 85
DRG: 057 | End: 2020-12-24
Attending: PHYSICAL MEDICINE & REHABILITATION
Payer: MEDICARE

## 2020-12-24 DIAGNOSIS — I63.40 CEREBROVASCULAR ACCIDENT (CVA) DUE TO EMBOLISM OF CEREBRAL ARTERY (HCC): ICD-10-CM

## 2020-12-24 PROBLEM — R74.8 ALKALINE PHOSPHATASE ELEVATION: Status: ACTIVE | Noted: 2020-12-24

## 2020-12-24 LAB
ALBUMIN SERPL BCP-MCNC: 2.4 G/DL (ref 3.2–4.9)
ALBUMIN/GLOB SERPL: 0.7 G/DL
ALP SERPL-CCNC: 317 U/L (ref 30–99)
ALT SERPL-CCNC: 34 U/L (ref 2–50)
ANION GAP SERPL CALC-SCNC: 5 MMOL/L (ref 7–16)
AST SERPL-CCNC: 51 U/L (ref 12–45)
BILIRUB SERPL-MCNC: 0.6 MG/DL (ref 0.1–1.5)
BUN SERPL-MCNC: 7 MG/DL (ref 8–22)
CALCIUM SERPL-MCNC: 9 MG/DL (ref 8.5–10.5)
CHLORIDE SERPL-SCNC: 104 MMOL/L (ref 96–112)
CO2 SERPL-SCNC: 28 MMOL/L (ref 20–33)
CREAT SERPL-MCNC: 0.53 MG/DL (ref 0.5–1.4)
GLOBULIN SER CALC-MCNC: 3.4 G/DL (ref 1.9–3.5)
GLUCOSE SERPL-MCNC: 149 MG/DL (ref 65–99)
PHOSPHATE SERPL-MCNC: 2.3 MG/DL (ref 2.5–4.5)
POTASSIUM SERPL-SCNC: 3.6 MMOL/L (ref 3.6–5.5)
PROT SERPL-MCNC: 5.8 G/DL (ref 6–8.2)
SODIUM SERPL-SCNC: 137 MMOL/L (ref 135–145)

## 2020-12-24 PROCEDURE — 97110 THERAPEUTIC EXERCISES: CPT

## 2020-12-24 PROCEDURE — 97129 THER IVNTJ 1ST 15 MIN: CPT

## 2020-12-24 PROCEDURE — 97530 THERAPEUTIC ACTIVITIES: CPT

## 2020-12-24 PROCEDURE — 700102 HCHG RX REV CODE 250 W/ 637 OVERRIDE(OP): Performed by: PHYSICAL MEDICINE & REHABILITATION

## 2020-12-24 PROCEDURE — 36415 COLL VENOUS BLD VENIPUNCTURE: CPT

## 2020-12-24 PROCEDURE — A9270 NON-COVERED ITEM OR SERVICE: HCPCS | Performed by: PHYSICAL MEDICINE & REHABILITATION

## 2020-12-24 PROCEDURE — 99222 1ST HOSP IP/OBS MODERATE 55: CPT | Performed by: HOSPITALIST

## 2020-12-24 PROCEDURE — 74018 RADEX ABDOMEN 1 VIEW: CPT

## 2020-12-24 PROCEDURE — 84100 ASSAY OF PHOSPHORUS: CPT

## 2020-12-24 PROCEDURE — 97535 SELF CARE MNGMENT TRAINING: CPT

## 2020-12-24 PROCEDURE — 80053 COMPREHEN METABOLIC PANEL: CPT

## 2020-12-24 PROCEDURE — 97130 THER IVNTJ EA ADDL 15 MIN: CPT

## 2020-12-24 PROCEDURE — 700102 HCHG RX REV CODE 250 W/ 637 OVERRIDE(OP): Performed by: HOSPITALIST

## 2020-12-24 PROCEDURE — A9270 NON-COVERED ITEM OR SERVICE: HCPCS | Performed by: HOSPITALIST

## 2020-12-24 PROCEDURE — 770010 HCHG ROOM/CARE - REHAB SEMI PRIVAT*

## 2020-12-24 PROCEDURE — 99233 SBSQ HOSP IP/OBS HIGH 50: CPT | Performed by: PHYSICAL MEDICINE & REHABILITATION

## 2020-12-24 RX ORDER — BISACODYL 10 MG
10 SUPPOSITORY, RECTAL RECTAL
Status: DISCONTINUED | OUTPATIENT
Start: 2020-12-24 | End: 2021-01-09 | Stop reason: HOSPADM

## 2020-12-24 RX ORDER — POLYETHYLENE GLYCOL 3350 17 G/17G
1 POWDER, FOR SOLUTION ORAL DAILY
Status: DISCONTINUED | OUTPATIENT
Start: 2020-12-24 | End: 2021-01-09 | Stop reason: HOSPADM

## 2020-12-24 RX ORDER — AMOXICILLIN 250 MG
2 CAPSULE ORAL 2 TIMES DAILY
Status: DISCONTINUED | OUTPATIENT
Start: 2020-12-24 | End: 2021-01-09 | Stop reason: HOSPADM

## 2020-12-24 RX ORDER — LISINOPRIL 20 MG/1
20 TABLET ORAL
Status: DISCONTINUED | OUTPATIENT
Start: 2020-12-25 | End: 2020-12-28

## 2020-12-24 RX ORDER — GABAPENTIN 100 MG/1
200 CAPSULE ORAL 3 TIMES DAILY
Status: DISCONTINUED | OUTPATIENT
Start: 2020-12-24 | End: 2020-12-25

## 2020-12-24 RX ADMIN — GABAPENTIN 200 MG: 100 CAPSULE ORAL at 14:07

## 2020-12-24 RX ADMIN — METOPROLOL TARTRATE 50 MG: 25 TABLET, FILM COATED ORAL at 06:08

## 2020-12-24 RX ADMIN — METOPROLOL TARTRATE 50 MG: 25 TABLET, FILM COATED ORAL at 17:35

## 2020-12-24 RX ADMIN — APIXABAN 5 MG: 5 TABLET, FILM COATED ORAL at 20:39

## 2020-12-24 RX ADMIN — HYDRALAZINE HYDROCHLORIDE 25 MG: 25 TABLET, FILM COATED ORAL at 08:19

## 2020-12-24 RX ADMIN — Medication 1000 UNITS: at 08:20

## 2020-12-24 RX ADMIN — ACETAMINOPHEN 650 MG: 325 TABLET, FILM COATED ORAL at 20:37

## 2020-12-24 RX ADMIN — APIXABAN 5 MG: 5 TABLET, FILM COATED ORAL at 08:20

## 2020-12-24 RX ADMIN — ATORVASTATIN CALCIUM 40 MG: 40 TABLET, FILM COATED ORAL at 20:38

## 2020-12-24 RX ADMIN — ACETAMINOPHEN 650 MG: 325 TABLET, FILM COATED ORAL at 03:05

## 2020-12-24 RX ADMIN — LISINOPRIL 10 MG: 5 TABLET ORAL at 06:10

## 2020-12-24 RX ADMIN — DOCUSATE SODIUM 50 MG AND SENNOSIDES 8.6 MG 2 TABLET: 8.6; 5 TABLET, FILM COATED ORAL at 20:38

## 2020-12-24 RX ADMIN — GABAPENTIN 100 MG: 100 CAPSULE ORAL at 08:20

## 2020-12-24 RX ADMIN — TAMSULOSIN HYDROCHLORIDE 0.4 MG: 0.4 CAPSULE ORAL at 20:39

## 2020-12-24 RX ADMIN — DIBASIC SODIUM PHOSPHATE, MONOBASIC POTASSIUM PHOSPHATE AND MONOBASIC SODIUM PHOSPHATE 250 MG: 852; 155; 130 TABLET ORAL at 20:37

## 2020-12-24 RX ADMIN — GABAPENTIN 200 MG: 100 CAPSULE ORAL at 20:38

## 2020-12-24 RX ADMIN — DIBASIC SODIUM PHOSPHATE, MONOBASIC POTASSIUM PHOSPHATE AND MONOBASIC SODIUM PHOSPHATE 250 MG: 852; 155; 130 TABLET ORAL at 14:07

## 2020-12-24 RX ADMIN — POLYETHYLENE GLYCOL 3350 1 PACKET: 17 POWDER, FOR SOLUTION ORAL at 14:07

## 2020-12-24 ASSESSMENT — ENCOUNTER SYMPTOMS
FOCAL WEAKNESS: 1
NAUSEA: 0
MUSCULOSKELETAL NEGATIVE: 1
COUGH: 0
CHILLS: 0
BRUISES/BLEEDS EASILY: 0
EYES NEGATIVE: 1
SHORTNESS OF BREATH: 0
VOMITING: 0
PALPITATIONS: 0
FEVER: 0
POLYDIPSIA: 0
ABDOMINAL PAIN: 0

## 2020-12-24 NOTE — THERAPY
Physical Therapy   Daily Treatment     Patient Name: Liudmila Pulido  Age:  88 y.o., Sex:  female  Medical Record #: [id number]  Today's Date: 12/24/2020     Precautions  Precautions: Fall Risk, Swallow Precautions ( See Comments), Other (See Comments)  Comments: R lizeth (RLE>RUE), R lateral lean    Subjective    Pt reports she was able to move leg more yesterday      Objective       12/24/20 0831   Transfer Functional Level of Assist   Bed, Chair, Wheelchair Transfer Total Assist X 2  (2nd person for safety)   Bed Chair Wheelchair Transfer Description Assist with one limb;Adaptive equipment;Increased time;Verbal cueing   Supine Lower Body Exercise   Short Arc Quad 3 sets of 15;Right    Sitting Lower Body Exercises   Long Arc Quad 3 sets of 15;Right   Bed Mobility    Supine to Sit Moderate Assist   Sit to Stand Total Assist X 2  (2nd perso for safety )   Scooting Maximal Assist   Rolling Moderate Assist to Rt.;Moderate Assist to Lt.   Interdisciplinary Plan of Care Collaboration   Patient Position at End of Therapy Seated;Call Light within Reach;Tray Table within Reach  (handoff OT)   PT Total Time Spent   PT Individual Total Time Spent (Mins) 60   PT Charge Group   PT Therapeutic Exercise 2   PT Therapeutic Activities 2       Pt was educated on stroke risk factors via auditory and visual modalities (printed handout reviewed and provided). Topics reviewed included stroke types, controllable stroke risk factors including HTN and cholesterol management options, weight management, diet, stress management, daily BP monitoring, smoking cessation, physical activity and regular exercise, and moderating alcohol intake. Patient attended appropriately to information. Patient was encouraged to ask questions, did so, and all questions were answered to patient's satisfaction. Patient was counseled on the importance of active communication with MD regarding medications, BP tracking and trends to ensure safe BP management. Patient  benefited from participating in this stroke education class as evidenced by verbalizing improved understanding of hypertension and strategies for lowering risk of stroke.      Assessment    Pt demos improving return in RLE - able to demo against gravity strength in knee ext this session in sup and sitting. Pt agreeable to all edu on stroke recovery and prevention. Cont to require 2 person transfer for safety.     Strengths: Able to follow instructions, Independent prior level of function, Good insight into deficits/needs  Barriers: Decreased endurance, Hemiparesis, Home accessibility, Limited mobility, Impaired balance    Plan    Exercise/neuro re-ed, issue HEP for pt to perform in room to initiate their own care when able to complete AROM, NMES,  set up, Trial lite gait and standing in // bars when able to come out of room, Transfer training, sit/stand balance, STS practice with walker        Physical Therapy Problems     Problem: Mobility     Dates: Start: 12/21/20       Goal: STG-Within one week, patient will propel wheelchair household distances     Dates: Start: 12/21/20       Description: 1) Individualized goal:  150 ft At SPV in order to improve activity tolerance propelling herself around facility  2) Interventions:  PT E Stim Attended, PT Orthotics Training, PT Gait Training, PT Therapeutic Exercises, PT Neuro Re-Ed/Balance, PT Aquatic Therapy, PT Therapeutic Activity, PT Manual Therapy, and PT Evaluation      Note:     Goal Note filed on 12/23/20 1129 by Carloz Azar, PT    < 150 ft                         Problem: Mobility Transfers     Dates: Start: 12/21/20       Goal: STG-Within one week, patient will transfer bed to chair     Dates: Start: 12/21/20       Description: 1) Individualized goal:  At mod A with LRD In order to maximize self mobility  2) Interventions:  PT E Stim Attended, PT Orthotics Training, PT Gait Training, PT Therapeutic Exercises, PT Neuro Re-Ed/Balance, PT Aquatic  Therapy, PT Therapeutic Activity, PT Manual Therapy, and PT Evaluation    Note:     Goal Note filed on 12/23/20 1129 by Carloz Azar, PT    2nd person assist                         Problem: PT-Long Term Goals     Dates: Start: 12/21/20       Goal: LTG-By discharge, patient will propel wheelchair     Dates: Start: 12/21/20       Description: 1) Individualized goal:  150 ft At mod I in order to improve self mobility  2) Interventions:  PT E Stim Attended, PT Orthotics Training, PT Gait Training, PT Therapeutic Exercises, PT Neuro Re-Ed/Balance, PT Aquatic Therapy, PT Therapeutic Activity, PT Manual Therapy, and PT Evaluation          Goal: LTG-By discharge, patient will transfer one surface to another     Dates: Start: 12/21/20       Description: 1) Individualized goal:  At min A with LRD In order to maximize self mobility  2) Interventions:  PT E Stim Attended, PT Orthotics Training, PT Gait Training, PT Therapeutic Exercises, PT Neuro Re-Ed/Balance, PT Aquatic Therapy, PT Therapeutic Activity, PT Manual Therapy, and PT Evaluation          Goal: LTG-By discharge, patient will transfer in/out of a car     Dates: Start: 12/21/20       Description: 1) Individualized goal:  At min A with LRD In order to maximize self mobility  2) Interventions:  PT E Stim Attended, PT Orthotics Training, PT Gait Training, PT Therapeutic Exercises, PT Neuro Re-Ed/Balance, PT Aquatic Therapy, PT Therapeutic Activity, PT Manual Therapy, and PT Evaluation

## 2020-12-24 NOTE — PROGRESS NOTES
Patient woke up in pain - describes pain as shooting sharp pain. Patient fell asleep after repositioning however reports pain is more intense and more frequent that it has been.

## 2020-12-24 NOTE — THERAPY
"Speech Language Pathology  Daily Treatment     Patient Name: Liudmila Pulido  Age:  88 y.o., Sex:  female  Medical Record #: [id number]  Today's Date: 12/24/2020     Precautions  Precautions: Fall Risk, Swallow Precautions ( See Comments), Other (See Comments)  Comments: R lizeth (RLE>RUE), R lateral lean    Subjective    Patient pleasant cooperative.  She was in bed and reported fatigue after being up for most of day.  She was agreeable to tx at bedside.     Objective       12/24/20 1401   Cognition   Moderate Attention Severe (2)   Prospective Memory Moderate (3)   Functional Memory Activities Moderate (3)   Functional Math / Financial Management Severe (2)   SLP Total Time Spent   SLP Individual Total Time Spent (Mins) 60   Treatment Charges   SLP Cognitive Skill Development First 15 Minutes 1   SLP Cognitive Skill Development Additional 15 Minutes 3       Assessment    Introduced patient to memory book pages, and education provided on how to use with written suggestion modeled in memory log.  Educated patient on RWAVS memory strategies. Patient recalled 2/5 after several exposures and a 5 min delay.    Patient given additional review and recalled 4/5 after a 20 min delay with multipl exposures.  Patient also continued work on attention, recall, and mental manipulation in context of a \"who has more task\", adding coin quantities for 2 types of coins- performed at 80% but with 3 types of coins needed mod to max A.  With 3 coin quanitities described, patient displayed difficulty holding the amounts in mind.  Cued patient to record the number of cents for each given coin amount, add the first 2 and write the sum then add the final number of cents to that sum.  With this level of cuing she was able to complete with 70%.  However,  Patient also displayed errors counting cents for multiples of nickels, often counting as dimes.  Patient was slow to process with difficulty holding several amounts in mind at a " time.    Strengths: Able to follow instructions, Making steady progress towards goals, Supportive family, Willingly participates in therapeutic activities, Pleasant and cooperative, Motivated for self care and independence  Barriers: Impaired functional cognition, Visual impairment, Impulsive, Impaired carryover of learning, Impaired insight/denial of deficits, Aspiration risk    Plan    Target recall, attention, safe swallow with (6) SBSand (0) thin.    Speech Therapy Problems     Problem: Memory STGs     Dates: Start: 12/21/20       Goal: STG-Within one week, patient will     Dates: Start: 12/21/20       Description: 1) Individualized goal:  learn and implement functional memory strategies with use of memory book with 80% accuracy provided MOD A.   2) Interventions:  SLP Self Care / ADL Training , SLP Cognitive Skill Development, and SLP Group Treatment                Problem: Problem Solving STGs     Dates: Start: 12/23/20       Goal: STG-Within one week, patient will     Dates: Start: 12/23/20       Description: 1) Individualized goal:  complete attention tasks related to medication management and financial management skills with 80% accuracy provided MIN A.   2) Interventions:  SLP Self Care / ADL Training , SLP Cognitive Skill Development, and SLP Group Treatment                    Problem: Speech/Swallowing LTGs     Dates: Start: 12/21/20       Goal: LTG-By discharge, patient will safely swallow     Dates: Start: 12/21/20       Description: 1) Individualized goal:  the least restrictive diet with no overt s/sx of asp/pen noted across meals with 100% accuracy provided MOD I.  2) Interventions:  SLP Swallowing Dysfunction Treatment, SLP Oral Pharyngeal Evaluation, SLP Video Swallow Evaluation, SLP Self Care / ADL Training , SLP Cognitive Skill Development, and SLP Group Treatment              Goal: LTG-By discharge, patient will     Dates: Start: 12/21/20       Description: 1) Individualized goal:  complete  functional problem solving and recall with 80% accuracy provided SPV.   2) Interventions:  SLP Self Care / ADL Training , SLP Cognitive Skill Development, and SLP Group Treatment                  Problem: Swallowing STGs     Dates: Start: 12/21/20       Goal: STG-Within one week, patient will safely swallow     Dates: Start: 12/21/20       Description: 1) Individualized goal:  soft and bite size diet with thin liquids with no overt s/sx of asp/pen noted across 2/2 meals provided MIN cues.   2) Interventions:  SLP Swallowing Dysfunction Treatment, SLP Oral Pharyngeal Evaluation, SLP Video Swallow Evaluation, SLP Self Care / ADL Training , SLP Cognitive Skill Development, and SLP Group Treatment    Note:     Goal Note filed on 12/23/20 1301 by Patrizia Ivan MS,CCC-SLP    Mod cues for safety and impulsivity

## 2020-12-24 NOTE — CONSULTS
HOSPITAL MEDICINE CONSULTATION    Requesting Physician:  Dr. Noel    Reason for Consult:  Hypertension, Hypophosphatemia    History of Present Illness:  The patient is an 88-year-old  female with past medical history significant for hypertension and diabetes.  She was admitted to [facility] on 12/17/20 for right leg weakness.  MR imaging revealed acute cerebral infarction involving the left frontal and parietal lobes.  CT angiogram of the head and neck were unremarkable.  Echocardiogram showed ejection fraction 75%, right ventricular systolic pressure 40 mmHg, and moderate aortic stenosis.  The patient was also diagnosed with new onset atrial fibrillation.  She was placed on Eliquis and Lipitor.  Due to her ongoing functional debility, the patient was transferred to [facility] on 12/20/20.  Hospital Medicine consultation is requested to assist in the management of this patient's HTN, DM, low phosphorus, and high alkaline phosphatase levels.    Review of Systems:  Review of Systems   Constitutional: Negative for chills and fever.   HENT: Negative.    Eyes: Negative.    Respiratory: Negative for cough and shortness of breath.    Cardiovascular: Negative for chest pain and palpitations.   Gastrointestinal: Negative for abdominal pain, nausea and vomiting.   Genitourinary:        Urinary retention   Musculoskeletal: Negative.    Skin: Negative for itching and rash.   Neurological: Positive for focal weakness.   Endo/Heme/Allergies: Negative for polydipsia. Does not bruise/bleed easily.   All other systems reviewed and are negative.      Allergies:  Allergies   Allergen Reactions   • Demerol Unspecified     Convulsions     • Bloodless      Lutheran reason   • Penicillins      Per daughter, Laura   • Erythromycin Vomiting and Nausea       Medications:    Current Facility-Administered Medications:   •  gabapentin (NEURONTIN) capsule 200 mg, 200 mg, Oral, TID, Tangela BRITTON  FEDERICO Noel, 200 mg at 12/24/20 1407  •  senna-docusate (PERICOLACE or SENOKOT S) 8.6-50 MG per tablet 2 Tab, 2 Tab, Oral, BID **AND** polyethylene glycol/lytes (MIRALAX) PACKET 1 Packet, 1 Packet, Oral, DAILY, 1 Packet at 12/24/20 1407 **AND** magnesium hydroxide (MILK OF MAGNESIA) suspension 30 mL, 30 mL, Oral, QDAY PRN **AND** bisacodyl (DULCOLAX) suppository 10 mg, 10 mg, Rectal, QDAY PRN, Tangela Noel M.D.  •  [START ON 12/25/2020] lisinopril (PRINIVIL) tablet 20 mg, 20 mg, Oral, Q DAY, Kendy Shah M.D.  •  phosphorus (K-Phos-Neutral) per tablet 250 mg, 250 mg, Oral, TID, Kendy Shah M.D., 250 mg at 12/24/20 1407  •  vitamin D (cholecalciferol) tablet 1,000 Units, 1,000 Units, Oral, DAILY, Tangela Noel M.D., 1,000 Units at 12/24/20 0820  •  tamsulosin (FLOMAX) capsule 0.4 mg, 0.4 mg, Oral, QHS, Tangela Noel M.D., 0.4 mg at 12/23/20 1952  •  Respiratory Therapy Consult, , Nebulization, Continuous RT, Xochilt Causey D.O.  •  Pharmacy Consult Request ...Pain Management Review 1 Each, 1 Each, Other, PHARMACY TO DOSE, Xochilt Causey D.O.  •  hydrALAZINE (APRESOLINE) tablet 25 mg, 25 mg, Oral, Q8HRS PRN, Xochilt Causey D.O., 25 mg at 12/24/20 0819  •  artificial tears ophthalmic solution 1 Drop, 1 Drop, Both Eyes, PRN, Xochilt Causey D.O.  •  benzocaine-menthol (CEPACOL) lozenge 1 Lozenge, 1 Lozenge, Mouth/Throat, Q2HRS PRN, Xochilt Causey D.O.  •  mag hydrox-al hydrox-simeth (MAALOX PLUS ES or MYLANTA DS) suspension 20 mL, 20 mL, Oral, Q2HRS PRN, TAHIRA GarciasO.  •  ondansetron (ZOFRAN ODT) dispertab 4 mg, 4 mg, Oral, 4X/DAY PRN **OR** ondansetron (ZOFRAN) syringe/vial injection 4 mg, 4 mg, Intramuscular, 4X/DAY PRN, TAHIRA GarciasO.  •  traZODone (DESYREL) tablet 50 mg, 50 mg, Oral, QHS PRN, TAHIRA GarciasO.  •  sodium chloride (OCEAN) 0.65 % nasal spray 2 Spray, 2 Spray, Nasal, PRN, TAHIRA GarciasO.  •  acetaminophen (Tylenol) tablet 650 mg, 650 mg,  Oral, Q6HRS PRN, Xochilt Causey D.O., 650 mg at 12/24/20 0305  •  atorvastatin (LIPITOR) tablet 40 mg, 40 mg, Oral, Q EVENING, Xochilt Causey D.O., 40 mg at 12/23/20 1952  •  apixaban (ELIQUIS) tablet 5 mg, 5 mg, Oral, BID, Xochilt Causey D.O., 5 mg at 12/24/20 0820  •  metoprolol (LOPRESSOR) tablet 50 mg, 50 mg, Oral, TWICE DAILY, Xochilt Causey D.O., 50 mg at 12/24/20 0608  •  guaiFENesin (ROBITUSSIN) 100 MG/5ML solution 100 mg, 5 mL, Oral, Q6HRS PRN, Xochilt Causey D.O.    Past Medical/Surgical History:  Past Medical History:   Diagnosis Date   • Hyperlipidemia    • Hypertension    • Type 2 diabetes mellitus without complication, without long-term current use of insulin ([facility]) 12/18/2020       Social History:  Social History     Socioeconomic History   • Marital status:      Spouse name: Not on file   • Number of children: 3   • Years of education: Not on file   • Highest education level: Not on file   Occupational History     Employer: Retired   Social Needs   • Financial resource strain: Not on file   • Food insecurity     Worry: Not on file     Inability: Not on file   • Transportation needs     Medical: Not on file     Non-medical: Not on file   Tobacco Use   • Smoking status: Never Smoker   • Smokeless tobacco: Never Used   Substance and Sexual Activity   • Alcohol use: Yes     Frequency: Monthly or less     Drinks per session: 1 or 2     Binge frequency: Never   • Drug use: Never   • Sexual activity: Not on file   Lifestyle   • Physical activity     Days per week: Not on file     Minutes per session: Not on file   • Stress: Not on file   Relationships   • Social connections     Talks on phone: Not on file     Gets together: Not on file     Attends Anabaptism service: Not on file     Active member of club or organization: Not on file     Attends meetings of clubs or organizations: Not on file     Relationship status: Not on file   • Intimate partner violence     Fear of current or ex  partner: Not on file     Emotionally abused: Not on file     Physically abused: Not on file     Forced sexual activity: Not on file   Other Topics Concern   • Not on file   Social History Narrative   • Not on file       Family History  The patient is uncertain of her family medical history.    Physical Examination:   Vitals:    12/23/20 2150 12/24/20 0610 12/24/20 0700 12/24/20 0815   BP: 131/56 140/75 (!) 168/75 (!) 168/80   Pulse: 63  63 65   Resp: 17  18    Temp: 36.7 °C (98.1 °F)  36.5 °C (97.7 °F)    TempSrc: Temporal  Oral    SpO2:   95%    Weight:       Height:           Physical Exam   Constitutional: She is oriented to person, place, and time. No distress.   HENT:   Head: Normocephalic and atraumatic.   Eyes: Conjunctivae and EOM are normal. Right eye exhibits no discharge. Left eye exhibits no discharge.   Neck: Normal range of motion. Neck supple. No tracheal deviation present.   Cardiovascular:   irreg irreg   Pulmonary/Chest: Effort normal and breath sounds normal. No stridor. No respiratory distress. She has no wheezes.   Abdominal: Soft. Bowel sounds are normal. She exhibits no distension. There is no abdominal tenderness.   Musculoskeletal:         General: No deformity or edema.   Neurological: She is alert and oriented to person, place, and time.   Skin: Skin is warm and dry. She is not diaphoretic.   Vitals reviewed.      Laboratory Data:      Recent Labs     12/24/20  0625   SODIUM 137   POTASSIUM 3.6   CHLORIDE 104   CO2 28   GLUCOSE 149*   BUN 7*   CREATININE 0.53   CALCIUM 9.0           Impressions/Recommendations:  Vitamin D deficiency  Vit D level 21  On supplementation    Type 2 diabetes mellitus without complication, without long-term current use of insulin ([facility])  HbA1c 6.2  Diet controlled    Hypophosphatemia  Supplement w/ Neutra-Phos  Follow levels    Essential hypertension  On Lisinopril and Metoprolol  Increase ACE-I for hypertensive trends  Monitor for orthostatic hypotension on  Flomax    CVA (cerebral vascular accident) ([facility])  Has RLE weakness  On Eliquis and Lipitor    Aortic stenosis  Echo EF 75%, RVSP 40 mmHg, and mod AS  Outpt F/U    AF (atrial fibrillation) ([facility])  Rate controlled on Metoprolol  Anticoagulated on Eliquis    Alkaline phosphatase elevation  Pursue further work-up if pt develops GI symptoms  Follow Alk Phos levels    Full Code    Thank you for the opportunity to assist in this patient's care.  We will continue to follow along with you.

## 2020-12-24 NOTE — CONSULTS
Discharge Care Plan


Diagnosis:  


(1) Facial fracture due to fall


(2) Subdural bleeding


(3) Hypothyroidism


(4) Hyperlipidemia


(5) Essential tremor


(6) HTN (hypertension)


Goals to Promote Your Health


* To prevent worsening of your condition and complications


* To maintain your health at the optimal level


Directions to Meet Your Goals


*** Take your medications as prescribed


*** Follow your dietary instruction


*** Follow activity as directed








*** Keep your appointments as scheduled


*** Take your immunizations and boosters as scheduled


*** If your symptoms worsen call your PCP, if no PCP go to Urgent Care Center 

or Emergency Room***


*** Smoking is Dangerous to Your Health. Avoid second hand smoke***


***Call the 24-hour hour crisis hotline for domestic abuse at 1-316.481.2817***











Bambi Ashley Dec 19, 2017 14:51 [facility]  Psychological Consultation    Name: Liudmila Pulido  MRN: [id number]  : 1932  Age: 88 y.o.  Date of assessment: 2020  PCP: Yulia Blackman M.D.  Persons in attendance: Patient  Total session time: 24 minutes      CHIEF COMPLAINT AND HISTORY OF PRESENTING PROBLEM:  (as stated by Patient):  Liudmila Pulido is a 88 y.o., White female referred for assessment by Dr. Noel..  Primary presenting issue includes sense about depression and the rehabilitative process.     History of Present Illness: Patient is a 88 y.o. year-old female with a past medical history significant for diabetes mellitus, aortic stenosis, essential hypertension, hyperlipidemia admitted to [facility] on 2020  7:03 AM with right leg weakness.  The patient was last seen normal at 7 PM the day prior and around 3 AM they noticed she was weaker when they helped her into bed.  Patient was brought to the emergency department where she was found to be tachycardic.  Atrial fibrillation was seen on EKG monitor.  She did have elevated white blood cell count and hyponatremia at 127.  Troponin was elevated.  Head CT negative for any acute finding.  CT perfusion was normal.  CTA of head and neck showed no large vessel occlusion. Review of her records showed she had an echocardiogram in 2020 with normal EF, mild aortic regurgitation and tricuspid regurgitation, and grade 1 diastolic dysfunction, with no mention of atrial fibrillation.   Echocardiogram from 2020 revealed hyperdynamic LV with an EF of 75% and moderate aortic stenosis.  Patient was called code stroke.  MRI brain was positive for high frontal infarct.  Given new onset atrial fibrillation patient has been placed on Eliquis 5 mg twice daily.  Patient also had rising troponin.  EKG again showed A. fib and LBBB which was not present prior.  Angiogram from [place] 2019 revealed nonobstructive CAD.  Suspected to be demand ischemia.   Cardiology was consulted.  Patient also found to have moderate aortic stenosis on echocardiogram.  This requires outpatient follow-up and not thought to contribute to her current issues.  Currently not a candidate for cardioversion for her atrial fibrillation given her recent CVA.     Review Psychiatric Symptoms:  The patient indicates that she does not feel her depressive symptoms that she experienced the days and decreased energy will negatively impact her rehabilitative process.  The patient denies a history of symptoms associated with depression and anxiety.  The patient further denies a history of hypomania, fr[place], auditory visual hallucinations, eating disorder, and thought disorder.  It appears that the patient is having some mild symptoms associated with the rehabilitative process and the acceptance of the limitations.  The patient was encouraged to focus on some of the small gains that she is already made after having her stroke.  The patient was able to identify that however at the age of 88 she was extremely independent and she is concerned about being a burden to her family and she is also concerned about being lonely as she is not going to have as much contact with her friends.    SOCIAL HISTORY: The patient currently lives alone her  passed away in 2006.  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  The patient does have adult children who are providing support in her recovery and have started making changes and modifications to her home to assist her related to her stroke  SCHOOL HISTORY: The patient completed nursing school  VOCATIONAL HISTORY: He has been retired since 1997    PSYCHIATRIC HISTORY: Patient denies a history of psychiatric hospitalization, suicide attempts, self-injurious behavior.  Patient denies a history of receiving psychotherapy or being prescribed psychotropic medication  SUBSTANCE ABUSE HISTORY: The patient denies current use of  alcohol and other substances including cannabis.    MEDICAL HISTORY:  Past medical/surgical history:   Past Medical History:   Diagnosis Date   • Hyperlipidemia    • Hypertension    • Type 2 diabetes mellitus without complication, without long-term current use of insulin ([facility]) 12/18/2020      No past surgical history on file.     Medication Allergies:  Demerol, Bloodless, Penicillins, and Erythromycin   No current facility-administered medications on file prior to encounter.      Current Outpatient Medications on File Prior to Encounter   Medication Sig Dispense Refill   • apixaban (ELIQUIS) 5mg Tab Take 1 Tab by mouth 2 Times a Day. Indications: Thromboembolism secondary to Atrial Fibrillation 60 Tab    • atorvastatin (LIPITOR) 40 MG Tab Take 1 Tab by mouth every evening. 30 Tab    • lisinopril (PRINIVIL) 5 MG Tab Take 1 Tab by mouth every day. 30 Tab    • guaiFENesin (COUGH SYRUP) 100 MG/5ML syrup Take 10 mL by mouth 3 times a day as needed for Cough.  0   • metoprolol SR (TOPROL XL) 100 MG TABLET SR 24 HR Take 150 mg by mouth every morning.     • metFORMIN (GLUCOPHAGE) 500 MG Tab Take 1,000 mg by mouth 2 times a day, with meals.     • FIBER PO Take 1 Tab by mouth 1 time a day as needed (constipation).     • Ascorbic Acid (VITAMIN C PO) Take 1 Tab by mouth every morning.          SAFETY ASSESSMENT - SELF  The patient denied any suicide-related ideation and/or behaviors and intent/plan, denied thoughts about death and dying both in session and during the period since last appointment, or past 2 weeks.    Current Suicide Risk: Low    Risk Level: Not Currently at Clinically Significant Risk  Hospitalization is not deemed necessary at this time as the patient does not present a clear or imminent danger to self or others. No indication for pursuing higher level of care. Outpatient management is currently most appropriate and least restrictive level of care.      MENTAL STATUS/OBSERVATIONS     Patient did not present in  acute distress. Patient was appropriately groomed. Patient was alert and oriented x4. Eye contact was appropriate. No abnormalities in attention or concentration were noted. No abnormalities of movement present; psychomotor activity was normal. Speech was fluent and regular in rhythm, rate, volume, and tone. Thought processes Linear, Logical and Goal Directed. There was no evidence of thought disorder. No auditory or visual hallucinations. Long and short term memory appeared to be intact. Insight, judgment, and impulse control were deemed to be good.  Reported mood was “comfortable.” Affect was full-ranging and appropriate to thought content and conversation.  Patient denied past and current suicidal and homicidal ideation in plan, intent, and preparatory behavior.      RESULTS OF SCREENING MEASURES:  [x] Not applicable       DIAGNOSTIC IMPRESSION(S):  Patient Active Problem List   Diagnosis   • CVA (cerebral vascular accident) ([facility])   • Hyponatremia   • Elevated troponin   • AF (atrial fibrillation) ([facility])   • Essential hypertension   • Hyperlipidemia   • Type 2 diabetes mellitus without complication, without long-term current use of insulin ([facility])   • Aortic stenosis   • Vitamin D deficiency   • Other dysphagia   • Cognitive deficits   • Hypophosphatemia   • Urinary retention   • Alkaline phosphatase elevation       PLAN:    IDENTIFIED NEEDS/PLAN:  [If any of these marked, trigger DISPOSITION list]  Other: Rehabilitation process  Psychology services      1) Provided consultation to Dr. Noel about the possible need for future treatment of the patient's depression but at the present time the patient is not experiencing significant depressive symptoms.  However the rehab staff should monitor her depressive symptoms that she has 3 or 4 days in a week of depressive symptoms the use of pharmacological intervention may be appropriate.  2) The patient will be seen weekly during the rehab process.  3) Referrals/Consults:   N/A  4) Referral appointment(s) scheduled? No  5) Barriers to Learning:  No   6) Readiness to Learn:  Yes   7) Cultural Concerns:  No   8) Patient voiced understanding of, and agreement with, plan and goals as annotated above Yes .  9) Declare these services are medically necessary and appropriate to the patient’s diagnosis and needs  10) The point of contact at the [facility] regarding this evaluation is Dr. Wasserman, Psychologist.    Emory Wasserman III, Ed.ELICEO.    This note was created using voice recognition software (Dragon). The accuracy of the dictation is limited by the abilities of the software. I have reviewed the note prior to signing, however some errors in grammar and context are still possible. If you have any questions related to this note please do not hesitate to contact our office.

## 2020-12-24 NOTE — ASSESSMENT & PLAN NOTE
Pt w/o abd pain, nausea, or vomiting  U/S Abd +cholelithiasis with gallbladder wall thickening suggesting cholecystitis  Alk Phos levels slowly improving  Suspect pt may have silently passed a stone  Suggest HIDA if develops GI symptoms

## 2020-12-24 NOTE — THERAPY
Occupational Therapy  Daily Treatment     Patient Name: Liudmila Pulido  Age:  88 y.o., Sex:  female  Medical Record #: [id number]  Today's Date: 12/24/2020     Precautions  Precautions: Fall Risk, Swallow Precautions ( See Comments), Other (See Comments)  Comments: R lizeth (RLE>RUE), R lateral lean    Safety   ADL Safety : Requires Supervision for Safety, Requires Physical Assist for Safety, Requires Cueing for Safety  Bathroom Safety: Requires Supervision for Safety, Requires Physical Assist for Safety, Requires Cuing for Safety  Comments: see below notes for ADL performance details.    Subjective    Patient seated in w/c upon arrival, requesting assistance with turning down heat in room. Undersigned therapist turned off heater per patient's request.      Objective     12/24/20 0931   Precautions   Precautions Fall Risk;Swallow Precautions ( See Comments);Other (See Comments)   Comments R lizeth (RLE>RUE), R lateral lean   Safety    Comments see below notes for ADL performance details.   Vitals   O2 Delivery Device None - Room Air   Cognition    Level of Consciousness Alert   Functional Level of Assist   Grooming Supervision;Seated  (for brushing dentures while seated in w/c )   Lower Body Dressing Minimal Assist  (CGA for donning L sock, mod A for donning R sock in w/c w/AE)   Lower Body Dressing Description Verbal cueing;Increased time;Initial preparation for task;Set-up of equipment;Supervision for safety;Sock aid   Interdisciplinary Plan of Care Collaboration   IDT Collaboration with  Occupational Therapist   Patient Position at End of Therapy Seated;Self Releasing Lap Belt Applied;Call Light within Reach;Tray Table within Reach   Collaboration Comments POC    OT Total Time Spent   OT Individual Total Time Spent (Mins) 30   OT Charge Group   OT Self Care / ADL 2     Education re: AE which patient can utilize to facilitate independence with LB ADLs. Patient receptive to education with successful return demo  completed with use of leg  and sock aid.     SBA for w/c propulsion room <> bathroom (for seated grooming tasks at sink) with goal of facilitating functional use of RUE.     Assessment    Patient tolerated OT session well with focus on progression with ADLs and education re: AE. Patient incorporated RUE well during w/c mobility, grooming and dressing tasks. LB dressing independence primarily limited by poor RLE motor control.    Strengths: Alert and oriented, Independent prior level of function, Motivated for self care and independence, Pleasant and cooperative, Supportive family, Willingly participates in therapeutic activities  Barriers: Bladder incontinence, Bowel incontinence, Decreased endurance, Fatigue, Hemiparesis, Home accessibility, Impaired activity tolerance, Impaired balance, Impaired functional cognition, Limited mobility, Visual impairment    Plan    ADLs and related functional mobility, sitting/standing balance, RUE strength/coordination, RLE neuro re-ed, monitor vision    Occupational Therapy Goals     Problem: Bathing     Dates: Start: 12/21/20       Goal: STG-Within one week, patient will bathe     Dates: Start: 12/21/20       Description: 1) Individualized Goal:  mod A with AE/DME as needed.  2) Interventions:  OT E Stim Attended, OT Self Care/ADL, OT Cognitive Skill Dev, OT Community Reintegration, OT Manual Ther Technique, OT Neuro Re-Ed/Balance, OT Therapeutic Activity, OT Evaluation, and OT Therapeutic Exercise      Note:     Goal Note filed on 12/23/20 0700 by Gris Padilla OT    Ant FLOREZ; pt evaluated on 12/21.                        Problem: Dressing     Dates: Start: 12/21/20       Goal: STG-Within one week, patient will dress LB     Dates: Start: 12/21/20       Description: 1) Individualized Goal:  mod A with AE as needed.  2) Interventions:  OT E Stim Attended, OT Self Care/ADL, OT Cognitive Skill Dev, OT Community Reintegration, OT Manual Ther Technique, OT Neuro Re-Ed/Balance,  OT Therapeutic Activity, OT Evaluation, and OT Therapeutic Exercise    Note:     Goal Note filed on 12/23/20 0700 by Gris Padilla, OT    Total A x1-2; pt evaluated on 12/21.                        Problem: Functional Transfers     Dates: Start: 12/21/20       Goal: STG-Within one week, patient will transfer to toilet     Dates: Start: 12/21/20       Description: 1) Individualized Goal:  mod A with AD/DME as needed.  2) Interventions:  OT E Stim Attended, OT Self Care/ADL, OT Cognitive Skill Dev, OT Community Reintegration, OT Manual Ther Technique, OT Neuro Re-Ed/Balance, OT Therapeutic Activity, OT Evaluation, and OT Therapeutic Exercise    Note:     Goal Note filed on 12/23/20 0700 by Gris Padilla, OT    Max-total A; pt evaluated on 12/21.                        Problem: Grooming     Dates: Start: 12/21/20             Problem: OT Long Term Goals     Dates: Start: 12/21/20       Goal: LTG-By discharge, patient will complete basic self care tasks     Dates: Start: 12/21/20       Description: 1) Individualized Goal: Supervision to mod I level with AE/DME as needed.  2) Interventions:  OT E Stim Attended, OT Self Care/ADL, OT Cognitive Skill Dev, OT Community Reintegration, OT Manual Ther Technique, OT Neuro Re-Ed/Balance, OT Therapeutic Activity, OT Evaluation, and OT Therapeutic Exercise          Goal: LTG-By discharge, patient will perform bathroom transfers     Dates: Start: 12/21/20       Description: 1) Individualized Goal:  Supervision to mod I level with AD/DME as needed.  2) Interventions:  OT E Stim Attended, OT Self Care/ADL, OT Cognitive Skill Dev, OT Community Reintegration, OT Manual Ther Technique, OT Neuro Re-Ed/Balance, OT Therapeutic Activity, OT Evaluation, and OT Therapeutic Exercise                Problem: Toileting     Dates: Start: 12/21/20       Goal: STG-Within one week, patient will complete toileting tasks     Dates: Start: 12/21/20       Description: 1) Individualized Goal:  max A  with AE/DME as needed.  2) Interventions:  OT E Stim Attended, OT Self Care/ADL, OT Cognitive Skill Dev, OT Community Reintegration, OT Manual Ther Technique, OT Neuro Re-Ed/Balance, OT Therapeutic Activity, OT Evaluation, and OT Therapeutic Exercise    Note:     Goal Note filed on 12/23/20 0700 by Gris Padilla, OT    Total A x1-2; pt evaluated on 12/21.

## 2020-12-24 NOTE — PROGRESS NOTES
"Rehab Progress Note     Date of Service: 12/24/2020  Chief Complaint: Follow-up stroke    Interval Events (Subjective)    Patient seen and examined today in her room.  She reports she had some shooting pain in her right leg yesterday that started in the right hip down her leg.  This is different than the pain she previously been feeling in her right anterior calf.  She denies a history of arthritis in her hip that she is aware of.  She does have a history however of intermittent back pain with referred pain.  Patient has been tolerating the gabapentin without any side effects we will increase the dose.  Patient reports she has already had a flu shot this year in [place].  Hospitalist has been consulted for electrolyte abnormalities as well as continued high blood pressure.      Objective:  VITAL SIGNS: BP (!) 168/80   Pulse 65   Temp 36.5 °C (97.7 °F) (Oral)   Resp 18   Ht 1.676 m (5' 5.98\")   Wt 75 kg (165 lb 6.4 oz)   SpO2 95%   BMI 26.71 kg/m²   Gen: alert, no apparent distress  Neuro: notable for flaccid right leg, no increased tone noted at the ankle        Recent Results (from the past 72 hour(s))   Comp Metabolic Panel    Collection Time: 12/24/20  6:25 AM   Result Value Ref Range    Sodium 137 135 - 145 mmol/L    Potassium 3.6 3.6 - 5.5 mmol/L    Chloride 104 96 - 112 mmol/L    Co2 28 20 - 33 mmol/L    Anion Gap 5.0 (L) 7.0 - 16.0    Glucose 149 (H) 65 - 99 mg/dL    Bun 7 (L) 8 - 22 mg/dL    Creatinine 0.53 0.50 - 1.40 mg/dL    Calcium 9.0 8.5 - 10.5 mg/dL    AST(SGOT) 51 (H) 12 - 45 U/L    ALT(SGPT) 34 2 - 50 U/L    Alkaline Phosphatase 317 (H) 30 - 99 U/L    Total Bilirubin 0.6 0.1 - 1.5 mg/dL    Albumin 2.4 (L) 3.2 - 4.9 g/dL    Total Protein 5.8 (L) 6.0 - 8.2 g/dL    Globulin 3.4 1.9 - 3.5 g/dL    A-G Ratio 0.7 g/dL   PHOSPHORUS    Collection Time: 12/24/20  6:25 AM   Result Value Ref Range    Phosphorus 2.3 (L) 2.5 - 4.5 mg/dL   ESTIMATED GFR    Collection Time: 12/24/20  6:25 AM   Result " Value Ref Range    GFR If African American >60 >60 mL/min/1.73 m 2    GFR If Non African American >60 >60 mL/min/1.73 m 2       Current Facility-Administered Medications   Medication Frequency   • gabapentin (NEURONTIN) capsule 200 mg TID   • lisinopril (PRINIVIL) tablet 10 mg Q DAY   • vitamin D (cholecalciferol) tablet 1,000 Units DAILY   • tamsulosin (FLOMAX) capsule 0.4 mg QHS   • Respiratory Therapy Consult Continuous RT   • Pharmacy Consult Request ...Pain Management Review 1 Each PHARMACY TO DOSE   • hydrALAZINE (APRESOLINE) tablet 25 mg Q8HRS PRN   • artificial tears ophthalmic solution 1 Drop PRN   • benzocaine-menthol (CEPACOL) lozenge 1 Lozenge Q2HRS PRN   • mag hydrox-al hydrox-simeth (MAALOX PLUS ES or MYLANTA DS) suspension 20 mL Q2HRS PRN   • ondansetron (ZOFRAN ODT) dispertab 4 mg 4X/DAY PRN    Or   • ondansetron (ZOFRAN) syringe/vial injection 4 mg 4X/DAY PRN   • traZODone (DESYREL) tablet 50 mg QHS PRN   • sodium chloride (OCEAN) 0.65 % nasal spray 2 Spray PRN   • senna-docusate (PERICOLACE or SENOKOT S) 8.6-50 MG per tablet 2 Tab BID    And   • polyethylene glycol/lytes (MIRALAX) PACKET 1 Packet QDAY PRN    And   • magnesium hydroxide (MILK OF MAGNESIA) suspension 30 mL QDAY PRN    And   • bisacodyl (DULCOLAX) suppository 10 mg QDAY PRN   • acetaminophen (Tylenol) tablet 650 mg Q6HRS PRN   • atorvastatin (LIPITOR) tablet 40 mg Q EVENING   • apixaban (ELIQUIS) tablet 5 mg BID   • metoprolol (LOPRESSOR) tablet 50 mg TWICE DAILY   • guaiFENesin (ROBITUSSIN) 100 MG/5ML solution 100 mg Q6HRS PRN       Orders Placed This Encounter   Procedures   • Diet Order Diet: Level 6 - Soft and Bite Sized (Please ground all meats - medications whole one at a time floated in puree); Liquid level: Level 0 - Thin     Standing Status:   Standing     Number of Occurrences:   1     Order Specific Question:   Diet:     Answer:   Level 6 - Soft and Bite Sized [23]     Comments:   Please ground all meats - medications whole  one at a time floated in puree     Order Specific Question:   Liquid level     Answer:   Level 0 - Thin       Assessment:  Active Hospital Problems    Diagnosis   • *CVA (cerebral vascular accident) (H[facility])   • AF (atrial fibrillation) (H[facility])   • Elevated troponin   • Hyponatremia   • Essential hypertension   • Hyperlipidemia   • Vitamin D deficiency   • Other dysphagia   • Cognitive deficits   • Hypophosphatemia   • Type 2 diabetes mellitus without complication, without long-term current use of insulin ([facility])   • Aortic stenosis     This patient is a 88 y.o. female admitted for acute inpatient rehabilitation with CVA (cerebral vascular accident) ([facility]).    I led and attended the weekly conference, and agree with the IDT conference documentation and plan of care as noted below.    Date of conference: 12/23/2020    Goals and barriers: See IDT note.    Biggest barriers: hypertension, right leg hemiplegia, poor endurance    Goals in next week: psychology consult    CM/social support: son supportive    Anticipated DC date: 1/9    Home health: PT/OT/SLP/RN    Equip: doesn't need any    Follow up: PCP, stroke bridge, Dr. Causey, cariology      Medical Decision Making and Plan:    High left frontal stroke  Cardio-embolic etiology  Right leg hemiplegia  Cognitive deficits  Dysphagia  Continue full rehab program  PT/OT/SLP, 1 hr each discipline, 5 days per week  Continue Eliquis and statin for secondary stroke prophylaxis  Monitor for development of depression or spasticity  Consulted Dr. Wasserman, no evidence of depression currently    Outpatient follow-up with the [facility], referral made  Outpatient follow-up with Dr. Causey, referral made    Right leg pain  Appears neuropathic  Started low-dose gabapentin 100 mg 3 times a day 12/22 --> 200 mg TID 12/24  Less likely DVT and patient is already on Eliquis    Hypertension  Continue lisinopril 5 mg 12/21 --> 10 mg 12/23  Continue metoprolol 50 mg twice a day  As needed  hydralazine  Consult hospitalist for assistance    Atrial fibrillation  Continue metoprolol 50 mg twice a day  Outpatient follow-up with cardiology    Diabetes with hyperglycemia  HgbA1c 6.2  Diet controlled    Aortic stenosis  Outpatient follow-up with cardiology    Vitamin D deficiency  Continue supplementation    Hypophosphatemia, continues  Status post supplementation  Consult hospitalist    Elevated alk phosphatase, continues  Elevated AST, improved  Consult hospitalist    Bowel program  Constipation  Continue bowel medications  Scheduled Sennakot - patient has been refusing  Schedule Miralax  PRN MOM, bisacodyl suppository  Last BM 12/21    Bladder program  Acute urinary retention  Check PVRs - 19  Started Flomax 0.4 mg 12/21  Bladder scan for no voids  ICP for over 400 cc - required once 12/20  Scheduled toileting    DVT prophylaxis  Eliquis    Total time:  36 minutes.  I spent greater than 50% of the time for patient care, counseling, and coordination on this date, including patient face-to face time, unit/floor time with review of records/pertinent lab data and studies, as well as discussing diagnostic evaluation/work up, planned therapeutic interventions, and future disposition of care, as per the interval events/subjective and the assessment and plan as noted above.    I have performed a physical exam, reviewed and updated ROS, as well as the assessment and plan today 12/24/2020. In review of note from 12/23/2020 there are no new changes except as documented above.        Tangela Noel M.D.   Physical Medicine and Rehabilitation

## 2020-12-24 NOTE — DOCUMENTATION QUERY
DOCUMENTATION QUERY    PROVIDERS: Please select “Cosign w/ note”to reply to query.    To better represent the severity of illness of your patient, please review the following information and exercise your independent professional judgment in responding to this query.     Patient has Diabetes Mellitus with labs showing elevated glucose. On sliding scale insulin.  Based upon the clinical findings, risk factors, and treatment, can this be further clarified    · Diabetes with hyperglycemia  · Diabetes without hyperglycemia  · Unable to determine           The medical record reflects the following:   Clinical Findings  Glucose levels 148, 135, 149   Treatment  Sliding scale insulin   Risk Factors  CVA, HTN   Location within medical record  Laboratory Reports     Thank you,   Liliana Hendricks, CCS

## 2020-12-24 NOTE — CARE PLAN
Problem: Inadequate nutrient intake  Goal: Patient to consume greater than or equal to 50% of meals  Outcome: PROGRESSING SLOWER THAN EXPECTED   Patient with some difficulty chewing s/t lacking dentures. Daughter to bring them in.  Also very [place] and has not been able to pick her meals consistently s/t issues hearing her phone ring.  Eating <50% of meals since admission.   Nutrition rep got in touch with her and gave her card with kitchen phone number. Declined snacks at this time and requesting small portions. Amenable to [employer] with meals as she declined snacks.  RD following.

## 2020-12-24 NOTE — THERAPY
"Occupational Therapy  Daily Treatment     Patient Name: Liudmila Pulido  Age:  88 y.o., Sex:  female  Medical Record #: [id number]  Today's Date: 12/24/2020     Precautions  Precautions: Fall Risk, Swallow Precautions ( See Comments), Other (See Comments)  Comments: R lizeth (RLE>RUE), R lateral lean    Safety   ADL Safety : Requires Supervision for Safety, Requires Physical Assist for Safety, Requires Cueing for Safety  Bathroom Safety: Requires Supervision for Safety, Requires Physical Assist for Safety, Requires Cuing for Safety  Comments: see below notes for ADL performance details.    Subjective    \"this leg just doesn't want to move\"     Objective       12/24/20 1031   Sitting Upper Body Exercises   Comments BUE therex using pink theraband: chest pull, diagonal pulls, rows, tricep/biceps, shoulder flexion 2 sets x 10   Interdisciplinary Plan of Care Collaboration   Patient Position at End of Therapy Seated;Call Light within Reach;Tray Table within Reach   OT Total Time Spent   OT Individual Total Time Spent (Mins) 60   OT Charge Group   OT Therapeutic Exercise  4       Assessment    Pt completed UB therex to the best of her abilities with no complaints of pain, demo improving and good strength/coordination in RUE    Strengths: Alert and oriented, Independent prior level of function, Motivated for self care and independence, Pleasant and cooperative, Supportive family, Willingly participates in therapeutic activities  Barriers: Bladder incontinence, Bowel incontinence, Decreased endurance, Fatigue, Hemiparesis, Home accessibility, Impaired activity tolerance, Impaired balance, Impaired functional cognition, Limited mobility, Visual impairment    Plan    Cont overall strength/endurance and standing tolerance/balance to improve ADL's and functional mobility    Occupational Therapy Goals     Problem: Bathing     Dates: Start: 12/21/20       Goal: STG-Within one week, patient will bathe     Dates: Start: 12/21/20    "    Description: 1) Individualized Goal:  mod A with AE/DME as needed.  2) Interventions:  OT E Stim Attended, OT Self Care/ADL, OT Cognitive Skill Dev, OT Community Reintegration, OT Manual Ther Technique, OT Neuro Re-Ed/Balance, OT Therapeutic Activity, OT Evaluation, and OT Therapeutic Exercise      Note:     Goal Note filed on 12/23/20 0700 by Gris Padilla, OT    Max A; pt evaluated on 12/21.                        Problem: Dressing     Dates: Start: 12/21/20       Goal: STG-Within one week, patient will dress LB     Dates: Start: 12/21/20       Description: 1) Individualized Goal:  mod A with AE as needed.  2) Interventions:  OT E Stim Attended, OT Self Care/ADL, OT Cognitive Skill Dev, OT Community Reintegration, OT Manual Ther Technique, OT Neuro Re-Ed/Balance, OT Therapeutic Activity, OT Evaluation, and OT Therapeutic Exercise    Note:     Goal Note filed on 12/23/20 0700 by Gris Padilla, OT    Total A x1-2; pt evaluated on 12/21.                        Problem: Functional Transfers     Dates: Start: 12/21/20       Goal: STG-Within one week, patient will transfer to toilet     Dates: Start: 12/21/20       Description: 1) Individualized Goal:  mod A with AD/DME as needed.  2) Interventions:  OT E Stim Attended, OT Self Care/ADL, OT Cognitive Skill Dev, OT Community Reintegration, OT Manual Ther Technique, OT Neuro Re-Ed/Balance, OT Therapeutic Activity, OT Evaluation, and OT Therapeutic Exercise    Note:     Goal Note filed on 12/23/20 0700 by Gris Padilla, OT    Max-total A; pt evaluated on 12/21.                        Problem: Grooming     Dates: Start: 12/21/20             Problem: OT Long Term Goals     Dates: Start: 12/21/20       Goal: LTG-By discharge, patient will complete basic self care tasks     Dates: Start: 12/21/20       Description: 1) Individualized Goal: Supervision to mod I level with AE/DME as needed.  2) Interventions:  OT E Stim Attended, OT Self Care/ADL, OT Cognitive Skill  Dev, OT Community Reintegration, OT Manual Ther Technique, OT Neuro Re-Ed/Balance, OT Therapeutic Activity, OT Evaluation, and OT Therapeutic Exercise          Goal: LTG-By discharge, patient will perform bathroom transfers     Dates: Start: 12/21/20       Description: 1) Individualized Goal:  Supervision to mod I level with AD/DME as needed.  2) Interventions:  OT E Stim Attended, OT Self Care/ADL, OT Cognitive Skill Dev, OT Community Reintegration, OT Manual Ther Technique, OT Neuro Re-Ed/Balance, OT Therapeutic Activity, OT Evaluation, and OT Therapeutic Exercise                Problem: Toileting     Dates: Start: 12/21/20       Goal: STG-Within one week, patient will complete toileting tasks     Dates: Start: 12/21/20       Description: 1) Individualized Goal:  max A with AE/DME as needed.  2) Interventions:  OT E Stim Attended, OT Self Care/ADL, OT Cognitive Skill Dev, OT Community Reintegration, OT Manual Ther Technique, OT Neuro Re-Ed/Balance, OT Therapeutic Activity, OT Evaluation, and OT Therapeutic Exercise    Note:     Goal Note filed on 12/23/20 0700 by Gris Padilla, OT    Total A x1-2; pt evaluated on 12/21.

## 2020-12-24 NOTE — ASSESSMENT & PLAN NOTE
Blood pressure controlled on Norvasc, Lisinopril, and Metoprolol  Monitor for orthostatic hypotension on Flomax

## 2020-12-24 NOTE — CARE PLAN
Problem: Safety  Goal: Will remain free from injury  Outcome: PROGRESSING AS EXPECTED  Note: Reviewed fall and safety precautions with patient and reminded patient to call for assistance before getting out of bed. Patient verbalized understanding and has not attempted self transfer this shift.      Problem: Skin Integrity  Goal: Risk for impaired skin integrity will decrease  Outcome: PROGRESSING AS EXPECTED  Note: Picture taken on patient's buttock. Skin is intact with old red cecily. Open to air.

## 2020-12-25 ENCOUNTER — APPOINTMENT (OUTPATIENT)
Dept: RADIOLOGY | Facility: REHABILITATION | Age: 85
DRG: 057 | End: 2020-12-25
Attending: PHYSICAL MEDICINE & REHABILITATION
Payer: MEDICARE

## 2020-12-25 ENCOUNTER — APPOINTMENT (OUTPATIENT)
Dept: RADIOLOGY | Facility: MEDICAL CENTER | Age: 85
DRG: 057 | End: 2020-12-25
Attending: HOSPITALIST
Payer: MEDICARE

## 2020-12-25 LAB
ALBUMIN SERPL BCP-MCNC: 2.5 G/DL (ref 3.2–4.9)
ALBUMIN/GLOB SERPL: 0.7 G/DL
ALP SERPL-CCNC: 347 U/L (ref 30–99)
ALT SERPL-CCNC: 35 U/L (ref 2–50)
ANION GAP SERPL CALC-SCNC: 10 MMOL/L (ref 7–16)
AST SERPL-CCNC: 47 U/L (ref 12–45)
BASOPHILS # BLD AUTO: 0.2 % (ref 0–1.8)
BASOPHILS # BLD: 0.02 K/UL (ref 0–0.12)
BILIRUB SERPL-MCNC: 0.5 MG/DL (ref 0.1–1.5)
BUN SERPL-MCNC: 8 MG/DL (ref 8–22)
CALCIUM SERPL-MCNC: 9 MG/DL (ref 8.5–10.5)
CHLORIDE SERPL-SCNC: 106 MMOL/L (ref 96–112)
CO2 SERPL-SCNC: 26 MMOL/L (ref 20–33)
CREAT SERPL-MCNC: 0.44 MG/DL (ref 0.5–1.4)
EOSINOPHIL # BLD AUTO: 0.16 K/UL (ref 0–0.51)
EOSINOPHIL NFR BLD: 1.9 % (ref 0–6.9)
ERYTHROCYTE [DISTWIDTH] IN BLOOD BY AUTOMATED COUNT: 48.4 FL (ref 35.9–50)
GLOBULIN SER CALC-MCNC: 3.8 G/DL (ref 1.9–3.5)
GLUCOSE SERPL-MCNC: 142 MG/DL (ref 65–99)
HCT VFR BLD AUTO: 39.3 % (ref 37–47)
HGB BLD-MCNC: 12.6 G/DL (ref 12–16)
IMM GRANULOCYTES # BLD AUTO: 0.04 K/UL (ref 0–0.11)
IMM GRANULOCYTES NFR BLD AUTO: 0.5 % (ref 0–0.9)
LYMPHOCYTES # BLD AUTO: 1.18 K/UL (ref 1–4.8)
LYMPHOCYTES NFR BLD: 14.1 % (ref 22–41)
MAGNESIUM SERPL-MCNC: 1.8 MG/DL (ref 1.5–2.5)
MCH RBC QN AUTO: 29 PG (ref 27–33)
MCHC RBC AUTO-ENTMCNC: 32.1 G/DL (ref 33.6–35)
MCV RBC AUTO: 90.6 FL (ref 81.4–97.8)
MONOCYTES # BLD AUTO: 0.4 K/UL (ref 0–0.85)
MONOCYTES NFR BLD AUTO: 4.8 % (ref 0–13.4)
NEUTROPHILS # BLD AUTO: 6.55 K/UL (ref 2–7.15)
NEUTROPHILS NFR BLD: 78.5 % (ref 44–72)
NRBC # BLD AUTO: 0 K/UL
NRBC BLD-RTO: 0 /100 WBC
PHOSPHATE SERPL-MCNC: 2.8 MG/DL (ref 2.5–4.5)
PLATELET # BLD AUTO: 294 K/UL (ref 164–446)
PMV BLD AUTO: 9.6 FL (ref 9–12.9)
POTASSIUM SERPL-SCNC: 4 MMOL/L (ref 3.6–5.5)
PROT SERPL-MCNC: 6.3 G/DL (ref 6–8.2)
RBC # BLD AUTO: 4.34 M/UL (ref 4.2–5.4)
SODIUM SERPL-SCNC: 142 MMOL/L (ref 135–145)
WBC # BLD AUTO: 8.4 K/UL (ref 4.8–10.8)

## 2020-12-25 PROCEDURE — 770010 HCHG ROOM/CARE - REHAB SEMI PRIVAT*

## 2020-12-25 PROCEDURE — 99232 SBSQ HOSP IP/OBS MODERATE 35: CPT | Performed by: PHYSICAL MEDICINE & REHABILITATION

## 2020-12-25 PROCEDURE — 83735 ASSAY OF MAGNESIUM: CPT

## 2020-12-25 PROCEDURE — 85025 COMPLETE CBC W/AUTO DIFF WBC: CPT

## 2020-12-25 PROCEDURE — A9270 NON-COVERED ITEM OR SERVICE: HCPCS | Performed by: PHYSICAL MEDICINE & REHABILITATION

## 2020-12-25 PROCEDURE — 97130 THER IVNTJ EA ADDL 15 MIN: CPT | Performed by: SPEECH-LANGUAGE PATHOLOGIST

## 2020-12-25 PROCEDURE — 97535 SELF CARE MNGMENT TRAINING: CPT

## 2020-12-25 PROCEDURE — 700102 HCHG RX REV CODE 250 W/ 637 OVERRIDE(OP): Performed by: PHYSICAL MEDICINE & REHABILITATION

## 2020-12-25 PROCEDURE — 36415 COLL VENOUS BLD VENIPUNCTURE: CPT

## 2020-12-25 PROCEDURE — 97110 THERAPEUTIC EXERCISES: CPT

## 2020-12-25 PROCEDURE — 99232 SBSQ HOSP IP/OBS MODERATE 35: CPT | Performed by: HOSPITALIST

## 2020-12-25 PROCEDURE — 97530 THERAPEUTIC ACTIVITIES: CPT

## 2020-12-25 PROCEDURE — 84100 ASSAY OF PHOSPHORUS: CPT

## 2020-12-25 PROCEDURE — 97112 NEUROMUSCULAR REEDUCATION: CPT

## 2020-12-25 PROCEDURE — 94760 N-INVAS EAR/PLS OXIMETRY 1: CPT

## 2020-12-25 PROCEDURE — A9270 NON-COVERED ITEM OR SERVICE: HCPCS | Performed by: HOSPITALIST

## 2020-12-25 PROCEDURE — 700102 HCHG RX REV CODE 250 W/ 637 OVERRIDE(OP): Performed by: HOSPITALIST

## 2020-12-25 PROCEDURE — 80053 COMPREHEN METABOLIC PANEL: CPT

## 2020-12-25 PROCEDURE — 97129 THER IVNTJ 1ST 15 MIN: CPT | Performed by: SPEECH-LANGUAGE PATHOLOGIST

## 2020-12-25 RX ORDER — GABAPENTIN 300 MG/1
300 CAPSULE ORAL 3 TIMES DAILY
Status: DISCONTINUED | OUTPATIENT
Start: 2020-12-25 | End: 2020-12-27

## 2020-12-25 RX ORDER — ACETAMINOPHEN 500 MG
1000 TABLET ORAL 3 TIMES DAILY
Status: DISCONTINUED | OUTPATIENT
Start: 2020-12-25 | End: 2020-12-25

## 2020-12-25 RX ORDER — ACETAMINOPHEN 325 MG/1
650 TABLET ORAL 3 TIMES DAILY
Status: DISCONTINUED | OUTPATIENT
Start: 2020-12-25 | End: 2021-01-05

## 2020-12-25 RX ADMIN — DIBASIC SODIUM PHOSPHATE, MONOBASIC POTASSIUM PHOSPHATE AND MONOBASIC SODIUM PHOSPHATE 250 MG: 852; 155; 130 TABLET ORAL at 08:27

## 2020-12-25 RX ADMIN — ACETAMINOPHEN 650 MG: 325 TABLET, FILM COATED ORAL at 20:06

## 2020-12-25 RX ADMIN — DIBASIC SODIUM PHOSPHATE, MONOBASIC POTASSIUM PHOSPHATE AND MONOBASIC SODIUM PHOSPHATE 250 MG: 852; 155; 130 TABLET ORAL at 20:06

## 2020-12-25 RX ADMIN — DIBASIC SODIUM PHOSPHATE, MONOBASIC POTASSIUM PHOSPHATE AND MONOBASIC SODIUM PHOSPHATE 250 MG: 852; 155; 130 TABLET ORAL at 15:13

## 2020-12-25 RX ADMIN — GABAPENTIN 200 MG: 100 CAPSULE ORAL at 08:27

## 2020-12-25 RX ADMIN — METOPROLOL TARTRATE 50 MG: 25 TABLET, FILM COATED ORAL at 18:01

## 2020-12-25 RX ADMIN — DOCUSATE SODIUM 50 MG AND SENNOSIDES 8.6 MG 2 TABLET: 8.6; 5 TABLET, FILM COATED ORAL at 20:06

## 2020-12-25 RX ADMIN — LISINOPRIL 20 MG: 20 TABLET ORAL at 06:01

## 2020-12-25 RX ADMIN — Medication 1000 UNITS: at 08:27

## 2020-12-25 RX ADMIN — APIXABAN 5 MG: 5 TABLET, FILM COATED ORAL at 08:27

## 2020-12-25 RX ADMIN — APIXABAN 5 MG: 5 TABLET, FILM COATED ORAL at 20:06

## 2020-12-25 RX ADMIN — GABAPENTIN 300 MG: 300 CAPSULE ORAL at 15:13

## 2020-12-25 RX ADMIN — TAMSULOSIN HYDROCHLORIDE 0.4 MG: 0.4 CAPSULE ORAL at 20:06

## 2020-12-25 RX ADMIN — ACETAMINOPHEN 1000 MG: 500 TABLET, FILM COATED ORAL at 10:55

## 2020-12-25 RX ADMIN — ATORVASTATIN CALCIUM 40 MG: 40 TABLET, FILM COATED ORAL at 20:06

## 2020-12-25 RX ADMIN — GABAPENTIN 300 MG: 300 CAPSULE ORAL at 20:06

## 2020-12-25 RX ADMIN — ACETAMINOPHEN 650 MG: 325 TABLET, FILM COATED ORAL at 05:59

## 2020-12-25 RX ADMIN — METOPROLOL TARTRATE 50 MG: 25 TABLET, FILM COATED ORAL at 06:01

## 2020-12-25 ASSESSMENT — PAIN DESCRIPTION - PAIN TYPE
TYPE: ACUTE PAIN
TYPE: ACUTE PAIN

## 2020-12-25 ASSESSMENT — PATIENT HEALTH QUESTIONNAIRE - PHQ9
SUM OF ALL RESPONSES TO PHQ9 QUESTIONS 1 AND 2: 0
1. LITTLE INTEREST OR PLEASURE IN DOING THINGS: NOT AT ALL
2. FEELING DOWN, DEPRESSED, IRRITABLE, OR HOPELESS: NOT AT ALL

## 2020-12-25 NOTE — THERAPY
"Occupational Therapy  Daily Treatment     Patient Name: Liudmila Pulido  Age:  88 y.o., Sex:  female  Medical Record #: [id number]  Today's Date: 12/25/2020     Precautions  Precautions: Fall Risk, Swallow Precautions ( See Comments), Other (See Comments)  Comments: R lizeth (RLE>RUE), R lateral lean    Safety   ADL Safety : Requires Supervision for Safety, Requires Physical Assist for Safety, Requires Cueing for Safety  Bathroom Safety: Requires Supervision for Safety, Requires Physical Assist for Safety, Requires Cuing for Safety  Comments: see below notes for ADL performance details.    Subjective    \"What happened? I'm usually so strong in my arms,\" patient stated near end of session due to increased difficulty with standing and increased fatigue/light headedness.     Objective     12/25/20 1031   Precautions   Precautions Fall Risk;Swallow Precautions ( See Comments);Other (See Comments)   Comments R lizeth (RLE>RUE), R lateral lean   Vitals   Pulse 74   Patient BP Position Sitting   Blood Pressure  131/66   Room Air Oximetry 84   O2 Delivery Device None - Room Air   Vitals Comments Notified RN/ RT    Cognition    Level of Consciousness Alert   Functional Level of Assist   Grooming Supervision  (for combing hair and brushing dentures while seated in wc)   Bathing Moderate Assist  (for standing balance, drying buttocks and distal LEs )   Bathing Description Grab bar;Hand held shower;Long handled bath tool;Tub bench;Assit wtih lower extremities;Increased time;Initial preparation for task;Set-up of equipment;Supervision for safety;Verbal cueing   Upper Body Dressing Stand by Assist  (Set-up for donning muumuu )   Lower Body Dressing Minimal Assist  (CGA for donning L sock, mod A for donning R sock in w/c w/AE)   Tub / Shower Transfers Moderate Assist  (Min-mod assist for stand pivot txfr w/ grab bar )   Tub Shower Transfer Description Grab bar;Shower bench;Assist with one limb;Increased time;Initial preparation for " task;Set-up of equipment;Supervision for safety  (for management of RLE, standing balanace and safety )   Interdisciplinary Plan of Care Collaboration   IDT Collaboration with  Nursing;Respiratory Therapist   Patient Position at End of Therapy In Bed;Call Light within Reach;Other (Comments)   Collaboration Comments O2 sats, incrased fatigue/light headedness    OT Total Time Spent   OT Individual Total Time Spent (Mins) 60   OT Charge Group   OT Self Care / ADL 4       Assessment    Patient tolerated majority of OT session well however reported sudden onset of increased fatigue and light headedness when attempting to stand w/ use of grab bar to reposition. Vitals checked, see above. ADL performance primarily limited by poor RLE motor control, decreased endurance and limited standing balance. RN and undersigned therapist assisted patient with transferring back to bed safely (~min-mod assist of 2). Transferred care to Respiratory Therapist and RN at end of session.   Strengths: Alert and oriented, Independent prior level of function, Motivated for self care and independence, Pleasant and cooperative, Supportive family, Willingly participates in therapeutic activities  Barriers: Bladder incontinence, Bowel incontinence, Decreased endurance, Fatigue, Hemiparesis, Home accessibility, Impaired activity tolerance, Impaired balance, Impaired functional cognition, Limited mobility, Visual impairment    Plan    Cont overall strength/endurance and standing tolerance/balance to improve ADL's and functional mobility    Occupational Therapy Goals     Problem: Bathing     Dates: Start: 12/21/20       Goal: STG-Within one week, patient will bathe     Dates: Start: 12/21/20       Description: 1) Individualized Goal:  mod A with AE/DME as needed.  2) Interventions:  OT E Stim Attended, OT Self Care/ADL, OT Cognitive Skill Dev, OT Community Reintegration, OT Manual Ther Technique, OT Neuro Re-Ed/Balance, OT Therapeutic Activity, OT  Evaluation, and OT Therapeutic Exercise      Note:     Goal Note filed on 12/23/20 0700 by Gris Padilla, OT    Max A; pt evaluated on 12/21.                        Problem: Dressing     Dates: Start: 12/21/20       Goal: STG-Within one week, patient will dress LB     Dates: Start: 12/21/20       Description: 1) Individualized Goal:  mod A with AE as needed.  2) Interventions:  OT E Stim Attended, OT Self Care/ADL, OT Cognitive Skill Dev, OT Community Reintegration, OT Manual Ther Technique, OT Neuro Re-Ed/Balance, OT Therapeutic Activity, OT Evaluation, and OT Therapeutic Exercise    Note:     Goal Note filed on 12/23/20 0700 by Gris Padilla, OT    Total A x1-2; pt evaluated on 12/21.                        Problem: Functional Transfers     Dates: Start: 12/21/20       Goal: STG-Within one week, patient will transfer to toilet     Dates: Start: 12/21/20       Description: 1) Individualized Goal:  mod A with AD/DME as needed.  2) Interventions:  OT E Stim Attended, OT Self Care/ADL, OT Cognitive Skill Dev, OT Community Reintegration, OT Manual Ther Technique, OT Neuro Re-Ed/Balance, OT Therapeutic Activity, OT Evaluation, and OT Therapeutic Exercise    Note:     Goal Note filed on 12/23/20 0700 by Gris Padilla, OT    Max-total A; pt evaluated on 12/21.                        Problem: Grooming     Dates: Start: 12/21/20             Problem: OT Long Term Goals     Dates: Start: 12/21/20       Goal: LTG-By discharge, patient will complete basic self care tasks     Dates: Start: 12/21/20       Description: 1) Individualized Goal: Supervision to mod I level with AE/DME as needed.  2) Interventions:  OT E Stim Attended, OT Self Care/ADL, OT Cognitive Skill Dev, OT Community Reintegration, OT Manual Ther Technique, OT Neuro Re-Ed/Balance, OT Therapeutic Activity, OT Evaluation, and OT Therapeutic Exercise          Goal: LTG-By discharge, patient will perform bathroom transfers     Dates: Start: 12/21/20        Description: 1) Individualized Goal:  Supervision to mod I level with AD/DME as needed.  2) Interventions:  OT E Stim Attended, OT Self Care/ADL, OT Cognitive Skill Dev, OT Community Reintegration, OT Manual Ther Technique, OT Neuro Re-Ed/Balance, OT Therapeutic Activity, OT Evaluation, and OT Therapeutic Exercise                Problem: Toileting     Dates: Start: 12/21/20       Goal: STG-Within one week, patient will complete toileting tasks     Dates: Start: 12/21/20       Description: 1) Individualized Goal:  max A with AE/DME as needed.  2) Interventions:  OT E Stim Attended, OT Self Care/ADL, OT Cognitive Skill Dev, OT Community Reintegration, OT Manual Ther Technique, OT Neuro Re-Ed/Balance, OT Therapeutic Activity, OT Evaluation, and OT Therapeutic Exercise    Note:     Goal Note filed on 12/23/20 0700 by Gris Padilla, OT    Total A x1-2; pt evaluated on 12/21.

## 2020-12-25 NOTE — PROGRESS NOTES
[facility] Medicine Daily Progress Note      Chief Complaint  Hypertension  Hypophosphatemia    Interval Problem Update  No overnight issues.  Labs reviewed.     Review of Systems  Review of Systems   Constitutional: Negative for chills and fever.   HENT: Negative.    Eyes: Negative.    Respiratory: Negative.    Cardiovascular: Negative for chest pain and palpitations.   Gastrointestinal: Negative for abdominal pain, nausea and vomiting.   Genitourinary: Negative.    Musculoskeletal: Negative.    Skin: Negative for itching and rash.   Neurological: Positive for focal weakness.   Endo/Heme/Allergies: Negative for polydipsia. Does not bruise/bleed easily.        Physical Exam  Temp:  [36.2 °C (97.2 °F)-37.2 °C (98.9 °F)] 36.7 °C (98 °F)  Pulse:  [60-97] 78  Resp:  [16-18] 18  BP: (110-138)/(58-76) 110/75  SpO2:  [94 %-95 %] 94 %    Physical Exam  Vitals signs reviewed.   Constitutional:       General: She is not in acute distress.     Appearance: Normal appearance. She is not ill-appearing.   HENT:      Head: Normocephalic and atraumatic.      Right Ear: External ear normal.      Left Ear: External ear normal.      Nose: Nose normal.      Mouth/Throat:      Pharynx: Oropharynx is clear.   Eyes:      General:         Right eye: No discharge.         Left eye: No discharge.      Extraocular Movements: Extraocular movements intact.      Conjunctiva/sclera: Conjunctivae normal.   Neck:      Musculoskeletal: Normal range of motion and neck supple.   Cardiovascular:      Comments: irreg irreg  Pulmonary:      Effort: No respiratory distress.      Breath sounds: No wheezing.      Comments: Decreased BS   Abdominal:      General: Bowel sounds are normal. There is no distension.      Palpations: Abdomen is soft.      Tenderness: There is no abdominal tenderness.   Musculoskeletal:      Right lower leg: No edema.      Left lower leg: No edema.   Skin:     General: Skin is warm and dry.   Neurological:      Mental Status: She is  alert.      Comments: Awake and alert         Fluids    Intake/Output Summary (Last 24 hours) at 12/26/2020 1609  Last data filed at 12/26/2020 1200  Gross per 24 hour   Intake 340 ml   Output --   Net 340 ml       Laboratory  Recent Labs     12/25/20  0556   WBC 8.4   RBC 4.34   HEMOGLOBIN 12.6   HEMATOCRIT 39.3   MCV 90.6   MCH 29.0   MCHC 32.1*   RDW 48.4   PLATELETCT 294   MPV 9.6     Recent Labs     12/24/20  0625 12/25/20  0556   SODIUM 137 142   POTASSIUM 3.6 4.0   CHLORIDE 104 106   CO2 28 26   GLUCOSE 149* 142*   BUN 7* 8   CREATININE 0.53 0.44*   CALCIUM 9.0 9.0                   Assessment/Plan  * CVA (cerebral vascular accident) ([facility])- (present on admission)  Assessment & Plan  Has RLE weakness  On Eliquis and Lipitor    AF (atrial fibrillation) ([facility])- (present on admission)  Assessment & Plan  Rate controlled on Metoprolol  Anticoagulated on Eliquis    Alkaline phosphatase elevation  Assessment & Plan  Alk Phos levels escalating  Will request U/S Abd    Hypophosphatemia- (present on admission)  Assessment & Plan  Supplement w/ Neutra-Phos  Follow levels    Vitamin D deficiency- (present on admission)  Assessment & Plan  Vit D level 21  On supplementation    Aortic stenosis- (present on admission)  Assessment & Plan  Echo EF 75%, RVSP 40 mmHg, and mod AS  Outpt F/U    Type 2 diabetes mellitus without complication, without long-term current use of insulin ([facility])- (present on admission)  Assessment & Plan  HbA1c 6.2  Diet controlled    Essential hypertension- (present on admission)  Assessment & Plan  On Lisinopril and Metoprolol  Increased ACE-I for hypertensive trends  Monitor for orthostatic hypotension on Flomax     Full Code

## 2020-12-25 NOTE — FLOWSHEET NOTE
12/25/20 1138   Events/Summary/Plan   Events/Summary/Plan Called to room for desat. Pt was place on 2LPM NC prior to RT arriving.  Finger very cold. Checked Sp02 with ear probe,  97% on 2LPM NC, HR 56.  Left ear probe monitor on pt. RN at bedside.   Vital Signs   Pulse (!) 56   Respiration 16   Pulse Oximetry 97 %   $ Pulse Oximetry (Spot Check) Yes   Oxygen   O2 (LPM) 2   O2 Delivery Device Nasal Cannula

## 2020-12-25 NOTE — THERAPY
Speech Language Pathology  Daily Treatment     Patient Name: Liudmila Pulido  Age:  88 y.o., Sex:  female  Medical Record #: [id number]  Today's Date: 12/25/2020     Precautions  Precautions: Fall Risk, Swallow Precautions ( See Comments)  Comments: R lizeth (RLE>RUE), R lateral lean    Subjective    Per OT, patient has an episode of decreased O2 during their session. Patient was given supplemental O2 and was resting in bed when SLP arrived. Patient stated that she felt better and wanted to participate with tx. Patient was seen, seated upright in bed.      Objective     12/25/20 1301   Precautions   Precautions Fall Risk;Swallow Precautions ( See Comments)   Reading Comprehension    Reading Comprehension (WDL) WDL   Functional Reading Materials Within Functional Limits (6-7)   Cognition   Moderate Attention Moderate (3)   Prospective Memory Moderate (3)   Functional Memory Activities Minimal (4)   Interdisciplinary Plan of Care Collaboration   IDT Collaboration with  Occupational Therapist   Patient Position at End of Therapy In Bed;Call Light within Reach;Tray Table within Reach;Phone within Reach   Collaboration Comments O2 SAT decreased, increased suppl. O2   SLP Total Time Spent   SLP Individual Total Time Spent (Mins) 60   Treatment Charges   SLP Cognitive Skill Development First 15 Minutes 1   SLP Cognitive Skill Development Additional 15 Minutes 3       Assessment    SLP provided patient with tasks to target memory and attention. Patient completed as follows:    Recalling details from a verbally presented paragraph: 9/15  Word List Retention-Inclusion (FO4): 3/5  Word List Retention-Inclusion (FO5): 2/5  Word List Retention-Exclusion (FO4): 2/5  Recalling details from functional reading materials: 12/14    Strengths: Able to follow instructions, Making steady progress towards goals, Supportive family, Willingly participates in therapeutic activities, Pleasant and cooperative, Motivated for self care and  independence  Barriers: Impaired functional cognition, Visual impairment, Impulsive, Impaired carryover of learning, Impaired insight/denial of deficits, Aspiration risk    Plan    Cont tx to target memory and attention.     Speech Therapy Problems     Problem: Memory STGs     Dates: Start: 12/21/20       Goal: STG-Within one week, patient will     Dates: Start: 12/21/20       Description: 1) Individualized goal:  learn and implement functional memory strategies with use of memory book with 80% accuracy provided MOD A.   2) Interventions:  SLP Self Care / ADL Training , SLP Cognitive Skill Development, and SLP Group Treatment                Problem: Problem Solving STGs     Dates: Start: 12/23/20       Goal: STG-Within one week, patient will     Dates: Start: 12/23/20       Description: 1) Individualized goal:  complete attention tasks related to medication management and financial management skills with 80% accuracy provided MIN A.   2) Interventions:  SLP Self Care / ADL Training , SLP Cognitive Skill Development, and SLP Group Treatment                    Problem: Speech/Swallowing LTGs     Dates: Start: 12/21/20       Goal: LTG-By discharge, patient will safely swallow     Dates: Start: 12/21/20       Description: 1) Individualized goal:  the least restrictive diet with no overt s/sx of asp/pen noted across meals with 100% accuracy provided MOD I.  2) Interventions:  SLP Swallowing Dysfunction Treatment, SLP Oral Pharyngeal Evaluation, SLP Video Swallow Evaluation, SLP Self Care / ADL Training , SLP Cognitive Skill Development, and SLP Group Treatment              Goal: LTG-By discharge, patient will     Dates: Start: 12/21/20       Description: 1) Individualized goal:  complete functional problem solving and recall with 80% accuracy provided SPV.   2) Interventions:  SLP Self Care / ADL Training , SLP Cognitive Skill Development, and SLP Group Treatment                  Problem: Swallowing STGs     Dates:  Start: 12/21/20       Goal: STG-Within one week, patient will safely swallow     Dates: Start: 12/21/20       Description: 1) Individualized goal:  soft and bite size diet with thin liquids with no overt s/sx of asp/pen noted across 2/2 meals provided MIN cues.   2) Interventions:  SLP Swallowing Dysfunction Treatment, SLP Oral Pharyngeal Evaluation, SLP Video Swallow Evaluation, SLP Self Care / ADL Training , SLP Cognitive Skill Development, and SLP Group Treatment    Note:     Goal Note filed on 12/23/20 1301 by Patrizia Ivan MS,CCC-SLP    Mod cues for safety and impulsivity

## 2020-12-25 NOTE — CARE PLAN
Problem: Safety  Goal: Will remain free from injury  Note: Patient uses call light consistently and appropriately this shift.  Waits for assistance when needed and does not attempt self transfer.  Able to verbalize needs.  Will continue to monitor.      Problem: Infection  Goal: Will remain free from infection  Note: Patient remains free from s/s infection; afebrile.  Will continue to monitor.      Problem: Skin Integrity  Goal: Risk for impaired skin integrity will decrease  Note: Patient's skin remains intact and free from new or accidental injury this shift.  Will continue to monitor.

## 2020-12-25 NOTE — PROGRESS NOTES
"Rehab Progress Note     Encounter Date: 12/25/2020    CC: Right-sided weakness    Interval Events (Subjective)    Right hip pain, deep and ache, throughout the leg, improved with movement, she deoesn't report any change with change in medication yesterday.     Improving strength on the right side specifically right leg  Last BM: 12/25/20    ROS:  Gen: No fatigue, confusion, significant weight loss  Eyes: no blurry vision, double vision or pain in eyes  ENT: no changes in hearing, runny nose, nose bleeds, sinus pain  CV: No CP, palpitations,   Lungs: no shortness of breath, changes in secretions, changes in cough, pain with coughing  Abd: No abd pain, nausea, vomiting, pain with eating  : no blood in urine, pain during storage phase, bladder spasms, suprapubic pain  Ext: No swelling in legs, asymmetric atrophy  Neuro: Mild improvement in strength in the right leg  Skin: no new ulcers/skin breakdown appreciated by patient  Mood: No changes in mood today, increase in depression or anxiety  Heme: no bruising, or bleeding  Rest of 14 point review of systems is negative    Objective:  Vitals: /87   Pulse 79   Temp 36.6 °C (97.9 °F) (Oral)   Resp 18   Ht 1.676 m (5' 5.98\")   Wt 75 kg (165 lb 6.4 oz)   SpO2 93%   Gen: NAD, Body mass index is 26.71 kg/m².  HEENT:  NC/AT, PERRLA, moist mucous membranes  Cardio: RRR, no mumurs  Pulm: CTAB, with normal respiratory effort  Abd: Soft NTND, active bowel sounds  Ext: No peripheral edema. No calf tenderness. No clubbing/cyanosis. +dorsal pedalis pulses bilaterally.      Recent Results (from the past 72 hour(s))   Comp Metabolic Panel    Collection Time: 12/24/20  6:25 AM   Result Value Ref Range    Sodium 137 135 - 145 mmol/L    Potassium 3.6 3.6 - 5.5 mmol/L    Chloride 104 96 - 112 mmol/L    Co2 28 20 - 33 mmol/L    Anion Gap 5.0 (L) 7.0 - 16.0    Glucose 149 (H) 65 - 99 mg/dL    Bun 7 (L) 8 - 22 mg/dL    Creatinine 0.53 0.50 - 1.40 mg/dL    Calcium 9.0 8.5 - 10.5 " mg/dL    AST(SGOT) 51 (H) 12 - 45 U/L    ALT(SGPT) 34 2 - 50 U/L    Alkaline Phosphatase 317 (H) 30 - 99 U/L    Total Bilirubin 0.6 0.1 - 1.5 mg/dL    Albumin 2.4 (L) 3.2 - 4.9 g/dL    Total Protein 5.8 (L) 6.0 - 8.2 g/dL    Globulin 3.4 1.9 - 3.5 g/dL    A-G Ratio 0.7 g/dL   PHOSPHORUS    Collection Time: 12/24/20  6:25 AM   Result Value Ref Range    Phosphorus 2.3 (L) 2.5 - 4.5 mg/dL   ESTIMATED GFR    Collection Time: 12/24/20  6:25 AM   Result Value Ref Range    GFR If African American >60 >60 mL/min/1.73 m 2    GFR If Non African American >60 >60 mL/min/1.73 m 2   CBC WITH DIFFERENTIAL    Collection Time: 12/25/20  5:56 AM   Result Value Ref Range    WBC 8.4 4.8 - 10.8 K/uL    RBC 4.34 4.20 - 5.40 M/uL    Hemoglobin 12.6 12.0 - 16.0 g/dL    Hematocrit 39.3 37.0 - 47.0 %    MCV 90.6 81.4 - 97.8 fL    MCH 29.0 27.0 - 33.0 pg    MCHC 32.1 (L) 33.6 - 35.0 g/dL    RDW 48.4 35.9 - 50.0 fL    Platelet Count 294 164 - 446 K/uL    MPV 9.6 9.0 - 12.9 fL    Neutrophils-Polys 78.50 (H) 44.00 - 72.00 %    Lymphocytes 14.10 (L) 22.00 - 41.00 %    Monocytes 4.80 0.00 - 13.40 %    Eosinophils 1.90 0.00 - 6.90 %    Basophils 0.20 0.00 - 1.80 %    Immature Granulocytes 0.50 0.00 - 0.90 %    Nucleated RBC 0.00 /100 WBC    Neutrophils (Absolute) 6.55 2.00 - 7.15 K/uL    Lymphs (Absolute) 1.18 1.00 - 4.80 K/uL    Monos (Absolute) 0.40 0.00 - 0.85 K/uL    Eos (Absolute) 0.16 0.00 - 0.51 K/uL    Baso (Absolute) 0.02 0.00 - 0.12 K/uL    Immature Granulocytes (abs) 0.04 0.00 - 0.11 K/uL    NRBC (Absolute) 0.00 K/uL   Comp Metabolic Panel    Collection Time: 12/25/20  5:56 AM   Result Value Ref Range    Sodium 142 135 - 145 mmol/L    Potassium 4.0 3.6 - 5.5 mmol/L    Chloride 106 96 - 112 mmol/L    Co2 26 20 - 33 mmol/L    Anion Gap 10.0 7.0 - 16.0    Glucose 142 (H) 65 - 99 mg/dL    Bun 8 8 - 22 mg/dL    Creatinine 0.44 (L) 0.50 - 1.40 mg/dL    Calcium 9.0 8.5 - 10.5 mg/dL    AST(SGOT) 47 (H) 12 - 45 U/L    ALT(SGPT) 35 2 - 50 U/L     Alkaline Phosphatase 347 (H) 30 - 99 U/L    Total Bilirubin 0.5 0.1 - 1.5 mg/dL    Albumin 2.5 (L) 3.2 - 4.9 g/dL    Total Protein 6.3 6.0 - 8.2 g/dL    Globulin 3.8 (H) 1.9 - 3.5 g/dL    A-G Ratio 0.7 g/dL   MAGNESIUM    Collection Time: 12/25/20  5:56 AM   Result Value Ref Range    Magnesium 1.8 1.5 - 2.5 mg/dL   PHOSPHORUS    Collection Time: 12/25/20  5:56 AM   Result Value Ref Range    Phosphorus 2.8 2.5 - 4.5 mg/dL   ESTIMATED GFR    Collection Time: 12/25/20  5:56 AM   Result Value Ref Range    GFR If African American >60 >60 mL/min/1.73 m 2    GFR If Non African American >60 >60 mL/min/1.73 m 2       Current Facility-Administered Medications   Medication Frequency   • gabapentin (NEURONTIN) capsule 200 mg TID   • polyethylene glycol/lytes (MIRALAX) PACKET 1 Packet DAILY    And   • senna-docusate (PERICOLACE or SENOKOT S) 8.6-50 MG per tablet 2 Tab BID    And   • magnesium hydroxide (MILK OF MAGNESIA) suspension 30 mL QDAY PRN    And   • bisacodyl (DULCOLAX) suppository 10 mg QDAY PRN   • lisinopril (PRINIVIL) tablet 20 mg Q DAY   • phosphorus (K-Phos-Neutral) per tablet 250 mg TID   • vitamin D (cholecalciferol) tablet 1,000 Units DAILY   • tamsulosin (FLOMAX) capsule 0.4 mg QHS   • Respiratory Therapy Consult Continuous RT   • Pharmacy Consult Request ...Pain Management Review 1 Each PHARMACY TO DOSE   • hydrALAZINE (APRESOLINE) tablet 25 mg Q8HRS PRN   • artificial tears ophthalmic solution 1 Drop PRN   • benzocaine-menthol (CEPACOL) lozenge 1 Lozenge Q2HRS PRN   • mag hydrox-al hydrox-simeth (MAALOX PLUS ES or MYLANTA DS) suspension 20 mL Q2HRS PRN   • ondansetron (ZOFRAN ODT) dispertab 4 mg 4X/DAY PRN    Or   • ondansetron (ZOFRAN) syringe/vial injection 4 mg 4X/DAY PRN   • traZODone (DESYREL) tablet 50 mg QHS PRN   • sodium chloride (OCEAN) 0.65 % nasal spray 2 Spray PRN   • acetaminophen (Tylenol) tablet 650 mg Q6HRS PRN   • atorvastatin (LIPITOR) tablet 40 mg Q EVENING   • apixaban (ELIQUIS) tablet 5  mg BID   • metoprolol (LOPRESSOR) tablet 50 mg TWICE DAILY   • guaiFENesin (ROBITUSSIN) 100 MG/5ML solution 100 mg Q6HRS PRN       Orders Placed This Encounter   Procedures   • Diet Order Diet: Level 6 - Soft and Bite Sized (Please ground all meats - medications whole one at a time floated in puree); Liquid level: Level 0 - Thin     Standing Status:   Standing     Number of Occurrences:   1     Order Specific Question:   Diet:     Answer:   Level 6 - Soft and Bite Sized [23]     Comments:   Please ground all meats - medications whole one at a time floated in puree     Order Specific Question:   Liquid level     Answer:   Level 0 - Thin       Assessment:  Active Hospital Problems    Diagnosis   • *CVA (cerebral vascular accident) ([facility])   • AF (atrial fibrillation) ([facility])   • Elevated troponin   • Hyponatremia   • Essential hypertension   • Hyperlipidemia   • Alkaline phosphatase elevation   • Vitamin D deficiency   • Other dysphagia   • Cognitive deficits   • Hypophosphatemia   • Urinary retention   • Type 2 diabetes mellitus without complication, without long-term current use of insulin ([facility])   • Aortic stenosis       Medical Decision Making and Plan:    Left stroke:  - continue acute rehab    Right leg pain: deep ache sensation  - start tylenol 1000mg tid  - increase gabapentin to 300mg tid    Constipation: Had a large bowel movement this morning  -We will continue to follow constipation.    Total time:  26 minutes.  I spent greater than 50% of the time for patient care and coordination on this date, including unit/floor time, and face-to-face time with the patient as per assessment and plan above including neuropathic pain, increase gabapentin to 300 mg 3 times daily, Tylenol 1000 mg 3 times daily.    Sonido Gilman D.O.

## 2020-12-25 NOTE — CARE PLAN
Patient is alert and oriented x 4. Skin warm and dry, respirations even and unlabored.  Takes tylenol for generalized discomfort and pain.    Problem: Communication  Goal: The ability to communicate needs accurately and effectively will improve  Outcome: PROGRESSING AS EXPECTED     Problem: Safety  Goal: Will remain free from injury  Outcome: PROGRESSING AS EXPECTED

## 2020-12-26 ENCOUNTER — APPOINTMENT (OUTPATIENT)
Dept: RADIOLOGY | Facility: MEDICAL CENTER | Age: 85
DRG: 057 | End: 2020-12-26
Attending: HOSPITALIST
Payer: MEDICARE

## 2020-12-26 PROCEDURE — 700102 HCHG RX REV CODE 250 W/ 637 OVERRIDE(OP): Performed by: PHYSICAL MEDICINE & REHABILITATION

## 2020-12-26 PROCEDURE — A9270 NON-COVERED ITEM OR SERVICE: HCPCS | Performed by: PHYSICAL MEDICINE & REHABILITATION

## 2020-12-26 PROCEDURE — 99232 SBSQ HOSP IP/OBS MODERATE 35: CPT | Performed by: HOSPITALIST

## 2020-12-26 PROCEDURE — 700102 HCHG RX REV CODE 250 W/ 637 OVERRIDE(OP): Performed by: HOSPITALIST

## 2020-12-26 PROCEDURE — 76700 US EXAM ABDOM COMPLETE: CPT

## 2020-12-26 PROCEDURE — 94760 N-INVAS EAR/PLS OXIMETRY 1: CPT

## 2020-12-26 PROCEDURE — 770010 HCHG ROOM/CARE - REHAB SEMI PRIVAT*

## 2020-12-26 PROCEDURE — A9270 NON-COVERED ITEM OR SERVICE: HCPCS | Performed by: HOSPITALIST

## 2020-12-26 RX ADMIN — DIBASIC SODIUM PHOSPHATE, MONOBASIC POTASSIUM PHOSPHATE AND MONOBASIC SODIUM PHOSPHATE 250 MG: 852; 155; 130 TABLET ORAL at 15:49

## 2020-12-26 RX ADMIN — DIBASIC SODIUM PHOSPHATE, MONOBASIC POTASSIUM PHOSPHATE AND MONOBASIC SODIUM PHOSPHATE 250 MG: 852; 155; 130 TABLET ORAL at 20:44

## 2020-12-26 RX ADMIN — DIBASIC SODIUM PHOSPHATE, MONOBASIC POTASSIUM PHOSPHATE AND MONOBASIC SODIUM PHOSPHATE 250 MG: 852; 155; 130 TABLET ORAL at 08:43

## 2020-12-26 RX ADMIN — METOPROLOL TARTRATE 50 MG: 25 TABLET, FILM COATED ORAL at 05:44

## 2020-12-26 RX ADMIN — ACETAMINOPHEN 650 MG: 325 TABLET, FILM COATED ORAL at 08:44

## 2020-12-26 RX ADMIN — GABAPENTIN 300 MG: 300 CAPSULE ORAL at 15:49

## 2020-12-26 RX ADMIN — GABAPENTIN 300 MG: 300 CAPSULE ORAL at 20:45

## 2020-12-26 RX ADMIN — ACETAMINOPHEN 650 MG: 325 TABLET, FILM COATED ORAL at 15:49

## 2020-12-26 RX ADMIN — TAMSULOSIN HYDROCHLORIDE 0.4 MG: 0.4 CAPSULE ORAL at 20:54

## 2020-12-26 RX ADMIN — DOCUSATE SODIUM 50 MG AND SENNOSIDES 8.6 MG 2 TABLET: 8.6; 5 TABLET, FILM COATED ORAL at 20:44

## 2020-12-26 RX ADMIN — ACETAMINOPHEN 650 MG: 325 TABLET, FILM COATED ORAL at 20:44

## 2020-12-26 RX ADMIN — GABAPENTIN 300 MG: 300 CAPSULE ORAL at 08:44

## 2020-12-26 RX ADMIN — ATORVASTATIN CALCIUM 40 MG: 40 TABLET, FILM COATED ORAL at 20:45

## 2020-12-26 RX ADMIN — LISINOPRIL 20 MG: 20 TABLET ORAL at 05:44

## 2020-12-26 RX ADMIN — APIXABAN 5 MG: 5 TABLET, FILM COATED ORAL at 08:44

## 2020-12-26 RX ADMIN — METOPROLOL TARTRATE 50 MG: 25 TABLET, FILM COATED ORAL at 18:46

## 2020-12-26 RX ADMIN — APIXABAN 5 MG: 5 TABLET, FILM COATED ORAL at 20:44

## 2020-12-26 RX ADMIN — TRAZODONE HYDROCHLORIDE 50 MG: 50 TABLET ORAL at 20:54

## 2020-12-26 RX ADMIN — Medication 1000 UNITS: at 08:44

## 2020-12-26 ASSESSMENT — ENCOUNTER SYMPTOMS
RESPIRATORY NEGATIVE: 1
MUSCULOSKELETAL NEGATIVE: 1
FEVER: 0
PALPITATIONS: 0
NAUSEA: 0
POLYDIPSIA: 0
ABDOMINAL PAIN: 0
CHILLS: 0
BRUISES/BLEEDS EASILY: 0
VOMITING: 0
FOCAL WEAKNESS: 1
EYES NEGATIVE: 1

## 2020-12-26 ASSESSMENT — PAIN DESCRIPTION - PAIN TYPE
TYPE: ACUTE PAIN
TYPE: ACUTE PAIN

## 2020-12-26 NOTE — THERAPY
Physical Therapy   Daily Treatment     Patient Name: Liudmila Pulido  Age:  88 y.o., Sex:  female  Medical Record #: [id number]  Today's Date: 12/25/2020     Precautions  Precautions: Fall Risk, Swallow Precautions ( See Comments)  Comments: R lizeth (RLE>RUE), R lateral lean    Subjective    Pt resting in bed, willing to participate     Objective       12/25/20 1501   Wheelchair Functional Level of Assist   Wheelchair Assist Stand by Assist   Distance Wheelchair (Feet or Distance) 50  (pt propels within room with BUE's. )   Wheelchair Description Extra time;Limited by fatigue;Verbal cueing   Transfer Functional Level of Assist   Bed, Chair, Wheelchair Transfer Total Assist X 2  (max A of one/ mod A of 1)   Bed Chair Wheelchair Transfer Description Requires lift;Assist with one limb;Increased time;Set-up of equipment;Squat pivot transfer to wheelchair   Toilet Transfers Total Assist X 2  (2nd person for safety and assist with clothing and hygeiene)   Toilet Transfer Description Grab bar;Assist with one limb;Increased time;Requires lift   Supine Lower Body Exercise   Bridges Two Legged;2 sets of 10   Hip Abduction Hook Lying;2 sets of 10   Hip Adduction  2 sets of 10  (hooklying)   Knee to Chest 2 sets of 10  (reciprocal marching from hooklying)   Comments floor pedals for LE endurance and reciprocal patterning/ mod A for RLE positioning and smooth revolutions x 10 minutes   Neuro-Muscular Treatments   Neuro-Muscular Treatments Facilitation;Joint Approximation;Postural Facilitation;Sequencing;Tactile Cuing;Verbal Cuing;Weight Shift Right;Weight Shift Left   Comments forced use WB activity / standing 5 minutes x 3 with UE support at FWW, max A for RLE stance stabilization/ second person min A for balance/ safety. Activity to include static standing and lateral wt shifting, attempted pre-gait stepping with LLE x 3 trials/ total A for RLE stance support   PT Total Time Spent   PT Individual Total Time Spent (Mins) 60    PT Charge Group   PT Therapeutic Exercise 1   PT Neuromuscular Re-Education / Balance 2   PT Therapeutic Activities 1       Assessment    Pt demonstrated improved standing tolerance but requires max A for RLE stance/ terminal hip and knee extension. Pt pleasant/ cooperative and hard working however requires 2 person A for safety due to RLE hemiparsis/ significant weakness.    Strengths: Able to follow instructions, Independent prior level of function, Good insight into deficits/needs  Barriers: Decreased endurance, Hemiparesis, Home accessibility, Limited mobility, Impaired balance    Plan    Exercise/neuro re-ed, issue HEP for pt to perform in room to initiate their own care when able to complete AROM, NMES,  set up, Trial lite gait and standing in // bars when able to come out of room, Transfer training, sit/stand balance, STS practice with walker    Physical Therapy Problems     Problem: Mobility     Dates: Start: 12/21/20       Goal: STG-Within one week, patient will propel wheelchair household distances     Dates: Start: 12/21/20       Description: 1) Individualized goal:  150 ft At SPV in order to improve activity tolerance propelling herself around facility  2) Interventions:  PT E Stim Attended, PT Orthotics Training, PT Gait Training, PT Therapeutic Exercises, PT Neuro Re-Ed/Balance, PT Aquatic Therapy, PT Therapeutic Activity, PT Manual Therapy, and PT Evaluation      Note:     Goal Note filed on 12/23/20 1129 by Carloz Azar, PT    < 150 ft                         Problem: Mobility Transfers     Dates: Start: 12/21/20       Goal: STG-Within one week, patient will transfer bed to chair     Dates: Start: 12/21/20       Description: 1) Individualized goal:  At mod A with LRD In order to maximize self mobility  2) Interventions:  PT E Stim Attended, PT Orthotics Training, PT Gait Training, PT Therapeutic Exercises, PT Neuro Re-Ed/Balance, PT Aquatic Therapy, PT Therapeutic Activity, PT Manual  Therapy, and PT Evaluation    Note:     Goal Note filed on 12/23/20 1129 by Carloz Azar, PT    2nd person assist                         Problem: PT-Long Term Goals     Dates: Start: 12/21/20       Goal: LTG-By discharge, patient will propel wheelchair     Dates: Start: 12/21/20       Description: 1) Individualized goal:  150 ft At mod I in order to improve self mobility  2) Interventions:  PT E Stim Attended, PT Orthotics Training, PT Gait Training, PT Therapeutic Exercises, PT Neuro Re-Ed/Balance, PT Aquatic Therapy, PT Therapeutic Activity, PT Manual Therapy, and PT Evaluation          Goal: LTG-By discharge, patient will transfer one surface to another     Dates: Start: 12/21/20       Description: 1) Individualized goal:  At min A with LRD In order to maximize self mobility  2) Interventions:  PT E Stim Attended, PT Orthotics Training, PT Gait Training, PT Therapeutic Exercises, PT Neuro Re-Ed/Balance, PT Aquatic Therapy, PT Therapeutic Activity, PT Manual Therapy, and PT Evaluation          Goal: LTG-By discharge, patient will transfer in/out of a car     Dates: Start: 12/21/20       Description: 1) Individualized goal:  At min A with LRD In order to maximize self mobility  2) Interventions:  PT E Stim Attended, PT Orthotics Training, PT Gait Training, PT Therapeutic Exercises, PT Neuro Re-Ed/Balance, PT Aquatic Therapy, PT Therapeutic Activity, PT Manual Therapy, and PT Evaluation

## 2020-12-27 PROCEDURE — A9270 NON-COVERED ITEM OR SERVICE: HCPCS | Performed by: PHYSICAL MEDICINE & REHABILITATION

## 2020-12-27 PROCEDURE — 700102 HCHG RX REV CODE 250 W/ 637 OVERRIDE(OP): Performed by: HOSPITALIST

## 2020-12-27 PROCEDURE — 97129 THER IVNTJ 1ST 15 MIN: CPT

## 2020-12-27 PROCEDURE — 700102 HCHG RX REV CODE 250 W/ 637 OVERRIDE(OP): Performed by: PHYSICAL MEDICINE & REHABILITATION

## 2020-12-27 PROCEDURE — 99232 SBSQ HOSP IP/OBS MODERATE 35: CPT | Performed by: PHYSICAL MEDICINE & REHABILITATION

## 2020-12-27 PROCEDURE — A9270 NON-COVERED ITEM OR SERVICE: HCPCS | Performed by: HOSPITALIST

## 2020-12-27 PROCEDURE — 97130 THER IVNTJ EA ADDL 15 MIN: CPT

## 2020-12-27 PROCEDURE — 770010 HCHG ROOM/CARE - REHAB SEMI PRIVAT*

## 2020-12-27 PROCEDURE — 99232 SBSQ HOSP IP/OBS MODERATE 35: CPT | Performed by: HOSPITALIST

## 2020-12-27 RX ORDER — GABAPENTIN 400 MG/1
400 CAPSULE ORAL 3 TIMES DAILY
Status: DISCONTINUED | OUTPATIENT
Start: 2020-12-27 | End: 2020-12-28

## 2020-12-27 RX ADMIN — METOPROLOL TARTRATE 50 MG: 25 TABLET, FILM COATED ORAL at 17:50

## 2020-12-27 RX ADMIN — ACETAMINOPHEN 650 MG: 325 TABLET, FILM COATED ORAL at 16:18

## 2020-12-27 RX ADMIN — LISINOPRIL 20 MG: 20 TABLET ORAL at 05:47

## 2020-12-27 RX ADMIN — TAMSULOSIN HYDROCHLORIDE 0.4 MG: 0.4 CAPSULE ORAL at 22:43

## 2020-12-27 RX ADMIN — GABAPENTIN 400 MG: 400 CAPSULE ORAL at 16:18

## 2020-12-27 RX ADMIN — METOPROLOL TARTRATE 50 MG: 25 TABLET, FILM COATED ORAL at 05:47

## 2020-12-27 RX ADMIN — TRAZODONE HYDROCHLORIDE 50 MG: 50 TABLET ORAL at 22:43

## 2020-12-27 RX ADMIN — GABAPENTIN 400 MG: 400 CAPSULE ORAL at 22:43

## 2020-12-27 RX ADMIN — APIXABAN 5 MG: 5 TABLET, FILM COATED ORAL at 09:55

## 2020-12-27 RX ADMIN — DIBASIC SODIUM PHOSPHATE, MONOBASIC POTASSIUM PHOSPHATE AND MONOBASIC SODIUM PHOSPHATE 250 MG: 852; 155; 130 TABLET ORAL at 16:18

## 2020-12-27 RX ADMIN — ACETAMINOPHEN 650 MG: 325 TABLET, FILM COATED ORAL at 22:43

## 2020-12-27 RX ADMIN — ATORVASTATIN CALCIUM 40 MG: 40 TABLET, FILM COATED ORAL at 22:43

## 2020-12-27 RX ADMIN — Medication 1000 UNITS: at 09:54

## 2020-12-27 RX ADMIN — DIBASIC SODIUM PHOSPHATE, MONOBASIC POTASSIUM PHOSPHATE AND MONOBASIC SODIUM PHOSPHATE 250 MG: 852; 155; 130 TABLET ORAL at 09:54

## 2020-12-27 RX ADMIN — GABAPENTIN 300 MG: 300 CAPSULE ORAL at 09:55

## 2020-12-27 RX ADMIN — APIXABAN 5 MG: 5 TABLET, FILM COATED ORAL at 22:43

## 2020-12-27 RX ADMIN — ACETAMINOPHEN 650 MG: 325 TABLET, FILM COATED ORAL at 09:54

## 2020-12-27 ASSESSMENT — ENCOUNTER SYMPTOMS
MUSCULOSKELETAL NEGATIVE: 1
ABDOMINAL PAIN: 0
FOCAL WEAKNESS: 1
RESPIRATORY NEGATIVE: 1
BRUISES/BLEEDS EASILY: 0
EYES NEGATIVE: 1
VOMITING: 0
CHILLS: 0
FEVER: 0
NAUSEA: 0
PALPITATIONS: 0
POLYDIPSIA: 0

## 2020-12-27 NOTE — FLOWSHEET NOTE
12/27/20 1038   Events/Summary/Plan   Events/Summary/Plan 02 spot check, weaned to 1 lpm   Vital Signs   Pulse (!) 59   Respiration 16   Pulse Oximetry 98 %  (on 2 lpm)   Respiratory Assessment   Level of Consciousness Alert   Oxygen   O2 (LPM) 1   O2 Delivery Device Silicone Nasal Cannula

## 2020-12-27 NOTE — CARE PLAN
Problem: Bowel/Gastric:  Goal: Normal bowel function is maintained or improved  Outcome: PROGRESSING SLOWER THAN EXPECTED  Goal: Will not experience complications related to bowel motility  Outcome: PROGRESSING SLOWER THAN EXPECTED  Ultra sound done this afternoon after lunch.   Cholelithiasis with gallbladder wall thickening suggesting cholecystitis.  Stools are loose have held stool softeners. Incontinent of stool when she has on.

## 2020-12-27 NOTE — FLOWSHEET NOTE
12/26/20 1355   Vital Signs   Pulse 80   Respiration 18   Pulse Oximetry 95 %   $ Pulse Oximetry (Spot Check) Yes   Respiratory Assessment   Level of Consciousness Alert   Oxygen   O2 (LPM) 2   O2 Delivery Device Nasal Cannula

## 2020-12-27 NOTE — PROGRESS NOTES
Hospital Medicine Daily Progress Note      Chief Complaint  Hypertension  Hypophosphatemia    Interval Problem Update  No 24 hour clinical changes.     Review of Systems  Review of Systems   Constitutional: Negative for chills and fever.   HENT: Negative.    Eyes: Negative.    Respiratory: Negative.    Cardiovascular: Negative for chest pain and palpitations.   Gastrointestinal: Negative for abdominal pain, nausea and vomiting.   Genitourinary: Negative.    Musculoskeletal: Negative.    Skin: Negative for itching and rash.   Neurological: Positive for focal weakness.   Endo/Heme/Allergies: Negative for polydipsia. Does not bruise/bleed easily.        Physical Exam  Temp:  [36.2 °C (97.2 °F)-37.2 °C (98.9 °F)] 36.7 °C (98 °F)  Pulse:  [60-97] 78  Resp:  [16-18] 18  BP: (110-138)/(58-76) 110/75  SpO2:  [94 %-95 %] 94 %    Physical Exam  Vitals signs reviewed.   Constitutional:       General: She is not in acute distress.     Appearance: Normal appearance. She is not ill-appearing.   HENT:      Head: Normocephalic and atraumatic.      Right Ear: External ear normal.      Left Ear: External ear normal.      Nose: Nose normal.      Mouth/Throat:      Pharynx: Oropharynx is clear.   Eyes:      General:         Right eye: No discharge.         Left eye: No discharge.      Extraocular Movements: Extraocular movements intact.      Conjunctiva/sclera: Conjunctivae normal.   Neck:      Musculoskeletal: Normal range of motion and neck supple.   Cardiovascular:      Comments: irreg irreg  Pulmonary:      Effort: No respiratory distress.      Breath sounds: No wheezing.      Comments: Decreased BS   Abdominal:      General: Bowel sounds are normal. There is no distension.      Palpations: Abdomen is soft.      Tenderness: There is no abdominal tenderness.   Musculoskeletal:      Right lower leg: No edema.      Left lower leg: No edema.   Skin:     General: Skin is warm and dry.   Neurological:      Mental Status: She is alert.       Comments: Resting comfortably         Fluids    Intake/Output Summary (Last 24 hours) at 12/26/2020 1613  Last data filed at 12/26/2020 1200  Gross per 24 hour   Intake 340 ml   Output --   Net 340 ml       Laboratory  Recent Labs     12/25/20  0556   WBC 8.4   RBC 4.34   HEMOGLOBIN 12.6   HEMATOCRIT 39.3   MCV 90.6   MCH 29.0   MCHC 32.1*   RDW 48.4   PLATELETCT 294   MPV 9.6     Recent Labs     12/24/20  0625 12/25/20  0556   SODIUM 137 142   POTASSIUM 3.6 4.0   CHLORIDE 104 106   CO2 28 26   GLUCOSE 149* 142*   BUN 7* 8   CREATININE 0.53 0.44*   CALCIUM 9.0 9.0                   Assessment/Plan  * CVA (cerebral vascular accident) ([facility])- (present on admission)  Assessment & Plan  Has RLE weakness  On Eliquis and Lipitor    AF (atrial fibrillation) ([facility])- (present on admission)  Assessment & Plan  Rate controlled on Metoprolol  Anticoagulated on Eliquis    Alkaline phosphatase elevation  Assessment & Plan  Alk Phos levels escalating  U/S Abd pending    Hypophosphatemia- (present on admission)  Assessment & Plan  Supplement w/ Neutra-Phos  Follow levels    Vitamin D deficiency- (present on admission)  Assessment & Plan  Vit D level 21  On supplementation    Aortic stenosis- (present on admission)  Assessment & Plan  Echo EF 75%, RVSP 40 mmHg, and mod AS  Outpt F/U    Type 2 diabetes mellitus without complication, without long-term current use of insulin ([facility])- (present on admission)  Assessment & Plan  HbA1c 6.2  Diet controlled    Essential hypertension- (present on admission)  Assessment & Plan  On Lisinopril and Metoprolol  Hypertensive trends improved w/ increased ACE-I  Monitor for orthostatic hypotension on Flomax     Full Code

## 2020-12-27 NOTE — PROGRESS NOTES
"Rehab Progress Note     Encounter Date: 12/27/2020    CC: Right-sided weakness    Interval Events (Subjective)  She denies any abdominal pain  She reports improved strength and movement in her right lower extremity, able to stand on it.    Participating well with therapy  Sleeping well at night  Last BM: 12/26/20      ROS:  Gen: No fatigue, confusion, significant weight loss  Eyes: no blurry vision, double vision or pain in eyes  ENT: no changes in hearing, runny nose, nose bleeds, sinus pain  CV: No CP, palpitations,   Lungs: no shortness of breath, changes in secretions, changes in cough, pain with coughing  Abd: No abd pain, nausea, vomiting, pain with eating  : no blood in urine, pain during storage phase, bladder spasms, suprapubic pain  Ext: No swelling in legs, asymmetric atrophy  Neuro: no changes in strength or sensation  Skin: no new ulcers/skin breakdown appreciated by patient  Mood: No changes in mood today, increase in depression or anxiety  Heme: no bruising, or bleeding  Rest of 14 point review of systems is negative    Objective:  Vitals: /58   Pulse (!) 58   Temp 36.7 °C (98 °F) (Temporal)   Resp 16   Ht 1.676 m (5' 5.98\")   Wt 75 kg (165 lb 6.4 oz)   SpO2 98%   Gen: NAD, Body mass index is 26.71 kg/m².  HEENT:  NC/AT, PERRLA, moist mucous membranes  Cardio: RRR, no mumurs  Pulm: CTAB, with normal respiratory effort  Abd: Soft NTND, active bowel sounds, no rebound or guarding,   Ext: No peripheral edema. No calf tenderness. No clubbing/cyanosis. +dorsal pedalis pulses bilaterally.        Recent Results (from the past 72 hour(s))   CBC WITH DIFFERENTIAL    Collection Time: 12/25/20  5:56 AM   Result Value Ref Range    WBC 8.4 4.8 - 10.8 K/uL    RBC 4.34 4.20 - 5.40 M/uL    Hemoglobin 12.6 12.0 - 16.0 g/dL    Hematocrit 39.3 37.0 - 47.0 %    MCV 90.6 81.4 - 97.8 fL    MCH 29.0 27.0 - 33.0 pg    MCHC 32.1 (L) 33.6 - 35.0 g/dL    RDW 48.4 35.9 - 50.0 fL    Platelet Count 294 164 - 446 K/uL "    MPV 9.6 9.0 - 12.9 fL    Neutrophils-Polys 78.50 (H) 44.00 - 72.00 %    Lymphocytes 14.10 (L) 22.00 - 41.00 %    Monocytes 4.80 0.00 - 13.40 %    Eosinophils 1.90 0.00 - 6.90 %    Basophils 0.20 0.00 - 1.80 %    Immature Granulocytes 0.50 0.00 - 0.90 %    Nucleated RBC 0.00 /100 WBC    Neutrophils (Absolute) 6.55 2.00 - 7.15 K/uL    Lymphs (Absolute) 1.18 1.00 - 4.80 K/uL    Monos (Absolute) 0.40 0.00 - 0.85 K/uL    Eos (Absolute) 0.16 0.00 - 0.51 K/uL    Baso (Absolute) 0.02 0.00 - 0.12 K/uL    Immature Granulocytes (abs) 0.04 0.00 - 0.11 K/uL    NRBC (Absolute) 0.00 K/uL   Comp Metabolic Panel    Collection Time: 12/25/20  5:56 AM   Result Value Ref Range    Sodium 142 135 - 145 mmol/L    Potassium 4.0 3.6 - 5.5 mmol/L    Chloride 106 96 - 112 mmol/L    Co2 26 20 - 33 mmol/L    Anion Gap 10.0 7.0 - 16.0    Glucose 142 (H) 65 - 99 mg/dL    Bun 8 8 - 22 mg/dL    Creatinine 0.44 (L) 0.50 - 1.40 mg/dL    Calcium 9.0 8.5 - 10.5 mg/dL    AST(SGOT) 47 (H) 12 - 45 U/L    ALT(SGPT) 35 2 - 50 U/L    Alkaline Phosphatase 347 (H) 30 - 99 U/L    Total Bilirubin 0.5 0.1 - 1.5 mg/dL    Albumin 2.5 (L) 3.2 - 4.9 g/dL    Total Protein 6.3 6.0 - 8.2 g/dL    Globulin 3.8 (H) 1.9 - 3.5 g/dL    A-G Ratio 0.7 g/dL   MAGNESIUM    Collection Time: 12/25/20  5:56 AM   Result Value Ref Range    Magnesium 1.8 1.5 - 2.5 mg/dL   PHOSPHORUS    Collection Time: 12/25/20  5:56 AM   Result Value Ref Range    Phosphorus 2.8 2.5 - 4.5 mg/dL   ESTIMATED GFR    Collection Time: 12/25/20  5:56 AM   Result Value Ref Range    GFR If African American >60 >60 mL/min/1.73 m 2    GFR If Non African American >60 >60 mL/min/1.73 m 2       Current Facility-Administered Medications   Medication Frequency   • gabapentin (NEURONTIN) capsule 300 mg TID   • acetaminophen (Tylenol) tablet 650 mg TID   • polyethylene glycol/lytes (MIRALAX) PACKET 1 Packet DAILY    And   • senna-docusate (PERICOLACE or SENOKOT S) 8.6-50 MG per tablet 2 Tab BID    And   • magnesium  hydroxide (MILK OF MAGNESIA) suspension 30 mL QDAY PRN    And   • bisacodyl (DULCOLAX) suppository 10 mg QDAY PRN   • lisinopril (PRINIVIL) tablet 20 mg Q DAY   • phosphorus (K-Phos-Neutral) per tablet 250 mg TID   • vitamin D (cholecalciferol) tablet 1,000 Units DAILY   • tamsulosin (FLOMAX) capsule 0.4 mg QHS   • Respiratory Therapy Consult Continuous RT   • Pharmacy Consult Request ...Pain Management Review 1 Each PHARMACY TO DOSE   • hydrALAZINE (APRESOLINE) tablet 25 mg Q8HRS PRN   • artificial tears ophthalmic solution 1 Drop PRN   • benzocaine-menthol (CEPACOL) lozenge 1 Lozenge Q2HRS PRN   • mag hydrox-al hydrox-simeth (MAALOX PLUS ES or MYLANTA DS) suspension 20 mL Q2HRS PRN   • ondansetron (ZOFRAN ODT) dispertab 4 mg 4X/DAY PRN    Or   • ondansetron (ZOFRAN) syringe/vial injection 4 mg 4X/DAY PRN   • traZODone (DESYREL) tablet 50 mg QHS PRN   • sodium chloride (OCEAN) 0.65 % nasal spray 2 Spray PRN   • acetaminophen (Tylenol) tablet 650 mg Q6HRS PRN   • atorvastatin (LIPITOR) tablet 40 mg Q EVENING   • apixaban (ELIQUIS) tablet 5 mg BID   • metoprolol (LOPRESSOR) tablet 50 mg TWICE DAILY   • guaiFENesin (ROBITUSSIN) 100 MG/5ML solution 100 mg Q6HRS PRN       Orders Placed This Encounter   Procedures   • Diet Order Diet: Level 6 - Soft and Bite Sized (Please ground all meats - medications whole one at a time floated in puree); Liquid level: Level 0 - Thin     Standing Status:   Standing     Number of Occurrences:   1     Order Specific Question:   Diet:     Answer:   Level 6 - Soft and Bite Sized [23]     Comments:   Please ground all meats - medications whole one at a time floated in puree     Order Specific Question:   Liquid level     Answer:   Level 0 - Thin       Assessment:  Active Hospital Problems    Diagnosis   • *CVA (cerebral vascular accident) (HCC)   • AF (atrial fibrillation) (HCC)   • Elevated troponin   • Hyponatremia   • Essential hypertension   • Hyperlipidemia   • Alkaline phosphatase  elevation   • Vitamin D deficiency   • Other dysphagia   • Cognitive deficits   • Hypophosphatemia   • Urinary retention   • Type 2 diabetes mellitus without complication, without long-term current use of insulin (HCC)   • Aortic stenosis     Abdominal ultrasound 12/26:  IMPRESSION:     Cholelithiasis with gallbladder wall thickening suggesting cholecystitis.               Medical Decision Making and Plan:    Left stroke/cva:  - continue acute rehab    Right leg pain: deep ache sensation  -  tylenol 1000mg tid  -Increase gabapentin to 400 mg 3 times a day  Constipation: Had a large bowel movement this morning  -We will continue to follow constipation.    Cholelithiasis: Alk phos 347 on 12/25, abdominal ultrasound consistent with cholecystitis  -Check CMP in a.m.  -Discussed with hospitalist    Patient was seen for 26 minutes on unit/floor of which > 50% of time was spent on counseling and coordination of care regarding the above, including prognosis, risk reduction, benefits of treatment, and options for next stage of care including elevated alkaline phosphatase, cholecystitis, check CMP in a.m., discussed with hospitalist, left-sided stroke.      Sonido Gilman D.O.

## 2020-12-27 NOTE — THERAPY
Speech Language Pathology  Daily Treatment     Patient Name: Liudmila Pulido  Age:  88 y.o., Sex:  female  Medical Record #: [id number]  Today's Date: 12/27/2020     Precautions  Precautions: Fall Risk, Swallow Precautions ( See Comments)  Comments: R lizeth (RLE>RUE), R lateral lean    Subjective    Pt pleasant and cooperative during tx.       Objective       12/27/20 0931   Cognition   Functional Memory Activities Minimal (4)   Functional Problem Solving Minimal (4)   SLP Total Time Spent   SLP Individual Total Time Spent (Mins) 60   Treatment Charges   SLP Cognitive Skill Development First 15 Minutes 1   SLP Cognitive Skill Development Additional 15 Minutes 3       Assessment    Pt required mod-max cues to recall RWAVS memory strategies.  Pt recalled daily events, and entered them into her memory book, given min verbal cues.  Pt educated regarding use of channel guide, to locate her desired channel.  Per Pt, family is able to assist with medications and finance, if necessary.      Strengths: Able to follow instructions, Making steady progress towards goals, Supportive family, Willingly participates in therapeutic activities, Pleasant and cooperative, Motivated for self care and independence  Barriers: Impaired functional cognition, Visual impairment, Impulsive, Impaired carryover of learning, Impaired insight/denial of deficits, Aspiration risk    Plan    Attn, memory, finance.     Speech Therapy Problems     Problem: Memory STGs     Dates: Start: 12/21/20       Goal: STG-Within one week, patient will     Dates: Start: 12/21/20       Description: 1) Individualized goal:  learn and implement functional memory strategies with use of memory book with 80% accuracy provided MOD A.   2) Interventions:  SLP Self Care / ADL Training , SLP Cognitive Skill Development, and SLP Group Treatment                Problem: Problem Solving STGs     Dates: Start: 12/23/20       Goal: STG-Within one week, patient will     Dates:  Start: 12/23/20       Description: 1) Individualized goal:  complete attention tasks related to medication management and financial management skills with 80% accuracy provided MIN A.   2) Interventions:  SLP Self Care / ADL Training , SLP Cognitive Skill Development, and SLP Group Treatment                    Problem: Speech/Swallowing LTGs     Dates: Start: 12/21/20       Goal: LTG-By discharge, patient will safely swallow     Dates: Start: 12/21/20       Description: 1) Individualized goal:  the least restrictive diet with no overt s/sx of asp/pen noted across meals with 100% accuracy provided MOD I.  2) Interventions:  SLP Swallowing Dysfunction Treatment, SLP Oral Pharyngeal Evaluation, SLP Video Swallow Evaluation, SLP Self Care / ADL Training , SLP Cognitive Skill Development, and SLP Group Treatment              Goal: LTG-By discharge, patient will     Dates: Start: 12/21/20       Description: 1) Individualized goal:  complete functional problem solving and recall with 80% accuracy provided SPV.   2) Interventions:  SLP Self Care / ADL Training , SLP Cognitive Skill Development, and SLP Group Treatment                  Problem: Swallowing STGs     Dates: Start: 12/21/20       Goal: STG-Within one week, patient will safely swallow     Dates: Start: 12/21/20       Description: 1) Individualized goal:  soft and bite size diet with thin liquids with no overt s/sx of asp/pen noted across 2/2 meals provided MIN cues.   2) Interventions:  SLP Swallowing Dysfunction Treatment, SLP Oral Pharyngeal Evaluation, SLP Video Swallow Evaluation, SLP Self Care / ADL Training , SLP Cognitive Skill Development, and SLP Group Treatment    Note:     Goal Note filed on 12/23/20 1301 by Patrizia Ivan MS,CCC-SLP    Mod cues for safety and impulsivity

## 2020-12-28 PROBLEM — D72.819 LEUKOPENIA: Status: ACTIVE | Noted: 2020-12-28

## 2020-12-28 LAB
ALBUMIN SERPL BCP-MCNC: 2.3 G/DL (ref 3.2–4.9)
ALBUMIN/GLOB SERPL: 0.7 G/DL
ALP SERPL-CCNC: 293 U/L (ref 30–99)
ALT SERPL-CCNC: 21 U/L (ref 2–50)
ANION GAP SERPL CALC-SCNC: 3 MMOL/L (ref 7–16)
AST SERPL-CCNC: 31 U/L (ref 12–45)
BASOPHILS # BLD AUTO: 0.5 % (ref 0–1.8)
BASOPHILS # BLD: 0.02 K/UL (ref 0–0.12)
BILIRUB SERPL-MCNC: 0.4 MG/DL (ref 0.1–1.5)
BUN SERPL-MCNC: 9 MG/DL (ref 8–22)
CALCIUM SERPL-MCNC: 8.9 MG/DL (ref 8.5–10.5)
CHLORIDE SERPL-SCNC: 109 MMOL/L (ref 96–112)
CO2 SERPL-SCNC: 29 MMOL/L (ref 20–33)
COVID ORDER STATUS COVID19: NORMAL
CREAT SERPL-MCNC: 0.45 MG/DL (ref 0.5–1.4)
EOSINOPHIL # BLD AUTO: 0.1 K/UL (ref 0–0.51)
EOSINOPHIL NFR BLD: 2.3 % (ref 0–6.9)
ERYTHROCYTE [DISTWIDTH] IN BLOOD BY AUTOMATED COUNT: 49 FL (ref 35.9–50)
GLOBULIN SER CALC-MCNC: 3.3 G/DL (ref 1.9–3.5)
GLUCOSE SERPL-MCNC: 144 MG/DL (ref 65–99)
HCT VFR BLD AUTO: 36.3 % (ref 37–47)
HGB BLD-MCNC: 11.2 G/DL (ref 12–16)
IMM GRANULOCYTES # BLD AUTO: 0.01 K/UL (ref 0–0.11)
IMM GRANULOCYTES NFR BLD AUTO: 0.2 % (ref 0–0.9)
LYMPHOCYTES # BLD AUTO: 1.37 K/UL (ref 1–4.8)
LYMPHOCYTES NFR BLD: 32 % (ref 22–41)
MAGNESIUM SERPL-MCNC: 1.9 MG/DL (ref 1.5–2.5)
MCH RBC QN AUTO: 28.1 PG (ref 27–33)
MCHC RBC AUTO-ENTMCNC: 30.9 G/DL (ref 33.6–35)
MCV RBC AUTO: 91 FL (ref 81.4–97.8)
MONOCYTES # BLD AUTO: 0.27 K/UL (ref 0–0.85)
MONOCYTES NFR BLD AUTO: 6.3 % (ref 0–13.4)
NEUTROPHILS # BLD AUTO: 2.51 K/UL (ref 2–7.15)
NEUTROPHILS NFR BLD: 58.7 % (ref 44–72)
NRBC # BLD AUTO: 0 K/UL
NRBC BLD-RTO: 0 /100 WBC
PHOSPHATE SERPL-MCNC: 2.6 MG/DL (ref 2.5–4.5)
PLATELET # BLD AUTO: 285 K/UL (ref 164–446)
PMV BLD AUTO: 9.6 FL (ref 9–12.9)
POTASSIUM SERPL-SCNC: 3.6 MMOL/L (ref 3.6–5.5)
PROT SERPL-MCNC: 5.6 G/DL (ref 6–8.2)
RBC # BLD AUTO: 3.99 M/UL (ref 4.2–5.4)
SODIUM SERPL-SCNC: 141 MMOL/L (ref 135–145)
WBC # BLD AUTO: 4.3 K/UL (ref 4.8–10.8)

## 2020-12-28 PROCEDURE — U0003 INFECTIOUS AGENT DETECTION BY NUCLEIC ACID (DNA OR RNA); SEVERE ACUTE RESPIRATORY SYNDROME CORONAVIRUS 2 (SARS-COV-2) (CORONAVIRUS DISEASE [COVID-19]), AMPLIFIED PROBE TECHNIQUE, MAKING USE OF HIGH THROUGHPUT TECHNOLOGIES AS DESCRIBED BY CMS-2020-01-R: HCPCS

## 2020-12-28 PROCEDURE — 92526 ORAL FUNCTION THERAPY: CPT

## 2020-12-28 PROCEDURE — 36415 COLL VENOUS BLD VENIPUNCTURE: CPT

## 2020-12-28 PROCEDURE — 770010 HCHG ROOM/CARE - REHAB SEMI PRIVAT*

## 2020-12-28 PROCEDURE — 94760 N-INVAS EAR/PLS OXIMETRY 1: CPT

## 2020-12-28 PROCEDURE — 83735 ASSAY OF MAGNESIUM: CPT

## 2020-12-28 PROCEDURE — 700102 HCHG RX REV CODE 250 W/ 637 OVERRIDE(OP): Performed by: PHYSICAL MEDICINE & REHABILITATION

## 2020-12-28 PROCEDURE — 97530 THERAPEUTIC ACTIVITIES: CPT

## 2020-12-28 PROCEDURE — 99233 SBSQ HOSP IP/OBS HIGH 50: CPT | Performed by: PHYSICAL MEDICINE & REHABILITATION

## 2020-12-28 PROCEDURE — 99232 SBSQ HOSP IP/OBS MODERATE 35: CPT | Performed by: HOSPITALIST

## 2020-12-28 PROCEDURE — 700102 HCHG RX REV CODE 250 W/ 637 OVERRIDE(OP): Performed by: HOSPITALIST

## 2020-12-28 PROCEDURE — A9270 NON-COVERED ITEM OR SERVICE: HCPCS | Performed by: PHYSICAL MEDICINE & REHABILITATION

## 2020-12-28 PROCEDURE — 97535 SELF CARE MNGMENT TRAINING: CPT

## 2020-12-28 PROCEDURE — 97129 THER IVNTJ 1ST 15 MIN: CPT

## 2020-12-28 PROCEDURE — 85025 COMPLETE CBC W/AUTO DIFF WBC: CPT

## 2020-12-28 PROCEDURE — 80053 COMPREHEN METABOLIC PANEL: CPT

## 2020-12-28 PROCEDURE — 84100 ASSAY OF PHOSPHORUS: CPT

## 2020-12-28 PROCEDURE — 97130 THER IVNTJ EA ADDL 15 MIN: CPT

## 2020-12-28 PROCEDURE — A9270 NON-COVERED ITEM OR SERVICE: HCPCS | Performed by: HOSPITALIST

## 2020-12-28 RX ORDER — LISINOPRIL 20 MG/1
30 TABLET ORAL
Status: DISCONTINUED | OUTPATIENT
Start: 2020-12-29 | End: 2021-01-01

## 2020-12-28 RX ORDER — GABAPENTIN 400 MG/1
400 CAPSULE ORAL 3 TIMES DAILY
Status: DISCONTINUED | OUTPATIENT
Start: 2020-12-28 | End: 2021-01-09 | Stop reason: HOSPADM

## 2020-12-28 RX ORDER — GABAPENTIN 300 MG/1
600 CAPSULE ORAL EVERY EVENING
Status: DISCONTINUED | OUTPATIENT
Start: 2020-12-28 | End: 2021-01-09 | Stop reason: HOSPADM

## 2020-12-28 RX ADMIN — DIBASIC SODIUM PHOSPHATE, MONOBASIC POTASSIUM PHOSPHATE AND MONOBASIC SODIUM PHOSPHATE 250 MG: 852; 155; 130 TABLET ORAL at 23:03

## 2020-12-28 RX ADMIN — METOPROLOL TARTRATE 50 MG: 25 TABLET, FILM COATED ORAL at 06:13

## 2020-12-28 RX ADMIN — GABAPENTIN 600 MG: 300 CAPSULE ORAL at 23:03

## 2020-12-28 RX ADMIN — DIBASIC SODIUM PHOSPHATE, MONOBASIC POTASSIUM PHOSPHATE AND MONOBASIC SODIUM PHOSPHATE 250 MG: 852; 155; 130 TABLET ORAL at 15:13

## 2020-12-28 RX ADMIN — TRAZODONE HYDROCHLORIDE 50 MG: 50 TABLET ORAL at 23:03

## 2020-12-28 RX ADMIN — DIBASIC SODIUM PHOSPHATE, MONOBASIC POTASSIUM PHOSPHATE AND MONOBASIC SODIUM PHOSPHATE 250 MG: 852; 155; 130 TABLET ORAL at 11:45

## 2020-12-28 RX ADMIN — DOCUSATE SODIUM 50 MG AND SENNOSIDES 8.6 MG 2 TABLET: 8.6; 5 TABLET, FILM COATED ORAL at 23:02

## 2020-12-28 RX ADMIN — TAMSULOSIN HYDROCHLORIDE 0.4 MG: 0.4 CAPSULE ORAL at 23:04

## 2020-12-28 RX ADMIN — ACETAMINOPHEN 650 MG: 325 TABLET, FILM COATED ORAL at 23:03

## 2020-12-28 RX ADMIN — METOPROLOL TARTRATE 50 MG: 25 TABLET, FILM COATED ORAL at 17:26

## 2020-12-28 RX ADMIN — ATORVASTATIN CALCIUM 40 MG: 40 TABLET, FILM COATED ORAL at 23:03

## 2020-12-28 RX ADMIN — ACETAMINOPHEN 650 MG: 325 TABLET, FILM COATED ORAL at 00:42

## 2020-12-28 RX ADMIN — ACETAMINOPHEN 650 MG: 325 TABLET, FILM COATED ORAL at 08:22

## 2020-12-28 RX ADMIN — ACETAMINOPHEN 650 MG: 325 TABLET, FILM COATED ORAL at 15:13

## 2020-12-28 RX ADMIN — GABAPENTIN 400 MG: 400 CAPSULE ORAL at 15:13

## 2020-12-28 RX ADMIN — GABAPENTIN 400 MG: 400 CAPSULE ORAL at 17:28

## 2020-12-28 RX ADMIN — Medication 1000 UNITS: at 08:22

## 2020-12-28 RX ADMIN — GABAPENTIN 400 MG: 400 CAPSULE ORAL at 08:22

## 2020-12-28 RX ADMIN — APIXABAN 5 MG: 5 TABLET, FILM COATED ORAL at 23:05

## 2020-12-28 RX ADMIN — APIXABAN 5 MG: 5 TABLET, FILM COATED ORAL at 08:22

## 2020-12-28 RX ADMIN — LISINOPRIL 20 MG: 20 TABLET ORAL at 06:13

## 2020-12-28 ASSESSMENT — ENCOUNTER SYMPTOMS
CHILLS: 0
EYES NEGATIVE: 1
ABDOMINAL PAIN: 0
PALPITATIONS: 0
VOMITING: 0
FOCAL WEAKNESS: 1
MUSCULOSKELETAL NEGATIVE: 1
RESPIRATORY NEGATIVE: 1
FEVER: 0
BRUISES/BLEEDS EASILY: 0
NAUSEA: 0
POLYDIPSIA: 0

## 2020-12-28 ASSESSMENT — PAIN DESCRIPTION - PAIN TYPE
TYPE: ACUTE PAIN
TYPE: ACUTE PAIN

## 2020-12-28 NOTE — PROGRESS NOTES
Hospital Medicine Daily Progress Note      Chief Complaint  Hypertension  Hypophosphatemia    Interval Problem Update  No fevers, chills, chest pain, shortness of breath, palpitations, abd pain, nausea, vomiting, constipation, or diarrhea.    Review of Systems  Review of Systems   Constitutional: Negative for chills and fever.   HENT: Negative.    Eyes: Negative.    Respiratory: Negative.    Cardiovascular: Negative for chest pain and palpitations.   Gastrointestinal: Negative for abdominal pain, nausea and vomiting.   Genitourinary: Negative.    Musculoskeletal: Negative.    Skin: Negative for itching and rash.   Neurological: Positive for focal weakness.   Endo/Heme/Allergies: Negative for polydipsia. Does not bruise/bleed easily.        Physical Exam  Temp:  [36.5 °C (97.7 °F)-37.2 °C (98.9 °F)] 37.2 °C (98.9 °F)  Pulse:  [58-64] 62  Resp:  [16-18] 18  BP: (130-141)/(47-58) 140/52  SpO2:  [98 %] 98 %    Physical Exam  Vitals signs reviewed.   Constitutional:       General: She is not in acute distress.     Appearance: Normal appearance. She is not ill-appearing.   HENT:      Head: Normocephalic and atraumatic.      Right Ear: External ear normal.      Left Ear: External ear normal.      Nose: Nose normal.      Mouth/Throat:      Pharynx: Oropharynx is clear.   Eyes:      General:         Right eye: No discharge.         Left eye: No discharge.      Extraocular Movements: Extraocular movements intact.      Conjunctiva/sclera: Conjunctivae normal.   Neck:      Musculoskeletal: Normal range of motion and neck supple.   Cardiovascular:      Comments: irreg irreg  Pulmonary:      Effort: No respiratory distress.      Breath sounds: No wheezing.      Comments: Decreased BS   Abdominal:      General: Bowel sounds are normal. There is no distension.      Palpations: Abdomen is soft.      Tenderness: There is no abdominal tenderness.   Musculoskeletal:      Right lower leg: No edema.      Left lower leg: No edema.    Skin:     General: Skin is warm and dry.   Neurological:      Mental Status: She is alert.      Comments: Resting comfortably         Fluids    Intake/Output Summary (Last 24 hours) at 12/27/2020 1639  Last data filed at 12/27/2020 1200  Gross per 24 hour   Intake 460 ml   Output --   Net 460 ml       Laboratory  Recent Labs     12/25/20  0556   WBC 8.4   RBC 4.34   HEMOGLOBIN 12.6   HEMATOCRIT 39.3   MCV 90.6   MCH 29.0   MCHC 32.1*   RDW 48.4   PLATELETCT 294   MPV 9.6     Recent Labs     12/25/20  0556   SODIUM 142   POTASSIUM 4.0   CHLORIDE 106   CO2 26   GLUCOSE 142*   BUN 8   CREATININE 0.44*   CALCIUM 9.0                   Assessment/Plan  * CVA (cerebral vascular accident) ([facility])- (present on admission)  Assessment & Plan  Has RLE weakness  On Eliquis and Lipitor    AF (atrial fibrillation) ([facility])- (present on admission)  Assessment & Plan  Rate controlled on Metoprolol  Anticoagulated on Eliquis    Alkaline phosphatase elevation  Assessment & Plan  Pt w/o abd pain, nausea, or vomiting but Alk Phos levels escalating  U/S Abd +cholelithiasis with gallbladder wall thickening suggesting cholecystitis  Consider HIDA if pt develops GI symptoms    Hypophosphatemia- (present on admission)  Assessment & Plan  Adequately supplemented  Will discontinue Neutra-Phos  Check F/U labs in am     Vitamin D deficiency- (present on admission)  Assessment & Plan  Vit D level 21  On supplementation    Aortic stenosis- (present on admission)  Assessment & Plan  Echo EF 75%, RVSP 40 mmHg, and mod AS  Outpt F/U    Type 2 diabetes mellitus without complication, without long-term current use of insulin ([facility])- (present on admission)  Assessment & Plan  HbA1c 6.2  Diet controlled    Essential hypertension- (present on admission)  Assessment & Plan  On Lisinopril and Metoprolol  Hypertensive trends improved w/ increased ACE-I  Monitor for orthostatic hypotension on Flomax     Full Code    Reviewed w/ RN

## 2020-12-28 NOTE — THERAPY
Speech Language Pathology  Daily Treatment     Patient Name: Liudmila Pulido  Age:  88 y.o., Sex:  female  Medical Record #: [id number]  Today's Date: 12/28/2020     Precautions  Precautions: Fall Risk, Swallow Precautions ( See Comments)  Comments: R lizeth (RLE>RUE), R lateral lean    Subjective    Pt pleasant and cooperative.      Objective       12/28/20 0803   SLP Total Time Spent   SLP Individual Total Time Spent (Mins) 90   Treatment Charges   SLP Swallowing Dysfunction Treatment Swallowing Dysfunction Treatment   SLP Cognitive Skill Development First 15 Minutes 1   SLP Cognitive Skill Development Additional 15 Minutes 1       Assessment    Pt assessed with trials of regular textures with thin liquids. Pt tolerated well with no overt s/sx of asp/pen noted. Pt demonstrates appropriate bite sizes however cont to require cues for single sips of liquids. Again, pt impulsive with liquids however tolerated. Rec advance to regular textures with breads and meats to be precut for pt for ease of consumption. Pt cont to eat in room due to being on isolation, once removed pt to be enrolled into therapeutic dining as appropriate. Please ensure that both upper and lower dentures are in place. Assess X1 with d/c as appropriate. Pt had filled out memory log indep once SLP entered room for PM session. Pt expressed frustration re: being tired and right leg weakness. Cont stroke education with discussion re: POC, rehab goals with goal to return home with safety in place for both pt and family. Pt reporting that DIL would be the one to assist primarily at d/c and willing and able to come in for family training.     Strengths: Able to follow instructions, Making steady progress towards goals, Supportive family, Willingly participates in therapeutic activities, Pleasant and cooperative, Motivated for self care and independence  Barriers: Impaired functional cognition, Visual impairment, Impulsive, Impaired carryover of learning,  Impaired insight/denial of deficits, Aspiration risk    Plan    Advance to regular textures, reinforce functional problem solving and recall.    Speech Therapy Problems     Problem: Memory STGs     Dates: Start: 12/21/20       Goal: STG-Within one week, patient will     Dates: Start: 12/21/20       Description: 1) Individualized goal:  learn and implement functional memory strategies with use of memory book with 80% accuracy provided MOD A.   2) Interventions:  SLP Self Care / ADL Training , SLP Cognitive Skill Development, and SLP Group Treatment                Problem: Problem Solving STGs     Dates: Start: 12/23/20       Goal: STG-Within one week, patient will     Dates: Start: 12/23/20       Description: 1) Individualized goal:  complete attention tasks related to medication management and financial management skills with 80% accuracy provided MIN A.   2) Interventions:  SLP Self Care / ADL Training , SLP Cognitive Skill Development, and SLP Group Treatment                    Problem: Speech/Swallowing LTGs     Dates: Start: 12/21/20       Goal: LTG-By discharge, patient will safely swallow     Dates: Start: 12/21/20       Description: 1) Individualized goal:  the least restrictive diet with no overt s/sx of asp/pen noted across meals with 100% accuracy provided MOD I.  2) Interventions:  SLP Swallowing Dysfunction Treatment, SLP Oral Pharyngeal Evaluation, SLP Video Swallow Evaluation, SLP Self Care / ADL Training , SLP Cognitive Skill Development, and SLP Group Treatment              Goal: LTG-By discharge, patient will     Dates: Start: 12/21/20       Description: 1) Individualized goal:  complete functional problem solving and recall with 80% accuracy provided SPV.   2) Interventions:  SLP Self Care / ADL Training , SLP Cognitive Skill Development, and SLP Group Treatment                  Problem: Swallowing STGs     Dates: Start: 12/21/20       Goal: STG-Within one week, patient will safely swallow      Dates: Start: 12/21/20       Description: 1) Individualized goal:  soft and bite size diet with thin liquids with no overt s/sx of asp/pen noted across 2/2 meals provided MIN cues.   2) Interventions:  SLP Swallowing Dysfunction Treatment, SLP Oral Pharyngeal Evaluation, SLP Video Swallow Evaluation, SLP Self Care / ADL Training , SLP Cognitive Skill Development, and SLP Group Treatment    Note:     Goal Note filed on 12/23/20 1301 by Patrizia Ivan MS,CCC-SLP    Mod cues for safety and impulsivity

## 2020-12-28 NOTE — CARE PLAN
Problem: Communication  Goal: The ability to communicate needs accurately and effectively will improve  Note: Makes needs known to staff.  Encouraged to use call light for staff assist.      Problem: Safety  Goal: Will remain free from falls  Note: Call light kept within reach and encouraged to use for assistance and with toileting needs. Encouraged to call staff and to wait for staff before transfers.      Problem: Pain Management  Goal: Pain level will decrease to patient's comfort goal  Note: Complains of cramping to RLE, medicated with Tylenol tonight, has some relief.  Would like additional medication prn to relieve cramping.  Will continue to monitor patient.

## 2020-12-28 NOTE — PROGRESS NOTES
Received bedside shift report from Gwendolyn MC RN regarding patient and assumed care. Patient awake, calm and stable, currently positioned in bed for comfort and safety; call light within reach. Denies pain or discomfort at this time. Will continue to monitor.

## 2020-12-28 NOTE — PROGRESS NOTES
"Rehab Progress Note     Date of Service: 12/28/2020  Chief Complaint: Follow-up stroke    Interval Events (Subjective)    Patient seen and examined today in her room.  She is working with occupational therapy.  She continues to report some pain in her right leg that is worse at night.  She is going to be rescreened for Covid today but otherwise denies any symptoms of Covid.  She had an abdominal ultrasound over the weekend with possible gallbladder findings, but patient is asymptomatic.  Patient has been able to stand up with some return of motor function in her right leg per therapy.  Patient has no other complaints.      Objective:  VITAL SIGNS: /57   Pulse 62   Temp 36.8 °C (98.3 °F) (Oral)   Resp 16   Ht 1.676 m (5' 5.98\")   Wt 75 kg (165 lb 6.4 oz)   SpO2 96%   BMI 26.71 kg/m²   Gen: alert, no apparent distress  Neuro: notable for right leg weakness and abnormal sensation        Recent Results (from the past 72 hour(s))   Comp Metabolic Panel    Collection Time: 12/28/20  6:00 AM   Result Value Ref Range    Sodium 141 135 - 145 mmol/L    Potassium 3.6 3.6 - 5.5 mmol/L    Chloride 109 96 - 112 mmol/L    Co2 29 20 - 33 mmol/L    Anion Gap 3.0 (L) 7.0 - 16.0    Glucose 144 (H) 65 - 99 mg/dL    Bun 9 8 - 22 mg/dL    Creatinine 0.45 (L) 0.50 - 1.40 mg/dL    Calcium 8.9 8.5 - 10.5 mg/dL    AST(SGOT) 31 12 - 45 U/L    ALT(SGPT) 21 2 - 50 U/L    Alkaline Phosphatase 293 (H) 30 - 99 U/L    Total Bilirubin 0.4 0.1 - 1.5 mg/dL    Albumin 2.3 (L) 3.2 - 4.9 g/dL    Total Protein 5.6 (L) 6.0 - 8.2 g/dL    Globulin 3.3 1.9 - 3.5 g/dL    A-G Ratio 0.7 g/dL   CBC WITH DIFFERENTIAL    Collection Time: 12/28/20  6:00 AM   Result Value Ref Range    WBC 4.3 (L) 4.8 - 10.8 K/uL    RBC 3.99 (L) 4.20 - 5.40 M/uL    Hemoglobin 11.2 (L) 12.0 - 16.0 g/dL    Hematocrit 36.3 (L) 37.0 - 47.0 %    MCV 91.0 81.4 - 97.8 fL    MCH 28.1 27.0 - 33.0 pg    MCHC 30.9 (L) 33.6 - 35.0 g/dL    RDW 49.0 35.9 - 50.0 fL    Platelet Count 285 " 164 - 446 K/uL    MPV 9.6 9.0 - 12.9 fL    Neutrophils-Polys 58.70 44.00 - 72.00 %    Lymphocytes 32.00 22.00 - 41.00 %    Monocytes 6.30 0.00 - 13.40 %    Eosinophils 2.30 0.00 - 6.90 %    Basophils 0.50 0.00 - 1.80 %    Immature Granulocytes 0.20 0.00 - 0.90 %    Nucleated RBC 0.00 /100 WBC    Neutrophils (Absolute) 2.51 2.00 - 7.15 K/uL    Lymphs (Absolute) 1.37 1.00 - 4.80 K/uL    Monos (Absolute) 0.27 0.00 - 0.85 K/uL    Eos (Absolute) 0.10 0.00 - 0.51 K/uL    Baso (Absolute) 0.02 0.00 - 0.12 K/uL    Immature Granulocytes (abs) 0.01 0.00 - 0.11 K/uL    NRBC (Absolute) 0.00 K/uL   MAGNESIUM    Collection Time: 12/28/20  6:00 AM   Result Value Ref Range    Magnesium 1.9 1.5 - 2.5 mg/dL   PHOSPHORUS    Collection Time: 12/28/20  6:00 AM   Result Value Ref Range    Phosphorus 2.6 2.5 - 4.5 mg/dL   ESTIMATED GFR    Collection Time: 12/28/20  6:00 AM   Result Value Ref Range    GFR If African American >60 >60 mL/min/1.73 m 2    GFR If Non African American >60 >60 mL/min/1.73 m 2       Current Facility-Administered Medications   Medication Frequency   • phosphorus (K-Phos-Neutral) per tablet 250 mg TID   • [START ON 12/29/2020] lisinopril (PRINIVIL) tablet 30 mg Q DAY   • gabapentin (NEURONTIN) capsule 400 mg TID   • acetaminophen (Tylenol) tablet 650 mg TID   • polyethylene glycol/lytes (MIRALAX) PACKET 1 Packet DAILY    And   • senna-docusate (PERICOLACE or SENOKOT S) 8.6-50 MG per tablet 2 Tab BID    And   • magnesium hydroxide (MILK OF MAGNESIA) suspension 30 mL QDAY PRN    And   • bisacodyl (DULCOLAX) suppository 10 mg QDAY PRN   • vitamin D (cholecalciferol) tablet 1,000 Units DAILY   • tamsulosin (FLOMAX) capsule 0.4 mg QHS   • Respiratory Therapy Consult Continuous RT   • Pharmacy Consult Request ...Pain Management Review 1 Each PHARMACY TO DOSE   • hydrALAZINE (APRESOLINE) tablet 25 mg Q8HRS PRN   • artificial tears ophthalmic solution 1 Drop PRN   • benzocaine-menthol (CEPACOL) lozenge 1 Lozenge Q2HRS PRN    • mag hydrox-al hydrox-simeth (MAALOX PLUS ES or MYLANTA DS) suspension 20 mL Q2HRS PRN   • ondansetron (ZOFRAN ODT) dispertab 4 mg 4X/DAY PRN    Or   • ondansetron (ZOFRAN) syringe/vial injection 4 mg 4X/DAY PRN   • traZODone (DESYREL) tablet 50 mg QHS PRN   • sodium chloride (OCEAN) 0.65 % nasal spray 2 Spray PRN   • acetaminophen (Tylenol) tablet 650 mg Q6HRS PRN   • atorvastatin (LIPITOR) tablet 40 mg Q EVENING   • apixaban (ELIQUIS) tablet 5 mg BID   • metoprolol (LOPRESSOR) tablet 50 mg TWICE DAILY   • guaiFENesin (ROBITUSSIN) 100 MG/5ML solution 100 mg Q6HRS PRN       Orders Placed This Encounter   Procedures   • Diet Order Diet: Regular (Please make sure that pt has dentures (uppers and lowers) in place for meal. )     Standing Status:   Standing     Number of Occurrences:   1     Order Specific Question:   Diet:     Answer:   Regular [1]     Comments:   Please make sure that pt has dentures (uppers and lowers) in place for meal.        Assessment:  Active Hospital Problems    Diagnosis   • *CVA (cerebral vascular accident) (HCC)   • AF (atrial fibrillation) ([facility])   • Elevated troponin   • Hyponatremia   • Essential hypertension   • Hyperlipidemia   • Vitamin D deficiency   • Other dysphagia   • Cognitive deficits   • Hypophosphatemia   • Type 2 diabetes mellitus without complication, without long-term current use of insulin (HCC)   • Aortic stenosis     This patient is a 88 y.o. female admitted for acute inpatient rehabilitation with CVA (cerebral vascular accident) ([facility]).    I led and attended the weekly conference, and agree with the IDT conference documentation and plan of care as noted below.    Date of conference: 12/23/2020    Goals and barriers: See IDT note.    Biggest barriers: hypertension, right leg hemiplegia, poor endurance    Goals in next week: psychology consult    CM/social support: son supportive    Anticipated DC date: 1/9    Home health: PT/OT/SLP/RN    Equip: doesn't need  any    Follow up: PCP, stroke bridge, Dr. Causey, cardiology      Medical Decision Making and Plan:    High left frontal stroke  Cardio-embolic etiology  Right leg hemiplegia, improved  Right leg hemianesthesia, continues  Cognitive deficits  Dysphagia, resolved  Continue full rehab program  PT/OT/SLP, 1 hr each discipline, 5 days per week  Continue Eliquis and statin for secondary stroke prophylaxis  Monitor for development of depression or spasticity  Consulted Dr. Wasserman, no evidence of depression currently    Outpatient follow-up with the stroke [facility], referral made  Outpatient follow-up with Dr. Causey, referral made    Right leg pain  Appears neuropathic  Started low-dose gabapentin 100 mg 3 times a day 12/22 --> 200 mg TID 12/24 --> 400 mg TID 12/27, add higher nighttime dose of 600 mg, continue to monitor need to increase  Less likely DVT and patient is already on Eliquis  Outpatient follow-up with Dr. Causey    Hypertension  Continue lisinopril 5 mg 12/21 --> 10 mg 12/23  Continue metoprolol 50 mg twice a day  As needed hydralazine  Consult hospitalist, appreciate assistance    Atrial fibrillation  Continue metoprolol 50 mg twice a day  Outpatient follow-up with cardiology  Currently rate controlled    Diabetes with hyperglycemia  HgbA1c 6.2  Diet controlled    Aortic stenosis  Outpatient follow-up with cardiology    Vitamin D deficiency  Continue supplementation    Hypophosphatemia, continues  Status post supplementation  Consult hospitalist, appreciate assistance    Elevated alk phosphatase, continues  Elevated AST, improved  Ultrasound with gallbladder wall thickening  Monitor as patient currently asymptomatic  Consult hospitalist, appreciate assistance    Bowel program  Constipation, improved  Continue bowel medications  Scheduled Sennakot - patient has been refusing  Schedule Miralax  PRN MOM, bisacodyl suppository  Last BM 12/27    Bladder program  Acute urinary retention, resolved  Check PVRs -  68, 98  Started Flomax 0.4 mg 12/21  Bladder scan for no voids  ICP for over 400 cc - required once 12/20  Scheduled toileting    DVT prophylaxis  Eliquis    Total time:  35 minutes.  I spent greater than 50% of the time for patient care, counseling, and coordination on this date, including patient face-to face time, unit/floor time with review of records/pertinent lab data and studies, as well as discussing diagnostic evaluation/work up, planned therapeutic interventions, and future disposition of care, as per the interval events/subjective and the assessment and plan as noted above.    I have performed a physical exam, reviewed and updated ROS, as well as the assessment and plan today 12/28/2020. In review of note from 12/24/2020 there are no new changes except as documented above.          Tangela Noel M.D.   Physical Medicine and Rehabilitation

## 2020-12-28 NOTE — THERAPY
Occupational Therapy  Daily Treatment     Patient Name: Liudmila Pulido  Age:  88 y.o., Sex:  female  Medical Record #: [id number]  Today's Date: 12/28/2020     Precautions  Precautions: (P) Fall Risk, Swallow Precautions ( See Comments)  Comments: (P) R lizeth (RLE>RUE), R lateral lean    Safety   ADL Safety : Requires Supervision for Safety, Requires Physical Assist for Safety, Requires Cueing for Safety  Bathroom Safety: Requires Supervision for Safety, Requires Physical Assist for Safety, Requires Cuing for Safety  Comments: see below notes for ADL performance details.    Subjective    Pt received up in w/c, agreeable to OT session     Objective       12/28/20 0931   Precautions   Precautions Fall Risk;Swallow Precautions ( See Comments)   Comments R lizeth (RLE>RUE), R lateral lean   Functional Level of Assist   Grooming Supervision;Seated   Grooming Description Seated in wheelchair at sink  (washed hands, combed hair seated at sink)   Toileting Total Assist x 2   Toileting Description Assist to pull pants down;Assist to pull pants up;Assist for standing balance;Grab bar;Increased time;Supervision for safety;Verbal cueing  (1 person to assist with brief up/down, 2nd pers for bal/RLE)   Toilet Transfers Total Assist X 2   Toilet Transfer Description Verbal cueing;Grab bar;Increased time;Supervision for safety  (2nd person for safety, stand pivot with grab bar)   Balance   Comments sit to stands x5 during session, first with grab bar with min A followed by FWW with mod A to stand and mod A for balance, consistent posterior and R lean, improving ability to self correct as session progressed though requires cues, consistent cues for RLE engagement   OT Total Time Spent   OT Individual Total Time Spent (Mins) 60   OT Charge Group   OT Self Care / ADL 2   OT Therapy Activity 2     Changed socks seated in w/c due to urinary incontinence in standing, pt able to manage L sock with CGA for balance, mod A for use of sock aid  to don R sock - limited primarily by decreased strength for applying sock to device as well as difficulty maneuvering R foot into sock aid due to limited AROM    Assessment    Pt tolerated session well, demos consistent posterior and R lean in standing when using FWW and requires consistent cues for RLE engagement, heavy reliance on UE support limiting functional standing tasks at this time, continues to benefit from 2nd person for safety particularly during transfers and functional standing tasks due to RLE instability and impaired balance. Pt motivated and participatory throughout, receptive to education on AE use for dressing but requires significant help at this time.   Strengths: Alert and oriented, Independent prior level of function, Motivated for self care and independence, Pleasant and cooperative, Supportive family, Willingly participates in therapeutic activities  Barriers: Bladder incontinence, Bowel incontinence, Decreased endurance, Fatigue, Hemiparesis, Home accessibility, Impaired activity tolerance, Impaired balance, Impaired functional cognition, Limited mobility, Visual impairment    Plan    Cont overall strength/endurance and standing tolerance/balance to improve ADL's and functional mobility    Occupational Therapy Goals     Problem: Bathing     Dates: Start: 12/21/20       Goal: STG-Within one week, patient will bathe     Dates: Start: 12/21/20       Description: 1) Individualized Goal:  mod A with AE/DME as needed.  2) Interventions:  OT E Stim Attended, OT Self Care/ADL, OT Cognitive Skill Dev, OT Community Reintegration, OT Manual Ther Technique, OT Neuro Re-Ed/Balance, OT Therapeutic Activity, OT Evaluation, and OT Therapeutic Exercise      Note:     Goal Note filed on 12/23/20 0700 by Gris Padilla, OT    Max A; pt evaluated on 12/21.                        Problem: Dressing     Dates: Start: 12/21/20       Goal: STG-Within one week, patient will dress LB     Dates: Start: 12/21/20        Description: 1) Individualized Goal:  mod A with AE as needed.  2) Interventions:  OT E Stim Attended, OT Self Care/ADL, OT Cognitive Skill Dev, OT Community Reintegration, OT Manual Ther Technique, OT Neuro Re-Ed/Balance, OT Therapeutic Activity, OT Evaluation, and OT Therapeutic Exercise    Note:     Goal Note filed on 12/23/20 0700 by Gris Padilla, OT    Total A x1-2; pt evaluated on 12/21.                        Problem: Functional Transfers     Dates: Start: 12/21/20       Goal: STG-Within one week, patient will transfer to toilet     Dates: Start: 12/21/20       Description: 1) Individualized Goal:  mod A with AD/DME as needed.  2) Interventions:  OT E Stim Attended, OT Self Care/ADL, OT Cognitive Skill Dev, OT Community Reintegration, OT Manual Ther Technique, OT Neuro Re-Ed/Balance, OT Therapeutic Activity, OT Evaluation, and OT Therapeutic Exercise    Note:     Goal Note filed on 12/23/20 0700 by Gris Padilla, OT    Max-total A; pt evaluated on 12/21.                        Problem: Grooming     Dates: Start: 12/21/20             Problem: OT Long Term Goals     Dates: Start: 12/21/20       Goal: LTG-By discharge, patient will complete basic self care tasks     Dates: Start: 12/21/20       Description: 1) Individualized Goal: Supervision to mod I level with AE/DME as needed.  2) Interventions:  OT E Stim Attended, OT Self Care/ADL, OT Cognitive Skill Dev, OT Community Reintegration, OT Manual Ther Technique, OT Neuro Re-Ed/Balance, OT Therapeutic Activity, OT Evaluation, and OT Therapeutic Exercise          Goal: LTG-By discharge, patient will perform bathroom transfers     Dates: Start: 12/21/20       Description: 1) Individualized Goal:  Supervision to mod I level with AD/DME as needed.  2) Interventions:  OT E Stim Attended, OT Self Care/ADL, OT Cognitive Skill Dev, OT Community Reintegration, OT Manual Ther Technique, OT Neuro Re-Ed/Balance, OT Therapeutic Activity, OT Evaluation, and OT  Therapeutic Exercise                Problem: Toileting     Dates: Start: 12/21/20       Goal: STG-Within one week, patient will complete toileting tasks     Dates: Start: 12/21/20       Description: 1) Individualized Goal:  max A with AE/DME as needed.  2) Interventions:  OT E Stim Attended, OT Self Care/ADL, OT Cognitive Skill Dev, OT Community Reintegration, OT Manual Ther Technique, OT Neuro Re-Ed/Balance, OT Therapeutic Activity, OT Evaluation, and OT Therapeutic Exercise    Note:     Goal Note filed on 12/23/20 0700 by Gris Padilla, OT    Total A x1-2; pt evaluated on 12/21.

## 2020-12-28 NOTE — CARE PLAN
Problem: Safety  Goal: Will remain free from injury  Outcome: PROGRESSING AS EXPECTED     Problem: Venous Thromboembolism (VTW)/Deep Vein Thrombosis (DVT) Prevention:  Goal: Patient will participate in Venous Thrombosis (VTE)/Deep Vein Thrombosis (DVT)Prevention Measures  Flowsheets (Taken 12/28/2020 1503)  Pharmacologic Prophylaxis Used: Apixaban     Problem: Bowel/Gastric:  Goal: Normal bowel function is maintained or improved  Outcome: PROGRESSING AS EXPECTED  Note: Pt refused scheduled bowel medications, last BM 12/27/20.      Problem: Respiratory:  Goal: Respiratory status will improve  Outcome: PROGRESSING AS EXPECTED

## 2020-12-28 NOTE — PROGRESS NOTES
Hospital Medicine Daily Progress Note      Chief Complaint  Hypertension  Hypophosphatemia    Interval Problem Update  Pt seen and examined during therapies, denies new complaints.    Review of Systems  Review of Systems   Constitutional: Negative for chills and fever.   HENT: Negative.    Eyes: Negative.    Respiratory: Negative.    Cardiovascular: Negative for chest pain and palpitations.   Gastrointestinal: Negative for abdominal pain, nausea and vomiting.   Genitourinary: Negative.    Musculoskeletal: Negative.    Skin: Negative for itching and rash.   Neurological: Positive for focal weakness.   Endo/Heme/Allergies: Negative for polydipsia. Does not bruise/bleed easily.        Physical Exam  Temp:  [36.4 °C (97.6 °F)-37.2 °C (98.9 °F)] 36.5 °C (97.7 °F)  Pulse:  [48-93] 60  Resp:  [16-18] 16  BP: (137-160)/(52-72) 156/56  SpO2:  [93 %-97 %] 97 %    Physical Exam  Vitals signs reviewed.   Constitutional:       General: She is not in acute distress.     Appearance: Normal appearance. She is not ill-appearing.   HENT:      Head: Normocephalic and atraumatic.      Right Ear: External ear normal.      Left Ear: External ear normal.      Nose: Nose normal.      Mouth/Throat:      Pharynx: Oropharynx is clear.   Eyes:      General:         Right eye: No discharge.         Left eye: No discharge.      Extraocular Movements: Extraocular movements intact.      Conjunctiva/sclera: Conjunctivae normal.   Neck:      Musculoskeletal: Normal range of motion and neck supple.   Cardiovascular:      Comments: irreg irreg  Pulmonary:      Effort: No respiratory distress.      Breath sounds: No wheezing.      Comments: Decreased BS   Abdominal:      General: Bowel sounds are normal. There is no distension.      Palpations: Abdomen is soft.      Tenderness: There is no abdominal tenderness.   Musculoskeletal:      Right lower leg: No edema.      Left lower leg: No edema.   Skin:     General: Skin is warm and dry.   Neurological:       Mental Status: She is alert.      Comments: Awake and alert         Fluids    Intake/Output Summary (Last 24 hours) at 12/28/2020 1137  Last data filed at 12/28/2020 0816  Gross per 24 hour   Intake 600 ml   Output --   Net 600 ml       Laboratory  Recent Labs     12/28/20  0600   WBC 4.3*   RBC 3.99*   HEMOGLOBIN 11.2*   HEMATOCRIT 36.3*   MCV 91.0   MCH 28.1   MCHC 30.9*   RDW 49.0   PLATELETCT 285   MPV 9.6     Recent Labs     12/28/20  0600   SODIUM 141   POTASSIUM 3.6   CHLORIDE 109   CO2 29   GLUCOSE 144*   BUN 9   CREATININE 0.45*   CALCIUM 8.9                   Assessment/Plan  * CVA (cerebral vascular accident) ([facility])- (present on admission)  Assessment & Plan  Has RLE weakness  On Eliquis and Lipitor    AF (atrial fibrillation) ([facility])- (present on admission)  Assessment & Plan  Rate controlled on Metoprolol  Anticoagulated on Eliquis    Leukopenia  Assessment & Plan  COVID-19 testing in progress  Monitor need for G-CSF  Follow WBC/ANC    Alkaline phosphatase elevation  Assessment & Plan  Pt w/o abd pain, nausea, or vomiting  U/S Abd +cholelithiasis with gallbladder wall thickening suggesting cholecystitis  Alk Phos levels now slowly improving  Consider HIDA if pt develops GI symptoms    Hypophosphatemia- (present on admission)  Assessment & Plan  Alk Phos levels still borderline  Will resume Neutra-Phos supplementation    Vitamin D deficiency- (present on admission)  Assessment & Plan  Vit D level 21  On supplementation    Aortic stenosis- (present on admission)  Assessment & Plan  Echo EF 75%, RVSP 40 mmHg, and mod AS  Outpt F/U    Type 2 diabetes mellitus without complication, without long-term current use of insulin ([facility])- (present on admission)  Assessment & Plan  HbA1c 6.2  Diet controlled    Essential hypertension- (present on admission)  Assessment & Plan  On Lisinopril and Metoprolol  Continue to increase ACE-I for elevated blood pressures  Monitor for orthostatic hypotension on Flomax     Full  Code    Reviewed w/ RN and Dr. Noel

## 2020-12-29 LAB
SARS-COV-2 RNA RESP QL NAA+PROBE: NOTDETECTED
SPECIMEN SOURCE: NORMAL

## 2020-12-29 PROCEDURE — 700102 HCHG RX REV CODE 250 W/ 637 OVERRIDE(OP): Performed by: PHYSICAL MEDICINE & REHABILITATION

## 2020-12-29 PROCEDURE — 99233 SBSQ HOSP IP/OBS HIGH 50: CPT | Performed by: PHYSICAL MEDICINE & REHABILITATION

## 2020-12-29 PROCEDURE — 97535 SELF CARE MNGMENT TRAINING: CPT

## 2020-12-29 PROCEDURE — 97112 NEUROMUSCULAR REEDUCATION: CPT

## 2020-12-29 PROCEDURE — 94760 N-INVAS EAR/PLS OXIMETRY 1: CPT

## 2020-12-29 PROCEDURE — 92526 ORAL FUNCTION THERAPY: CPT

## 2020-12-29 PROCEDURE — 97129 THER IVNTJ 1ST 15 MIN: CPT

## 2020-12-29 PROCEDURE — A9270 NON-COVERED ITEM OR SERVICE: HCPCS | Performed by: PHYSICAL MEDICINE & REHABILITATION

## 2020-12-29 PROCEDURE — 770010 HCHG ROOM/CARE - REHAB SEMI PRIVAT*

## 2020-12-29 PROCEDURE — 700102 HCHG RX REV CODE 250 W/ 637 OVERRIDE(OP): Performed by: HOSPITALIST

## 2020-12-29 PROCEDURE — 97130 THER IVNTJ EA ADDL 15 MIN: CPT

## 2020-12-29 PROCEDURE — A9270 NON-COVERED ITEM OR SERVICE: HCPCS | Performed by: HOSPITALIST

## 2020-12-29 PROCEDURE — 97530 THERAPEUTIC ACTIVITIES: CPT

## 2020-12-29 PROCEDURE — 99232 SBSQ HOSP IP/OBS MODERATE 35: CPT | Performed by: HOSPITALIST

## 2020-12-29 RX ADMIN — APIXABAN 5 MG: 5 TABLET, FILM COATED ORAL at 07:56

## 2020-12-29 RX ADMIN — METOPROLOL TARTRATE 50 MG: 25 TABLET, FILM COATED ORAL at 17:05

## 2020-12-29 RX ADMIN — GABAPENTIN 600 MG: 300 CAPSULE ORAL at 20:10

## 2020-12-29 RX ADMIN — GABAPENTIN 400 MG: 400 CAPSULE ORAL at 07:55

## 2020-12-29 RX ADMIN — ACETAMINOPHEN 650 MG: 325 TABLET, FILM COATED ORAL at 07:55

## 2020-12-29 RX ADMIN — GABAPENTIN 400 MG: 400 CAPSULE ORAL at 13:59

## 2020-12-29 RX ADMIN — DOCUSATE SODIUM 50 MG AND SENNOSIDES 8.6 MG 2 TABLET: 8.6; 5 TABLET, FILM COATED ORAL at 20:11

## 2020-12-29 RX ADMIN — Medication 1000 UNITS: at 07:55

## 2020-12-29 RX ADMIN — DIBASIC SODIUM PHOSPHATE, MONOBASIC POTASSIUM PHOSPHATE AND MONOBASIC SODIUM PHOSPHATE 250 MG: 852; 155; 130 TABLET ORAL at 20:11

## 2020-12-29 RX ADMIN — GABAPENTIN 400 MG: 400 CAPSULE ORAL at 17:05

## 2020-12-29 RX ADMIN — LISINOPRIL 30 MG: 20 TABLET ORAL at 06:13

## 2020-12-29 RX ADMIN — ACETAMINOPHEN 650 MG: 325 TABLET, FILM COATED ORAL at 13:59

## 2020-12-29 RX ADMIN — ATORVASTATIN CALCIUM 40 MG: 40 TABLET, FILM COATED ORAL at 20:11

## 2020-12-29 RX ADMIN — METOPROLOL TARTRATE 50 MG: 25 TABLET, FILM COATED ORAL at 06:13

## 2020-12-29 RX ADMIN — TAMSULOSIN HYDROCHLORIDE 0.4 MG: 0.4 CAPSULE ORAL at 20:10

## 2020-12-29 RX ADMIN — DIBASIC SODIUM PHOSPHATE, MONOBASIC POTASSIUM PHOSPHATE AND MONOBASIC SODIUM PHOSPHATE 250 MG: 852; 155; 130 TABLET ORAL at 14:13

## 2020-12-29 RX ADMIN — DIBASIC SODIUM PHOSPHATE, MONOBASIC POTASSIUM PHOSPHATE AND MONOBASIC SODIUM PHOSPHATE 250 MG: 852; 155; 130 TABLET ORAL at 07:56

## 2020-12-29 RX ADMIN — APIXABAN 5 MG: 5 TABLET, FILM COATED ORAL at 20:11

## 2020-12-29 RX ADMIN — ACETAMINOPHEN 650 MG: 325 TABLET, FILM COATED ORAL at 20:11

## 2020-12-29 ASSESSMENT — ACTIVITIES OF DAILY LIVING (ADL): TUB_SHOWER_TRANSFER_DESCRIPTION: GRAB BAR;INCREASED TIME;SUPERVISION FOR SAFETY

## 2020-12-29 ASSESSMENT — ENCOUNTER SYMPTOMS
SHORTNESS OF BREATH: 0
VOMITING: 0
FEVER: 0
ABDOMINAL PAIN: 0
NERVOUS/ANXIOUS: 0
CHILLS: 0
NAUSEA: 0
DIARRHEA: 0

## 2020-12-29 ASSESSMENT — PAIN DESCRIPTION - PAIN TYPE: TYPE: ACUTE PAIN

## 2020-12-29 NOTE — CARE PLAN
Problem: Safety  Goal: Will remain free from injury  Outcome: PROGRESSING AS EXPECTED  Note: Pt uses call light appropriately, call light is within easy reach.      Problem: Infection  Goal: Will remain free from infection  Note: Pt is Covid negative as of 12/28/20 and is now off of isolation.      Problem: Bowel/Gastric:  Goal: Normal bowel function is maintained or improved  Outcome: PROGRESSING AS EXPECTED  Note: Pt refused scheduled bowel medications this morning.

## 2020-12-29 NOTE — FLOWSHEET NOTE
12/28/20 1511   Events/Summary/Plan   Events/Summary/Plan 02 spot check on RA.  Pt has been off 02 all day and tolerating without difficulty   Vital Signs   Pulse 64   Respiration 16   Pulse Oximetry 93 %   $ Pulse Oximetry (Spot Check) Yes   Respiratory Assessment   Level of Consciousness Alert   Oxygen   O2 Delivery Device None - Room Air

## 2020-12-29 NOTE — THERAPY
Physical Therapy   Daily Treatment     Patient Name: Liudmila Pulido  Age:  88 y.o., Sex:  female  Medical Record #: [id number]  Today's Date: 12/29/2020     Precautions  Precautions: Fall Risk, Swallow Precautions ( See Comments)  Comments: R lizeth (RLE>RUE), R lateral lean    Subjective    Pt reports she is agreeable to      Objective       12/29/20 0831   Interdisciplinary Plan of Care Collaboration   Patient Position at End of Therapy Seated;Call Light within Reach;Tray Table within Reach;Self Releasing Lap Belt Applied;Chair Alarm On   PT Total Time Spent   PT Individual Total Time Spent (Mins) 60   PT Charge Group   PT Neuromuscular Re-Education / Balance 3   PT Therapeutic Activities 1     Pt set up on  this session with edu on e-stim and goals and benefits. Pt assisted in setting e-stim values focused on achieving stim-elicited contraction to tolerance in each muscle group. Pt participated in FES session on  for 26 minutes (25 active, 2 minute warmup, and 1 minute cooldown), 3.42 miles, 2.4 kcal/hour energy expended, 2.8 W power, 23 R % symmetry, 1.04 NM resistance, 35 RPM spd.      Assessment    Pt demos verbal understanding of goals and benefits of estim. Pt able to demo active peddling throughout session and able to tolerate intensity of stimulation to elicit muscle contraction in all major muscle groups of RLE.    Strengths: Able to follow instructions, Independent prior level of function, Good insight into deficits/needs  Barriers: Decreased endurance, Hemiparesis, Home accessibility, Limited mobility, Impaired balance    Plan    Exercise/neuro re-ed, issue HEP for pt to perform in room to initiate their own care when able to complete AROM, NMES,  set up, Trial lite gait and standing in // bars, Transfer training, sit/stand balance, wc mob, ramp navigation - uneven, STS practice with walker, shuttle,          Physical Therapy Problems     Problem: Mobility     Dates: Start: 12/21/20        Goal: STG-Within one week, patient will propel wheelchair household distances     Dates: Start: 12/21/20       Description: 1) Individualized goal:  150 ft At SP[facility] in order to improve activity tolerance propelling herself around facility  2) Interventions:  PT E Stim Attended, PT Orthotics Training, PT Gait Training, PT Therapeutic Exercises, PT Neuro Re-Ed/Balance, PT Aquatic Therapy, PT Therapeutic Activity, PT Manual Therapy, and PT Evaluation      Note:     Goal Note filed on 12/23/20 1129 by Carloz Azar, PT    < 150 ft                         Problem: Mobility Transfers     Dates: Start: 12/21/20       Goal: STG-Within one week, patient will transfer bed to chair     Dates: Start: 12/21/20       Description: 1) Individualized goal:  At mod A with LRD In order to maximize self mobility  2) Interventions:  PT E Stim Attended, PT Orthotics Training, PT Gait Training, PT Therapeutic Exercises, PT Neuro Re-Ed/Balance, PT Aquatic Therapy, PT Therapeutic Activity, PT Manual Therapy, and PT Evaluation    Note:     Goal Note filed on 12/23/20 1129 by Carloz Azar, PT    2nd person assist                         Problem: PT-Long Term Goals     Dates: Start: 12/21/20       Goal: LTG-By discharge, patient will propel wheelchair     Dates: Start: 12/21/20       Description: 1) Individualized goal:  150 ft At mod I in order to improve self mobility  2) Interventions:  PT E Stim Attended, PT Orthotics Training, PT Gait Training, PT Therapeutic Exercises, PT Neuro Re-Ed/Balance, PT Aquatic Therapy, PT Therapeutic Activity, PT Manual Therapy, and PT Evaluation          Goal: LTG-By discharge, patient will transfer one surface to another     Dates: Start: 12/21/20       Description: 1) Individualized goal:  At min A with LRD In order to maximize self mobility  2) Interventions:  PT E Stim Attended, PT Orthotics Training, PT Gait Training, PT Therapeutic Exercises, PT Neuro Re-Ed/Balance, PT Aquatic Therapy,  PT Therapeutic Activity, PT Manual Therapy, and PT Evaluation          Goal: LTG-By discharge, patient will transfer in/out of a car     Dates: Start: 12/21/20       Description: 1) Individualized goal:  At min A with LRD In order to maximize self mobility  2) Interventions:  PT E Stim Attended, PT Orthotics Training, PT Gait Training, PT Therapeutic Exercises, PT Neuro Re-Ed/Balance, PT Aquatic Therapy, PT Therapeutic Activity, PT Manual Therapy, and PT Evaluation

## 2020-12-29 NOTE — DISCHARGE PLANNING
"CM rec'd TC yesterday from patients daughter/POA Keyonna stating that \"her mom had some type of procedure over the weekend and no one informed her of it\".  She also asked CM if she should be doing patients laundry.  CM explained she would need to speak with her moms nurse and referred her to nursing to get questions answered.      CM rec'd call from Keyonna today stating her mom called her and was upset but couldn't explain why and she's confused.  Keyonna stated \"your communication there is poor and someone from neurology called my sister in law yesterday and told my sister in law that I needed to call to schedule follow up appointment\".  She stated \"I don't know how much more clear I can make you guys realize that everything needs to be run by me first and you need a better system to let the family know what they need to do to help\".      I explained my role as CM again as I did upon CM admission/eval., I update after Team Conferences and as the DC date gets closer, we are better able to know patient needs for DC; DME, home health, follow up appointments, family training, etc.   CM has made clear that she is available to call if any needs/concerns arise.        Keyonna stated patient will be DC'ing home with her sister-in-law who will be caring for her 24/7.      Keyonna would like to speak to Rosa Elena OVIEDO regarding concerns.  CM gave Keyonna's name and number to Rosa Elena.  CM will continue to monitor for DC needs.    "

## 2020-12-29 NOTE — CARE PLAN
Problem: Inadequate nutrient intake  Goal: Patient to consume greater than or equal to 50% of meals  Outcome: MET

## 2020-12-29 NOTE — PROGRESS NOTES
Hospital Medicine Daily Progress Note      Chief Complaint:  Hypertension  Afib  Elevated AP    Interval History:  No significant events or changes since last visit    Review of Systems  Review of Systems   Constitutional: Negative for chills and fever.   Respiratory: Negative for shortness of breath.    Cardiovascular: Negative for chest pain.   Gastrointestinal: Negative for abdominal pain, diarrhea, nausea and vomiting.   Psychiatric/Behavioral: The patient is not nervous/anxious.         Physical Exam  Temp:  [36.1 °C (97 °F)-36.9 °C (98.5 °F)] 36.1 °C (97 °F)  Pulse:  [60-64] 63  Resp:  [16-18] 17  BP: (116-156)/(57-63) 143/63  SpO2:  [93 %-96 %] 94 %    Physical Exam  Vitals signs and nursing note reviewed.   Constitutional:       Appearance: Normal appearance.   HENT:      Head: Atraumatic.   Eyes:      Conjunctiva/sclera: Conjunctivae normal.      Pupils: Pupils are equal, round, and reactive to light.   Neck:      Musculoskeletal: Normal range of motion and neck supple.   Cardiovascular:      Rate and Rhythm: Normal rate. Rhythm irregular.      Heart sounds: No murmur.   Pulmonary:      Effort: Pulmonary effort is normal.      Breath sounds: No stridor. No wheezing or rales.   Abdominal:      General: There is no distension.      Palpations: Abdomen is soft.      Tenderness: There is no abdominal tenderness.   Musculoskeletal:      Right lower leg: No edema.      Left lower leg: No edema.   Skin:     General: Skin is warm and dry.      Findings: No rash.   Neurological:      Mental Status: She is alert and oriented to person, place, and time.   Psychiatric:         Mood and Affect: Mood normal.         Behavior: Behavior normal.         Fluids    Intake/Output Summary (Last 24 hours) at 12/29/2020 0856  Last data filed at 12/29/2020 0613  Gross per 24 hour   Intake 320 ml   Output --   Net 320 ml       Laboratory  Recent Labs     12/28/20  0600   WBC 4.3*   RBC 3.99*   HEMOGLOBIN 11.2*   HEMATOCRIT 36.3*    MCV 91.0   MCH 28.1   MCHC 30.9*   RDW 49.0   PLATELETCT 285   MPV 9.6     Recent Labs     12/28/20  0600   SODIUM 141   POTASSIUM 3.6   CHLORIDE 109   CO2 29   GLUCOSE 144*   BUN 9   CREATININE 0.45*   CALCIUM 8.9                   Assessment/Plan  * CVA (cerebral vascular accident) ([facility])- (present on admission)  Assessment & Plan  Has RLE weakness  On Eliquis  On Lipitor    AF (atrial fibrillation) ([facility])- (present on admission)  Assessment & Plan  HR a little labile but ok  On Metoprolol: 50 mg bid  On Eliquis  Cont to monitor    Alkaline phosphatase elevation  Assessment & Plan  AP: 293 (12/28)  Asymptomatic -- denies abd pain, nausea, or vomiting  U/S abd: shows cholelithiasis with gallbladder wall thickening suggesting cholecystitis  Consider HIDA if pt develops GI symptoms  Will need out patient follow up    Hypophosphatemia- (present on admission)  Assessment & Plan  PO4: 2.3 --> 2.8 --> 2.6 (12/28)  On supplements  Monitpr    Vitamin D deficiency- (present on admission)  Assessment & Plan  Vit D: 21  On supplements    Aortic stenosis- (present on admission)  Assessment & Plan  Echo: EF 75%, RVSP 40, moderate AS  Outpt follow up    Type 2 diabetes mellitus without complication, without long-term current use of insulin ([facility])- (present on admission)  Assessment & Plan  Hba1c: 6.2 (12/17)  Diet controlled    Essential hypertension- (present on admission)  Assessment & Plan  BP a little labile bit ok  On Lisinopril: 20 mg daily --> 30 mg daily (12/29)  On Lopressor: 50 mg bid  Note: on Flomax  Cont to monitor

## 2020-12-29 NOTE — FLOWSHEET NOTE
12/29/20 1055   Events/Summary/Plan   Events/Summary/Plan 02 spot check on RA   Vital Signs   Pulse 60   Respiration 18   Pulse Oximetry 99 %   $ Pulse Oximetry (Spot Check) Yes   Respiratory Assessment   Level of Consciousness Alert   Oxygen   O2 Delivery Device None - Room Air

## 2020-12-29 NOTE — THERAPY
"Occupational Therapy  Daily Treatment     Patient Name: Liudmila Pulido  Age:  88 y.o., Sex:  female  Medical Record #: [id number]  Today's Date: 12/29/2020     Precautions  Precautions: (P) Fall Risk, Swallow Precautions ( See Comments)  Comments: (P) R lizeth (RLE>RUE), R lateral lean    Safety   ADL Safety : Requires Supervision for Safety, Requires Physical Assist for Safety, Requires Cueing for Safety  Bathroom Safety: Requires Supervision for Safety, Requires Physical Assist for Safety, Requires Cuing for Safety  Comments: (P) See functional levels of assist below for ADL performance details     Subjective    \"It feels great! Thank you girls so much for helping me,\" patient stated after shower. \"I've never felt so helpless.\"     Objective     12/29/20 0701   Precautions   Precautions Fall Risk;Swallow Precautions ( See Comments)   Comments R lizeth (RLE>RUE), R lateral lean   Safety    Comments See functional levels of assist below for ADL performance details    Vitals   O2 Delivery Device None - Room Air   Cognition    Level of Consciousness Alert   Sleep/Wake Cycle   Sleep Observations Alert upon awakening   Functional Level of Assist   Grooming Supervision  (for combing hair and brushing dentures while seated in w/c)   Bathing Moderate Assist  (for thoroughness w/ drying buttocks/distal LEs, balance)   Bathing Description Grab bar;Hand held shower;Long handled bath tool;Tub bench;Supervision for safety;Set-up of equipment;Assit wtih lower extremities;Increased time;Assit with back  (seated on fold down shower bench )   Upper Body Dressing Supervision  (set-up and supervision for donning shirt while seated in w/c)   Lower Body Dressing Total Assist  (min A for donning R sock w/ AE,2 person A for donning pants)   Lower Body Dressing Description Sock aid   Tub / Shower Transfers   (min-mod A x 1, CGA-SBA x 1 for safety (stand pivot w/ GB))   Tub Shower Transfer Description Grab bar;Increased time;Supervision for " safety   Interdisciplinary Plan of Care Collaboration   IDT Collaboration with  Therapy Tech   Patient Position at End of Therapy Seated;Call Light within Reach;Self Releasing Lap Belt Applied   Collaboration Comments LB dressing, txfrs    OT Total Time Spent   OT Individual Total Time Spent (Mins) 60   OT Charge Group   OT Self Care / ADL 4     Assessment    Patient tolerated OT session well with focus on progression with ADLs. Incorporates BUEs into functional tasks well. Functional performance primarily limited by poor RLE motor control, decreased balance/posterior lean, and decreased endurance.   Strengths: Alert and oriented, Independent prior level of function, Motivated for self care and independence, Pleasant and cooperative, Supportive family, Willingly participates in therapeutic activities  Barriers: Bladder incontinence, Bowel incontinence, Decreased endurance, Fatigue, Hemiparesis, Home accessibility, Impaired activity tolerance, Impaired balance, Impaired functional cognition, Limited mobility, Visual impairment    Plan    Cont overall strength/endurance and standing tolerance/balance to improve ADL's and functional mobility    Occupational Therapy Goals     Problem: Bathing     Dates: Start: 12/21/20       Goal: STG-Within one week, patient will bathe     Dates: Start: 12/21/20       Description: 1) Individualized Goal:  mod A with AE/DME as needed.  2) Interventions:  OT E Stim Attended, OT Self Care/ADL, OT Cognitive Skill Dev, OT Community Reintegration, OT Manual Ther Technique, OT Neuro Re-Ed/Balance, OT Therapeutic Activity, OT Evaluation, and OT Therapeutic Exercise      Note:     Goal Note filed on 12/23/20 0700 by Gris Padilla, OT    Max A; pt evaluated on 12/21.                        Problem: Dressing     Dates: Start: 12/21/20       Goal: STG-Within one week, patient will dress LB     Dates: Start: 12/21/20       Description: 1) Individualized Goal:  mod A with AE as needed.  2)  Interventions:  OT E Stim Attended, OT Self Care/ADL, OT Cognitive Skill Dev, OT Community Reintegration, OT Manual Ther Technique, OT Neuro Re-Ed/Balance, OT Therapeutic Activity, OT Evaluation, and OT Therapeutic Exercise    Note:     Goal Note filed on 12/23/20 0700 by Gris Padilla, OT    Total A x1-2; pt evaluated on 12/21.                        Problem: Functional Transfers     Dates: Start: 12/21/20       Goal: STG-Within one week, patient will transfer to toilet     Dates: Start: 12/21/20       Description: 1) Individualized Goal:  mod A with AD/DME as needed.  2) Interventions:  OT E Stim Attended, OT Self Care/ADL, OT Cognitive Skill Dev, OT Community Reintegration, OT Manual Ther Technique, OT Neuro Re-Ed/Balance, OT Therapeutic Activity, OT Evaluation, and OT Therapeutic Exercise    Note:     Goal Note filed on 12/23/20 0700 by Gris Padilla, OT    Max-total A; pt evaluated on 12/21.                        Problem: Grooming     Dates: Start: 12/21/20             Problem: OT Long Term Goals     Dates: Start: 12/21/20       Goal: LTG-By discharge, patient will complete basic self care tasks     Dates: Start: 12/21/20       Description: 1) Individualized Goal: Supervision to mod I level with AE/DME as needed.  2) Interventions:  OT E Stim Attended, OT Self Care/ADL, OT Cognitive Skill Dev, OT Community Reintegration, OT Manual Ther Technique, OT Neuro Re-Ed/Balance, OT Therapeutic Activity, OT Evaluation, and OT Therapeutic Exercise          Goal: LTG-By discharge, patient will perform bathroom transfers     Dates: Start: 12/21/20       Description: 1) Individualized Goal:  Supervision to mod I level with AD/DME as needed.  2) Interventions:  OT E Stim Attended, OT Self Care/ADL, OT Cognitive Skill Dev, OT Community Reintegration, OT Manual Ther Technique, OT Neuro Re-Ed/Balance, OT Therapeutic Activity, OT Evaluation, and OT Therapeutic Exercise                Problem: Toileting     Dates: Start:  12/21/20       Goal: STG-Within one week, patient will complete toileting tasks     Dates: Start: 12/21/20       Description: 1) Individualized Goal:  max A with AE/DME as needed.  2) Interventions:  OT E Stim Attended, OT Self Care/ADL, OT Cognitive Skill Dev, OT Community Reintegration, OT Manual Ther Technique, OT Neuro Re-Ed/Balance, OT Therapeutic Activity, OT Evaluation, and OT Therapeutic Exercise    Note:     Goal Note filed on 12/23/20 0700 by Gris Padilla, OT    Total A x1-2; pt evaluated on 12/21.

## 2020-12-29 NOTE — PROGRESS NOTES
"Rehab Progress Note     Date of Service: 12/29/2020  Chief Complaint: Follow-up stroke    Interval Events (Subjective)    Patient seen and examined today in her room.  Her Covid test came back negative.  She is very happy about her right leg is having some return of function.  She is able to stand with therapy right now although she is unable to take steps.  Patient reports the pain in her right leg has improved as well.  She has no new complaints today.  She reports she has had her flu shot as well as her pneumonia shot in [place] prior to admission.    Later in the afternoon I called the patient's daughter and had a prolonged discussion with her regarding the medical issues with her mother.    Objective:  VITAL SIGNS: /63   Pulse 60   Temp 36.1 °C (97 °F) (Temporal)   Resp 18   Ht 1.676 m (5' 5.98\")   Wt 75 kg (165 lb 6.4 oz)   SpO2 99%   BMI 26.71 kg/m²   Gen: alert, no apparent distress  Neuro: notable for improving right-sided leg weakness and abnormal sensation        Recent Results (from the past 72 hour(s))   Comp Metabolic Panel    Collection Time: 12/28/20  6:00 AM   Result Value Ref Range    Sodium 141 135 - 145 mmol/L    Potassium 3.6 3.6 - 5.5 mmol/L    Chloride 109 96 - 112 mmol/L    Co2 29 20 - 33 mmol/L    Anion Gap 3.0 (L) 7.0 - 16.0    Glucose 144 (H) 65 - 99 mg/dL    Bun 9 8 - 22 mg/dL    Creatinine 0.45 (L) 0.50 - 1.40 mg/dL    Calcium 8.9 8.5 - 10.5 mg/dL    AST(SGOT) 31 12 - 45 U/L    ALT(SGPT) 21 2 - 50 U/L    Alkaline Phosphatase 293 (H) 30 - 99 U/L    Total Bilirubin 0.4 0.1 - 1.5 mg/dL    Albumin 2.3 (L) 3.2 - 4.9 g/dL    Total Protein 5.6 (L) 6.0 - 8.2 g/dL    Globulin 3.3 1.9 - 3.5 g/dL    A-G Ratio 0.7 g/dL   CBC WITH DIFFERENTIAL    Collection Time: 12/28/20  6:00 AM   Result Value Ref Range    WBC 4.3 (L) 4.8 - 10.8 K/uL    RBC 3.99 (L) 4.20 - 5.40 M/uL    Hemoglobin 11.2 (L) 12.0 - 16.0 g/dL    Hematocrit 36.3 (L) 37.0 - 47.0 %    MCV 91.0 81.4 - 97.8 fL    MCH 28.1 " 27.0 - 33.0 pg    MCHC 30.9 (L) 33.6 - 35.0 g/dL    RDW 49.0 35.9 - 50.0 fL    Platelet Count 285 164 - 446 K/uL    MPV 9.6 9.0 - 12.9 fL    Neutrophils-Polys 58.70 44.00 - 72.00 %    Lymphocytes 32.00 22.00 - 41.00 %    Monocytes 6.30 0.00 - 13.40 %    Eosinophils 2.30 0.00 - 6.90 %    Basophils 0.50 0.00 - 1.80 %    Immature Granulocytes 0.20 0.00 - 0.90 %    Nucleated RBC 0.00 /100 WBC    Neutrophils (Absolute) 2.51 2.00 - 7.15 K/uL    Lymphs (Absolute) 1.37 1.00 - 4.80 K/uL    Monos (Absolute) 0.27 0.00 - 0.85 K/uL    Eos (Absolute) 0.10 0.00 - 0.51 K/uL    Baso (Absolute) 0.02 0.00 - 0.12 K/uL    Immature Granulocytes (abs) 0.01 0.00 - 0.11 K/uL    NRBC (Absolute) 0.00 K/uL   MAGNESIUM    Collection Time: 12/28/20  6:00 AM   Result Value Ref Range    Magnesium 1.9 1.5 - 2.5 mg/dL   PHOSPHORUS    Collection Time: 12/28/20  6:00 AM   Result Value Ref Range    Phosphorus 2.6 2.5 - 4.5 mg/dL   ESTIMATED GFR    Collection Time: 12/28/20  6:00 AM   Result Value Ref Range    GFR If African American >60 >60 mL/min/1.73 m 2    GFR If Non African American >60 >60 mL/min/1.73 m 2   COVID/SARS CoV-2 PCR    Collection Time: 12/28/20 10:30 AM    Specimen: Nasopharyngeal; Respirate   Result Value Ref Range    COVID Order Status Received    SARS-CoV-2, PCR (In-House)    Collection Time: 12/28/20 10:30 AM   Result Value Ref Range    SARS-CoV-2 Source NP Swab     SARS-CoV-2 by PCR NotDetected        Current Facility-Administered Medications   Medication Frequency   • phosphorus (K-Phos-Neutral) per tablet 250 mg TID   • lisinopril (PRINIVIL) tablet 30 mg Q DAY   • gabapentin (NEURONTIN) capsule 400 mg TID   • gabapentin (NEURONTIN) capsule 600 mg Q EVENING   • acetaminophen (Tylenol) tablet 650 mg TID   • polyethylene glycol/lytes (MIRALAX) PACKET 1 Packet DAILY    And   • senna-docusate (PERICOLACE or SENOKOT S) 8.6-50 MG per tablet 2 Tab BID    And   • magnesium hydroxide (MILK OF MAGNESIA) suspension 30 mL QDAY PRN    And   •  bisacodyl (DULCOLAX) suppository 10 mg QDAY PRN   • vitamin D (cholecalciferol) tablet 1,000 Units DAILY   • tamsulosin (FLOMAX) capsule 0.4 mg QHS   • Respiratory Therapy Consult Continuous RT   • Pharmacy Consult Request ...Pain Management Review 1 Each PHARMACY TO DOSE   • hydrALAZINE (APRESOLINE) tablet 25 mg Q8HRS PRN   • artificial tears ophthalmic solution 1 Drop PRN   • benzocaine-menthol (CEPACOL) lozenge 1 Lozenge Q2HRS PRN   • mag hydrox-al hydrox-simeth (MAALOX PLUS ES or MYLANTA DS) suspension 20 mL Q2HRS PRN   • ondansetron (ZOFRAN ODT) dispertab 4 mg 4X/DAY PRN    Or   • ondansetron (ZOFRAN) syringe/vial injection 4 mg 4X/DAY PRN   • traZODone (DESYREL) tablet 50 mg QHS PRN   • sodium chloride (OCEAN) 0.65 % nasal spray 2 Spray PRN   • acetaminophen (Tylenol) tablet 650 mg Q6HRS PRN   • atorvastatin (LIPITOR) tablet 40 mg Q EVENING   • apixaban (ELIQUIS) tablet 5 mg BID   • metoprolol (LOPRESSOR) tablet 50 mg TWICE DAILY   • guaiFENesin (ROBITUSSIN) 100 MG/5ML solution 100 mg Q6HRS PRN       Orders Placed This Encounter   Procedures   • Diet Order Diet: Regular (Please make sure that pt has dentures (uppers and lowers) in place for meal. )     Standing Status:   Standing     Number of Occurrences:   1     Order Specific Question:   Diet:     Answer:   Regular [1]     Comments:   Please make sure that pt has dentures (uppers and lowers) in place for meal.        Assessment:  Active Hospital Problems    Diagnosis   • *CVA (cerebral vascular accident) ([facility])   • AF (atrial fibrillation) ([facility])   • Elevated troponin   • Hyponatremia   • Essential hypertension   • Hyperlipidemia   • Vitamin D deficiency   • Other dysphagia   • Cognitive deficits   • Hypophosphatemia   • Type 2 diabetes mellitus without complication, without long-term current use of insulin ([facility])   • Aortic stenosis     This patient is a 88 y.o. female admitted for acute inpatient rehabilitation with CVA (cerebral vascular accident)  (Formerly [facility]).    I led and attended the weekly conference, and agree with the IDT conference documentation and plan of care as noted below.    Date of conference: 12/23/2020    Goals and barriers: See IDT note.    Biggest barriers: hypertension, right leg hemiplegia, poor endurance    Goals in next week: psychology consult    CM/social support: son supportive    Anticipated DC date: 1/9    Home health: PT/OT/SLP/RN    Equip: doesn't need any    Follow up: PCP, stroke bridge, Dr. Causey, cardiology      Medical Decision Making and Plan:    High left frontal stroke  Cardio-embolic etiology  Right leg hemiplegia, improved  Right leg hemianesthesia, continues/improved  Cognitive deficits  Dysphagia, resolved  Continue full rehab program  PT/OT/SLP, 1 hr each discipline, 5 days per week  Continue Eliquis and statin for secondary stroke prophylaxis  Monitor for development of depression or spasticity  Consulted Dr. Wasserman, no evidence of depression currently    Outpatient follow-up with the [facility], referral made  Outpatient follow-up with Dr. Causey, referral made    Right leg neuropathic pain, improved  Started low-dose gabapentin 100 mg 3 times a day 12/22 --> 200 mg TID 12/24 --> 400 mg TID 12/27, added higher nighttime dose of 600 mg, continue to monitor need to increase  Less likely DVT and patient is already on Eliquis  Outpatient follow-up with Dr. Causey    Hypertension  Continue lisinopril 5 mg 12/21 --> 10 mg 12/23 --> 30 mg 12/29  Continue metoprolol 50 mg twice a day  As needed hydralazine  Consult hospitalist, appreciate assistance    Atrial fibrillation  Continue metoprolol 50 mg twice a day  Outpatient follow-up with cardiology  Currently rate controlled    Diabetes with hyperglycemia  HgbA1c 6.2  Diet controlled    Aortic stenosis  Outpatient follow-up with cardiology    Vitamin D deficiency  Continue supplementation    Hypophosphatemia, continues  Continue supplementation  Consult hospitalist,  appreciate assistance    Elevated alk phosphatase, continues  Elevated AST, improved  Ultrasound with gallbladder wall thickening  Monitor as patient currently asymptomatic  Consult hospitalist, appreciate assistance    Bowel program  Constipation, improved  Continue bowel medications  Scheduled Sennakot - patient has been refusing  Schedule Miralax  PRN MOM, bisacodyl suppository  Last BM 12/27    Bladder program  Acute urinary retention, resolved  Check PVRs - 68, 98  Started Flomax 0.4 mg 12/21  Bladder scan for no voids  ICP for over 400 cc - required once 12/20  Scheduled toileting    DVT prophylaxis  Eliquis    Total time:  36 minutes.  I spent greater than 50% of the time for patient care, counseling, and coordination on this date, including patient face-to face time, unit/floor time with review of records/pertinent lab data and studies, as well as discussing diagnostic evaluation/work up, planned therapeutic interventions, and future disposition of care, as per the interval events/subjective and the assessment and plan as noted above.    Prolonged discussion with daughter over the phone today regarding her mother's medical issues.    I have performed a physical exam, reviewed and updated ROS, as well as the assessment and plan today 12/29/2020. In review of note from 12/28/20 there are no new changes except as documented above.              Tangela Noel M.D.   Physical Medicine and Rehabilitation

## 2020-12-29 NOTE — PROGRESS NOTES
Received bedside shift report from Princess ELICEO SEAMAN RN regarding patient and assumed care. Patient awake, calm and stable, currently positioned in bed for comfort and safety; call light within reach. Denies pain or discomfort at this time. Will continue to monitor.

## 2020-12-29 NOTE — REHAB-DIETARY IDT TEAM NOTE
Dietary   Nutrition  Dietary Problems     Problem: Inadequate nutrient intake     Goal: Patient to consume greater than or equal to 50% of meals (Resolved)                     Patient averaging ~53% of meals on Regular diet with Boost GC BID.  Patient reporting fair to good intake with small portions and supplements. Feels current intake is at baseline.   No new weight taken since 12/20.  Wound team eval noted with skin CDI from 12/23.  Red cecily around buttock per RN charting.   Pertinent labs: WBC 4.3, Hgb 11.2/Hct 36.3, glucose 144, Anion Gap 3, Creat 0.45, Glucose 144, Alk phos 293, Albumin 2.3,T.P. 5.6  Pertinent Medications: noted    Vitals: RA, /63  GI: BM today  : WNL  I/Os: +440mL x 24 hours - no output recorded    Plan: Continue current nutritional POC. RD following PRN per protocol.       Section completed by:  Danyelle Adams R.D.

## 2020-12-29 NOTE — THERAPY
Speech Language Pathology  Daily Treatment     Patient Name: Liudmila Pulido  Age:  88 y.o., Sex:  female  Medical Record #: [id number]  Today's Date: 12/29/2020     Precautions  Precautions: Fall Risk, Swallow Precautions ( See Comments)  Comments: R lizeth (RLE>RUE), R lateral lean    Subjective    Pt was pleasant and cooperative during this ST session     Objective       12/29/20 1101   SLP Total Time Spent   SLP Individual Total Time Spent (Mins) 90   Treatment Charges   SLP Swallowing Dysfunction Treatment Swallowing Dysfunction Treatment   SLP Cognitive Skill Development First 15 Minutes 1   SLP Cognitive Skill Development Additional 15 Minutes 3       Assessment    Pt seen for 2 sessions today:     11:00-12:00- Initiated stroke education.  Reviewed stroke risk factors including high blood pressure, high cholesterol, diet, physical activity and a-fib.  Pt reported that her daughter would be setting up her medications in a pill box for her to make sure she was taking her medications accurately.  Pt tolerated regular textures and thin liquids during lunch without difficulty.  Pt was able to chew a chicken sandwich without trouble and requested not to have her meat ground.  No further therapy warranted for dysphagia management.      [id number]  Continued stroke education using [employer] stroke education packet.  Reviewed the DASH diet, how to manage blood pressure and cholesterol and ways to continue physical activity at home.      Strengths: Able to follow instructions, Making steady progress towards goals, Supportive family, Willingly participates in therapeutic activities, Pleasant and cooperative, Motivated for self care and independence  Barriers: Impaired functional cognition, Visual impairment, Impulsive, Impaired carryover of learning, Impaired insight/denial of deficits, Aspiration risk    Plan    Continue with stroke education using stroke packet (left off at [facility]), target attention tasks      Speech Therapy Problems     Problem: Memory STGs     Dates: Start: 12/21/20       Goal: STG-Within one week, patient will     Dates: Start: 12/21/20       Description: 1) Individualized goal:  learn and implement functional memory strategies with use of memory book with 80% accuracy provided MOD A.   2) Interventions:  SLP Self Care / ADL Training , SLP Cognitive Skill Development, and SLP Group Treatment                Problem: Problem Solving STGs     Dates: Start: 12/23/20       Goal: STG-Within one week, patient will     Dates: Start: 12/23/20       Description: 1) Individualized goal:  complete attention tasks related to medication management and financial management skills with 80% accuracy provided MIN A.   2) Interventions:  SLP Self Care / ADL Training , SLP Cognitive Skill Development, and SLP Group Treatment                    Problem: Speech/Swallowing LTGs     Dates: Start: 12/21/20       Goal: LTG-By discharge, patient will safely swallow     Dates: Start: 12/21/20       Description: 1) Individualized goal:  the least restrictive diet with no overt s/sx of asp/pen noted across meals with 100% accuracy provided MOD I.  2) Interventions:  SLP Swallowing Dysfunction Treatment, SLP Oral Pharyngeal Evaluation, SLP Video Swallow Evaluation, SLP Self Care / ADL Training , SLP Cognitive Skill Development, and SLP Group Treatment              Goal: LTG-By discharge, patient will     Dates: Start: 12/21/20       Description: 1) Individualized goal:  complete functional problem solving and recall with 80% accuracy provided SPV.   2) Interventions:  SLP Self Care / ADL Training , SLP Cognitive Skill Development, and SLP Group Treatment                  Problem: Swallowing STGs     Dates: Start: 12/21/20       Goal: STG-Within one week, patient will safely swallow     Dates: Start: 12/21/20       Description: 1) Individualized goal:  soft and bite size diet with thin liquids with no overt s/sx of asp/pen noted  across 2/2 meals provided MIN cues.   2) Interventions:  SLP Swallowing Dysfunction Treatment, SLP Oral Pharyngeal Evaluation, SLP Video Swallow Evaluation, SLP Self Care / ADL Training , SLP Cognitive Skill Development, and SLP Group Treatment    Note:     Goal Note filed on 12/23/20 1301 by Patrizia Ivan MS,CCC-SLP    Mod cues for safety and impulsivity

## 2020-12-30 LAB
GLUCOSE BLD-MCNC: 141 MG/DL (ref 65–99)
GLUCOSE BLD-MCNC: 322 MG/DL (ref 65–99)

## 2020-12-30 PROCEDURE — A9270 NON-COVERED ITEM OR SERVICE: HCPCS | Performed by: HOSPITALIST

## 2020-12-30 PROCEDURE — A9270 NON-COVERED ITEM OR SERVICE: HCPCS | Performed by: PHYSICAL MEDICINE & REHABILITATION

## 2020-12-30 PROCEDURE — 97116 GAIT TRAINING THERAPY: CPT

## 2020-12-30 PROCEDURE — 700102 HCHG RX REV CODE 250 W/ 637 OVERRIDE(OP): Performed by: PHYSICAL MEDICINE & REHABILITATION

## 2020-12-30 PROCEDURE — 770010 HCHG ROOM/CARE - REHAB SEMI PRIVAT*

## 2020-12-30 PROCEDURE — 97110 THERAPEUTIC EXERCISES: CPT

## 2020-12-30 PROCEDURE — 97530 THERAPEUTIC ACTIVITIES: CPT

## 2020-12-30 PROCEDURE — 97130 THER IVNTJ EA ADDL 15 MIN: CPT

## 2020-12-30 PROCEDURE — 99233 SBSQ HOSP IP/OBS HIGH 50: CPT | Performed by: PHYSICAL MEDICINE & REHABILITATION

## 2020-12-30 PROCEDURE — 97129 THER IVNTJ 1ST 15 MIN: CPT

## 2020-12-30 PROCEDURE — 700102 HCHG RX REV CODE 250 W/ 637 OVERRIDE(OP): Performed by: HOSPITALIST

## 2020-12-30 PROCEDURE — 97112 NEUROMUSCULAR REEDUCATION: CPT

## 2020-12-30 PROCEDURE — 99232 SBSQ HOSP IP/OBS MODERATE 35: CPT | Performed by: HOSPITALIST

## 2020-12-30 PROCEDURE — 82962 GLUCOSE BLOOD TEST: CPT

## 2020-12-30 RX ADMIN — APIXABAN 5 MG: 5 TABLET, FILM COATED ORAL at 08:11

## 2020-12-30 RX ADMIN — GABAPENTIN 400 MG: 400 CAPSULE ORAL at 08:11

## 2020-12-30 RX ADMIN — GABAPENTIN 600 MG: 300 CAPSULE ORAL at 21:27

## 2020-12-30 RX ADMIN — LISINOPRIL 30 MG: 20 TABLET ORAL at 05:42

## 2020-12-30 RX ADMIN — GABAPENTIN 400 MG: 400 CAPSULE ORAL at 17:48

## 2020-12-30 RX ADMIN — POLYETHYLENE GLYCOL 3350 1 PACKET: 17 POWDER, FOR SOLUTION ORAL at 08:08

## 2020-12-30 RX ADMIN — ACETAMINOPHEN 650 MG: 325 TABLET, FILM COATED ORAL at 14:11

## 2020-12-30 RX ADMIN — DIBASIC SODIUM PHOSPHATE, MONOBASIC POTASSIUM PHOSPHATE AND MONOBASIC SODIUM PHOSPHATE 250 MG: 852; 155; 130 TABLET ORAL at 21:27

## 2020-12-30 RX ADMIN — DOCUSATE SODIUM 50 MG AND SENNOSIDES 8.6 MG 2 TABLET: 8.6; 5 TABLET, FILM COATED ORAL at 08:10

## 2020-12-30 RX ADMIN — APIXABAN 5 MG: 5 TABLET, FILM COATED ORAL at 21:26

## 2020-12-30 RX ADMIN — TAMSULOSIN HYDROCHLORIDE 0.4 MG: 0.4 CAPSULE ORAL at 21:28

## 2020-12-30 RX ADMIN — Medication 1000 UNITS: at 08:10

## 2020-12-30 RX ADMIN — ATORVASTATIN CALCIUM 40 MG: 40 TABLET, FILM COATED ORAL at 21:28

## 2020-12-30 RX ADMIN — METOPROLOL TARTRATE 50 MG: 25 TABLET, FILM COATED ORAL at 17:48

## 2020-12-30 RX ADMIN — METFORMIN HYDROCHLORIDE 250 MG: 500 TABLET ORAL at 17:48

## 2020-12-30 RX ADMIN — METOPROLOL TARTRATE 50 MG: 25 TABLET, FILM COATED ORAL at 05:43

## 2020-12-30 RX ADMIN — DIBASIC SODIUM PHOSPHATE, MONOBASIC POTASSIUM PHOSPHATE AND MONOBASIC SODIUM PHOSPHATE 250 MG: 852; 155; 130 TABLET ORAL at 14:11

## 2020-12-30 RX ADMIN — ACETAMINOPHEN 650 MG: 325 TABLET, FILM COATED ORAL at 21:26

## 2020-12-30 RX ADMIN — ACETAMINOPHEN 650 MG: 325 TABLET, FILM COATED ORAL at 08:11

## 2020-12-30 RX ADMIN — DIBASIC SODIUM PHOSPHATE, MONOBASIC POTASSIUM PHOSPHATE AND MONOBASIC SODIUM PHOSPHATE 250 MG: 852; 155; 130 TABLET ORAL at 08:11

## 2020-12-30 RX ADMIN — GABAPENTIN 400 MG: 400 CAPSULE ORAL at 14:11

## 2020-12-30 ASSESSMENT — ENCOUNTER SYMPTOMS
NAUSEA: 0
SHORTNESS OF BREATH: 0
FEVER: 0
VOMITING: 0
HEADACHES: 0
HALLUCINATIONS: 0
DIZZINESS: 0
BLURRED VISION: 0
PALPITATIONS: 0

## 2020-12-30 ASSESSMENT — GAIT ASSESSMENTS
DISTANCE (FEET): 10
ASSISTIVE DEVICE: PARALLEL BARS
GAIT LEVEL OF ASSIST: UNABLE TO PARTICIPATE
GAIT LEVEL OF ASSIST: MODERATE ASSIST

## 2020-12-30 NOTE — PROGRESS NOTES
Hospital Medicine Daily Progress Note      Chief Complaint:  Hypertension  Afib  Elevated AP    Interval History:  No significant events or changes since last visit    Review of Systems  Review of Systems   Constitutional: Negative for fever.   Eyes: Negative for blurred vision.   Respiratory: Negative for shortness of breath.    Cardiovascular: Negative for palpitations.   Gastrointestinal: Negative for nausea and vomiting.   Neurological: Negative for dizziness and headaches.   Psychiatric/Behavioral: Negative for hallucinations.        Physical Exam  Temp:  [36.5 °C (97.7 °F)-37.1 °C (98.7 °F)] 36.5 °C (97.7 °F)  Pulse:  [58-67] 58  Resp:  [17-20] 20  BP: (109-140)/(50-60) 140/56  SpO2:  [93 %-96 %] 96 %    Physical Exam  Vitals signs and nursing note reviewed.   Constitutional:       General: She is not in acute distress.  HENT:      Mouth/Throat:      Mouth: Mucous membranes are moist.      Pharynx: Oropharynx is clear.   Eyes:      General: No scleral icterus.  Neck:      Musculoskeletal: No neck rigidity.      Vascular: No JVD.   Cardiovascular:      Rate and Rhythm: Normal rate. Rhythm irregular.      Heart sounds: Normal heart sounds. No murmur.   Pulmonary:      Effort: Pulmonary effort is normal.      Breath sounds: Normal breath sounds. No stridor.   Abdominal:      General: There is no distension.      Palpations: Abdomen is soft.      Tenderness: There is no abdominal tenderness.   Musculoskeletal:      Right lower leg: No edema.      Left lower leg: No edema.   Skin:     General: Skin is warm and dry.      Findings: No rash.   Neurological:      Mental Status: She is alert and oriented to person, place, and time.   Psychiatric:         Mood and Affect: Mood normal.         Behavior: Behavior normal.         Fluids    Intake/Output Summary (Last 24 hours) at 12/30/2020 1325  Last data filed at 12/30/2020 0900  Gross per 24 hour   Intake 840 ml   Output --   Net 840 ml       Laboratory  Recent Labs      12/28/20  0600   WBC 4.3*   RBC 3.99*   HEMOGLOBIN 11.2*   HEMATOCRIT 36.3*   MCV 91.0   MCH 28.1   MCHC 30.9*   RDW 49.0   PLATELETCT 285   MPV 9.6     Recent Labs     12/28/20  0600   SODIUM 141   POTASSIUM 3.6   CHLORIDE 109   CO2 29   GLUCOSE 144*   BUN 9   CREATININE 0.45*   CALCIUM 8.9                   Assessment/Plan  * CVA (cerebral vascular accident) ([facility])- (present on admission)  Assessment & Plan  Has RLE weakness  On Eliquis  On Lipitor    AF (atrial fibrillation) ([facility])- (present on admission)  Assessment & Plan  HR a little labile but ok  On Metoprolol: 50 mg bid  On Eliquis  Cont to monitor    Alkaline phosphatase elevation  Assessment & Plan  Starting to improve  AP: 347 --> 293 (12/28)  Asymptomatic -- denies abd pain, nausea, or vomiting  U/S abd: shows cholelithiasis with gallbladder wall thickening suggesting cholecystitis  Consider HIDA if pt develops GI symptoms  Will need out patient follow up    Hypophosphatemia- (present on admission)  Assessment & Plan  PO4: 2.3 --> 2.8 --> 2.6 (12/28)  On supplements  Monitpr    Vitamin D deficiency- (present on admission)  Assessment & Plan  Vit D: 21  On supplements    Aortic stenosis- (present on admission)  Assessment & Plan  Echo: EF 75%, RVSP 40, moderate AS  Outpt follow up    Type 2 diabetes mellitus without complication, without long-term current use of insulin ([facility])- (present on admission)  Assessment & Plan  Hba1c: 6.2 (12/17)  BS at noon on 12/30: 322  Will start Metformin: 250 mg bid (12/30 evening)  Will place on accuchecks for a few days for evaluation  Will change diet to diabetic diet (12/30)  Note: takes Metformin at home (? 500 mg)  Cont to monitor    Essential hypertension- (present on admission)  Assessment & Plan  BP a little labile bit ok  On Lisinopril: 20 mg daily --> 30 mg daily (12/29)  On Lopressor: 50 mg bid  Note: on Flomax  Cont to monitor

## 2020-12-30 NOTE — THERAPY
Physical Therapy   Daily Treatment     Patient Name: Liudmila Pulido  Age:  88 y.o., Sex:  female  Medical Record #: [id number]  Today's Date: 12/30/2020     Precautions  Precautions: Fall Risk  Comments: R lizeth (RLE>RUE), R lateral lean    Subjective    Pt reports she is agreeable to stand      Objective       12/30/20 0831   Gait Functional Level of Assist    Gait Level Of Assist Unable to Participate   Wheelchair Functional Level of Assist   Wheelchair Assist Stand by Assist   Distance Wheelchair (Feet or Distance) 50  (pt propels within room with BUE's.)   Wheelchair Description Extra time;Limited by fatigue;Verbal cueing   Stairs Functional Level of Assist   Level of Assist with Stairs Unable to Participate   Transfer Functional Level of Assist   Bed, Chair, Wheelchair Transfer Total Assist X 2  (max A of one/ mod A of 1)   Bed Chair Wheelchair Transfer Description Requires lift;Assist with one limb;Increased time;Set-up of equipment;Squat pivot transfer to wheelchair   Bed Mobility    Sit to Stand Minimal Assist  (// bars BUE support. repeated practice, cues for sequencing)   Strengths & Barriers   Strengths Able to follow instructions;Independent prior level of function;Motivated for self care and independence;Pleasant and cooperative;Willingly participates in therapeutic activities   Barriers Hemiparesis;Home accessibility;Impaired balance;Limited mobility   PT Total Time Spent   PT Individual Total Time Spent (Mins) 30   PT Charge Group   PT Therapeutic Exercise 1   PT Neuromuscular Re-Education / Balance 1     Standing tolerance - 5 min in // bars    Pre gait, lateral wt shifting, mini squats - cues and demo for form and technique.    Assessment    Pt demos improved STS this session with decreased level of assist. Pt also demos improved STS tolerance and approaching ability to trial gait.    Strengths: Able to follow instructions, Independent prior level of function, Motivated for self care and  independence, Pleasant and cooperative, Willingly participates in therapeutic activities  Barriers: Hemiparesis, Home accessibility, Impaired balance, Limited mobility    Plan    Exercise/neuro re-ed, issue HEP for pt to perform in room to initiate their own care when able to complete AROM, NMES,  set up, Trial lite gait and standing in // bars, Transfer training, sit/stand balance, wc mob, ramp navigation - uneven, STS practice with walker, shuttle,    Physical Therapy Problems     Problem: Mobility     Dates: Start: 12/21/20       Goal: STG-Within one week, patient will propel wheelchair household distances     Dates: Start: 12/21/20       Description: < 150 ft    Note:     Goal Note filed on 12/30/20 1134 by Carloz Azar, PT    < 150 ft                        Problem: Mobility Transfers     Dates: Start: 12/21/20       Goal: STG-Within one week, patient will transfer bed to chair     Dates: Start: 12/21/20       Description: 2nd person    Note:     Goal Note filed on 12/30/20 1134 by Carloz Azar, PT    2nd person                        Problem: PT-Long Term Goals     Dates: Start: 12/21/20       Goal: LTG-By discharge, patient will propel wheelchair     Dates: Start: 12/21/20       Description: 1) Individualized goal:  150 ft At mod I in order to improve self mobility  2) Interventions:  PT E Stim Attended, PT Orthotics Training, PT Gait Training, PT Therapeutic Exercises, PT Neuro Re-Ed/Balance, PT Aquatic Therapy, PT Therapeutic Activity, PT Manual Therapy, and PT Evaluation          Goal: LTG-By discharge, patient will transfer one surface to another     Dates: Start: 12/21/20       Description: 1) Individualized goal:  At min A with LRD In order to maximize self mobility  2) Interventions:  PT E Stim Attended, PT Orthotics Training, PT Gait Training, PT Therapeutic Exercises, PT Neuro Re-Ed/Balance, PT Aquatic Therapy, PT Therapeutic Activity, PT Manual Therapy, and PT Evaluation           Goal: LTG-By discharge, patient will transfer in/out of a car     Dates: Start: 12/21/20       Description: 1) Individualized goal:  At min A with LRD In order to maximize self mobility  2) Interventions:  PT E Stim Attended, PT Orthotics Training, PT Gait Training, PT Therapeutic Exercises, PT Neuro Re-Ed/Balance, PT Aquatic Therapy, PT Therapeutic Activity, PT Manual Therapy, and PT Evaluation

## 2020-12-30 NOTE — CARE PLAN
Problem: Communication  Goal: The ability to communicate needs accurately and effectively will improve  Outcome: PROGRESSING AS EXPECTED  Note: Pt is able to communicate her needs effectively to staff.      Problem: Safety  Goal: Will remain free from injury  Outcome: PROGRESSING AS EXPECTED  Note: Pt uses call light consistently for staff assistance. Pt has good safety awareness, no impulsivity observed.      Problem: Pain Management  Goal: Pain level will decrease to patient's comfort goal  Outcome: PROGRESSING AS EXPECTED  Note: No reports of pain at this time. Will continue to monitor through shift with hourly rounds.

## 2020-12-30 NOTE — CARE PLAN
Problem: Mobility  Goal: STG-Within one week, patient will propel wheelchair household distances  Description: < 150 ft  Outcome: PROGRESSING AS EXPECTED  Note: < 150 ft     Problem: Mobility Transfers  Goal: STG-Within one week, patient will transfer bed to chair  Description: 2nd person  Outcome: PROGRESSING AS EXPECTED  Note: 2nd person

## 2020-12-30 NOTE — THERAPY
Physical Therapy   Daily Treatment     Patient Name: Liudmila Pulido  Age:  88 y.o., Sex:  female  Medical Record #: [id number]  Today's Date: 12/30/2020     Precautions  Precautions: Fall Risk  Comments: R lizeth (RLE>RUE), R lateral lean    Subjective    Pt reports she is agreeable to trialing gait     Objective       12/30/20 1431   Gait Functional Level of Assist    Gait Level Of Assist Moderate Assist   Assistive Device Parallel Bars   Distance (Feet) 10   # of Times Distance was Traveled 3   Deviation   (lizeth gait, mod A to block RLE in stance and for advancment)   Wheelchair Functional Level of Assist   Wheelchair Assist Supervised  (even, surfaces, outside uneven - min A on downhills)   Distance Wheelchair (Feet or Distance) 150   Wheelchair Description Extra time;Supervision for safety;Verbal cueing   Sitting Lower Body Exercises   Comments Seated ext exercises pushing against bolster on bottom stair against therapist resistance, tech assist to maintain knee position, 3 x 10 RLE   Bed Mobility    Sit to Stand Contact Guard Assist  (// bars, BUE support, repeated practice)   Interdisciplinary Plan of Care Collaboration   Patient Position at End of Therapy Seated;Call Light within Reach;Tray Table within Reach;Self Releasing Lap Belt Applied;Chair Alarm On   PT Total Time Spent   PT Individual Total Time Spent (Mins) 60   PT Charge Group   PT Gait Training 2   PT Neuromuscular Re-Education / Balance 1   PT Therapeutic Activities 1       Assessment    Pt demos good first trial of gait, mod A throughout. Cont to demo knee buckling requiring R knee block in stance. Pt also demos improving wc mob distance this session.     Strengths: Able to follow instructions, Independent prior level of function, Motivated for self care and independence, Pleasant and cooperative, Willingly participates in therapeutic activities  Barriers: Hemiparesis, Home accessibility, Impaired balance, Limited  mobility    Plan    Exercise/neuro re-ed, issue HEP for pt to perform in room to initiate their own care when able to complete AROM, NMES,  set up, Trial lite gait and standing in // bars, Transfer training, sit/stand balance, wc mob, ramp navigation - uneven, STS practice with walker, shuttle,    Physical Therapy Problems     Problem: Mobility     Dates: Start: 12/21/20       Goal: STG-Within one week, patient will propel wheelchair household distances     Dates: Start: 12/21/20       Description: < 150 ft    Note:     Goal Note filed on 12/30/20 1134 by Carloz Azar, PT    < 150 ft                        Problem: Mobility Transfers     Dates: Start: 12/21/20       Goal: STG-Within one week, patient will transfer bed to chair     Dates: Start: 12/21/20       Description: 2nd person    Note:     Goal Note filed on 12/30/20 1134 by Carloz Azar, PT    2nd person                        Problem: PT-Long Term Goals     Dates: Start: 12/21/20       Goal: LTG-By discharge, patient will propel wheelchair     Dates: Start: 12/21/20       Description: 1) Individualized goal:  150 ft At mod I in order to improve self mobility  2) Interventions:  PT E Stim Attended, PT Orthotics Training, PT Gait Training, PT Therapeutic Exercises, PT Neuro Re-Ed/Balance, PT Aquatic Therapy, PT Therapeutic Activity, PT Manual Therapy, and PT Evaluation          Goal: LTG-By discharge, patient will transfer one surface to another     Dates: Start: 12/21/20       Description: 1) Individualized goal:  At min A with LRD In order to maximize self mobility  2) Interventions:  PT E Stim Attended, PT Orthotics Training, PT Gait Training, PT Therapeutic Exercises, PT Neuro Re-Ed/Balance, PT Aquatic Therapy, PT Therapeutic Activity, PT Manual Therapy, and PT Evaluation          Goal: LTG-By discharge, patient will transfer in/out of a car     Dates: Start: 12/21/20       Description: 1) Individualized goal:  At min A with LRD In order  to maximize self mobility  2) Interventions:  PT E Stim Attended, PT Orthotics Training, PT Gait Training, PT Therapeutic Exercises, PT Neuro Re-Ed/Balance, PT Aquatic Therapy, PT Therapeutic Activity, PT Manual Therapy, and PT Evaluation

## 2020-12-30 NOTE — CARE PLAN
Problem: Safety  Goal: Will remain free from falls  Note: Pt uses call light consistently and appropriately. Waits for assistance does not attempt self transfer this shift. Able to verbalize needs.      Problem: Pain Management  Goal: Pain level will decrease to patient's comfort goal  Note: Patient able to verbalize pain level and verbalize an acceptable level of pain.

## 2020-12-30 NOTE — CARE PLAN
Problem: Problem Solving STGs  Goal: STG-Within one week, patient will  Description: 1) Individualized goal:  complete attention tasks related to medication management and financial management skills with 80% accuracy provided MIN A.   2) Interventions:  SLP Self Care / ADL Training , SLP Cognitive Skill Development, and SLP Group Treatment      Outcome: NOT MET     Problem: Speech/Swallowing LTGs  Goal: LTG-By discharge, patient will safely swallow  Description: 1) Individualized goal:  the least restrictive diet with no overt s/sx of asp/pen noted across meals with 100% accuracy provided MOD I.  2) Interventions:  SLP Swallowing Dysfunction Treatment, SLP Oral Pharyngeal Evaluation, SLP Video Swallow Evaluation, SLP Self Care / ADL Training , SLP Cognitive Skill Development, and SLP Group Treatment      Outcome: MET     Problem: Swallowing STGs  Goal: STG-Within one week, patient will safely swallow  Description: 1) Individualized goal:  soft and bite size diet with thin liquids with no overt s/sx of asp/pen noted across 2/2 meals provided MIN cues.   2) Interventions:  SLP Swallowing Dysfunction Treatment, SLP Oral Pharyngeal Evaluation, SLP Video Swallow Evaluation, SLP Self Care / ADL Training , SLP Cognitive Skill Development, and SLP Group Treatment  Outcome: MET     Problem: Memory STGs  Goal: STG-Within one week, patient will  Description: 1) Individualized goal:  learn and implement functional memory strategies with use of memory book with 80% accuracy provided MOD A.   2) Interventions:  SLP Self Care / ADL Training , SLP Cognitive Skill Development, and SLP Group Treatment  Outcome: MET

## 2020-12-30 NOTE — CARE PLAN
Problem: Dressing  Goal: STG-Within one week, patient will dress LB  Description: 1) Individualized Goal:  mod A with AE as needed.  2) Interventions:  OT E Stim Attended, OT Self Care/ADL, OT Cognitive Skill Dev, OT Community Reintegration, OT Manual Ther Technique, OT Neuro Re-Ed/Balance, OT Therapeutic Activity, OT Evaluation, and OT Therapeutic Exercise  Outcome: NOT MET  Note: Con't to require min-mod A x2.      Problem: Functional Transfers  Goal: STG-Within one week, patient will transfer to toilet  Description: 1) Individualized Goal:  mod A with AD/DME as needed.  2) Interventions:  OT E Stim Attended, OT Self Care/ADL, OT Cognitive Skill Dev, OT Community Reintegration, OT Manual Ther Technique, OT Neuro Re-Ed/Balance, OT Therapeutic Activity, OT Evaluation, and OT Therapeutic Exercise  Outcome: NOT MET  Note: Mod-max A x2.      Problem: Toileting  Goal: STG-Within one week, patient will complete toileting tasks  Description: 1) Individualized Goal:  max A with AE/DME as needed.  2) Interventions:  OT E Stim Attended, OT Self Care/ADL, OT Cognitive Skill Dev, OT Community Reintegration, OT Manual Ther Technique, OT Neuro Re-Ed/Balance, OT Therapeutic Activity, OT Evaluation, and OT Therapeutic Exercise  Outcome: PROGRESSING AS EXPECTED  Note: Con't to require 2nd person present for safety with clothing mgt, hygiene and balance.     Problem: Bathing  Goal: STG-Within one week, patient will bathe  Description: 1) Individualized Goal:  mod A with AE/DME as needed.  2) Interventions:  OT E Stim Attended, OT Self Care/ADL, OT Cognitive Skill Dev, OT Community Reintegration, OT Manual Ther Technique, OT Neuro Re-Ed/Balance, OT Therapeutic Activity, OT Evaluation, and OT Therapeutic Exercise    Outcome: MET

## 2020-12-30 NOTE — THERAPY
Occupational Therapy  Daily Treatment     Patient Name: Liudmila Pulido  Age:  88 y.o., Sex:  female  Medical Record #: [id number]  Today's Date: 12/30/2020     Precautions  Precautions: (P) Fall Risk  Comments: (P) R lizeth (RLE>RUE), R lateral lean    Safety   ADL Safety : Requires Supervision for Safety, Requires Physical Assist for Safety, Requires Cueing for Safety  Bathroom Safety: Requires Supervision for Safety, Requires Physical Assist for Safety, Requires Cuing for Safety  Comments: See functional levels of assist below for ADL performance details     Subjective    Pt received up in w/c, ready for therapy session. Pt requested to work on UB exercises.      Objective       12/30/20 1001   Precautions   Precautions Fall Risk   Comments R lizeth (RLE>RUE), R lateral lean   Cognition    Level of Consciousness Alert   Sitting Upper Body Exercises   Chest Press 3 sets of 10;Bilateral;Weight (See Comments for lbs)   Side Arm Raise 3 sets of 10;Bilateral;Weight (See Comments for lbs)   Shoulder Press 3 sets of 10;Bilateral;Weight (See Comments for lbs)   Internal Shoulder Rotation 3 sets of 10;Bilateral;Weight (See Comments for lbs)   External Shoulder Rotation 3 sets of 10;Bilateral;Weight (See Comments for lbs)   Bicep Curls 3 sets of 10;Bilateral;Weight (See Comments for lbs)   Pronation / Supination 3 sets of 10;Bilateral;Weight (See Comments for lbs)   Upper Extremity Bike Level 2 Resistance  (FluidoBike for UB strength/endurance x12 min, 0 RB, 1.2 km)   Comments BUE therex completed with 3# weights.   Interdisciplinary Plan of Care Collaboration   Patient Position at End of Therapy Seated;Chair Alarm On;Self Releasing Lap Belt Applied;Call Light within Reach;Tray Table within Reach;Phone within Reach   OT Total Time Spent   OT Individual Total Time Spent (Mins) 60   OT Charge Group   OT Therapeutic Exercise  4       Assessment    Pt tolerated OT session well with focus on progression with UB strength and  endurance. Pt completed all exercises noted above with intermittent rest breaks and no c/o pain. Pt RUE with full ROM and equal strength to LUE. Pt reports no difficulties with coordination with RUE during functional tasks, including eating and grooming.     Strengths: Alert and oriented, Independent prior level of function, Motivated for self care and independence, Pleasant and cooperative, Supportive family, Willingly participates in therapeutic activities  Barriers: Bladder incontinence, Bowel incontinence, Decreased endurance, Fatigue, Hemiparesis, Home accessibility, Impaired activity tolerance, Impaired balance, Impaired functional cognition, Limited mobility, Visual impairment    Plan    Cont overall strength/endurance and standing tolerance/balance to improve ADL's and functional mobility    Occupational Therapy Goals     Problem: Bathing     Dates: Start: 12/21/20       Goal: STG-Within one week, patient will bathe     Dates: Start: 12/21/20       Description: 1) Individualized Goal:  mod A with AE/DME as needed.  2) Interventions:  OT E Stim Attended, OT Self Care/ADL, OT Cognitive Skill Dev, OT Community Reintegration, OT Manual Ther Technique, OT Neuro Re-Ed/Balance, OT Therapeutic Activity, OT Evaluation, and OT Therapeutic Exercise      Note:     Goal Note filed on 12/23/20 0700 by Gris Padilla, OT    Max A; pt evaluated on 12/21.                        Problem: Dressing     Dates: Start: 12/21/20       Goal: STG-Within one week, patient will dress LB     Dates: Start: 12/21/20       Description: 1) Individualized Goal:  mod A with AE as needed.  2) Interventions:  OT E Stim Attended, OT Self Care/ADL, OT Cognitive Skill Dev, OT Community Reintegration, OT Manual Ther Technique, OT Neuro Re-Ed/Balance, OT Therapeutic Activity, OT Evaluation, and OT Therapeutic Exercise    Note:     Goal Note filed on 12/23/20 0700 by Gris Padilla, OT    Total A x1-2; pt evaluated on 12/21.                         Problem: Functional Transfers     Dates: Start: 12/21/20       Goal: STG-Within one week, patient will transfer to toilet     Dates: Start: 12/21/20       Description: 1) Individualized Goal:  mod A with AD/DME as needed.  2) Interventions:  OT E Stim Attended, OT Self Care/ADL, OT Cognitive Skill Dev, OT Community Reintegration, OT Manual Ther Technique, OT Neuro Re-Ed/Balance, OT Therapeutic Activity, OT Evaluation, and OT Therapeutic Exercise    Note:     Goal Note filed on 12/23/20 0700 by Gris Padilla, OT    Max-total A; pt evaluated on 12/21.                        Problem: Grooming     Dates: Start: 12/21/20             Problem: OT Long Term Goals     Dates: Start: 12/21/20       Goal: LTG-By discharge, patient will complete basic self care tasks     Dates: Start: 12/21/20       Description: 1) Individualized Goal: Supervision to mod I level with AE/DME as needed.  2) Interventions:  OT E Stim Attended, OT Self Care/ADL, OT Cognitive Skill Dev, OT Community Reintegration, OT Manual Ther Technique, OT Neuro Re-Ed/Balance, OT Therapeutic Activity, OT Evaluation, and OT Therapeutic Exercise          Goal: LTG-By discharge, patient will perform bathroom transfers     Dates: Start: 12/21/20       Description: 1) Individualized Goal:  Supervision to mod I level with AD/DME as needed.  2) Interventions:  OT E Stim Attended, OT Self Care/ADL, OT Cognitive Skill Dev, OT Community Reintegration, OT Manual Ther Technique, OT Neuro Re-Ed/Balance, OT Therapeutic Activity, OT Evaluation, and OT Therapeutic Exercise                Problem: Toileting     Dates: Start: 12/21/20       Goal: STG-Within one week, patient will complete toileting tasks     Dates: Start: 12/21/20       Description: 1) Individualized Goal:  max A with AE/DME as needed.  2) Interventions:  OT E Stim Attended, OT Self Care/ADL, OT Cognitive Skill Dev, OT Community Reintegration, OT Manual Ther Technique, OT Neuro Re-Ed/Balance, OT Therapeutic  Activity, OT Evaluation, and OT Therapeutic Exercise    Note:     Goal Note filed on 12/23/20 0700 by Gris Padilla, OT    Total A x1-2; pt evaluated on 12/21.

## 2020-12-30 NOTE — PROGRESS NOTES
Hospital Medicine Daily Progress Note      Chief Complaint:  Hypertension  Afib  Elevated AP    Interval History:  No significant events or changes since last visit    Review of Systems  Review of Systems   Constitutional: Negative for fever.   Eyes: Negative for blurred vision.   Respiratory: Negative for shortness of breath.    Cardiovascular: Negative for palpitations.   Gastrointestinal: Negative for nausea and vomiting.   Neurological: Negative for dizziness and headaches.   Psychiatric/Behavioral: Negative for hallucinations.        Physical Exam  Temp:  [36.5 °C (97.7 °F)-37.1 °C (98.7 °F)] 36.5 °C (97.7 °F)  Pulse:  [58-67] 58  Resp:  [17-20] 20  BP: (109-140)/(50-60) 140/56  SpO2:  [93 %-99 %] 96 %    Physical Exam  Vitals signs and nursing note reviewed.   Constitutional:       General: She is not in acute distress.  HENT:      Mouth/Throat:      Mouth: Mucous membranes are moist.      Pharynx: Oropharynx is clear.   Eyes:      General: No scleral icterus.  Neck:      Musculoskeletal: No neck rigidity.      Vascular: No JVD.   Cardiovascular:      Rate and Rhythm: Normal rate. Rhythm irregular.      Heart sounds: Normal heart sounds. No murmur.   Pulmonary:      Effort: Pulmonary effort is normal.      Breath sounds: Normal breath sounds. No stridor.   Abdominal:      General: There is no distension.      Palpations: Abdomen is soft.      Tenderness: There is no abdominal tenderness.   Musculoskeletal:      Right lower leg: No edema.      Left lower leg: No edema.   Skin:     General: Skin is warm and dry.      Findings: No rash.   Neurological:      Mental Status: She is alert and oriented to person, place, and time.   Psychiatric:         Mood and Affect: Mood normal.         Behavior: Behavior normal.         Fluids    Intake/Output Summary (Last 24 hours) at 12/30/2020 0753  Last data filed at 12/30/2020 0542  Gross per 24 hour   Intake 1200 ml   Output --   Net 1200 ml       Laboratory  Recent Labs      12/28/20  0600   WBC 4.3*   RBC 3.99*   HEMOGLOBIN 11.2*   HEMATOCRIT 36.3*   MCV 91.0   MCH 28.1   MCHC 30.9*   RDW 49.0   PLATELETCT 285   MPV 9.6     Recent Labs     12/28/20  0600   SODIUM 141   POTASSIUM 3.6   CHLORIDE 109   CO2 29   GLUCOSE 144*   BUN 9   CREATININE 0.45*   CALCIUM 8.9                   Assessment/Plan  * CVA (cerebral vascular accident) (Former[facility] [place])- (present on admission)  Assessment & Plan  Has RLE weakness  On Eliquis  On Lipitor    AF (atrial fibrillation) ([facility] [place])- (present on admission)  Assessment & Plan  HR a little labile but ok  On Metoprolol: 50 mg bid  On Eliquis  Cont to monitor    Alkaline phosphatase elevation  Assessment & Plan  Starting to improve  AP: 347 --> 293 (12/28)  Asymptomatic -- denies abd pain, nausea, or vomiting  U/S abd: shows cholelithiasis with gallbladder wall thickening suggesting cholecystitis  Consider HIDA if pt develops GI symptoms  Will need out patient follow up    Hypophosphatemia- (present on admission)  Assessment & Plan  PO4: 2.3 --> 2.8 --> 2.6 (12/28)  On supplements  Monitpr    Vitamin D deficiency- (present on admission)  Assessment & Plan  Vit D: 21  On supplements    Aortic stenosis- (present on admission)  Assessment & Plan  Echo: EF 75%, RVSP 40, moderate AS  Outpt follow up    Type 2 diabetes mellitus without complication, without long-term current use of insulin ([facility] [place])- (present on admission)  Assessment & Plan  Hba1c: 6.2 (12/17)  BS in the 140s per BMP  Will place on accuchecks for a few days for evaluation  Note: takes Metformin at home    Essential hypertension- (present on admission)  Assessment & Plan  BP a little labile bit ok  On Lisinopril: 20 mg daily --> 30 mg daily (12/29)  On Lopressor: 50 mg bid  Note: on Flomax  Cont to monitor

## 2020-12-30 NOTE — THERAPY
Speech Language Pathology  Daily Treatment     Patient Name: Liudmila Pulido  Age:  88 y.o., Sex:  female  Medical Record #: [id number]  Today's Date: 12/30/2020     Precautions  Precautions: Fall Risk  Comments: R lizeth (RLE>RUE), R lateral lean    Subjective    Pt seen for 2 30 min cognitive sessions on this date.      Objective       12/30/20 0733   SLP Total Time Spent   SLP Individual Total Time Spent (Mins) 60   Treatment Charges   SLP Cognitive Skill Development First 15 Minutes 1   SLP Cognitive Skill Development Additional 15 Minutes 3       Assessment    Reviewed stroke education day prior. Pt indep able to state 5 facts that she learned and could recall from prior education. Pt asked to name three things she plans to change/address to decrease risk of future stroke, pt indep able to come up with two (diet and taking medications). Continued stroke education and discussed FAST. Pt encouraged to review this page related to warning signs as for information would be reviewed in later session. During second session pt indep recalled 3/4 pieces of FAST otherwise MOD A required for remaining target. Pt asking appropriate questions throughout session related to stroke education, POC and goals for returning home.     Strengths: Able to follow instructions, Making steady progress towards goals, Supportive family, Willingly participates in therapeutic activities, Pleasant and cooperative, Motivated for self care and independence  Barriers: Visual impairment, Impaired carryover of learning, Impaired functional cognition    Plan    Moderate attention tasks, functional problem solving related to d/c.     Speech Therapy Problems     Problem: Problem Solving STGs     Dates: Start: 12/23/20       Goal: STG-Within one week, patient will     Dates: Start: 12/23/20       Description: 1) Individualized goal:  complete attention tasks related to medication management and financial management skills with 80% accuracy provided  MIN A.   2) Interventions:  SLP Self Care / ADL Training , SLP Cognitive Skill Development, and SLP Group Treatment                    Problem: Speech/Swallowing LTGs     Dates: Start: 12/21/20       Goal: LTG-By discharge, patient will     Dates: Start: 12/21/20       Description: 1) Individualized goal:  complete functional problem solving and recall with 80% accuracy provided SPV.   2) Interventions:  SLP Self Care / ADL Training , SLP Cognitive Skill Development, and SLP Group Treatment

## 2020-12-30 NOTE — FLOWSHEET NOTE
12/30/20 1044   Events/Summary/Plan   Events/Summary/Plan 02 dc'd per protocol, pt has been on RA for over 48 hours

## 2020-12-30 NOTE — PROGRESS NOTES
Patient care assumed. Report received from Randi Curry. Patient is alert and calm, resting in bed. Call light and bedside table within reach. Will continue to monitor.

## 2020-12-30 NOTE — PROGRESS NOTES
"Rehab Progress Note     Date of Service: 12/30/2020  Chief Complaint: Follow-up stroke    Interval Events (Subjective)    Patient seen and examined today in her room.  We discussed the results of her right upper quadrant ultrasound which apparently was not discussed with her over the weekend.  Patient continues to deny any abdominal pain or nausea.  Advised she does have gallstones.  Patient continues to be very discouraged about the fact that her leg is not working.  She is just very frustrated that she was previously so independent.  Patient encouraged to think positively about the progress that she has made.  She has no other complaints today.    Objective:  VITAL SIGNS: /56   Pulse (!) 58   Temp 36.5 °C (97.7 °F) (Oral)   Resp 20   Ht 1.676 m (5' 5.98\")   Wt 75 kg (165 lb 6.4 oz)   SpO2 96%   BMI 26.71 kg/m²   Gen: alert, no apparent distress  Neuro: notable for weak right leg and abnormal sensation      Recent Results (from the past 72 hour(s))   Comp Metabolic Panel    Collection Time: 12/28/20  6:00 AM   Result Value Ref Range    Sodium 141 135 - 145 mmol/L    Potassium 3.6 3.6 - 5.5 mmol/L    Chloride 109 96 - 112 mmol/L    Co2 29 20 - 33 mmol/L    Anion Gap 3.0 (L) 7.0 - 16.0    Glucose 144 (H) 65 - 99 mg/dL    Bun 9 8 - 22 mg/dL    Creatinine 0.45 (L) 0.50 - 1.40 mg/dL    Calcium 8.9 8.5 - 10.5 mg/dL    AST(SGOT) 31 12 - 45 U/L    ALT(SGPT) 21 2 - 50 U/L    Alkaline Phosphatase 293 (H) 30 - 99 U/L    Total Bilirubin 0.4 0.1 - 1.5 mg/dL    Albumin 2.3 (L) 3.2 - 4.9 g/dL    Total Protein 5.6 (L) 6.0 - 8.2 g/dL    Globulin 3.3 1.9 - 3.5 g/dL    A-G Ratio 0.7 g/dL   CBC WITH DIFFERENTIAL    Collection Time: 12/28/20  6:00 AM   Result Value Ref Range    WBC 4.3 (L) 4.8 - 10.8 K/uL    RBC 3.99 (L) 4.20 - 5.40 M/uL    Hemoglobin 11.2 (L) 12.0 - 16.0 g/dL    Hematocrit 36.3 (L) 37.0 - 47.0 %    MCV 91.0 81.4 - 97.8 fL    MCH 28.1 27.0 - 33.0 pg    MCHC 30.9 (L) 33.6 - 35.0 g/dL    RDW 49.0 35.9 - 50.0 " fL    Platelet Count 285 164 - 446 K/uL    MPV 9.6 9.0 - 12.9 fL    Neutrophils-Polys 58.70 44.00 - 72.00 %    Lymphocytes 32.00 22.00 - 41.00 %    Monocytes 6.30 0.00 - 13.40 %    Eosinophils 2.30 0.00 - 6.90 %    Basophils 0.50 0.00 - 1.80 %    Immature Granulocytes 0.20 0.00 - 0.90 %    Nucleated RBC 0.00 /100 WBC    Neutrophils (Absolute) 2.51 2.00 - 7.15 K/uL    Lymphs (Absolute) 1.37 1.00 - 4.80 K/uL    Monos (Absolute) 0.27 0.00 - 0.85 K/uL    Eos (Absolute) 0.10 0.00 - 0.51 K/uL    Baso (Absolute) 0.02 0.00 - 0.12 K/uL    Immature Granulocytes (abs) 0.01 0.00 - 0.11 K/uL    NRBC (Absolute) 0.00 K/uL   MAGNESIUM    Collection Time: 12/28/20  6:00 AM   Result Value Ref Range    Magnesium 1.9 1.5 - 2.5 mg/dL   PHOSPHORUS    Collection Time: 12/28/20  6:00 AM   Result Value Ref Range    Phosphorus 2.6 2.5 - 4.5 mg/dL   ESTIMATED GFR    Collection Time: 12/28/20  6:00 AM   Result Value Ref Range    GFR If African American >60 >60 mL/min/1.73 m 2    GFR If Non African American >60 >60 mL/min/1.73 m 2   COVID/SARS CoV-2 PCR    Collection Time: 12/28/20 10:30 AM    Specimen: Nasopharyngeal; Respirate   Result Value Ref Range    COVID Order Status Received    SARS-CoV-2, PCR (In-House)    Collection Time: 12/28/20 10:30 AM   Result Value Ref Range    SARS-CoV-2 Source NP Swab     SARS-CoV-2 by PCR NotDetected    ACCU-CHEK GLUCOSE    Collection Time: 12/30/20  8:06 AM   Result Value Ref Range    Glucose - Accu-Ck 322 (H) 65 - 99 mg/dL       Current Facility-Administered Medications   Medication Frequency   • phosphorus (K-Phos-Neutral) per tablet 250 mg TID   • lisinopril (PRINIVIL) tablet 30 mg Q DAY   • gabapentin (NEURONTIN) capsule 400 mg TID   • gabapentin (NEURONTIN) capsule 600 mg Q EVENING   • acetaminophen (Tylenol) tablet 650 mg TID   • polyethylene glycol/lytes (MIRALAX) PACKET 1 Packet DAILY    And   • senna-docusate (PERICOLACE or SENOKOT S) 8.6-50 MG per tablet 2 Tab BID    And   • magnesium hydroxide  (MILK OF MAGNESIA) suspension 30 mL QDAY PRN    And   • bisacodyl (DULCOLAX) suppository 10 mg QDAY PRN   • vitamin D (cholecalciferol) tablet 1,000 Units DAILY   • tamsulosin (FLOMAX) capsule 0.4 mg QHS   • Respiratory Therapy Consult Continuous RT   • Pharmacy Consult Request ...Pain Management Review 1 Each PHARMACY TO DOSE   • hydrALAZINE (APRESOLINE) tablet 25 mg Q8HRS PRN   • artificial tears ophthalmic solution 1 Drop PRN   • benzocaine-menthol (CEPACOL) lozenge 1 Lozenge Q2HRS PRN   • mag hydrox-al hydrox-simeth (MAALOX PLUS ES or MYLANTA DS) suspension 20 mL Q2HRS PRN   • ondansetron (ZOFRAN ODT) dispertab 4 mg 4X/DAY PRN    Or   • ondansetron (ZOFRAN) syringe/vial injection 4 mg 4X/DAY PRN   • traZODone (DESYREL) tablet 50 mg QHS PRN   • sodium chloride (OCEAN) 0.65 % nasal spray 2 Spray PRN   • acetaminophen (Tylenol) tablet 650 mg Q6HRS PRN   • atorvastatin (LIPITOR) tablet 40 mg Q EVENING   • apixaban (ELIQUIS) tablet 5 mg BID   • metoprolol (LOPRESSOR) tablet 50 mg TWICE DAILY   • guaiFENesin (ROBITUSSIN) 100 MG/5ML solution 100 mg Q6HRS PRN       Orders Placed This Encounter   Procedures   • Diet Order Diet: Regular (Please make sure that pt has dentures (uppers and lowers) in place for meal. )     Standing Status:   Standing     Number of Occurrences:   1     Order Specific Question:   Diet:     Answer:   Regular [1]     Comments:   Please make sure that pt has dentures (uppers and lowers) in place for meal.        Assessment:  Active Hospital Problems    Diagnosis   • *CVA (cerebral vascular accident) (HCC)   • AF (atrial fibrillation) (HCC)   • Elevated troponin   • Hyponatremia   • Essential hypertension   • Hyperlipidemia   • Vitamin D deficiency   • Other dysphagia   • Cognitive deficits   • Hypophosphatemia   • Type 2 diabetes mellitus without complication, without long-term current use of insulin (HCC)   • Aortic stenosis     This patient is a 88 y.o. female admitted for acute inpatient  rehabilitation with CVA (cerebral vascular accident) ([facility]).    I led and attended the weekly conference, and agree with the IDT conference documentation and plan of care as noted below.    Date of conference: 12/30/2020    Goals and barriers: See IDT note.    Biggest barriers: right leg weakness, mild cognitive impairments    CM/social support: son supportive and will assist at discharge, however her daughter Keyonna is her power of     Anticipated DC date: 1/9    Home health: PT/OT/SLP/RN    Equip: MWC, ramp    Follow up: PCP, stroke bridge, Dr. Causey, cardiology      Medical Decision Making and Plan:    High left frontal stroke  Cardio-embolic etiology  Right leg hemiplegia, improved  Right leg hemianesthesia, continues/improved  Cognitive deficits, mild  Dysphagia, resolved  Continue full rehab program  PT/OT/SLP, 1 hr each discipline, 5 days per week  Continue Eliquis and statin for secondary stroke prophylaxis  Monitor for development of depression or spasticity  Consulted Dr. Wasserman, no evidence of depression currently    Outpatient follow-up with the stroke [facility] clinic, referral made  Outpatient follow-up with Dr. Causey, referral made    Right leg neuropathic pain, improved  Started low-dose gabapentin 100 mg 3 times a day 12/22 --> 200 mg TID 12/24 --> 400 mg TID 12/27, added higher nighttime dose of 600 mg, continue to monitor need to increase  Less likely DVT and patient is already on Eliquis  Outpatient follow-up with Dr. Causey    Hypertension  Continue lisinopril 5 mg 12/21 --> 10 mg 12/23 --> 30 mg 12/29  Continue metoprolol 50 mg twice a day  As needed hydralazine  Consult hospitalist, appreciate assistance    Atrial fibrillation  Continue metoprolol 50 mg twice a day  Outpatient follow-up with cardiology  Currently rate controlled    Diabetes with hyperglycemia  HgbA1c 6.2  Started on metformin 250 BID    Aortic stenosis  Outpatient follow-up with cardiology    Vitamin D deficiency  Continue  supplementation    Hypophosphatemia, continues  Continue supplementation  Consult hospitalist, appreciate assistance    Elevated alk phosphatase, continues  Elevated AST, improved  Ultrasound with gallbladder wall thickening  Monitor as patient currently asymptomatic  Consult hospitalist, appreciate assistance    Bowel program  Constipation, improved  Continue bowel medications  Scheduled Sennakot - patient has been refusing  Schedule Miralax  PRN MOM, bisacodyl suppository  Last BM 12/29    Bladder program  Acute urinary retention, resolved  Check PVRs - 68, 98  Started Flomax 0.4 mg 12/21  Bladder scan for no voids  ICP for over 400 cc - required once 12/20  Scheduled toileting    DVT prophylaxis  Eliquis    COVID negative 12/17, 12/19, 12/28    Patient received both her pneumococcal and her flu shot in [place] this year    Total time:  40 minutes.  I spent greater than 50% of the time for patient care, counseling, and coordination on this date, including patient face-to face time, unit/floor time with review of records/pertinent lab data and studies, as well as discussing diagnostic evaluation/work up, planned therapeutic interventions, and future disposition of care, as per the interval events/subjective and the assessment and plan as noted above.    Tangela Noel M.D.   Physical Medicine and Rehabilitation

## 2020-12-31 LAB
GLUCOSE BLD-MCNC: 108 MG/DL (ref 65–99)
GLUCOSE BLD-MCNC: 111 MG/DL (ref 65–99)
GLUCOSE BLD-MCNC: 113 MG/DL (ref 65–99)
GLUCOSE BLD-MCNC: 137 MG/DL (ref 65–99)

## 2020-12-31 PROCEDURE — 82962 GLUCOSE BLOOD TEST: CPT

## 2020-12-31 PROCEDURE — 97110 THERAPEUTIC EXERCISES: CPT

## 2020-12-31 PROCEDURE — 97530 THERAPEUTIC ACTIVITIES: CPT

## 2020-12-31 PROCEDURE — 700102 HCHG RX REV CODE 250 W/ 637 OVERRIDE(OP): Performed by: HOSPITALIST

## 2020-12-31 PROCEDURE — 97112 NEUROMUSCULAR REEDUCATION: CPT | Mod: CO

## 2020-12-31 PROCEDURE — 700102 HCHG RX REV CODE 250 W/ 637 OVERRIDE(OP): Performed by: PHYSICAL MEDICINE & REHABILITATION

## 2020-12-31 PROCEDURE — A9270 NON-COVERED ITEM OR SERVICE: HCPCS | Performed by: PHYSICAL MEDICINE & REHABILITATION

## 2020-12-31 PROCEDURE — A9270 NON-COVERED ITEM OR SERVICE: HCPCS | Performed by: HOSPITALIST

## 2020-12-31 PROCEDURE — 97129 THER IVNTJ 1ST 15 MIN: CPT

## 2020-12-31 PROCEDURE — 99232 SBSQ HOSP IP/OBS MODERATE 35: CPT | Performed by: HOSPITALIST

## 2020-12-31 PROCEDURE — 97130 THER IVNTJ EA ADDL 15 MIN: CPT

## 2020-12-31 PROCEDURE — 97112 NEUROMUSCULAR REEDUCATION: CPT

## 2020-12-31 PROCEDURE — 770010 HCHG ROOM/CARE - REHAB SEMI PRIVAT*

## 2020-12-31 PROCEDURE — 97110 THERAPEUTIC EXERCISES: CPT | Mod: CO

## 2020-12-31 PROCEDURE — 99231 SBSQ HOSP IP/OBS SF/LOW 25: CPT | Performed by: PHYSICAL MEDICINE & REHABILITATION

## 2020-12-31 RX ADMIN — DIBASIC SODIUM PHOSPHATE, MONOBASIC POTASSIUM PHOSPHATE AND MONOBASIC SODIUM PHOSPHATE 250 MG: 852; 155; 130 TABLET ORAL at 08:34

## 2020-12-31 RX ADMIN — ATORVASTATIN CALCIUM 40 MG: 40 TABLET, FILM COATED ORAL at 20:30

## 2020-12-31 RX ADMIN — METFORMIN HYDROCHLORIDE 250 MG: 500 TABLET ORAL at 17:49

## 2020-12-31 RX ADMIN — DIBASIC SODIUM PHOSPHATE, MONOBASIC POTASSIUM PHOSPHATE AND MONOBASIC SODIUM PHOSPHATE 250 MG: 852; 155; 130 TABLET ORAL at 20:30

## 2020-12-31 RX ADMIN — DIBASIC SODIUM PHOSPHATE, MONOBASIC POTASSIUM PHOSPHATE AND MONOBASIC SODIUM PHOSPHATE 250 MG: 852; 155; 130 TABLET ORAL at 14:49

## 2020-12-31 RX ADMIN — APIXABAN 5 MG: 5 TABLET, FILM COATED ORAL at 08:34

## 2020-12-31 RX ADMIN — ACETAMINOPHEN 650 MG: 325 TABLET, FILM COATED ORAL at 08:35

## 2020-12-31 RX ADMIN — TRAZODONE HYDROCHLORIDE 50 MG: 50 TABLET ORAL at 21:24

## 2020-12-31 RX ADMIN — METOPROLOL TARTRATE 50 MG: 25 TABLET, FILM COATED ORAL at 17:49

## 2020-12-31 RX ADMIN — APIXABAN 5 MG: 5 TABLET, FILM COATED ORAL at 20:29

## 2020-12-31 RX ADMIN — TAMSULOSIN HYDROCHLORIDE 0.4 MG: 0.4 CAPSULE ORAL at 20:30

## 2020-12-31 RX ADMIN — GABAPENTIN 400 MG: 400 CAPSULE ORAL at 08:34

## 2020-12-31 RX ADMIN — ACETAMINOPHEN 650 MG: 325 TABLET, FILM COATED ORAL at 20:29

## 2020-12-31 RX ADMIN — GABAPENTIN 400 MG: 400 CAPSULE ORAL at 17:49

## 2020-12-31 RX ADMIN — METOPROLOL TARTRATE 50 MG: 25 TABLET, FILM COATED ORAL at 05:19

## 2020-12-31 RX ADMIN — GABAPENTIN 400 MG: 400 CAPSULE ORAL at 14:49

## 2020-12-31 RX ADMIN — METFORMIN HYDROCHLORIDE 250 MG: 500 TABLET ORAL at 08:34

## 2020-12-31 RX ADMIN — LISINOPRIL 30 MG: 20 TABLET ORAL at 05:18

## 2020-12-31 RX ADMIN — Medication 1000 UNITS: at 08:34

## 2020-12-31 RX ADMIN — GABAPENTIN 600 MG: 300 CAPSULE ORAL at 20:30

## 2020-12-31 ASSESSMENT — ENCOUNTER SYMPTOMS
DIZZINESS: 0
COUGH: 0
NERVOUS/ANXIOUS: 0
BLURRED VISION: 0
DIARRHEA: 0
FEVER: 0

## 2020-12-31 ASSESSMENT — PAIN DESCRIPTION - PAIN TYPE: TYPE: ACUTE PAIN

## 2020-12-31 NOTE — THERAPY
Physical Therapy   Daily Treatment     Patient Name: Liudmila Pulido  Age:  88 y.o., Sex:  female  Medical Record #: [id number]  Today's Date: 12/31/2020     Precautions  Precautions: Fall Risk  Comments: R lizeth (RLE>RUE), R lateral lean    Subjective    Pt reports she is agreeable to      Objective       12/31/20 0914   Interdisciplinary Plan of Care Collaboration   Patient Position at End of Therapy Seated;Call Light within Reach;Tray Table within Reach;Self Releasing Lap Belt Applied;Chair Alarm On   PT Total Time Spent   PT Individual Total Time Spent (Mins) 60   PT Charge Group   PT Neuromuscular Re-Education / Balance 3   PT Therapeutic Activities 1     Pt set up on  this session with edu on e-stim and goals and benefits. Pt assisted in setting e-stim values focused on achieving stim-elicited contraction to tolerance in each muscle group. Pt participated in FES session on  for 26 minutes (23 active, 2 minute warmup, and 1 minute cooldown), 3.18 miles, 4.0 kcal/hour energy expended, 4.6 W power, 49 R % symmetry, 2.12 NM resistance, 35 RPM spd.        Assessment     Pt demos verbal understanding of goals and benefits of estim. Pt able to demo active peddling throughout session and able to tolerate intensity of stimulation to elicit muscle contraction in all major muscle groups of RLE. Pt demos improved power and resistance tolerance this session and greater use of RLE throughout compared to first  session     Strengths: Able to follow instructions, Independent prior level of function, Good insight into deficits/needs  Barriers: Decreased endurance, Hemiparesis, Home accessibility, Limited mobility, Impaired balance     Plan     Exercise/neuro re-ed, issue HEP for pt to perform in room to initiate their own care when able to complete AROM, NMES,  set up, Trial lite gait and standing in // bars, Transfer training, sit/stand balance, wc mob, ramp navigation - uneven, STS practice with  walker, shuttle,    Physical Therapy Problems     Problem: Mobility     Dates: Start: 12/21/20       Goal: STG-Within one week, patient will propel wheelchair household distances     Dates: Start: 12/21/20       Description: < 150 ft    Note:     Goal Note filed on 12/30/20 1134 by Carloz Azar, PT    < 150 ft                        Problem: Mobility Transfers     Dates: Start: 12/21/20       Goal: STG-Within one week, patient will transfer bed to chair     Dates: Start: 12/21/20       Description: 2nd person    Note:     Goal Note filed on 12/30/20 1134 by Carloz Azar, PT    2nd person                        Problem: PT-Long Term Goals     Dates: Start: 12/21/20       Goal: LTG-By discharge, patient will propel wheelchair     Dates: Start: 12/21/20       Description: 1) Individualized goal:  150 ft At mod I in order to improve self mobility  2) Interventions:  PT E Stim Attended, PT Orthotics Training, PT Gait Training, PT Therapeutic Exercises, PT Neuro Re-Ed/Balance, PT Aquatic Therapy, PT Therapeutic Activity, PT Manual Therapy, and PT Evaluation          Goal: LTG-By discharge, patient will transfer one surface to another     Dates: Start: 12/21/20       Description: 1) Individualized goal:  At min A with LRD In order to maximize self mobility  2) Interventions:  PT E Stim Attended, PT Orthotics Training, PT Gait Training, PT Therapeutic Exercises, PT Neuro Re-Ed/Balance, PT Aquatic Therapy, PT Therapeutic Activity, PT Manual Therapy, and PT Evaluation          Goal: LTG-By discharge, patient will transfer in/out of a car     Dates: Start: 12/21/20       Description: 1) Individualized goal:  At min A with LRD In order to maximize self mobility  2) Interventions:  PT E Stim Attended, PT Orthotics Training, PT Gait Training, PT Therapeutic Exercises, PT Neuro Re-Ed/Balance, PT Aquatic Therapy, PT Therapeutic Activity, PT Manual Therapy, and PT Evaluation

## 2020-12-31 NOTE — THERAPY
Missed Therapy     Patient Name: Liudmila Pulido  Age:  88 y.o., Sex:  female  Medical Record #: [id number]  Today's Date: 12/31/2020    Discussed missed therapy with . Patient missed 15 minutes tx due to being in bathroom.       12/31/20 0801   Therapy Missed   Missed Therapy (Minutes) 15   Reason For Missed Therapy Medical - Patient has Bowel Issues

## 2020-12-31 NOTE — PROGRESS NOTES
Patient care assumed. Report received from [facility] FITZ Feldman. Patient is alert and calm, resting in bed. Call light and bedside table within reach. Will continue to monitor.

## 2020-12-31 NOTE — THERAPY
"Occupational Therapy  Daily Treatment     Patient Name: Liudmila Pulido  Age:  88 y.o., Sex:  female  Medical Record #: [id number]  Today's Date: 12/31/2020     Precautions  Precautions: (P) Fall Risk  Comments: (P) R lizeth (RLE>RUE), R lateral lean    Safety   ADL Safety : Requires Supervision for Safety, Requires Physical Assist for Safety, Requires Cueing for Safety  Bathroom Safety: Requires Supervision for Safety, Requires Physical Assist for Safety, Requires Cuing for Safety  Comments: See functional levels of assist below for ADL performance details     Subjective    \"  Are you going to make me work hard?\"     Objective       12/31/20 1331   Precautions   Precautions Fall Risk   Comments R lizeth (RLE>RUE), R lateral lean   Sitting Upper Body Exercises   Chest Press 2 sets of 10;Bilateral   Front Arm Raise 2 sets of 10;Bilateral   Shoulder Press 2 sets of 10;Bilateral   Bicep Curls 1 set of 10;Bilateral   Upper Extremity Bike Level 3 Resistance  (x 5 minutes  motomed)   Other Exercise 2 sets of 10;Bilateral arm circles forward and back.    Comments ther ex peformed with 4lb dowel    Neuro-Muscular Treatments   Neuro-Muscular Treatments Weight Shift Right   Comments seated edge of mat worke on sit  to stands , weight bearing through  right LE while in standing and  reaching with left UE  and left foot  on 1/2 inch  elevated surface.  patient required moderate to max iniput  just below the knee to  maintain  extension .         Interdisciplinary Plan of Care Collaboration   Patient Position at End of Therapy Seated;Self Releasing Lap Belt Applied;Call Light within Reach;Tray Table within Reach   OT Total Time Spent   OT Individual Total Time Spent (Mins) 60   OT Charge Group   OT Neuromuscular Re-education / Balance 2   OT Therapeutic Exercise  2       w/c to sitting edge of mat   Squat pivot  Min / mod assist     Step  pivot  Edge of mat to w/c with mod assist   Max facilitation or right LE  And cues for weight " shift/ technique     Assessment     participates to the best of her ability with all tasks presented.      Slight amount of lag noted in right UE  Performing ther ex with 4lb dowels .   Strengths: Alert and oriented, Independent prior level of function, Motivated for self care and independence, Pleasant and cooperative, Supportive family, Willingly participates in therapeutic activities  Barriers: Bladder incontinence, Bowel incontinence, Decreased endurance, Fatigue, Hemiparesis, Home accessibility, Impaired activity tolerance, Impaired balance, Impaired functional cognition, Limited mobility, Visual impairment    Plan    Cont overall strength/endurance and standing tolerance/balance to improve ADL's and functional mobility    Occupational Therapy Goals     Problem: Dressing     Dates: Start: 12/21/20       Goal: STG-Within one week, patient will dress LB     Dates: Start: 12/21/20       Description: 1) Individualized Goal:  mod A with AE as needed.  2) Interventions:  OT E Stim Attended, OT Self Care/ADL, OT Cognitive Skill Dev, OT Community Reintegration, OT Manual Ther Technique, OT Neuro Re-Ed/Balance, OT Therapeutic Activity, OT Evaluation, and OT Therapeutic Exercise    Note:     Goal Note filed on 12/30/20 1128 by Gris Padilla, OT    Con't to require min-mod A x2.                         Problem: Functional Transfers     Dates: Start: 12/21/20       Goal: STG-Within one week, patient will transfer to toilet     Dates: Start: 12/21/20       Description: 1) Individualized Goal:  mod A with AD/DME as needed.  2) Interventions:  OT E Stim Attended, OT Self Care/ADL, OT Cognitive Skill Dev, OT Community Reintegration, OT Manual Ther Technique, OT Neuro Re-Ed/Balance, OT Therapeutic Activity, OT Evaluation, and OT Therapeutic Exercise    Note:     Goal Note filed on 12/30/20 1128 by Gris Padilla, OT    Mod-max A x2.                         Problem: Grooming     Dates: Start: 12/21/20             Problem:  OT Long Term Goals     Dates: Start: 12/21/20       Goal: LTG-By discharge, patient will complete basic self care tasks     Dates: Start: 12/21/20       Description: 1) Individualized Goal: Supervision to mod I level with AE/DME as needed.  2) Interventions:  OT E Stim Attended, OT Self Care/ADL, OT Cognitive Skill Dev, OT Community Reintegration, OT Manual Ther Technique, OT Neuro Re-Ed/Balance, OT Therapeutic Activity, OT Evaluation, and OT Therapeutic Exercise          Goal: LTG-By discharge, patient will perform bathroom transfers     Dates: Start: 12/21/20       Description: 1) Individualized Goal:  Supervision to mod I level with AD/DME as needed.  2) Interventions:  OT E Stim Attended, OT Self Care/ADL, OT Cognitive Skill Dev, OT Community Reintegration, OT Manual Ther Technique, OT Neuro Re-Ed/Balance, OT Therapeutic Activity, OT Evaluation, and OT Therapeutic Exercise                Problem: Toileting     Dates: Start: 12/21/20       Goal: STG-Within one week, patient will complete toileting tasks     Dates: Start: 12/21/20       Description: 1) Individualized Goal:  max A with AE/DME as needed.  2) Interventions:  OT E Stim Attended, OT Self Care/ADL, OT Cognitive Skill Dev, OT Community Reintegration, OT Manual Ther Technique, OT Neuro Re-Ed/Balance, OT Therapeutic Activity, OT Evaluation, and OT Therapeutic Exercise    Note:     Goal Note filed on 12/30/20 1128 by Gris Padilla, OT    Con't to require 2nd person present for safety with clothing mgt, hygiene and balance.

## 2020-12-31 NOTE — CARE PLAN
Problem: Safety  Goal: Will remain free from injury  Outcome: PROGRESSING AS EXPECTED  Note: Pt uses call light consistently and appropriately. Waits for assistance does not attempt self transfer this shift. Able to verbalize needs.      Problem: Bowel/Gastric:  Goal: Normal bowel function is maintained or improved  Outcome: PROGRESSING AS EXPECTED  Note: Bowel sounds normoactive in all quadrants.       Problem: Urinary Elimination:  Goal: Ability to reestablish a normal urinary elimination pattern will improve  Outcome: PROGRESSING AS EXPECTED  Note: Patient is on Flomax, remains incontinent of bladder.

## 2020-12-31 NOTE — PROGRESS NOTES
Hospital Medicine Daily Progress Note      Chief Complaint:  Hypertension  Afib  Elevated AP    Interval History:  No significant events or changes since last visit    Review of Systems  Review of Systems   Constitutional: Negative for fever.   Eyes: Negative for blurred vision.   Respiratory: Negative for cough.    Cardiovascular: Negative for chest pain.   Gastrointestinal: Negative for diarrhea.   Musculoskeletal: Negative for joint pain.   Neurological: Negative for dizziness.   Psychiatric/Behavioral: The patient is not nervous/anxious.         Physical Exam  Temp:  [36.6 °C (97.8 °F)-36.9 °C (98.4 °F)] 36.6 °C (97.8 °F)  Pulse:  [55-72] 55  Resp:  [16-20] 18  BP: (113-160)/(54-69) 148/69  SpO2:  [91 %-96 %] 91 %    Physical Exam  Vitals signs and nursing note reviewed.   Constitutional:       Appearance: She is not diaphoretic.   HENT:      Mouth/Throat:      Pharynx: No oropharyngeal exudate or posterior oropharyngeal erythema.   Eyes:      Extraocular Movements: Extraocular movements intact.   Neck:      Vascular: No carotid bruit or JVD.   Cardiovascular:      Rate and Rhythm: Normal rate. Rhythm irregular.      Heart sounds: Normal heart sounds. No murmur.   Pulmonary:      Effort: Pulmonary effort is normal.      Breath sounds: Normal breath sounds. No stridor.   Abdominal:      General: Bowel sounds are normal.      Palpations: Abdomen is soft.   Musculoskeletal:      Right lower leg: Edema present.      Left lower leg: Edema present.      Comments: Has mild B/L pedal/ankle swelling   Skin:     General: Skin is warm and dry.      Findings: No rash.   Neurological:      Mental Status: She is alert and oriented to person, place, and time.   Psychiatric:         Mood and Affect: Mood normal.         Behavior: Behavior normal.         Fluids    Intake/Output Summary (Last 24 hours) at 12/31/2020 0805  Last data filed at 12/30/2020 1600  Gross per 24 hour   Intake 720 ml   Output --   Net 720 ml        Laboratory                        Assessment/Plan  * CVA (cerebral vascular accident) ([facility])- (present on admission)  Assessment & Plan  Has RLE weakness  On Eliquis  On Lipitor    AF (atrial fibrillation) ([facility])- (present on admission)  Assessment & Plan  HR recently 55-72  On Metoprolol: 50 mg bid  On Eliquis  Will monitor another day and consider decreasing Metoprolol dose    Alkaline phosphatase elevation  Assessment & Plan  Starting to improve  AP: 347 --> 293 (12/28)  Asymptomatic -- denies abd pain, nausea, or vomiting  U/S abd: shows cholelithiasis with gallbladder wall thickening suggesting cholecystitis  Consider HIDA if pt develops GI symptoms  Will need out patient follow up    Hypophosphatemia- (present on admission)  Assessment & Plan  PO4: 2.3 --> 2.8 --> 2.6 (12/28)  On supplements  Monitpr    Vitamin D deficiency- (present on admission)  Assessment & Plan  Vit D: 21  On supplements    Aortic stenosis- (present on admission)  Assessment & Plan  Has some mild B/L LE pedal/ankle swelling  Echo: EF 75%, RVSP 40, moderate AS  Outpt follow up    Type 2 diabetes mellitus without complication, without long-term current use of insulin ([facility])- (present on admission)  Assessment & Plan  Hba1c: 6.2 (12/17)  BS after Metformin started: 113-141  On Metformin: 250 mg bid (12/30 evening)  Diet changed to diabetic diet (12/30)  Note: takes Metformin 500 mg bid at home   Cont to monitor    Essential hypertension- (present on admission)  Assessment & Plan  BP ok  On Lisinopril: 20 mg daily --> 30 mg daily (12/29)  On Lopressor: 50 mg bid  Note: on Flomax  Cont to monitor

## 2020-12-31 NOTE — THERAPY
Speech Language Pathology  Daily Treatment     Patient Name: Liudmila Pulido  Age:  88 y.o., Sex:  female  Medical Record #: [id number]  Today's Date: 12/31/2020     Precautions  Precautions: Fall Risk  Comments: R lizeth (RLE>RUE), R lateral lean    Subjective    Patient pleasant and cooperative.      Objective       12/31/20 0745   Cognition   Functional Memory Activities Minimal (4)   SLP Total Time Spent   SLP Individual Total Time Spent (Mins) 15   Treatment Charges   SLP Cognitive Skill Development First 15 Minutes 1       Assessment    Patient was not able to recall RWAVS memory strategies at onset of session.  Continued patient education on memory strategies.  Prompted her to record them in her memory log.  Patient recalled 3/5 RWAVS memory strategies after 5 min delay.    Strengths: Able to follow instructions, Making steady progress towards goals, Supportive family, Willingly participates in therapeutic activities, Pleasant and cooperative, Motivated for self care and independence  Barriers: Visual impairment, Impaired carryover of learning, Impaired functional cognition    Plan    Target use of memory book and memory strategies.    Speech Therapy Problems     Problem: Memory STGs     Dates: Start: 12/30/20       Goal: STG-Within one week, patient will     Dates: Start: 12/30/20       Description: Description: 1) Individualized goal:  learn and implement functional memory strategies with use of memory book with 80% accuracy provided MIN A. 2) Interventions:  SLP Self Care / ADL Training , SLP Cognitive Skill Development, and SLP Group Treatment                  Problem: Problem Solving STGs     Dates: Start: 12/23/20       Goal: STG-Within one week, patient will     Dates: Start: 12/23/20       Description: 1) Individualized goal:  complete attention tasks related to medication management and financial management skills with 80% accuracy provided MIN A.   2) Interventions:  SLP Self Care / ADL Training ,  SLP Cognitive Skill Development, and SLP Group Treatment                    Problem: Speech/Swallowing LTGs     Dates: Start: 12/21/20       Goal: LTG-By discharge, patient will     Dates: Start: 12/21/20       Description: 1) Individualized goal:  complete functional problem solving and recall with 80% accuracy provided SPV.   2) Interventions:  SLP Self Care / ADL Training , SLP Cognitive Skill Development, and SLP Group Treatment

## 2020-12-31 NOTE — DISCHARGE PLANNING
CM called patients daughter Keyonna and got .  DAWIT to update her on IDT and DC date still 1/9/2021.  CM will continue to monitor for DC needs.

## 2020-12-31 NOTE — PROGRESS NOTES
"Rehab Progress Note     Date of Service: 12/31/2020  Chief Complaint: Follow-up stroke    Interval Events (Subjective)    Patient seen and examined today in her room.  She reports she is tired from her morning therapies.  She is very happy that she was able to visit with some of her family members to the window today.  Is the first time she had seen them in many weeks.  She reports her right leg pain is currently well controlled.  She reports she is sleeping well.  She last moved her bowels yesterday.  She denies any issues with urination.  She has no new complaints.    Objective:  VITAL SIGNS: /69   Pulse (!) 55   Temp 36.6 °C (97.8 °F) (Oral)   Resp 18   Ht 1.676 m (5' 5.98\")   Wt 75 kg (165 lb 6.4 oz)   SpO2 91%   BMI 26.71 kg/m²   Gen: alert, no apparent distress  Neuro: notable for improving right leg weakness and abnormal sensation      Recent Results (from the past 72 hour(s))   ACCU-CHEK GLUCOSE    Collection Time: 12/30/20  8:06 AM   Result Value Ref Range    Glucose - Accu-Ck 322 (H) 65 - 99 mg/dL   ACCU-CHEK GLUCOSE    Collection Time: 12/30/20  9:33 PM   Result Value Ref Range    Glucose - Accu-Ck 141 (H) 65 - 99 mg/dL   ACCU-CHEK GLUCOSE    Collection Time: 12/31/20  7:12 AM   Result Value Ref Range    Glucose - Accu-Ck 113 (H) 65 - 99 mg/dL   ACCU-CHEK GLUCOSE    Collection Time: 12/31/20 11:25 AM   Result Value Ref Range    Glucose - Accu-Ck 111 (H) 65 - 99 mg/dL       Current Facility-Administered Medications   Medication Frequency   • metFORMIN (GLUCOPHAGE) tablet 250 mg BID WITH MEALS   • phosphorus (K-Phos-Neutral) per tablet 250 mg TID   • lisinopril (PRINIVIL) tablet 30 mg Q DAY   • gabapentin (NEURONTIN) capsule 400 mg TID   • gabapentin (NEURONTIN) capsule 600 mg Q EVENING   • acetaminophen (Tylenol) tablet 650 mg TID   • polyethylene glycol/lytes (MIRALAX) PACKET 1 Packet DAILY    And   • senna-docusate (PERICOLACE or SENOKOT S) 8.6-50 MG per tablet 2 Tab BID    And   • magnesium " hydroxide (MILK OF MAGNESIA) suspension 30 mL QDAY PRN    And   • bisacodyl (DULCOLAX) suppository 10 mg QDAY PRN   • vitamin D (cholecalciferol) tablet 1,000 Units DAILY   • tamsulosin (FLOMAX) capsule 0.4 mg QHS   • Respiratory Therapy Consult Continuous RT   • Pharmacy Consult Request ...Pain Management Review 1 Each PHARMACY TO DOSE   • hydrALAZINE (APRESOLINE) tablet 25 mg Q8HRS PRN   • artificial tears ophthalmic solution 1 Drop PRN   • benzocaine-menthol (CEPACOL) lozenge 1 Lozenge Q2HRS PRN   • mag hydrox-al hydrox-simeth (MAALOX PLUS ES or MYLANTA DS) suspension 20 mL Q2HRS PRN   • ondansetron (ZOFRAN ODT) dispertab 4 mg 4X/DAY PRN    Or   • ondansetron (ZOFRAN) syringe/vial injection 4 mg 4X/DAY PRN   • traZODone (DESYREL) tablet 50 mg QHS PRN   • sodium chloride (OCEAN) 0.65 % nasal spray 2 Spray PRN   • acetaminophen (Tylenol) tablet 650 mg Q6HRS PRN   • atorvastatin (LIPITOR) tablet 40 mg Q EVENING   • apixaban (ELIQUIS) tablet 5 mg BID   • metoprolol (LOPRESSOR) tablet 50 mg TWICE DAILY   • guaiFENesin (ROBITUSSIN) 100 MG/5ML solution 100 mg Q6HRS PRN       Orders Placed This Encounter   Procedures   • Diet Order Diet: Consistent CHO (Diabetic) (Please make sure that pt has dentures (uppers and lowers) in place for meal. )     Standing Status:   Standing     Number of Occurrences:   1     Order Specific Question:   Diet:     Answer:   Consistent CHO (Diabetic) [4]     Comments:   Please make sure that pt has dentures (uppers and lowers) in place for meal.        Assessment:  Active Hospital Problems    Diagnosis   • *CVA (cerebral vascular accident) (HCC)   • AF (atrial fibrillation) ([facility])   • Elevated troponin   • Hyponatremia   • Essential hypertension   • Hyperlipidemia   • Vitamin D deficiency   • Other dysphagia   • Cognitive deficits   • Hypophosphatemia   • Type 2 diabetes mellitus without complication, without long-term current use of insulin (HCC)   • Aortic stenosis     This patient is a 88  y.o. female admitted for acute inpatient rehabilitation with CVA (cerebral vascular accident) (HCC).    I led and attended the weekly conference, and agree with the IDT conference documentation and plan of care as noted below.    Date of conference: 12/30/2020    Goals and barriers: See IDT note.    Biggest barriers: right leg weakness, mild cognitive impairments    CM/social support: son supportive and will assist at discharge, however her daughter Keyonna is her power of     Anticipated DC date: 1/9    Home health: PT/OT/SLP/RN    Equip: MWC, ramp    Follow up: PCP, stroke bridge, Dr. Causey, cardiology      Medical Decision Making and Plan:    High left frontal stroke  Cardio-embolic etiology  Right leg hemiplegia, improved  Right leg hemianesthesia, continues/improved  Cognitive deficits, mild  Dysphagia, resolved  Continue full rehab program  PT/OT/SLP, 1 hr each discipline, 5 days per week  Continue Eliquis and statin for secondary stroke prophylaxis  Monitor for development of depression or spasticity  Consulted Dr. Wasserman, no evidence of depression currently    Outpatient follow-up with the [facility], referral made  Outpatient follow-up with Dr. Causey, referral made    Right leg neuropathic pain, improved/resolved  Started low-dose gabapentin 100 mg 3 times a day 12/22 --> 200 mg TID 12/24 --> 400 mg TID 12/27, added higher nighttime dose of 600 mg, continue to monitor need to increase  Less likely DVT and patient is already on Eliquis  Outpatient follow-up with Dr. Causey    Hypertension  Continue lisinopril 5 mg 12/21 --> 10 mg 12/23 --> 30 mg 12/29  Continue metoprolol 50 mg twice a day  As needed hydralazine  Consult hospitalist, appreciate assistance    Atrial fibrillation  Continue metoprolol 50 mg twice a day  Outpatient follow-up with cardiology  Currently rate controlled    Diabetes with hyperglycemia  HgbA1c 6.2  Started on metformin 250 BID    Aortic stenosis  Outpatient follow-up  with cardiology    Vitamin D deficiency  Continue supplementation    Hypophosphatemia, continues  Continue supplementation  Consult hospitalist, appreciate assistance    Elevated alk phosphatase, continues  Elevated AST, improved  Ultrasound with gallbladder wall thickening  Monitor as patient currently asymptomatic  Consult hospitalist, appreciate assistance    Bowel program  Constipation, improved/resolved  Continue bowel medications  Scheduled Sennakot - patient has been refusing  Schedule Miralax  PRN MOM, bisacodyl suppository  Last BM 12/30    Bladder program  Acute urinary retention, resolved  Check PVRs - 68, 98  Started Flomax 0.4 mg 12/21  Bladder scan for no voids  ICP for over 400 cc - required once 12/20  Scheduled toileting    DVT prophylaxis  Eliquis    COVID negative 12/17, 12/19, 12/28    Patient received both her pneumococcal and her flu shot in [place] this year    Total time:  16 minutes.  I spent greater than 50% of the time for patient care, counseling, and coordination on this date, including patient face-to face time, unit/floor time with review of records/pertinent lab data and studies, as well as discussing diagnostic evaluation/work up, planned therapeutic interventions, and future disposition of care, as per the interval events/subjective and the assessment and plan as noted above.    I have performed a physical exam, reviewed and updated ROS, as well as the assessment and plan today 12/31/2020. In review of note from 12/30/20 there are no new changes except as documented above.      Tangela Noel M.D.   Physical Medicine and Rehabilitation

## 2020-12-31 NOTE — THERAPY
Speech Language Pathology  Daily Treatment     Patient Name: Liudmila Pulido  Age:  88 y.o., Sex:  female  Medical Record #: [id number]  Today's Date: 12/31/2020     Precautions  Precautions: Fall Risk  Comments: R lizeth (RLE>RUE), R lateral lean    Subjective    Pt pleasant and cooperative.      Objective       12/31/20 0833   SLP Total Time Spent   SLP Individual Total Time Spent (Mins) 60   Treatment Charges   SLP Cognitive Skill Development First 15 Minutes 1   SLP Cognitive Skill Development Additional 15 Minutes 3       Assessment    Pt indep recalled items related to FAST which were presented day prior. Functional problem solving related to d/c completed with SPV overall. Pt indep aware of assistance needed with meds and finances. Pt presented with attention task with use of short story and target word, pt indep located 13/15 targets. Increased to 15/15 provided MIN cues from SLP. Pt then presented with alternating attention task with working memory element, pt completed alternating attention component with MIN verbal cues, pt required MOD A overall for working memory.     Strengths: Able to follow instructions, Making steady progress towards goals, Supportive family, Willingly participates in therapeutic activities, Pleasant and cooperative, Motivated for self care and independence  Barriers: Visual impairment, Impaired carryover of learning, Impaired functional cognition    Plan    Cont to address attention, recall and problem solving     Speech Therapy Problems     Problem: Memory STGs     Dates: Start: 12/30/20       Goal: STG-Within one week, patient will     Dates: Start: 12/30/20       Description: Description: 1) Individualized goal:  learn and implement functional memory strategies with use of memory book with 80% accuracy provided MIN A. 2) Interventions:  SLP Self Care / ADL Training , SLP Cognitive Skill Development, and SLP Group Treatment                  Problem: Problem Solving STGs     Dates:  Start: 12/23/20       Goal: STG-Within one week, patient will     Dates: Start: 12/23/20       Description: 1) Individualized goal:  complete attention tasks related to medication management and financial management skills with 80% accuracy provided MIN A.   2) Interventions:  SLP Self Care / ADL Training , SLP Cognitive Skill Development, and SLP Group Treatment                    Problem: Speech/Swallowing LTGs     Dates: Start: 12/21/20       Goal: LTG-By discharge, patient will     Dates: Start: 12/21/20       Description: 1) Individualized goal:  complete functional problem solving and recall with 80% accuracy provided SPV.   2) Interventions:  SLP Self Care / ADL Training , SLP Cognitive Skill Development, and SLP Group Treatment

## 2020-12-31 NOTE — THERAPY
Physical Therapy   Daily Treatment     Patient Name: Liudmila Pulido  Age:  88 y.o., Sex:  female  Medical Record #: [id number]  Today's Date: 12/31/2020     Precautions  Precautions: Fall Risk  Comments: R lizeth (RLE>RUE), R lateral lean    Subjective    Pt reports she is agreeable to STS and exercise      Objective       12/31/20 1431   Standing Lower Body Exercises   Marching 2 sets of 15   Mini Squat 2 sets of 15;Partial   Comments VCs and dmeo for form adn tehcnique. R knee block in stance for marches for safety   Bed Mobility    Sit to Stand Contact Guard Assist  (BUE support // bars, repeated practice, cues for sequencing)   Interdisciplinary Plan of Care Collaboration   Patient Position at End of Therapy Seated;Call Light within Reach;Tray Table within Reach   PT Total Time Spent   PT Individual Total Time Spent (Mins) 30   PT Charge Group   PT Therapeutic Exercise 2       Assessment    Pt demos improving STS with decreased level of assist. Performs exercises well but cont to require R knee block in stance dt weakness    Strengths: Able to follow instructions, Independent prior level of function, Motivated for self care and independence, Pleasant and cooperative, Willingly participates in therapeutic activities  Barriers: Hemiparesis, Home accessibility, Impaired balance, Limited mobility    Plan    Exercise/neuro re-ed, issue HEP for pt to perform in room when able to complete AROM, NMES,  with techs, Trial lite gait, Transfer training, stand balance, ramp navigation - uneven with wc, STS practice, shuttle, forced use RLE, tight spaces with wc      Physical Therapy Problems     Problem: Mobility     Dates: Start: 12/21/20       Goal: STG-Within one week, patient will propel wheelchair household distances     Dates: Start: 12/21/20       Description: < 150 ft    Note:     Goal Note filed on 12/30/20 1134 by Carloz Azar, PT    < 150 ft                        Problem: Mobility Transfers     Dates:  Start: 12/21/20       Goal: STG-Within one week, patient will transfer bed to chair     Dates: Start: 12/21/20       Description: 2nd person    Note:     Goal Note filed on 12/30/20 1134 by Carloz Azar, PT    2nd person                        Problem: PT-Long Term Goals     Dates: Start: 12/21/20       Goal: LTG-By discharge, patient will propel wheelchair     Dates: Start: 12/21/20       Description: 1) Individualized goal:  150 ft At mod I in order to improve self mobility  2) Interventions:  PT E Stim Attended, PT Orthotics Training, PT Gait Training, PT Therapeutic Exercises, PT Neuro Re-Ed/Balance, PT Aquatic Therapy, PT Therapeutic Activity, PT Manual Therapy, and PT Evaluation          Goal: LTG-By discharge, patient will transfer one surface to another     Dates: Start: 12/21/20       Description: 1) Individualized goal:  At min A with LRD In order to maximize self mobility  2) Interventions:  PT E Stim Attended, PT Orthotics Training, PT Gait Training, PT Therapeutic Exercises, PT Neuro Re-Ed/Balance, PT Aquatic Therapy, PT Therapeutic Activity, PT Manual Therapy, and PT Evaluation          Goal: LTG-By discharge, patient will transfer in/out of a car     Dates: Start: 12/21/20       Description: 1) Individualized goal:  At min A with LRD In order to maximize self mobility  2) Interventions:  PT E Stim Attended, PT Orthotics Training, PT Gait Training, PT Therapeutic Exercises, PT Neuro Re-Ed/Balance, PT Aquatic Therapy, PT Therapeutic Activity, PT Manual Therapy, and PT Evaluation

## 2020-12-31 NOTE — CARE PLAN
Problem: Bowel/Gastric:  Goal: Normal bowel function is maintained or improved  Intervention: Educate patient and significant other/support system about diet, fluid intake, medications and activity to promote bowel function  Note: Patient refusing bowel medications this am due to multiple stools yesterday.     Problem: Pain Management  Goal: Pain level will decrease to patient's comfort goal  Note: Patient able to verbalize pain level and verbalize an acceptable level of pain.

## 2021-01-01 ENCOUNTER — TELEPHONE (OUTPATIENT)
Dept: MEDICAL GROUP | Facility: PHYSICIAN GROUP | Age: 86
End: 2021-01-01

## 2021-01-01 ENCOUNTER — ANTICOAGULATION VISIT (OUTPATIENT)
Dept: VASCULAR LAB | Facility: MEDICAL CENTER | Age: 86
End: 2021-01-01
Attending: INTERNAL MEDICINE
Payer: MEDICARE

## 2021-01-01 ENCOUNTER — HOSPITAL ENCOUNTER (OUTPATIENT)
Dept: LAB | Facility: MEDICAL CENTER | Age: 86
End: 2021-11-24
Attending: INTERNAL MEDICINE
Payer: MEDICARE

## 2021-01-01 VITALS — HEART RATE: 58 BPM | DIASTOLIC BLOOD PRESSURE: 74 MMHG | SYSTOLIC BLOOD PRESSURE: 144 MMHG

## 2021-01-01 DIAGNOSIS — I48.11 LONGSTANDING PERSISTENT ATRIAL FIBRILLATION (HCC): ICD-10-CM

## 2021-01-01 DIAGNOSIS — Z79.01 CHRONIC ANTICOAGULATION: ICD-10-CM

## 2021-01-01 DIAGNOSIS — E78.5 DYSLIPIDEMIA ASSOCIATED WITH TYPE 2 DIABETES MELLITUS (HCC): ICD-10-CM

## 2021-01-01 DIAGNOSIS — Z13.21 ENCOUNTER FOR VITAMIN DEFICIENCY SCREENING: ICD-10-CM

## 2021-01-01 DIAGNOSIS — E11.69 DYSLIPIDEMIA ASSOCIATED WITH TYPE 2 DIABETES MELLITUS (HCC): ICD-10-CM

## 2021-01-01 LAB
25(OH)D3 SERPL-MCNC: 70 NG/ML (ref 30–80)
ALBUMIN SERPL BCP-MCNC: 4.2 G/DL (ref 3.2–4.9)
ALBUMIN/GLOB SERPL: 1.2 G/DL
ALP SERPL-CCNC: 103 U/L (ref 30–99)
ALT SERPL-CCNC: 18 U/L (ref 2–50)
ANION GAP SERPL CALC-SCNC: 10 MMOL/L (ref 7–16)
AST SERPL-CCNC: 31 U/L (ref 12–45)
BASOPHILS # BLD AUTO: 0.6 % (ref 0–1.8)
BASOPHILS # BLD: 0.03 K/UL (ref 0–0.12)
BILIRUB SERPL-MCNC: 0.5 MG/DL (ref 0.1–1.5)
BUN SERPL-MCNC: 10 MG/DL (ref 8–22)
CALCIUM SERPL-MCNC: 10.6 MG/DL (ref 8.5–10.5)
CHLORIDE SERPL-SCNC: 104 MMOL/L (ref 96–112)
CHOLEST SERPL-MCNC: 129 MG/DL (ref 100–199)
CO2 SERPL-SCNC: 25 MMOL/L (ref 20–33)
CREAT SERPL-MCNC: 0.55 MG/DL (ref 0.5–1.4)
CREAT UR-MCNC: 61.18 MG/DL
EOSINOPHIL # BLD AUTO: 0.1 K/UL (ref 0–0.51)
EOSINOPHIL NFR BLD: 2.1 % (ref 0–6.9)
ERYTHROCYTE [DISTWIDTH] IN BLOOD BY AUTOMATED COUNT: 49.1 FL (ref 35.9–50)
EST. AVERAGE GLUCOSE BLD GHB EST-MCNC: 128 MG/DL
FASTING STATUS PATIENT QL REPORTED: NORMAL
GLOBULIN SER CALC-MCNC: 3.4 G/DL (ref 1.9–3.5)
GLUCOSE BLD-MCNC: 104 MG/DL (ref 65–99)
GLUCOSE BLD-MCNC: 113 MG/DL (ref 65–99)
GLUCOSE BLD-MCNC: 118 MG/DL (ref 65–99)
GLUCOSE BLD-MCNC: 146 MG/DL (ref 65–99)
GLUCOSE SERPL-MCNC: 115 MG/DL (ref 65–99)
HBA1C MFR BLD: 6.1 % (ref 4–5.6)
HCT VFR BLD AUTO: 41.5 % (ref 37–47)
HDLC SERPL-MCNC: 60 MG/DL
HGB BLD-MCNC: 13.4 G/DL (ref 12–16)
IMM GRANULOCYTES # BLD AUTO: 0.01 K/UL (ref 0–0.11)
IMM GRANULOCYTES NFR BLD AUTO: 0.2 % (ref 0–0.9)
LDLC SERPL CALC-MCNC: 54 MG/DL
LYMPHOCYTES # BLD AUTO: 2.39 K/UL (ref 1–4.8)
LYMPHOCYTES NFR BLD: 49.4 % (ref 22–41)
MCH RBC QN AUTO: 28.7 PG (ref 27–33)
MCHC RBC AUTO-ENTMCNC: 32.3 G/DL (ref 33.6–35)
MCV RBC AUTO: 88.9 FL (ref 81.4–97.8)
MICROALBUMIN UR-MCNC: <1.2 MG/DL
MICROALBUMIN/CREAT UR: NORMAL MG/G (ref 0–30)
MONOCYTES # BLD AUTO: 0.27 K/UL (ref 0–0.85)
MONOCYTES NFR BLD AUTO: 5.6 % (ref 0–13.4)
NEUTROPHILS # BLD AUTO: 2.04 K/UL (ref 2–7.15)
NEUTROPHILS NFR BLD: 42.1 % (ref 44–72)
NRBC # BLD AUTO: 0 K/UL
NRBC BLD-RTO: 0 /100 WBC
PLATELET # BLD AUTO: 212 K/UL (ref 164–446)
PMV BLD AUTO: 10.3 FL (ref 9–12.9)
POTASSIUM SERPL-SCNC: 4.6 MMOL/L (ref 3.6–5.5)
PROT SERPL-MCNC: 7.6 G/DL (ref 6–8.2)
RBC # BLD AUTO: 4.67 M/UL (ref 4.2–5.4)
SODIUM SERPL-SCNC: 139 MMOL/L (ref 135–145)
TRIGL SERPL-MCNC: 75 MG/DL (ref 0–149)
WBC # BLD AUTO: 4.8 K/UL (ref 4.8–10.8)

## 2021-01-01 PROCEDURE — 700102 HCHG RX REV CODE 250 W/ 637 OVERRIDE(OP): Performed by: HOSPITALIST

## 2021-01-01 PROCEDURE — 36415 COLL VENOUS BLD VENIPUNCTURE: CPT | Mod: GA

## 2021-01-01 PROCEDURE — 700102 HCHG RX REV CODE 250 W/ 637 OVERRIDE(OP): Performed by: PHYSICAL MEDICINE & REHABILITATION

## 2021-01-01 PROCEDURE — 82570 ASSAY OF URINE CREATININE: CPT

## 2021-01-01 PROCEDURE — 99212 OFFICE O/P EST SF 10 MIN: CPT

## 2021-01-01 PROCEDURE — 83036 HEMOGLOBIN GLYCOSYLATED A1C: CPT | Mod: GA

## 2021-01-01 PROCEDURE — 82962 GLUCOSE BLOOD TEST: CPT | Mod: 91

## 2021-01-01 PROCEDURE — 97112 NEUROMUSCULAR REEDUCATION: CPT

## 2021-01-01 PROCEDURE — A9270 NON-COVERED ITEM OR SERVICE: HCPCS

## 2021-01-01 PROCEDURE — A9270 NON-COVERED ITEM OR SERVICE: HCPCS | Performed by: PHYSICAL MEDICINE & REHABILITATION

## 2021-01-01 PROCEDURE — 99232 SBSQ HOSP IP/OBS MODERATE 35: CPT | Performed by: HOSPITALIST

## 2021-01-01 PROCEDURE — 80061 LIPID PANEL: CPT

## 2021-01-01 PROCEDURE — 82043 UR ALBUMIN QUANTITATIVE: CPT

## 2021-01-01 PROCEDURE — 97129 THER IVNTJ 1ST 15 MIN: CPT

## 2021-01-01 PROCEDURE — A9270 NON-COVERED ITEM OR SERVICE: HCPCS | Performed by: HOSPITALIST

## 2021-01-01 PROCEDURE — 85025 COMPLETE CBC W/AUTO DIFF WBC: CPT

## 2021-01-01 PROCEDURE — 97130 THER IVNTJ EA ADDL 15 MIN: CPT

## 2021-01-01 PROCEDURE — 82306 VITAMIN D 25 HYDROXY: CPT | Mod: GA

## 2021-01-01 PROCEDURE — 770010 HCHG ROOM/CARE - REHAB SEMI PRIVAT*

## 2021-01-01 PROCEDURE — 97110 THERAPEUTIC EXERCISES: CPT

## 2021-01-01 PROCEDURE — 97530 THERAPEUTIC ACTIVITIES: CPT

## 2021-01-01 PROCEDURE — 99232 SBSQ HOSP IP/OBS MODERATE 35: CPT | Performed by: PHYSICAL MEDICINE & REHABILITATION

## 2021-01-01 PROCEDURE — 97116 GAIT TRAINING THERAPY: CPT

## 2021-01-01 PROCEDURE — 700102 HCHG RX REV CODE 250 W/ 637 OVERRIDE(OP)

## 2021-01-01 PROCEDURE — 80053 COMPREHEN METABOLIC PANEL: CPT

## 2021-01-01 RX ORDER — BACLOFEN 10 MG/1
TABLET ORAL
Status: COMPLETED
Start: 2021-01-01 | End: 2021-01-01

## 2021-01-01 RX ORDER — LISINOPRIL 5 MG/1
10 TABLET ORAL ONCE
Status: COMPLETED | OUTPATIENT
Start: 2021-01-01 | End: 2021-01-01

## 2021-01-01 RX ORDER — BACLOFEN 10 MG/1
10 TABLET ORAL
Status: DISCONTINUED | OUTPATIENT
Start: 2021-01-01 | End: 2021-01-09 | Stop reason: HOSPADM

## 2021-01-01 RX ORDER — LISINOPRIL 20 MG/1
40 TABLET ORAL
Status: DISCONTINUED | OUTPATIENT
Start: 2021-01-02 | End: 2021-01-09 | Stop reason: HOSPADM

## 2021-01-01 RX ADMIN — ACETAMINOPHEN 650 MG: 325 TABLET, FILM COATED ORAL at 19:12

## 2021-01-01 RX ADMIN — Medication 1000 UNITS: at 08:16

## 2021-01-01 RX ADMIN — BACLOFEN 10 MG: 10 TABLET ORAL at 19:12

## 2021-01-01 RX ADMIN — LISINOPRIL 30 MG: 20 TABLET ORAL at 05:30

## 2021-01-01 RX ADMIN — DIBASIC SODIUM PHOSPHATE, MONOBASIC POTASSIUM PHOSPHATE AND MONOBASIC SODIUM PHOSPHATE 250 MG: 852; 155; 130 TABLET ORAL at 14:43

## 2021-01-01 RX ADMIN — DOCUSATE SODIUM 50 MG AND SENNOSIDES 8.6 MG 2 TABLET: 8.6; 5 TABLET, FILM COATED ORAL at 20:52

## 2021-01-01 RX ADMIN — DIBASIC SODIUM PHOSPHATE, MONOBASIC POTASSIUM PHOSPHATE AND MONOBASIC SODIUM PHOSPHATE 250 MG: 852; 155; 130 TABLET ORAL at 08:16

## 2021-01-01 RX ADMIN — LISINOPRIL 10 MG: 5 TABLET ORAL at 09:31

## 2021-01-01 RX ADMIN — METFORMIN HYDROCHLORIDE 250 MG: 500 TABLET ORAL at 08:27

## 2021-01-01 RX ADMIN — APIXABAN 5 MG: 5 TABLET, FILM COATED ORAL at 08:17

## 2021-01-01 RX ADMIN — POLYETHYLENE GLYCOL 3350 1 PACKET: 17 POWDER, FOR SOLUTION ORAL at 08:16

## 2021-01-01 RX ADMIN — GABAPENTIN 400 MG: 400 CAPSULE ORAL at 17:33

## 2021-01-01 RX ADMIN — DIBASIC SODIUM PHOSPHATE, MONOBASIC POTASSIUM PHOSPHATE AND MONOBASIC SODIUM PHOSPHATE 250 MG: 852; 155; 130 TABLET ORAL at 20:52

## 2021-01-01 RX ADMIN — METOPROLOL TARTRATE 50 MG: 25 TABLET, FILM COATED ORAL at 05:31

## 2021-01-01 RX ADMIN — ATORVASTATIN CALCIUM 40 MG: 40 TABLET, FILM COATED ORAL at 20:52

## 2021-01-01 RX ADMIN — GABAPENTIN 400 MG: 400 CAPSULE ORAL at 12:13

## 2021-01-01 RX ADMIN — GABAPENTIN 400 MG: 400 CAPSULE ORAL at 08:16

## 2021-01-01 RX ADMIN — ACETAMINOPHEN 650 MG: 325 TABLET, FILM COATED ORAL at 14:43

## 2021-01-01 RX ADMIN — METFORMIN HYDROCHLORIDE 250 MG: 500 TABLET ORAL at 17:32

## 2021-01-01 RX ADMIN — ACETAMINOPHEN 650 MG: 325 TABLET, FILM COATED ORAL at 08:17

## 2021-01-01 RX ADMIN — GABAPENTIN 600 MG: 300 CAPSULE ORAL at 20:52

## 2021-01-01 RX ADMIN — TAMSULOSIN HYDROCHLORIDE 0.4 MG: 0.4 CAPSULE ORAL at 20:52

## 2021-01-01 RX ADMIN — APIXABAN 5 MG: 5 TABLET, FILM COATED ORAL at 20:52

## 2021-01-01 ASSESSMENT — ENCOUNTER SYMPTOMS
CHILLS: 0
VOMITING: 0
ABDOMINAL PAIN: 0
SHORTNESS OF BREATH: 0
NAUSEA: 0
FEVER: 0
NERVOUS/ANXIOUS: 0
DIARRHEA: 0

## 2021-01-01 ASSESSMENT — ACTIVITIES OF DAILY LIVING (ADL): BED_CHAIR_WHEELCHAIR_TRANSFER_DESCRIPTION: ADAPTIVE EQUIPMENT;SET-UP OF EQUIPMENT;INCREASED TIME

## 2021-01-01 ASSESSMENT — GAIT ASSESSMENTS
GAIT LEVEL OF ASSIST: TOTAL ASSIST X 2
DISTANCE (FEET): 10
ASSISTIVE DEVICE: PARALLEL BARS

## 2021-01-01 ASSESSMENT — PAIN DESCRIPTION - PAIN TYPE: TYPE: ACUTE PAIN;OTHER (COMMENT)

## 2021-01-01 NOTE — THERAPY
"Occupational Therapy  Daily Treatment     Patient Name: Liudmila Pulido  Age:  88 y.o., Sex:  female  Medical Record #: [id number]  Today's Date: 1/1/2021     Precautions  Precautions: (P) Fall Risk  Comments: (P) R lizeth (RLE>RUE), R LE rossy    Safety   ADL Safety : Requires Supervision for Safety, Requires Physical Assist for Safety, Requires Cueing for Safety  Bathroom Safety: Requires Supervision for Safety, Requires Physical Assist for Safety, Requires Cuing for Safety  Comments: See functional levels of assist below for ADL performance details     Subjective    \"I need this leg to start working better\"     Objective       01/01/21 1301   Precautions   Precautions Fall Risk   Comments R lizeth (RLE>RUE), R LE rossy   Sitting Upper Body Exercises   Upper Extremity Bike Level 2 Resistance  (FluidoBike x10 min, 0 RB, 1.1km)   Neuro-Muscular Treatments   Neuro-Muscular Treatments Biofeedback;Facilitation;Sequencing;Tactile Cuing;Verbal Cuing;Weight Shift Right;Weight Shift Left;Other (See Comments)   Comments see note for tx details.   Interdisciplinary Plan of Care Collaboration   IDT Collaboration with  Certified Nursing Assistant;Therapy Tech   Patient Position at End of Therapy Seated;Chair Alarm On;Self Releasing Lap Belt Applied;Call Light within Reach;Tray Table within Reach;Phone within Reach   Collaboration Comments transfer of care to CNA for vitals; tech assist prn.   OT Total Time Spent   OT Individual Total Time Spent (Mins) 60   OT Charge Group   OT Neuromuscular Re-education / Balance 1   OT Therapy Activity 2   OT Therapeutic Exercise  1     Sit<>stand repetitions in // bars with focus on hand placement, RLE WB, ant weightshifting 2x10 reps with assist for blocking RLE.    Unilateral reach while standing in // bars for cones x10 reps with RUE toward right side, x10 reps with RUE across midline, x10 reps with LUE toward left side, and x10 reps with LUE across midline, all completed with CGA and " blocking of RLE.     Assessment    Pt tolerated OT session well. She is making progress with standing tolerance and sit<> stand transitions, requiring decreased level of assistance. Pt able to complete unilateral reaching task in // bars with blocking of RLE, no LOB. RUE coordination is also improving.       Strengths: Alert and oriented, Independent prior level of function, Motivated for self care and independence, Pleasant and cooperative, Supportive family, Willingly participates in therapeutic activities  Barriers: Bladder incontinence, Bowel incontinence, Decreased endurance, Fatigue, Hemiparesis, Home accessibility, Impaired activity tolerance, Impaired balance, Impaired functional cognition, Limited mobility, Visual impairment    Plan    Cont overall strength/endurance, RUE coordination, and standing tolerance/balance to improve ADL's and functional mobility    Occupational Therapy Goals     Problem: Dressing     Dates: Start: 12/21/20       Goal: STG-Within one week, patient will dress LB     Dates: Start: 12/21/20       Description: 1) Individualized Goal:  mod A with AE as needed.  2) Interventions:  OT E Stim Attended, OT Self Care/ADL, OT Cognitive Skill Dev, OT Community Reintegration, OT Manual Ther Technique, OT Neuro Re-Ed/Balance, OT Therapeutic Activity, OT Evaluation, and OT Therapeutic Exercise    Note:     Goal Note filed on 12/30/20 1128 by Gris Padilla, OT    Con't to require min-mod A x2.                         Problem: Functional Transfers     Dates: Start: 12/21/20       Goal: STG-Within one week, patient will transfer to toilet     Dates: Start: 12/21/20       Description: 1) Individualized Goal:  mod A with AD/DME as needed.  2) Interventions:  OT E Stim Attended, OT Self Care/ADL, OT Cognitive Skill Dev, OT Community Reintegration, OT Manual Ther Technique, OT Neuro Re-Ed/Balance, OT Therapeutic Activity, OT Evaluation, and OT Therapeutic Exercise    Note:     Goal Note filed on  12/30/20 1128 by Gris Padilla OT    Mod-max A x2.                         Problem: Grooming     Dates: Start: 12/21/20             Problem: OT Long Term Goals     Dates: Start: 12/21/20       Goal: LTG-By discharge, patient will complete basic self care tasks     Dates: Start: 12/21/20       Description: 1) Individualized Goal: Supervision to mod I level with AE/DME as needed.  2) Interventions:  OT E Stim Attended, OT Self Care/ADL, OT Cognitive Skill Dev, OT Community Reintegration, OT Manual Ther Technique, OT Neuro Re-Ed/Balance, OT Therapeutic Activity, OT Evaluation, and OT Therapeutic Exercise          Goal: LTG-By discharge, patient will perform bathroom transfers     Dates: Start: 12/21/20       Description: 1) Individualized Goal:  Supervision to mod I level with AD/DME as needed.  2) Interventions:  OT E Stim Attended, OT Self Care/ADL, OT Cognitive Skill Dev, OT Community Reintegration, OT Manual Ther Technique, OT Neuro Re-Ed/Balance, OT Therapeutic Activity, OT Evaluation, and OT Therapeutic Exercise                Problem: Toileting     Dates: Start: 12/21/20       Goal: STG-Within one week, patient will complete toileting tasks     Dates: Start: 12/21/20       Description: 1) Individualized Goal:  max A with AE/DME as needed.  2) Interventions:  OT E Stim Attended, OT Self Care/ADL, OT Cognitive Skill Dev, OT Community Reintegration, OT Manual Ther Technique, OT Neuro Re-Ed/Balance, OT Therapeutic Activity, OT Evaluation, and OT Therapeutic Exercise    Note:     Goal Note filed on 12/30/20 1128 by Gris Padilla OT    Con't to require 2nd person present for safety with clothing mgt, hygiene and balance.

## 2021-01-01 NOTE — CARE PLAN
Problem: Safety  Goal: Will remain free from injury  Outcome: PROGRESSING AS EXPECTED  Note: Patient demonstrates good safety technique this shift.  Asks for assistance when needed and does not attempt self transfer.  Able to verbalize needs.  Will continue to monitor.      Problem: Pain Management  Goal: Pain level will decrease to patient's comfort goal  Outcome: PROGRESSING AS EXPECTED  Note: Patient is on scheduled Tylenol. She complains of muscle spasms in her right leg at bedtime.  Repositioning helped.

## 2021-01-01 NOTE — PROGRESS NOTES
"Rehab Progress Note     Encounter Date: 1/1/2021    CC: Right lower extremity spasms.    Interval Events (Subjective)  Vital signs reviewed.  Intermittent tachycardia in the 50s which appears baseline for patient.  Elevated blood pressure at 160 and 155.  No labs to review.  Nursing reported that patient is having spasticity at night and also neuropathic pain in the right leg.  Patient seen and examined in her room while sitting in manual wheelchair.  She states that her neuropathic pain is controlled.  However, she states that she is getting spasms at night in the right lower extremity that keeps her up.  They are not present during the day.    14 point ROS reviewed and negative except as stated above.     Objective:  VITAL SIGNS: /66   Pulse (!) 50   Temp 36.7 °C (98 °F) (Oral)   Resp 18   Ht 1.676 m (5' 5.98\")   Wt 75 kg (165 lb 6.4 oz)   SpO2 97%   BMI 26.71 kg/m²     GEN: No apparent distress, sitting comfortably in manual wheelchair.  HEENT: Head normocephalic, atraumatic.  Sclera nonicteric bilaterally, no ocular discharge appreciated bilaterally.  CV: Extremities warm and well-perfused, no peripheral edema appreciated bilaterally.  PULMONARY: Breathing nonlabored on room air, no respiratory accessory muscle use.  Not requiring supplemental oxygen.  ABD: Soft, nontender.  SKIN: No appreciable skin breakdown on exposed areas of skin.  NEURO: Awake alert.  Conversational.  Logical thought content.  No significant tone appreciated on exam at this time.  No clonus at right ankle.  PSYCH: Mood and affect within normal limits.    Recent Results (from the past 72 hour(s))   ACCU-CHEK GLUCOSE    Collection Time: 12/30/20  8:06 AM   Result Value Ref Range    Glucose - Accu-Ck 322 (H) 65 - 99 mg/dL   ACCU-CHEK GLUCOSE    Collection Time: 12/30/20  9:33 PM   Result Value Ref Range    Glucose - Accu-Ck 141 (H) 65 - 99 mg/dL   ACCU-CHEK GLUCOSE    Collection Time: 12/31/20  7:12 AM   Result Value Ref Range    " Glucose - Accu-Ck 113 (H) 65 - 99 mg/dL   ACCU-CHEK GLUCOSE    Collection Time: 12/31/20 11:25 AM   Result Value Ref Range    Glucose - Accu-Ck 111 (H) 65 - 99 mg/dL   ACCU-CHEK GLUCOSE    Collection Time: 12/31/20  5:22 PM   Result Value Ref Range    Glucose - Accu-Ck 108 (H) 65 - 99 mg/dL   ACCU-CHEK GLUCOSE    Collection Time: 12/31/20  8:35 PM   Result Value Ref Range    Glucose - Accu-Ck 137 (H) 65 - 99 mg/dL   ACCU-CHEK GLUCOSE    Collection Time: 01/01/21  7:37 AM   Result Value Ref Range    Glucose - Accu-Ck 113 (H) 65 - 99 mg/dL   ACCU-CHEK GLUCOSE    Collection Time: 01/01/21 11:48 AM   Result Value Ref Range    Glucose - Accu-Ck 118 (H) 65 - 99 mg/dL       Current Facility-Administered Medications   Medication Frequency   • [START ON 1/2/2021] lisinopril (PRINIVIL) tablet 40 mg Q DAY   • metFORMIN (GLUCOPHAGE) tablet 250 mg BID WITH MEALS   • phosphorus (K-Phos-Neutral) per tablet 250 mg TID   • gabapentin (NEURONTIN) capsule 400 mg TID   • gabapentin (NEURONTIN) capsule 600 mg Q EVENING   • acetaminophen (Tylenol) tablet 650 mg TID   • polyethylene glycol/lytes (MIRALAX) PACKET 1 Packet DAILY    And   • senna-docusate (PERICOLACE or SENOKOT S) 8.6-50 MG per tablet 2 Tab BID    And   • magnesium hydroxide (MILK OF MAGNESIA) suspension 30 mL QDAY PRN    And   • bisacodyl (DULCOLAX) suppository 10 mg QDAY PRN   • vitamin D (cholecalciferol) tablet 1,000 Units DAILY   • tamsulosin (FLOMAX) capsule 0.4 mg QHS   • Respiratory Therapy Consult Continuous RT   • Pharmacy Consult Request ...Pain Management Review 1 Each PHARMACY TO DOSE   • hydrALAZINE (APRESOLINE) tablet 25 mg Q8HRS PRN   • artificial tears ophthalmic solution 1 Drop PRN   • benzocaine-menthol (CEPACOL) lozenge 1 Lozenge Q2HRS PRN   • mag hydrox-al hydrox-simeth (MAALOX PLUS ES or MYLANTA DS) suspension 20 mL Q2HRS PRN   • ondansetron (ZOFRAN ODT) dispertab 4 mg 4X/DAY PRN    Or   • ondansetron (ZOFRAN) syringe/vial injection 4 mg 4X/DAY PRN   •  traZODone (DESYREL) tablet 50 mg QHS PRN   • sodium chloride (OCEAN) 0.65 % nasal spray 2 Spray PRN   • acetaminophen (Tylenol) tablet 650 mg Q6HRS PRN   • atorvastatin (LIPITOR) tablet 40 mg Q EVENING   • apixaban (ELIQUIS) tablet 5 mg BID   • metoprolol (LOPRESSOR) tablet 50 mg TWICE DAILY   • guaiFENesin (ROBITUSSIN) 100 MG/5ML solution 100 mg Q6HRS PRN       Orders Placed This Encounter   Procedures   • Diet Order Diet: Consistent CHO (Diabetic) (Please make sure that pt has dentures (uppers and lowers) in place for meal. )     Standing Status:   Standing     Number of Occurrences:   1     Order Specific Question:   Diet:     Answer:   Consistent CHO (Diabetic) [4]     Comments:   Please make sure that pt has dentures (uppers and lowers) in place for meal.        Assessment:  Active Hospital Problems    Diagnosis   • *CVA (cerebral vascular accident) ([facility])   • AF (atrial fibrillation) ([facility])   • Elevated troponin   • Hyponatremia   • Essential hypertension   • Hyperlipidemia   • Leukopenia   • Alkaline phosphatase elevation   • Vitamin D deficiency   • Other dysphagia   • Cognitive deficits   • Hypophosphatemia   • Urinary retention   • Type 2 diabetes mellitus without complication, without long-term current use of insulin ([facility])   • Aortic stenosis       Medical Decision Making and Plan:  Spasticity: Start baclofen 10 mg nightly.  Hypertension: Hospitalist elevated lisinopril to 40 mg daily 1/1  Neuropathic pain: Currently on gabapentin 400/400/400/600    Total time: 27 minutes minutes.  I spent greater than 50% of the time for patient care and coordination on this date, including unit/floor time, and face-to-face time with the patient as per assessment and plan above.    Xochilt Causey D.O.

## 2021-01-01 NOTE — CARE PLAN
Problem: Safety  Goal: Will remain free from injury  Outcome: PROGRESSING AS EXPECTED  Goal: Will remain free from falls  Outcome: PROGRESSING AS EXPECTED  Note: Education about fall risk performed. Verbalizes understanding     Problem: Venous Thromboembolism (VTW)/Deep Vein Thrombosis (DVT) Prevention:  Goal: Patient will participate in Venous Thrombosis (VTE)/Deep Vein Thrombosis (DVT)Prevention Measures  Outcome: PROGRESSING AS EXPECTED  Flowsheets (Taken 1/1/2021 1351)  Pharmacologic Prophylaxis Used: Apixaban     Problem: Skin Integrity  Goal: Risk for impaired skin integrity will decrease  Outcome: PROGRESSING AS EXPECTED

## 2021-01-01 NOTE — THERAPY
Physical Therapy   Daily Treatment     Patient Name: Liudmila Pulido  Age:  88 y.o., Sex:  female  Medical Record #: [id number]  Today's Date: 1/1/2021     Precautions  Precautions: (P) Fall Risk  Comments: (P) R lizeth (RLE>RUE), R lateral lean    Subjective    Patient reported motivation toward independence at D/C.      Objective       01/01/21 0831   Precautions   Precautions Fall Risk   Comments R lizeth (RLE>RUE), R lateral lean   Gait Functional Level of Assist    Gait Level Of Assist Total Assist X 2  (// bars mod A with RLE blocking, + wc follow)   Assistive Device Parallel Bars   Distance (Feet) 10   # of Times Distance was Traveled 3   Deviation Bradykinetic;Decreased Toe Off;Decreased Heel Strike;Step To  (blocking RLE in stance, RLE assist for advancement)   Stairs Functional Level of Assist   Level of Assist with Stairs Unable to Participate   Transfer Functional Level of Assist   Bed, Chair, Wheelchair Transfer Total Assist X 2  (reach pivot left, undershot transfer, 2nd person to assist)   Bed Chair Wheelchair Transfer Description Adaptive equipment;Set-up of equipment;Increased time   Sitting Lower Body Exercises   Sitting Lower Body Exercises   (HEP handout provided)   Hip Abduction Light Resistance Theraband;2 sets of 10   Hip Adduction 2 sets of 10;Bilateral  (ball squeezes)   Long Arc Quad   (5x with leg  RLE, for AAROM, modification)   Marching   (AAROM with leg  3x, as modification, dec coordination)   Sit to Stand 1 set of 10  (Blocking RLE, extra time)   Standing Lower Body Exercises   Mini Squat Partial;1 set of 10  (blocking RLE)   Bed Mobility    Supine to Sit Contact Guard Assist  (use of leg , extra time, vc)   Sit to Stand Contact Guard Assist  (// bars)   Scooting Minimal Assist   Neuro-Muscular Treatments   Neuro-Muscular Treatments Weight Shift Right;Weight Shift Left;Verbal Cuing;Tactile Cuing;Sequencing;Postural Facilitation;Postural Changes;Joint  Approximation;Facilitation   Comments Blocking R knee for stability in stance, assist with RLE for advancement during pregait.    Interdisciplinary Plan of Care Collaboration   IDT Collaboration with  Therapy Tech   Patient Position at End of Therapy Seated;Self Releasing Lap Belt Applied   Collaboration Comments POC, wc follow, transfer assist   Strengths & Barriers   Strengths Able to follow instructions;Independent prior level of function;Motivated for self care and independence;Pleasant and cooperative;Willingly participates in therapeutic activities   Barriers Hemiparesis;Home accessibility;Impaired balance;Limited mobility   PT Total Time Spent   PT Individual Total Time Spent (Mins) 60   PT Charge Group   PT Gait Training 1   PT Therapeutic Exercise 1   PT Neuromuscular Re-Education / Balance 1   PT Therapeutic Activities 1       Assessment    Patient demonstrated improved indep using leg  device for sup to sit, with sequencing vc. Patient continues to demonstrate inconsistencies with transfers, so 2 person recommendations continues at this time. Patient requires blocking to RLE for stance stability, and variable assistance with RLE advancement during pregait.       Strengths: (P) Able to follow instructions, Independent prior level of function, Motivated for self care and independence, Pleasant and cooperative, Willingly participates in therapeutic activities  Barriers: (P) Hemiparesis, Home accessibility, Impaired balance, Limited mobility    Plan    Exercise/neuro re-ed, review HEP for pt to perform in room to initiate their own care when able to complete AROM, NMES, , Trial lite gait and standing in // bars, Transfer training, sit/stand balance, wc mob, ramp navigation - uneven, STS practice with walker, shuttle    Physical Therapy Problems     Problem: Mobility     Dates: Start: 12/21/20       Goal: STG-Within one week, patient will propel wheelchair household distances     Dates: Start:  12/21/20       Description: < 150 ft    Note:     Goal Note filed on 12/30/20 1134 by Carloz Azar, PT    < 150 ft                        Problem: Mobility Transfers     Dates: Start: 12/21/20       Goal: STG-Within one week, patient will transfer bed to chair     Dates: Start: 12/21/20       Description: 2nd person    Note:     Goal Note filed on 12/30/20 1134 by Carloz Azar, PT    2nd person                        Problem: PT-Long Term Goals     Dates: Start: 12/21/20       Goal: LTG-By discharge, patient will propel wheelchair     Dates: Start: 12/21/20       Description: 1) Individualized goal:  150 ft At mod I in order to improve self mobility  2) Interventions:  PT E Stim Attended, PT Orthotics Training, PT Gait Training, PT Therapeutic Exercises, PT Neuro Re-Ed/Balance, PT Aquatic Therapy, PT Therapeutic Activity, PT Manual Therapy, and PT Evaluation          Goal: LTG-By discharge, patient will transfer one surface to another     Dates: Start: 12/21/20       Description: 1) Individualized goal:  At min A with LRD In order to maximize self mobility  2) Interventions:  PT E Stim Attended, PT Orthotics Training, PT Gait Training, PT Therapeutic Exercises, PT Neuro Re-Ed/Balance, PT Aquatic Therapy, PT Therapeutic Activity, PT Manual Therapy, and PT Evaluation          Goal: LTG-By discharge, patient will transfer in/out of a car     Dates: Start: 12/21/20       Description: 1) Individualized goal:  At min A with LRD In order to maximize self mobility  2) Interventions:  PT E Stim Attended, PT Orthotics Training, PT Gait Training, PT Therapeutic Exercises, PT Neuro Re-Ed/Balance, PT Aquatic Therapy, PT Therapeutic Activity, PT Manual Therapy, and PT Evaluation

## 2021-01-01 NOTE — THERAPY
Speech Language Pathology  Daily Treatment     Patient Name: Liudmila Pulido  Age:  88 y.o., Sex:  female  Medical Record #: [id number]  Today's Date: 1/1/2021     Precautions  Precautions: Fall Risk  Comments: R lizeth (RLE>RUE), R lateral lean    Subjective    Pt pleasant and cooperative, reporting being tired following completion of PT     Objective       01/01/21 0933   SLP Total Time Spent   SLP Individual Total Time Spent (Mins) 60   Treatment Charges   SLP Cognitive Skill Development First 15 Minutes 1   SLP Cognitive Skill Development Additional 15 Minutes 3       Assessment    Pt presented with simple planning and organization task to address attention and problem solving. Task #1 closet organization - pt required MOD A for completion. Pt then presented with task #2 planting a garden in which pt completed with 100% accuracy. Pt indep able to verbally recall meals and therapy completed thus far. Pt cont to be motivated for therapy, aware of difficulty with meds and finances and reports plan for family to assist rather than attempt indep.     Strengths: Able to follow instructions, Making steady progress towards goals, Supportive family, Willingly participates in therapeutic activities, Pleasant and cooperative, Motivated for self care and independence  Barriers: Visual impairment, Impaired carryover of learning, Impaired functional cognition    Plan    Functional problem solving and attention.     Speech Therapy Problems     Problem: Memory STGs     Dates: Start: 12/30/20       Goal: STG-Within one week, patient will     Dates: Start: 12/30/20       Description: Description: 1) Individualized goal:  learn and implement functional memory strategies with use of memory book with 80% accuracy provided MIN A. 2) Interventions:  SLP Self Care / ADL Training , SLP Cognitive Skill Development, and SLP Group Treatment                  Problem: Problem Solving STGs     Dates: Start: 12/23/20       Goal: STG-Within one  week, patient will     Dates: Start: 12/23/20       Description: 1) Individualized goal:  complete attention tasks related to medication management and financial management skills with 80% accuracy provided MIN A.   2) Interventions:  SLP Self Care / ADL Training , SLP Cognitive Skill Development, and SLP Group Treatment                    Problem: Speech/Swallowing LTGs     Dates: Start: 12/21/20       Goal: LTG-By discharge, patient will     Dates: Start: 12/21/20       Description: 1) Individualized goal:  complete functional problem solving and recall with 80% accuracy provided SPV.   2) Interventions:  SLP Self Care / ADL Training , SLP Cognitive Skill Development, and SLP Group Treatment

## 2021-01-01 NOTE — CONSULTS
BRIEF PSYCHOLOGY NOTE: Second consult received for psychotherapy. Patient apparently being seen by Dr. Wasserman however he is no longer with the service. Will follow up with the primary physician to see if the patient is still in need of services.

## 2021-01-01 NOTE — PROGRESS NOTES
Assumed care of pt. Pt awake sitting in bed, no signs of distress. Plan of care discussed. Denies pain. Education about fall prevention performed and pt verbalizes understanding. Call light within reach, bed in lowest position, call light and personal belongings within reach, hourly rounds.

## 2021-01-01 NOTE — PROGRESS NOTES
Hospital Medicine Daily Progress Note      Chief Complaint:  Hypertension  Afib  Elevated AP    Interval History:  No significant events or changes since last visit    Review of Systems  Review of Systems   Constitutional: Negative for chills and fever.   Respiratory: Negative for shortness of breath.    Cardiovascular: Negative for chest pain.   Gastrointestinal: Negative for abdominal pain, diarrhea, nausea and vomiting.   Psychiatric/Behavioral: The patient is not nervous/anxious.         Physical Exam  Temp:  [36.4 °C (97.6 °F)-37 °C (98.6 °F)] 36.7 °C (98 °F)  Pulse:  [50-67] 50  Resp:  [18] 18  BP: (139-160)/(63-74) 155/66  SpO2:  [95 %-97 %] 97 %    Physical Exam  Vitals signs and nursing note reviewed.   Constitutional:       Appearance: Normal appearance.   HENT:      Head: Atraumatic.   Eyes:      Conjunctiva/sclera: Conjunctivae normal.      Pupils: Pupils are equal, round, and reactive to light.   Neck:      Musculoskeletal: Normal range of motion and neck supple.      Vascular: No JVD.   Cardiovascular:      Rate and Rhythm: Normal rate. Rhythm irregular.      Heart sounds: Normal heart sounds.   Pulmonary:      Effort: Pulmonary effort is normal.      Breath sounds: Normal breath sounds.   Abdominal:      General: Bowel sounds are normal.      Palpations: Abdomen is soft.   Musculoskeletal:      Right lower leg: Edema present.      Left lower leg: Edema present.      Comments: Has mild B/L pedal/ankle swelling   Skin:     General: Skin is warm and dry.   Neurological:      Mental Status: She is alert and oriented to person, place, and time.   Psychiatric:         Mood and Affect: Mood normal.         Behavior: Behavior normal.         Fluids    Intake/Output Summary (Last 24 hours) at 1/1/2021 0800  Last data filed at 12/31/2020 1200  Gross per 24 hour   Intake 480 ml   Output --   Net 480 ml       Laboratory                        Assessment/Plan  * CVA (cerebral vascular accident) (HCC)- (present on  admission)  Assessment & Plan  Has RLE weakness  On Eliquis  On Lipitor    AF (atrial fibrillation) (HCC)- (present on admission)  Assessment & Plan  HR a little labile: 50-72  On Metoprolol: 50 mg bid  On Eliquis  Cont to monitor    Alkaline phosphatase elevation  Assessment & Plan  Starting to improve  AP: 347 --> 293 (12/28)  Asymptomatic -- denies abd pain, nausea, or vomiting  U/S abd: shows cholelithiasis with gallbladder wall thickening suggesting cholecystitis  Consider HIDA if pt develops GI symptoms  Will need out patient follow up    Hypophosphatemia- (present on admission)  Assessment & Plan  PO4: 2.3 --> 2.8 --> 2.6 (12/28)  On supplements  Monitpr    Vitamin D deficiency- (present on admission)  Assessment & Plan  Vit D: 21  On supplements    Aortic stenosis- (present on admission)  Assessment & Plan  Has some mild B/L LE pedal/ankle swelling  Echo: EF 75%, RVSP 40, moderate AS  Outpt follow up    Type 2 diabetes mellitus without complication, without long-term current use of insulin ([facility])- (present on admission)  Assessment & Plan  Hba1c: 6.2 (12/17)  BS: 108-137  On Metformin: 250 mg bid (12/30 evening)  Now on a diabetic diet (12/30)  Note: takes Metformin 500 mg bid at home   Cont to monitor    Essential hypertension- (present on admission)  Assessment & Plan  BP a little elevated recently  On Lisinopril: 20 mg daily --> 30 mg daily (12/29) --> will increase to 40 mg daily (1/1)  On Lopressor: 50 mg bid  Note: on Flomax  Cont to monitor

## 2021-01-02 LAB
GLUCOSE BLD-MCNC: 100 MG/DL (ref 65–99)
GLUCOSE BLD-MCNC: 115 MG/DL (ref 65–99)
GLUCOSE BLD-MCNC: 116 MG/DL (ref 65–99)
GLUCOSE BLD-MCNC: 125 MG/DL (ref 65–99)

## 2021-01-02 PROCEDURE — A9270 NON-COVERED ITEM OR SERVICE: HCPCS | Performed by: PHYSICAL MEDICINE & REHABILITATION

## 2021-01-02 PROCEDURE — A9270 NON-COVERED ITEM OR SERVICE: HCPCS | Performed by: HOSPITALIST

## 2021-01-02 PROCEDURE — 700102 HCHG RX REV CODE 250 W/ 637 OVERRIDE(OP): Performed by: PHYSICAL MEDICINE & REHABILITATION

## 2021-01-02 PROCEDURE — 99232 SBSQ HOSP IP/OBS MODERATE 35: CPT | Performed by: HOSPITALIST

## 2021-01-02 PROCEDURE — 770010 HCHG ROOM/CARE - REHAB SEMI PRIVAT*

## 2021-01-02 PROCEDURE — 700102 HCHG RX REV CODE 250 W/ 637 OVERRIDE(OP): Performed by: HOSPITALIST

## 2021-01-02 PROCEDURE — 82962 GLUCOSE BLOOD TEST: CPT | Mod: 91

## 2021-01-02 RX ADMIN — APIXABAN 5 MG: 5 TABLET, FILM COATED ORAL at 07:58

## 2021-01-02 RX ADMIN — ATORVASTATIN CALCIUM 40 MG: 40 TABLET, FILM COATED ORAL at 20:24

## 2021-01-02 RX ADMIN — GABAPENTIN 400 MG: 400 CAPSULE ORAL at 11:53

## 2021-01-02 RX ADMIN — TAMSULOSIN HYDROCHLORIDE 0.4 MG: 0.4 CAPSULE ORAL at 20:25

## 2021-01-02 RX ADMIN — POLYETHYLENE GLYCOL 3350 1 PACKET: 17 POWDER, FOR SOLUTION ORAL at 07:59

## 2021-01-02 RX ADMIN — DIBASIC SODIUM PHOSPHATE, MONOBASIC POTASSIUM PHOSPHATE AND MONOBASIC SODIUM PHOSPHATE 250 MG: 852; 155; 130 TABLET ORAL at 20:25

## 2021-01-02 RX ADMIN — GABAPENTIN 600 MG: 300 CAPSULE ORAL at 20:25

## 2021-01-02 RX ADMIN — Medication 1000 UNITS: at 07:58

## 2021-01-02 RX ADMIN — LISINOPRIL 40 MG: 20 TABLET ORAL at 05:13

## 2021-01-02 RX ADMIN — METFORMIN HYDROCHLORIDE 250 MG: 500 TABLET ORAL at 17:20

## 2021-01-02 RX ADMIN — METOPROLOL TARTRATE 50 MG: 25 TABLET, FILM COATED ORAL at 05:12

## 2021-01-02 RX ADMIN — BACLOFEN 10 MG: 10 TABLET ORAL at 20:24

## 2021-01-02 RX ADMIN — ACETAMINOPHEN 650 MG: 325 TABLET, FILM COATED ORAL at 14:31

## 2021-01-02 RX ADMIN — DIBASIC SODIUM PHOSPHATE, MONOBASIC POTASSIUM PHOSPHATE AND MONOBASIC SODIUM PHOSPHATE 250 MG: 852; 155; 130 TABLET ORAL at 07:59

## 2021-01-02 RX ADMIN — METOPROLOL TARTRATE 50 MG: 25 TABLET, FILM COATED ORAL at 17:19

## 2021-01-02 RX ADMIN — DIBASIC SODIUM PHOSPHATE, MONOBASIC POTASSIUM PHOSPHATE AND MONOBASIC SODIUM PHOSPHATE 250 MG: 852; 155; 130 TABLET ORAL at 14:31

## 2021-01-02 RX ADMIN — APIXABAN 5 MG: 5 TABLET, FILM COATED ORAL at 20:25

## 2021-01-02 RX ADMIN — ACETAMINOPHEN 650 MG: 325 TABLET, FILM COATED ORAL at 20:25

## 2021-01-02 RX ADMIN — DOCUSATE SODIUM 50 MG AND SENNOSIDES 8.6 MG 2 TABLET: 8.6; 5 TABLET, FILM COATED ORAL at 07:58

## 2021-01-02 RX ADMIN — METFORMIN HYDROCHLORIDE 250 MG: 500 TABLET ORAL at 07:58

## 2021-01-02 RX ADMIN — GABAPENTIN 400 MG: 400 CAPSULE ORAL at 08:40

## 2021-01-02 RX ADMIN — ACETAMINOPHEN 650 MG: 325 TABLET, FILM COATED ORAL at 07:58

## 2021-01-02 RX ADMIN — GABAPENTIN 400 MG: 400 CAPSULE ORAL at 17:19

## 2021-01-02 ASSESSMENT — ENCOUNTER SYMPTOMS
SHORTNESS OF BREATH: 0
VOMITING: 0
HALLUCINATIONS: 0
PALPITATIONS: 0
HEADACHES: 0
FEVER: 0
DIZZINESS: 0
NAUSEA: 0
BLURRED VISION: 0

## 2021-01-02 NOTE — PROGRESS NOTES
Hospital Medicine Daily Progress Note      Chief Complaint:  Hypertension  Afib  Elevated AP    Interval History:  No significant events or changes since last visit    Review of Systems  Review of Systems   Constitutional: Negative for fever.   Eyes: Negative for blurred vision.   Respiratory: Negative for shortness of breath.    Cardiovascular: Negative for palpitations.   Gastrointestinal: Negative for nausea and vomiting.   Neurological: Negative for dizziness and headaches.   Psychiatric/Behavioral: Negative for hallucinations.        Physical Exam  Temp:  [36.4 °C (97.5 °F)-36.7 °C (98.1 °F)] 36.7 °C (98.1 °F)  Pulse:  [54-78] 54  Resp:  [16-18] 17  BP: (121-151)/(57-80) 146/74  SpO2:  [95 %-98 %] 98 %    Physical Exam  Vitals signs and nursing note reviewed.   Constitutional:       General: She is not in acute distress.  HENT:      Mouth/Throat:      Mouth: Mucous membranes are moist.      Pharynx: Oropharynx is clear.   Neck:      Musculoskeletal: No neck rigidity.      Vascular: No JVD.   Cardiovascular:      Rate and Rhythm: Normal rate. Rhythm irregular.      Heart sounds: Normal heart sounds.   Pulmonary:      Effort: Pulmonary effort is normal.      Breath sounds: No wheezing or rales.   Abdominal:      General: Bowel sounds are normal.      Palpations: Abdomen is soft.   Musculoskeletal:      Right lower leg: Edema present.      Left lower leg: Edema present.      Comments: Has mild B/L pedal/ankle swelling   Skin:     General: Skin is warm and dry.   Neurological:      Mental Status: She is alert and oriented to person, place, and time.   Psychiatric:         Mood and Affect: Mood normal.         Behavior: Behavior normal.         Fluids    Intake/Output Summary (Last 24 hours) at 1/2/2021 0822  Last data filed at 1/1/2021 1900  Gross per 24 hour   Intake 240 ml   Output --   Net 240 ml       Laboratory                        Assessment/Plan  * CVA (cerebral vascular accident) (HCC)- (present on  admission)  Assessment & Plan  Has RLE weakness  On Eliquis  On Lipitor    AF (atrial fibrillation) (HCC)- (present on admission)  Assessment & Plan  HR 50-60's  On Metoprolol: 50 mg bid  On Eliquis  Cont to monitor    Alkaline phosphatase elevation  Assessment & Plan  Starting to improve  AP: 347 --> 293 (12/28)  Asymptomatic -- denies abd pain, nausea, or vomiting  U/S abd: shows cholelithiasis with gallbladder wall thickening suggesting cholecystitis  Consider HIDA if pt develops GI symptoms  Will need out patient follow up    Hypophosphatemia- (present on admission)  Assessment & Plan  PO4: 2.3 --> 2.8 --> 2.6 (12/28)  On supplements  Monitpr    Vitamin D deficiency- (present on admission)  Assessment & Plan  Vit D: 21  On supplements    Aortic stenosis- (present on admission)  Assessment & Plan  Has some mild B/L LE pedal/ankle swelling  Echo: EF 75%, RVSP 40, moderate AS  Outpt follow up    Type 2 diabetes mellitus without complication, without long-term current use of insulin ([facility])- (present on admission)  Assessment & Plan  Hba1c: 6.2 (12/17)  BS: 104-146  On Metformin: 250 mg bid (12/30 evening)  Now on a diabetic diet (12/30)  Note: takes Metformin 500 mg bid at home   Cont to monitor    Essential hypertension- (present on admission)  Assessment & Plan  BP better recently  On Lisinopril: 20 mg daily --> 30 mg daily (12/29) --> 40 mg daily (1/1)  On Lopressor: 50 mg bid  Note: on Flomax  Cont to monitor

## 2021-01-02 NOTE — CARE PLAN
Problem: Urinary Elimination:  Goal: Ability to reestablish a normal urinary elimination pattern will improve  Outcome: PROGRESSING AS EXPECTED  Time voids every 3 hours.  Patient continent with consistent time voids.      Problem: Pain Management  Goal: Pain level will decrease to patient's comfort goal  Outcome: PROGRESSING AS EXPECTED  Note: Patient complained of leg pain and muscle spasms.  HS does of Baclofen given.  Patient later states muscle relaxant was effective.

## 2021-01-03 LAB
GLUCOSE BLD-MCNC: 104 MG/DL (ref 65–99)
GLUCOSE BLD-MCNC: 105 MG/DL (ref 65–99)
GLUCOSE BLD-MCNC: 117 MG/DL (ref 65–99)
GLUCOSE BLD-MCNC: 146 MG/DL (ref 65–99)

## 2021-01-03 PROCEDURE — A9270 NON-COVERED ITEM OR SERVICE: HCPCS | Performed by: PHYSICAL MEDICINE & REHABILITATION

## 2021-01-03 PROCEDURE — 700102 HCHG RX REV CODE 250 W/ 637 OVERRIDE(OP): Performed by: PHYSICAL MEDICINE & REHABILITATION

## 2021-01-03 PROCEDURE — 97130 THER IVNTJ EA ADDL 15 MIN: CPT

## 2021-01-03 PROCEDURE — 770010 HCHG ROOM/CARE - REHAB SEMI PRIVAT*

## 2021-01-03 PROCEDURE — 97535 SELF CARE MNGMENT TRAINING: CPT

## 2021-01-03 PROCEDURE — 700102 HCHG RX REV CODE 250 W/ 637 OVERRIDE(OP): Performed by: HOSPITALIST

## 2021-01-03 PROCEDURE — 97129 THER IVNTJ 1ST 15 MIN: CPT

## 2021-01-03 PROCEDURE — 99232 SBSQ HOSP IP/OBS MODERATE 35: CPT | Performed by: HOSPITALIST

## 2021-01-03 PROCEDURE — A9270 NON-COVERED ITEM OR SERVICE: HCPCS | Performed by: HOSPITALIST

## 2021-01-03 PROCEDURE — 97112 NEUROMUSCULAR REEDUCATION: CPT

## 2021-01-03 PROCEDURE — 82962 GLUCOSE BLOOD TEST: CPT | Mod: 91

## 2021-01-03 RX ADMIN — GABAPENTIN 400 MG: 400 CAPSULE ORAL at 12:56

## 2021-01-03 RX ADMIN — ATORVASTATIN CALCIUM 40 MG: 40 TABLET, FILM COATED ORAL at 21:24

## 2021-01-03 RX ADMIN — DIBASIC SODIUM PHOSPHATE, MONOBASIC POTASSIUM PHOSPHATE AND MONOBASIC SODIUM PHOSPHATE 250 MG: 852; 155; 130 TABLET ORAL at 21:24

## 2021-01-03 RX ADMIN — METOPROLOL TARTRATE 50 MG: 25 TABLET, FILM COATED ORAL at 17:10

## 2021-01-03 RX ADMIN — GABAPENTIN 400 MG: 400 CAPSULE ORAL at 07:48

## 2021-01-03 RX ADMIN — APIXABAN 5 MG: 5 TABLET, FILM COATED ORAL at 07:48

## 2021-01-03 RX ADMIN — DIBASIC SODIUM PHOSPHATE, MONOBASIC POTASSIUM PHOSPHATE AND MONOBASIC SODIUM PHOSPHATE 250 MG: 852; 155; 130 TABLET ORAL at 07:47

## 2021-01-03 RX ADMIN — TAMSULOSIN HYDROCHLORIDE 0.4 MG: 0.4 CAPSULE ORAL at 21:24

## 2021-01-03 RX ADMIN — GABAPENTIN 400 MG: 400 CAPSULE ORAL at 17:10

## 2021-01-03 RX ADMIN — METFORMIN HYDROCHLORIDE 250 MG: 500 TABLET ORAL at 17:10

## 2021-01-03 RX ADMIN — DOCUSATE SODIUM 50 MG AND SENNOSIDES 8.6 MG 2 TABLET: 8.6; 5 TABLET, FILM COATED ORAL at 07:48

## 2021-01-03 RX ADMIN — DIBASIC SODIUM PHOSPHATE, MONOBASIC POTASSIUM PHOSPHATE AND MONOBASIC SODIUM PHOSPHATE 250 MG: 852; 155; 130 TABLET ORAL at 14:36

## 2021-01-03 RX ADMIN — ACETAMINOPHEN 650 MG: 325 TABLET, FILM COATED ORAL at 07:47

## 2021-01-03 RX ADMIN — Medication 1000 UNITS: at 07:47

## 2021-01-03 RX ADMIN — ACETAMINOPHEN 650 MG: 325 TABLET, FILM COATED ORAL at 14:36

## 2021-01-03 RX ADMIN — METFORMIN HYDROCHLORIDE 250 MG: 500 TABLET ORAL at 07:50

## 2021-01-03 RX ADMIN — METOPROLOL TARTRATE 50 MG: 25 TABLET, FILM COATED ORAL at 05:48

## 2021-01-03 RX ADMIN — APIXABAN 5 MG: 5 TABLET, FILM COATED ORAL at 21:24

## 2021-01-03 RX ADMIN — ACETAMINOPHEN 650 MG: 325 TABLET, FILM COATED ORAL at 21:24

## 2021-01-03 RX ADMIN — GABAPENTIN 600 MG: 300 CAPSULE ORAL at 21:24

## 2021-01-03 RX ADMIN — LISINOPRIL 40 MG: 20 TABLET ORAL at 05:48

## 2021-01-03 RX ADMIN — BACLOFEN 10 MG: 10 TABLET ORAL at 21:24

## 2021-01-03 ASSESSMENT — ENCOUNTER SYMPTOMS
FEVER: 0
DIZZINESS: 0
NERVOUS/ANXIOUS: 0
COUGH: 0
BLURRED VISION: 0
DIARRHEA: 0

## 2021-01-03 ASSESSMENT — PAIN DESCRIPTION - PAIN TYPE
TYPE: ACUTE PAIN
TYPE: ACUTE PAIN

## 2021-01-03 ASSESSMENT — ACTIVITIES OF DAILY LIVING (ADL): TOILET_TRANSFER_DESCRIPTION: GRAB BAR;SUPERVISION FOR SAFETY;VERBAL CUEING;INCREASED TIME

## 2021-01-03 NOTE — THERAPY
Occupational Therapy  Daily Treatment     Patient Name: Liudmila Pulido  Age:  88 y.o., Sex:  female  Medical Record #: [id number]  Today's Date: 1/3/2021     Precautions  Precautions: (P) Fall Risk  Comments: (P) R lizeth, LE>UE    Safety   ADL Safety : Requires Supervision for Safety, Requires Physical Assist for Safety, Requires Cueing for Safety  Bathroom Safety: Requires Supervision for Safety, Requires Physical Assist for Safety, Requires Cuing for Safety  Comments: See functional levels of assist below for ADL performance details     Subjective    No new issues reported     Objective       01/03/21 0931   Precautions   Precautions Fall Risk   Comments R lizeth, LE>UE   Vitals   O2 Delivery Device None - Room Air   Pain 0 - 10 Group   Therapist Pain Assessment Post Activity Pain Same as Prior to Activity;Nurse Notified;0   Cognition    Orientation Level Oriented x 4   Level of Consciousness Alert   Safety Awareness Impaired   Sequencing Impaired   ABS (Agitated Behavior Scale)   Agitated Behavior Scale Performed No   Sleep/Wake Cycle   Sleep & Rest Awake   Active ROM Upper Body   Active ROM Upper Body  WDL   Dominant Hand Right   Functional Level of Assist   Grooming Supervision   Grooming Description Seated in wheelchair at sink;Supervision for safety   Toileting Moderate Assist   Toileting Description Assist to pull pants up;Assist to pull pants down;Assist for hygiene;Grab bar;Increased time;Supervision for safety;Verbal cueing   Toilet Transfers Minimal Assist   Toilet Transfer Description Grab bar;Supervision for safety;Verbal cueing;Increased time   Standing Upper Body Exercises   Standing Upper Body Exercises Yes   Other Exercises stand step in parallel bars, placing right foot in front and bringing it back, mobility in bars, 8 staeps forward, 8 back to wheelchair. right knee blocked to prevent hyperextension. no buckling noted, able to place and bear weight   Fine Motor / Dexterity    Fine Motor /  Dexterity Yes   Fine Motor / Dexterity Interventions hand writing, peg stacking, all performed WFL   Balance   Sitting Balance (Static) Fair   Sitting Balance (Dynamic) Fair   Standing Balance (Static) Fair -   Standing Balance (Dynamic) Poor +   Weight Shift Sitting Fair   Weight Shift Standing Poor   Interdisciplinary Plan of Care Collaboration   IDT Collaboration with  Certified Nursing Assistant;Therapy Tech   Patient Position at End of Therapy Seated;Call Light within Reach;Tray Table within Reach;Phone within Reach;Self Releasing Lap Belt Applied   Collaboration Comments report given   Strengths & Barriers   Strengths Able to follow instructions;Independent prior level of function;Making steady progress towards goals;Motivated for self care and independence;Pleasant and cooperative;Supportive family;Willingly participates in therapeutic activities   Barriers Bowel incontinence;Impulsive;Impaired activity tolerance;Hemiparesis;Generalized weakness   OT Total Time Spent   OT Individual Total Time Spent (Mins) 60   OT Charge Group   OT Self Care / ADL 2   OT Neuromuscular Re-education / Balance 2       Assessment    Fatigued quickly during parallel bar activity, but demonstrated ability to place and hold weight through right LE, occasional guarding to prevent knee hyper extension. No buckling observed.  Strengths: (P) Able to follow instructions, Independent prior level of function, Making steady progress towards goals, Motivated for self care and independence, Pleasant and cooperative, Supportive family, Willingly participates in therapeutic activities  Barriers: (P) Bowel incontinence, Impulsive, Impaired activity tolerance, Hemiparesis, Generalized weakness    Plan    Follow current OT POC    Occupational Therapy Goals     Problem: Dressing     Dates: Start: 12/21/20       Goal: STG-Within one week, patient will dress LB     Dates: Start: 12/21/20       Description: 1) Individualized Goal:  mod A with AE as  needed.  2) Interventions:  OT E Stim Attended, OT Self Care/ADL, OT Cognitive Skill Dev, OT Community Reintegration, OT Manual Ther Technique, OT Neuro Re-Ed/Balance, OT Therapeutic Activity, OT Evaluation, and OT Therapeutic Exercise    Note:     Goal Note filed on 12/30/20 1128 by Gris Padilla, OT    Con't to require min-mod A x2.                         Problem: Functional Transfers     Dates: Start: 12/21/20       Goal: STG-Within one week, patient will transfer to toilet     Dates: Start: 12/21/20       Description: 1) Individualized Goal:  mod A with AD/DME as needed.  2) Interventions:  OT E Stim Attended, OT Self Care/ADL, OT Cognitive Skill Dev, OT Community Reintegration, OT Manual Ther Technique, OT Neuro Re-Ed/Balance, OT Therapeutic Activity, OT Evaluation, and OT Therapeutic Exercise    Note:     Goal Note filed on 12/30/20 1128 by Gris Padilla, OT    Mod-max A x2.                         Problem: Grooming     Dates: Start: 12/21/20             Problem: OT Long Term Goals     Dates: Start: 12/21/20       Goal: LTG-By discharge, patient will complete basic self care tasks     Dates: Start: 12/21/20       Description: 1) Individualized Goal: Supervision to mod I level with AE/DME as needed.  2) Interventions:  OT E Stim Attended, OT Self Care/ADL, OT Cognitive Skill Dev, OT Community Reintegration, OT Manual Ther Technique, OT Neuro Re-Ed/Balance, OT Therapeutic Activity, OT Evaluation, and OT Therapeutic Exercise          Goal: LTG-By discharge, patient will perform bathroom transfers     Dates: Start: 12/21/20       Description: 1) Individualized Goal:  Supervision to mod I level with AD/DME as needed.  2) Interventions:  OT E Stim Attended, OT Self Care/ADL, OT Cognitive Skill Dev, OT Community Reintegration, OT Manual Ther Technique, OT Neuro Re-Ed/Balance, OT Therapeutic Activity, OT Evaluation, and OT Therapeutic Exercise                Problem: Toileting     Dates: Start: 12/21/20        Goal: STG-Within one week, patient will complete toileting tasks     Dates: Start: 12/21/20       Description: 1) Individualized Goal:  max A with AE/DME as needed.  2) Interventions:  OT E Stim Attended, OT Self Care/ADL, OT Cognitive Skill Dev, OT Community Reintegration, OT Manual Ther Technique, OT Neuro Re-Ed/Balance, OT Therapeutic Activity, OT Evaluation, and OT Therapeutic Exercise    Note:     Goal Note filed on 12/30/20 1128 by Gris Padilla, OT    Con't to require 2nd person present for safety with clothing mgt, hygiene and balance.

## 2021-01-03 NOTE — CARE PLAN
Problem: Communication  Goal: The ability to communicate needs accurately and effectively will improve  Outcome: PROGRESSING AS EXPECTED  Note: Patient able to verbalize needs.  Will continue to monitor.      Problem: Safety  Goal: Will remain free from injury  Description: Patient care assumed. Report received from day RN. Patient is alert and calm, resting in bed/chair with family at bedside. Call light and bedside table within reach.     Outcome: PROGRESSING AS EXPECTED

## 2021-01-03 NOTE — CARE PLAN
Problem: Communication  Goal: The ability to communicate needs accurately and effectively will improve  Description: Patient able to verbalize needs.  Will continue to monitor.   Outcome: PROGRESSING AS EXPECTED     Problem: Safety  Goal: Will remain free from injury  Description: Patient care assumed. Report received from day RN. Patient is alert and calm, resting in bed/chair with family at bedside. Call light and bedside table within reach.     Outcome: PROGRESSING AS EXPECTED

## 2021-01-03 NOTE — THERAPY
Speech Language Pathology  Daily Treatment     Patient Name: Liudmila Pulido  Age:  88 y.o., Sex:  female  Medical Record #: [id number]  Today's Date: 1/3/2021     Precautions  Precautions: Fall Risk  Comments: R lizeth, LE>UE    Subjective    Pt pleasant and cooperative during ST session.      Objective       01/03/21 1301   Cognition   Moderate Attention Minimal (4)   Functional Memory Activities Minimal (4)   Functional Problem Solving Minimal (4)   Interdisciplinary Plan of Care Collaboration   IDT Collaboration with  Certified Nursing Assistant   Patient Position at End of Therapy Seated;Tray Table within Reach;Phone within Reach;Chair Alarm On   Collaboration Comments CLOF   SLP Total Time Spent   SLP Individual Total Time Spent (Mins) 60   Treatment Charges   SLP Cognitive Skill Development First 15 Minutes 1   SLP Cognitive Skill Development Additional 15 Minutes 3       Assessment    Funxl problem solving r/t D/C- MIN-SPV cues, pt able to generate a list of activities that she will require assistance with (e.g., transfers, toileting/bathing, medication/financial management).   Visual memory- 75% acc for recall of information pictured.  Problem solving task r/t locating and fixing errors in pill box- 80% acc w/ MIN cues.      Strengths: Able to follow instructions, Making steady progress towards goals, Supportive family, Willingly participates in therapeutic activities, Pleasant and cooperative, Motivated for self care and independence  Barriers: Visual impairment, Impaired carryover of learning, Impaired functional cognition    Plan    Continue to address funxl PS, memory, attention.     Speech Therapy Problems     Problem: Memory STGs     Dates: Start: 12/30/20       Goal: STG-Within one week, patient will     Dates: Start: 12/30/20       Description: Description: 1) Individualized goal:  learn and implement functional memory strategies with use of memory book with 80% accuracy provided MIN A. 2)  Interventions:  SLP Self Care / ADL Training , SLP Cognitive Skill Development, and SLP Group Treatment                  Problem: Problem Solving STGs     Dates: Start: 12/23/20       Goal: STG-Within one week, patient will     Dates: Start: 12/23/20       Description: 1) Individualized goal:  complete attention tasks related to medication management and financial management skills with 80% accuracy provided MIN A.   2) Interventions:  SLP Self Care / ADL Training , SLP Cognitive Skill Development, and SLP Group Treatment                    Problem: Speech/Swallowing LTGs     Dates: Start: 12/21/20       Goal: LTG-By discharge, patient will     Dates: Start: 12/21/20       Description: 1) Individualized goal:  complete functional problem solving and recall with 80% accuracy provided SPV.   2) Interventions:  SLP Self Care / ADL Training , SLP Cognitive Skill Development, and SLP Group Treatment

## 2021-01-03 NOTE — PROGRESS NOTES
Hospital Medicine Daily Progress Note      Chief Complaint:  Hypertension  Afib  Elevated AP    Interval History:  No significant events or changes since last visit    Review of Systems  Review of Systems   Constitutional: Negative for fever.   Eyes: Negative for blurred vision.   Respiratory: Negative for cough.    Cardiovascular: Negative for chest pain.   Gastrointestinal: Negative for diarrhea.   Musculoskeletal: Negative for joint pain.   Neurological: Negative for dizziness.   Psychiatric/Behavioral: The patient is not nervous/anxious.         Physical Exam  Temp:  [36.6 °C (97.9 °F)-37.1 °C (98.7 °F)] 37.1 °C (98.7 °F)  Pulse:  [60-63] 63  Resp:  [18] 18  BP: (116-157)/(61-77) 147/76  SpO2:  [92 %] 92 %    Physical Exam  Vitals signs and nursing note reviewed.   Constitutional:       Appearance: She is not diaphoretic.   HENT:      Mouth/Throat:      Pharynx: No oropharyngeal exudate or posterior oropharyngeal erythema.   Eyes:      Extraocular Movements: Extraocular movements intact.   Neck:      Vascular: No carotid bruit or JVD.   Cardiovascular:      Rate and Rhythm: Normal rate. Rhythm irregular.      Heart sounds: Normal heart sounds.   Pulmonary:      Effort: Pulmonary effort is normal.      Breath sounds: No wheezing or rales.   Abdominal:      Palpations: Abdomen is soft.   Musculoskeletal:      Right lower leg: Edema present.      Left lower leg: Edema present.      Comments: Has mild B/L pedal/ankle swelling   Skin:     General: Skin is warm and dry.   Neurological:      Mental Status: She is alert and oriented to person, place, and time.   Psychiatric:         Mood and Affect: Mood normal.         Behavior: Behavior normal.         Fluids    Intake/Output Summary (Last 24 hours) at 1/3/2021 0904  Last data filed at 1/2/2021 1750  Gross per 24 hour   Intake 240 ml   Output --   Net 240 ml       Laboratory                        Assessment/Plan  * CVA (cerebral vascular accident) (HCC)- (present on  admission)  Assessment & Plan  Has RLE weakness  On Eliquis  On Lipitor    AF (atrial fibrillation) ([facility])- (present on admission)  Assessment & Plan  HR mostly 60's  On Metoprolol: 50 mg bid  On Eliquis  Cont to monitor    Alkaline phosphatase elevation  Assessment & Plan  Starting to improve  AP: 347 --> 293 (12/28)  Asymptomatic -- denies abd pain, nausea, or vomiting  U/S abd: shows cholelithiasis with gallbladder wall thickening suggesting cholecystitis  Consider HIDA if pt develops GI symptoms  Will need out patient follow up    Hypophosphatemia- (present on admission)  Assessment & Plan  PO4: 2.3 --> 2.8 --> 2.6 (12/28)  On supplements  Monitpr    Vitamin D deficiency- (present on admission)  Assessment & Plan  Vit D: 21  On supplements    Aortic stenosis- (present on admission)  Assessment & Plan  Has some mild B/L LE pedal/ankle swelling  Echo: EF 75%, RVSP 40, moderate AS  Outpt follow up    Type 2 diabetes mellitus without complication, without long-term current use of insulin ([facility])- (present on admission)  Assessment & Plan  Hba1c: 6.2 (12/17)  BS: 100-146  On Metformin: 250 mg bid (12/30)  Now on a diabetic diet (12/30)  Note: takes Metformin 500 mg bid at home   Cont to monitor    Essential hypertension- (present on admission)  Assessment & Plan  BP ok but occ rises up a little  On Lisinopril: 20 mg daily --> 30 mg daily (12/29) --> 40 mg daily (1/1)  On Lopressor: 50 mg bid  Note: on Flomax  Cont to monitor

## 2021-01-04 LAB
GLUCOSE BLD-MCNC: 109 MG/DL (ref 65–99)
GLUCOSE BLD-MCNC: 110 MG/DL (ref 65–99)
GLUCOSE BLD-MCNC: 113 MG/DL (ref 65–99)

## 2021-01-04 PROCEDURE — 99232 SBSQ HOSP IP/OBS MODERATE 35: CPT | Performed by: HOSPITALIST

## 2021-01-04 PROCEDURE — 700102 HCHG RX REV CODE 250 W/ 637 OVERRIDE(OP): Performed by: HOSPITALIST

## 2021-01-04 PROCEDURE — 97129 THER IVNTJ 1ST 15 MIN: CPT

## 2021-01-04 PROCEDURE — A9270 NON-COVERED ITEM OR SERVICE: HCPCS | Performed by: PHYSICAL MEDICINE & REHABILITATION

## 2021-01-04 PROCEDURE — 97116 GAIT TRAINING THERAPY: CPT

## 2021-01-04 PROCEDURE — 770010 HCHG ROOM/CARE - REHAB SEMI PRIVAT*

## 2021-01-04 PROCEDURE — A9270 NON-COVERED ITEM OR SERVICE: HCPCS | Performed by: HOSPITALIST

## 2021-01-04 PROCEDURE — 700102 HCHG RX REV CODE 250 W/ 637 OVERRIDE(OP): Performed by: PHYSICAL MEDICINE & REHABILITATION

## 2021-01-04 PROCEDURE — 97530 THERAPEUTIC ACTIVITIES: CPT

## 2021-01-04 PROCEDURE — 97130 THER IVNTJ EA ADDL 15 MIN: CPT

## 2021-01-04 PROCEDURE — 99231 SBSQ HOSP IP/OBS SF/LOW 25: CPT | Performed by: PHYSICAL MEDICINE & REHABILITATION

## 2021-01-04 PROCEDURE — 97535 SELF CARE MNGMENT TRAINING: CPT

## 2021-01-04 PROCEDURE — 82962 GLUCOSE BLOOD TEST: CPT | Mod: 91

## 2021-01-04 RX ORDER — AMLODIPINE BESYLATE 5 MG/1
2.5 TABLET ORAL
Status: DISCONTINUED | OUTPATIENT
Start: 2021-01-04 | End: 2021-01-05

## 2021-01-04 RX ADMIN — ACETAMINOPHEN 650 MG: 325 TABLET, FILM COATED ORAL at 08:13

## 2021-01-04 RX ADMIN — ACETAMINOPHEN 650 MG: 325 TABLET, FILM COATED ORAL at 20:45

## 2021-01-04 RX ADMIN — AMLODIPINE BESYLATE 2.5 MG: 5 TABLET ORAL at 09:30

## 2021-01-04 RX ADMIN — METOPROLOL TARTRATE 50 MG: 25 TABLET, FILM COATED ORAL at 05:16

## 2021-01-04 RX ADMIN — BACLOFEN 10 MG: 10 TABLET ORAL at 20:44

## 2021-01-04 RX ADMIN — GABAPENTIN 600 MG: 300 CAPSULE ORAL at 20:44

## 2021-01-04 RX ADMIN — LISINOPRIL 40 MG: 20 TABLET ORAL at 05:16

## 2021-01-04 RX ADMIN — ATORVASTATIN CALCIUM 40 MG: 40 TABLET, FILM COATED ORAL at 20:45

## 2021-01-04 RX ADMIN — APIXABAN 5 MG: 5 TABLET, FILM COATED ORAL at 08:13

## 2021-01-04 RX ADMIN — METFORMIN HYDROCHLORIDE 250 MG: 500 TABLET ORAL at 17:31

## 2021-01-04 RX ADMIN — DIBASIC SODIUM PHOSPHATE, MONOBASIC POTASSIUM PHOSPHATE AND MONOBASIC SODIUM PHOSPHATE 250 MG: 852; 155; 130 TABLET ORAL at 08:13

## 2021-01-04 RX ADMIN — ACETAMINOPHEN 650 MG: 325 TABLET, FILM COATED ORAL at 15:00

## 2021-01-04 RX ADMIN — METFORMIN HYDROCHLORIDE 250 MG: 500 TABLET ORAL at 08:14

## 2021-01-04 RX ADMIN — METOPROLOL TARTRATE 50 MG: 25 TABLET, FILM COATED ORAL at 17:31

## 2021-01-04 RX ADMIN — DIBASIC SODIUM PHOSPHATE, MONOBASIC POTASSIUM PHOSPHATE AND MONOBASIC SODIUM PHOSPHATE 250 MG: 852; 155; 130 TABLET ORAL at 20:44

## 2021-01-04 RX ADMIN — DIBASIC SODIUM PHOSPHATE, MONOBASIC POTASSIUM PHOSPHATE AND MONOBASIC SODIUM PHOSPHATE 250 MG: 852; 155; 130 TABLET ORAL at 15:00

## 2021-01-04 RX ADMIN — GABAPENTIN 400 MG: 400 CAPSULE ORAL at 13:33

## 2021-01-04 RX ADMIN — GABAPENTIN 400 MG: 400 CAPSULE ORAL at 08:13

## 2021-01-04 RX ADMIN — Medication 1000 UNITS: at 08:16

## 2021-01-04 RX ADMIN — DOCUSATE SODIUM 50 MG AND SENNOSIDES 8.6 MG 2 TABLET: 8.6; 5 TABLET, FILM COATED ORAL at 20:44

## 2021-01-04 RX ADMIN — TAMSULOSIN HYDROCHLORIDE 0.4 MG: 0.4 CAPSULE ORAL at 20:45

## 2021-01-04 RX ADMIN — GABAPENTIN 400 MG: 400 CAPSULE ORAL at 17:31

## 2021-01-04 RX ADMIN — APIXABAN 5 MG: 5 TABLET, FILM COATED ORAL at 20:45

## 2021-01-04 ASSESSMENT — ENCOUNTER SYMPTOMS
DIARRHEA: 0
SHORTNESS OF BREATH: 0
NERVOUS/ANXIOUS: 0
CHILLS: 0
NAUSEA: 0
FEVER: 0
ABDOMINAL PAIN: 0
VOMITING: 0

## 2021-01-04 ASSESSMENT — ACTIVITIES OF DAILY LIVING (ADL)
BED_CHAIR_WHEELCHAIR_TRANSFER_DESCRIPTION: INCREASED TIME;SET-UP OF EQUIPMENT;SUPERVISION FOR SAFETY;VERBAL CUEING
TOILET_TRANSFER_DESCRIPTION: GRAB BAR;INCREASED TIME;SET-UP OF EQUIPMENT;SUPERVISION FOR SAFETY;VERBAL CUEING
TOILETING_LEVEL_OF_ASSIST_DESCRIPTION: ASSIST TO PULL PANTS UP;ASSIST FOR STANDING BALANCE;GRAB BAR;INCREASED TIME;SET-UP OF EQUIPMENT;SUPERVISION FOR SAFETY;VERBAL CUEING
TUB_SHOWER_TRANSFER_DESCRIPTION: GRAB BAR;SHOWER BENCH;INCREASED TIME;SET-UP OF EQUIPMENT;SUPERVISION FOR SAFETY;VERBAL CUEING
BED_CHAIR_WHEELCHAIR_TRANSFER_DESCRIPTION: INCREASED TIME;ADAPTIVE EQUIPMENT

## 2021-01-04 NOTE — THERAPY
Physical Therapy   Daily Treatment     Patient Name: Liudmila Pulido  Age:  88 y.o., Sex:  female  Medical Record #: [id number]  Today's Date: 1/4/2021     Precautions  Precautions: Fall Risk  Comments: R lizeth (LE>UE)    Subjective    Pt reports she is agreeable to trialing walking with FWW this session      Objective       01/04/21 1231   Transfer Functional Level of Assist   Bed, Chair, Wheelchair Transfer Minimal Assist   Bed Chair Wheelchair Transfer Description Increased time;Adaptive equipment  (SPT repeat practice with FWW, cues for sequencing and safety)   Bed Mobility    Sit to Stand Minimal Assist  (FWW, repeated practice, cues for sequencing)   Interdisciplinary Plan of Care Collaboration   IDT Collaboration with    (orthotist)   Patient Position at End of Therapy Seated;Call Light within Reach;Tray Table within Reach;Self Releasing Lap Belt Applied;Chair Alarm On   Collaboration Comments Scheduled AFO consult   PT Total Time Spent   PT Individual Total Time Spent (Mins) 60   PT Charge Group   PT Gait Training 2   PT Therapeutic Activities 2     Pt ambulated 25 ft in vector harness with 25% unweighting with FWW with min A for steadying and FWW management.     Assessment    Session focused on FWW usage - min A transfers SPT practice. Min A ambulation with vector harness on for safety.    Strengths: Able to follow instructions, Independent prior level of function, Motivated for self care and independence, Pleasant and cooperative, Willingly participates in therapeutic activities  Barriers: Hemiparesis, Home accessibility, Impaired balance, Limited mobility    Plan    AFO consult sometime Tuesday, continue vector walking with FWW, continue SPT practice with FWW    Exercise/neuro re-ed, review HEP,  with techs, Trial lite gait, wc mob, ramp navigation - uneven, shuttle    Physical Therapy Problems     Problem: Mobility     Dates: Start: 12/21/20       Goal: STG-Within one week, patient will propel  wheelchair household distances     Dates: Start: 12/21/20       Description: < 150 ft    Note:     Goal Note filed on 12/30/20 1134 by Carloz Azar, PT    < 150 ft                        Problem: Mobility Transfers     Dates: Start: 12/21/20       Goal: STG-Within one week, patient will transfer bed to chair     Dates: Start: 12/21/20       Description: 2nd person    Note:     Goal Note filed on 12/30/20 1134 by Carloz Azar, PT    2nd person                        Problem: PT-Long Term Goals     Dates: Start: 12/21/20       Goal: LTG-By discharge, patient will propel wheelchair     Dates: Start: 12/21/20       Description: 1) Individualized goal:  150 ft At mod I in order to improve self mobility  2) Interventions:  PT E Stim Attended, PT Orthotics Training, PT Gait Training, PT Therapeutic Exercises, PT Neuro Re-Ed/Balance, PT Aquatic Therapy, PT Therapeutic Activity, PT Manual Therapy, and PT Evaluation          Goal: LTG-By discharge, patient will transfer one surface to another     Dates: Start: 12/21/20       Description: 1) Individualized goal:  At min A with LRD In order to maximize self mobility  2) Interventions:  PT E Stim Attended, PT Orthotics Training, PT Gait Training, PT Therapeutic Exercises, PT Neuro Re-Ed/Balance, PT Aquatic Therapy, PT Therapeutic Activity, PT Manual Therapy, and PT Evaluation          Goal: LTG-By discharge, patient will transfer in/out of a car     Dates: Start: 12/21/20       Description: 1) Individualized goal:  At min A with LRD In order to maximize self mobility  2) Interventions:  PT E Stim Attended, PT Orthotics Training, PT Gait Training, PT Therapeutic Exercises, PT Neuro Re-Ed/Balance, PT Aquatic Therapy, PT Therapeutic Activity, PT Manual Therapy, and PT Evaluation

## 2021-01-04 NOTE — THERAPY
"Speech Language Pathology  Daily Treatment     Patient Name: Liudmila Pulido  Age:  88 y.o., Sex:  female  Medical Record #: [id number]  Today's Date: 1/4/2021     Precautions  Precautions: Fall Risk  Comments: R lizeth (LE>UE)    Subjective    Pt seen in her room. Pt was highly motivated to participate in tx stating \"I've been so bored\". She was pleasant and cooperative for duration of session.      Objective       01/04/21 0932   Cognition   Medication Management  Moderate (3)   Interdisciplinary Plan of Care Collaboration   IDT Collaboration with  Speech Therapist   Patient Position at End of Therapy Tray Table within Reach;Phone within Reach;Call Light within Reach;Seated   Collaboration Comments CLOF with ST (Sari)   SLP Total Time Spent   SLP Individual Total Time Spent (Mins) 30   Treatment Charges   SLP Cognitive Skill Development First 15 Minutes 1   SLP Cognitive Skill Development Additional 15 Minutes 1       Assessment    Pt independently recalled tasks she completes with speech therapy and goals of tx. Pt independently listed memory strategies she has been trained on (repetition, association, use of external aids). Pt completed medication error activity using pictured medication errors in 2x per day pill sorter. SLP notes that this same activity had been completed recently in tx with pt. Pt required max reiteration of directions of task and multiple SLP models. Pt required max assistance fading to mod over course of 6 pills to check. SLP educated pt on rationale for task and importance of 100% acc in medication management. Pt verbalized understanding and stated repeat practice \"would be good\".     Strengths: Able to follow instructions, Making steady progress towards goals, Supportive family, Willingly participates in therapeutic activities, Pleasant and cooperative, Motivated for self care and independence  Barriers: Visual impairment, Impaired carryover of learning, Impaired functional " cognition    Plan    Repeat same medication management/med errors task. Pt may benefit from more concrete med management activity using actual pill sorter.     Speech Therapy Problems     Problem: Memory STGs     Dates: Start: 12/30/20       Goal: STG-Within one week, patient will     Dates: Start: 12/30/20       Description: Description: 1) Individualized goal:  learn and implement functional memory strategies with use of memory book with 80% accuracy provided MIN A. 2) Interventions:  SLP Self Care / ADL Training , SLP Cognitive Skill Development, and SLP Group Treatment                  Problem: Problem Solving STGs     Dates: Start: 12/23/20       Goal: STG-Within one week, patient will     Dates: Start: 12/23/20       Description: 1) Individualized goal:  complete attention tasks related to medication management and financial management skills with 80% accuracy provided MIN A.   2) Interventions:  SLP Self Care / ADL Training , SLP Cognitive Skill Development, and SLP Group Treatment                    Problem: Speech/Swallowing LTGs     Dates: Start: 12/21/20       Goal: LTG-By discharge, patient will     Dates: Start: 12/21/20       Description: 1) Individualized goal:  complete functional problem solving and recall with 80% accuracy provided SPV.   2) Interventions:  SLP Self Care / ADL Training , SLP Cognitive Skill Development, and SLP Group Treatment

## 2021-01-04 NOTE — THERAPY
"Occupational Therapy  Daily Treatment     Patient Name: Liudmila Pulido  Age:  88 y.o., Sex:  female  Medical Record #: [id number]  Today's Date: 1/4/2021     Precautions  Precautions: (P) Fall Risk  Comments: (P) R lizeth (LE>UE)    Safety   ADL Safety : (P) Requires Supervision for Safety  Bathroom Safety: (P) Requires Supervision for Safety  Comments: (P) See functional levels of assist below for ADL performance details     Subjective    \"I'm surprised how much easier this is now\"     Objective       01/04/21 0701   Precautions   Precautions Fall Risk   Comments R lizeth (LE>UE)   Safety    ADL Safety  Requires Supervision for Safety   Bathroom Safety Requires Supervision for Safety   Comments See functional levels of assist below for ADL performance details    Cognition    Level of Consciousness Alert   Functional Level of Assist   Eating Independent   Grooming Independent;Seated   Grooming Description Seated in wheelchair at sink  (Oral care, combing hair, washing face, hand hygiene)   Bathing Contact Guard Assist   Bathing Description Grab bar;Hand held shower;Tub bench;Increased time;Set-up of equipment;Supervision for safety;Verbal cueing  (CGA/GB for standing balance, supv while seated)   Upper Body Dressing Independent   Upper Body Dressing Description Other (comment)  (don/doff pullover shirt)   Lower Body Dressing Moderate Assist  (total A to don/doff tread socks)   Lower Body Dressing Description Assist with threading into pant leg;Increased time;Set-up of equipment;Supervision for safety  (assist to thread RLE into brief/pants, CGA for balance)   Toileting Moderate Assist   Toileting Description Assist to pull pants up;Assist for standing balance;Grab bar;Increased time;Set-up of equipment;Supervision for safety;Verbal cueing   Bed, Chair, Wheelchair Transfer Minimal Assist   Bed Chair Wheelchair Transfer Description Increased time;Set-up of equipment;Supervision for safety;Verbal cueing  (SPT from " EOB>w/c)   Toilet Transfers Contact Guard Assist   Toilet Transfer Description Grab bar;Increased time;Set-up of equipment;Supervision for safety;Verbal cueing  (CGA for advancing RLE, balance)   Tub / Shower Transfers Contact Guard Assist   Tub Shower Transfer Description Grab bar;Shower bench;Increased time;Set-up of equipment;Supervision for safety;Verbal cueing  (CGA for advancing RLE, balance)   Bed Mobility    Supine to Sit Contact Guard Assist   Scooting Stand by Assist   Rolling Supervised   Skilled Intervention Verbal Cuing   Interdisciplinary Plan of Care Collaboration   IDT Collaboration with  Nursing   Patient Position at End of Therapy Seated;Self Releasing Lap Belt Applied;Call Light within Reach;Tray Table within Reach;Phone within Reach   Collaboration Comments RN for blood sugars, medications   OT Total Time Spent   OT Individual Total Time Spent (Mins) 60   OT Charge Group   OT Self Care / ADL 4       Assessment    Pt tolerated OT session well. She demonstrates significant progress with ADLs and bathroom transfers, and no longer requires 2nd person assistance for safety. Pt con't to require CGA for safety with standing balance, and assist with advancing RLE during txfrs, but she is able to tolerated increased WB into RLE. Pt may benefit from AE education for LB dressing, but reports that her family will assist at d/c.     Strengths: Able to follow instructions, Independent prior level of function, Making steady progress towards goals, Motivated for self care and independence, Pleasant and cooperative, Supportive family, Willingly participates in therapeutic activities  Barriers: Bowel incontinence, Impulsive, Impaired activity tolerance, Hemiparesis, Generalized weakness    Plan    Cont overall strength/endurance, RUE coordination, and standing tolerance/balance to improve ADL's and functional mobility. AE education (reacher/dressing stick/sock aid), DME/equipment needs for d/c (fixed GB by toilet  and in shower, shower bench, HHSH, hip kit, raised toilet seat?)    Occupational Therapy Goals     Problem: Dressing     Dates: Start: 12/21/20       Goal: STG-Within one week, patient will dress LB     Dates: Start: 12/21/20       Description: 1) Individualized Goal:  mod A with AE as needed.  2) Interventions:  OT E Stim Attended, OT Self Care/ADL, OT Cognitive Skill Dev, OT Community Reintegration, OT Manual Ther Technique, OT Neuro Re-Ed/Balance, OT Therapeutic Activity, OT Evaluation, and OT Therapeutic Exercise    Note:     Goal Note filed on 12/30/20 1128 by Gris Padilla, OT    Con't to require min-mod A x2.                         Problem: Functional Transfers     Dates: Start: 12/21/20       Goal: STG-Within one week, patient will transfer to toilet     Dates: Start: 12/21/20       Description: 1) Individualized Goal:  mod A with AD/DME as needed.  2) Interventions:  OT E Stim Attended, OT Self Care/ADL, OT Cognitive Skill Dev, OT Community Reintegration, OT Manual Ther Technique, OT Neuro Re-Ed/Balance, OT Therapeutic Activity, OT Evaluation, and OT Therapeutic Exercise    Note:     Goal Note filed on 12/30/20 1128 by Gris Paidlla, OT    Mod-max A x2.                         Problem: Grooming     Dates: Start: 12/21/20             Problem: OT Long Term Goals     Dates: Start: 12/21/20       Goal: LTG-By discharge, patient will complete basic self care tasks     Dates: Start: 12/21/20       Description: 1) Individualized Goal: Supervision to mod I level with AE/DME as needed.  2) Interventions:  OT E Stim Attended, OT Self Care/ADL, OT Cognitive Skill Dev, OT Community Reintegration, OT Manual Ther Technique, OT Neuro Re-Ed/Balance, OT Therapeutic Activity, OT Evaluation, and OT Therapeutic Exercise          Goal: LTG-By discharge, patient will perform bathroom transfers     Dates: Start: 12/21/20       Description: 1) Individualized Goal:  Supervision to mod I level with AD/DME as needed.  2)  Interventions:  OT E Stim Attended, OT Self Care/ADL, OT Cognitive Skill Dev, OT Community Reintegration, OT Manual Ther Technique, OT Neuro Re-Ed/Balance, OT Therapeutic Activity, OT Evaluation, and OT Therapeutic Exercise                Problem: Toileting     Dates: Start: 12/21/20       Goal: STG-Within one week, patient will complete toileting tasks     Dates: Start: 12/21/20       Description: 1) Individualized Goal:  max A with AE/DME as needed.  2) Interventions:  OT E Stim Attended, OT Self Care/ADL, OT Cognitive Skill Dev, OT Community Reintegration, OT Manual Ther Technique, OT Neuro Re-Ed/Balance, OT Therapeutic Activity, OT Evaluation, and OT Therapeutic Exercise    Note:     Goal Note filed on 12/30/20 1128 by Gris Padilla, OT    Con't to require 2nd person present for safety with clothing mgt, hygiene and balance.

## 2021-01-04 NOTE — CARE PLAN
Problem: Safety  Goal: Will remain free from falls  Outcome: PROGRESSING AS EXPECTED     Problem: Knowledge Deficit  Goal: Knowledge of the prescribed therapeutic regimen will improve  Outcome: PROGRESSING AS EXPECTED     Problem: Skin Integrity  Goal: Risk for impaired skin integrity will decrease  Outcome: PROGRESSING AS EXPECTED  Coccyx pressure injury is healed. No signs of redness, and no pain.

## 2021-01-04 NOTE — CARE PLAN
Problem: Communication  Goal: The ability to communicate needs accurately and effectively will improve  Description: Patient able to verbalize needs.  Will continue to monitor.   Note: Encouraged to make needs known to staff and to use call light for staff assist.      Problem: Safety  Goal: Will remain free from falls  Note: Call light kept within reach and encouraged to use for assistance and with toileting needs. Encouraged to call staff and to wait for staff before transfers.  Bed alarm is in place.  Hourly rounding is in effect.     Problem: Pain Management  Goal: Pain level will decrease to patient's comfort goal  Note: Complains of pain to right hip and right leg, medicated with scheduled Gabapentin, Tylenol and Baclofen.  Has + relief.  See MAR and doc flow sheet.  Will continue to monitor patient.

## 2021-01-04 NOTE — PROGRESS NOTES
"Rehab Progress Note     Date of Service: 1/4/2021  Chief Complaint: Follow-up stroke    Interval Events (Subjective)    Patient seen and examined today in her room. Her right leg continues to be impaired, but she is able to stand up. The spasms at night have improved on the baclofen that Dr. Causey started over the weekend.  Patient does feel like she will be ready for discharge home at the end of the week.  Patient does feel like she will be ready for discharge home at the end of the week.  She moved her bowels yesterday and denies any issues with urination.    Objective:  VITAL SIGNS: /65   Pulse 64   Temp 36.8 °C (98.2 °F) (Oral)   Resp 18   Ht 1.676 m (5' 5.98\")   Wt 75 kg (165 lb 6.4 oz)   SpO2 94%   BMI 26.71 kg/m²   Gen: alert, no apparent distress  Neuro: notable for improving right leg strength and decrease sensation        Recent Results (from the past 72 hour(s))   ACCU-CHEK GLUCOSE    Collection Time: 01/01/21  5:28 PM   Result Value Ref Range    Glucose - Accu-Ck 104 (H) 65 - 99 mg/dL   ACCU-CHEK GLUCOSE    Collection Time: 01/01/21  8:58 PM   Result Value Ref Range    Glucose - Accu-Ck 146 (H) 65 - 99 mg/dL   ACCU-CHEK GLUCOSE    Collection Time: 01/02/21  7:30 AM   Result Value Ref Range    Glucose - Accu-Ck 116 (H) 65 - 99 mg/dL   ACCU-CHEK GLUCOSE    Collection Time: 01/02/21 11:07 AM   Result Value Ref Range    Glucose - Accu-Ck 115 (H) 65 - 99 mg/dL   ACCU-CHEK GLUCOSE    Collection Time: 01/02/21  5:18 PM   Result Value Ref Range    Glucose - Accu-Ck 100 (H) 65 - 99 mg/dL   ACCU-CHEK GLUCOSE    Collection Time: 01/02/21  8:30 PM   Result Value Ref Range    Glucose - Accu-Ck 125 (H) 65 - 99 mg/dL   ACCU-CHEK GLUCOSE    Collection Time: 01/03/21  7:10 AM   Result Value Ref Range    Glucose - Accu-Ck 104 (H) 65 - 99 mg/dL   ACCU-CHEK GLUCOSE    Collection Time: 01/03/21 11:10 AM   Result Value Ref Range    Glucose - Accu-Ck 105 (H) 65 - 99 mg/dL   ACCU-CHEK GLUCOSE    Collection Time: " 01/03/21  5:09 PM   Result Value Ref Range    Glucose - Accu-Ck 117 (H) 65 - 99 mg/dL   ACCU-CHEK GLUCOSE    Collection Time: 01/03/21  9:30 PM   Result Value Ref Range    Glucose - Accu-Ck 146 (H) 65 - 99 mg/dL   ACCU-CHEK GLUCOSE    Collection Time: 01/04/21  7:31 AM   Result Value Ref Range    Glucose - Accu-Ck 113 (H) 65 - 99 mg/dL   ACCU-CHEK GLUCOSE    Collection Time: 01/04/21 11:31 AM   Result Value Ref Range    Glucose - Accu-Ck 110 (H) 65 - 99 mg/dL       Current Facility-Administered Medications   Medication Frequency   • amLODIPine (NORVASC) tablet 2.5 mg Q DAY   • lisinopril (PRINIVIL) tablet 40 mg Q DAY   • baclofen (LIORESAL) tablet 10 mg QHS   • metFORMIN (GLUCOPHAGE) tablet 250 mg BID WITH MEALS   • phosphorus (K-Phos-Neutral) per tablet 250 mg TID   • gabapentin (NEURONTIN) capsule 400 mg TID   • gabapentin (NEURONTIN) capsule 600 mg Q EVENING   • acetaminophen (Tylenol) tablet 650 mg TID   • polyethylene glycol/lytes (MIRALAX) PACKET 1 Packet DAILY    And   • senna-docusate (PERICOLACE or SENOKOT S) 8.6-50 MG per tablet 2 Tab BID    And   • magnesium hydroxide (MILK OF MAGNESIA) suspension 30 mL QDAY PRN    And   • bisacodyl (DULCOLAX) suppository 10 mg QDAY PRN   • vitamin D (cholecalciferol) tablet 1,000 Units DAILY   • tamsulosin (FLOMAX) capsule 0.4 mg QHS   • Respiratory Therapy Consult Continuous RT   • Pharmacy Consult Request ...Pain Management Review 1 Each PHARMACY TO DOSE   • hydrALAZINE (APRESOLINE) tablet 25 mg Q8HRS PRN   • artificial tears ophthalmic solution 1 Drop PRN   • benzocaine-menthol (CEPACOL) lozenge 1 Lozenge Q2HRS PRN   • mag hydrox-al hydrox-simeth (MAALOX PLUS ES or MYLANTA DS) suspension 20 mL Q2HRS PRN   • ondansetron (ZOFRAN ODT) dispertab 4 mg 4X/DAY PRN    Or   • ondansetron (ZOFRAN) syringe/vial injection 4 mg 4X/DAY PRN   • traZODone (DESYREL) tablet 50 mg QHS PRN   • sodium chloride (OCEAN) 0.65 % nasal spray 2 Spray PRN   • acetaminophen (Tylenol) tablet 650  mg Q6HRS PRN   • atorvastatin (LIPITOR) tablet 40 mg Q EVENING   • apixaban (ELIQUIS) tablet 5 mg BID   • metoprolol (LOPRESSOR) tablet 50 mg TWICE DAILY   • guaiFENesin (ROBITUSSIN) 100 MG/5ML solution 100 mg Q6HRS PRN       Orders Placed This Encounter   Procedures   • Diet Order Diet: Consistent CHO (Diabetic) (Please make sure that pt has dentures (uppers and lowers) in place for meal. )     Standing Status:   Standing     Number of Occurrences:   1     Order Specific Question:   Diet:     Answer:   Consistent CHO (Diabetic) [4]     Comments:   Please make sure that pt has dentures (uppers and lowers) in place for meal.        Assessment:  Active Hospital Problems    Diagnosis   • *CVA (cerebral vascular accident) (HCC)   • AF (atrial fibrillation) (HCC)   • Elevated troponin   • Hyponatremia   • Essential hypertension   • Hyperlipidemia   • Vitamin D deficiency   • Other dysphagia   • Cognitive deficits   • Hypophosphatemia   • Type 2 diabetes mellitus without complication, without long-term current use of insulin (HCC)   • Aortic stenosis     This patient is a 88 y.o. female admitted for acute inpatient rehabilitation with CVA (cerebral vascular accident) ([facility]).    I led and attended the weekly conference, and agree with the IDT conference documentation and plan of care as noted below.    Date of conference: 12/30/2020    Goals and barriers: See IDT note.    Biggest barriers: right leg weakness, mild cognitive impairments    CM/social support: son supportive and will assist at discharge, however her daughter Keyonna is her power of     Anticipated DC date: 1/9    Home health: PT/OT/SLP/RN    Equip: DIMITRIOS, camryn    Follow up: PCP, stroke bridge, Dr. Causey, cardiology      Medical Decision Making and Plan:    High left frontal stroke  Cardio-embolic etiology  Right leg hemiplegia, improved  Right leg hemianesthesia, continues/improved  Cognitive deficits, mild  Dysphagia, resolved  Continue full rehab  program  PT/OT/SLP, 1 hr each discipline, 5 days per week    Continue Eliquis and statin for secondary stroke prophylaxis  Consulted Dr. Wasserman, no evidence of depression currently    Outpatient follow-up with the stroke [facility] clinic, referral made  Outpatient follow-up with Dr. Causey, referral made    Right leg spasms  Started on baclofen 10 mg QHS  Continue to monitor need to increase  Outpatient follow-up with Dr. Causey    Right leg neuropathic pain, improved/resolved  Started low-dose gabapentin 100 mg 3 times a day 12/22 --> 200 mg TID 12/24 --> 400 mg TID 12/27, added higher nighttime dose of 600 mg, continue to monitor need to increase  Less likely DVT and patient is already on Eliquis  Outpatient follow-up with Dr. Causey    Hypertension  Continue lisinopril 5 mg 12/21 --> 10 mg 12/23 --> 30 mg 12/29  Continue metoprolol 50 mg twice a day  Started on amlodipdine 2.5 mg QD  As needed hydralazine  Consult hospitalist, appreciate assistance    Atrial fibrillation  Continue metoprolol 50 mg twice a day  Outpatient follow-up with cardiology  Currently rate controlled    Diabetes with hyperglycemia  HgbA1c 6.2  Continue metformin 250 BID  SSI discontinued    Aortic stenosis  Outpatient follow-up with cardiology    Vitamin D deficiency  Continue supplementation    Hypophosphatemia, continues  Continue supplementation  Consult hospitalist, appreciate assistance    Elevated alk phosphatase, continues  Elevated AST, improved  Ultrasound with gallbladder wall thickening  Monitor as patient currently asymptomatic  Consult hospitalist, appreciate assistance    Bowel program  Constipation, improved/resolved  Continue bowel medications  Scheduled Sennakot - patient has been refusing  Schedule Miralax  PRN MOM, bisacodyl suppository  Last BM 1/3    Bladder program  Acute urinary retention, resolved  Check PVRs - 68, 98  Started Flomax 0.4 mg 12/21  Bladder scan for no voids  ICP for over 400 cc - required once  12/20  Scheduled toileting    DVT prophylaxis  Eliquis    COVID negative 12/17, 12/19, 12/28    Patient received both her pneumococcal and her flu shot in [place] this year    Total time:  15 minutes.  I spent greater than 50% of the time for patient care, counseling, and coordination on this date, including patient face-to face time, unit/floor time with review of records/pertinent lab data and studies, as well as discussing diagnostic evaluation/work up, planned therapeutic interventions, and future disposition of care, as per the interval events/subjective and the assessment and plan as noted above.    I have performed a physical exam, reviewed and updated ROS, as well as the assessment and plan today 1/4/2021. In review of note from 12/31/20 there are no new changes except as documented above.        Tangela Noel M.D.   Physical Medicine and Rehabilitation

## 2021-01-04 NOTE — PROGRESS NOTES
Received bedside shift report from Ramon DUKE RN regarding patient and assumed care. Patient awake, calm and stable, currently positioned in bed for comfort and safety; call light within reach. Denies pain or discomfort at this time. Will continue to monitor.

## 2021-01-04 NOTE — PROGRESS NOTES
Blood sugar tonight is 146, no coverage ordered.  Pt would like accuchecks dc'd.  Will continue to monitor patient.

## 2021-01-04 NOTE — THERAPY
"Speech Language Pathology  Daily Treatment     Patient Name: Liudmila Pulido  Age:  88 y.o., Sex:  female  Medical Record #: [id number]  Today's Date: 1/4/2021     Precautions  Precautions: Fall Risk  Comments: R lizeth (LE>UE)    Subjective    Pt pleasant and cooperative, pt expressed concern re: family wanting to assist more than what is necessary or that she will be isolated to one part of her home.      Objective       01/04/21 1033   SLP Total Time Spent   SLP Individual Total Time Spent (Mins) 60   Treatment Charges   SLP Cognitive Skill Development First 15 Minutes 1   SLP Cognitive Skill Development Additional 15 Minutes 3       Assessment    Pt pleasant and cooperative, pt expressed concern re: family wanting to assist more than what is necessary or that she will be isolated to one part of her home. Extensive education provided at this time re: current abilities, strengths and weaknesses as well as current assistance needed. Discussed that family training is scheduled for Friday where DIL will work with therapist to learn what assistance is needed and how to provide assistance safely. Reinforced plan to keep pt as indep as possible while maintaining her safety. Again pt indep able to express in what areas she will require assistance such as cognitive assistance with meds and finances. Pt presented with attention task with use of short story, pt indep located 10/11 targets and predicted \"close to 100% accuracy\" pt able to locate remaining target with additional review and indep stated \"yup, I found it I just needed to slow down.\" Verbal recall completed of day thus far. Pt unable to recall OT which was completed from 7-8:00 however with prompting it was discovered that she had taken a shower but was unaware that that was therapy. Pt indep recalled item consumed and likes/dislikes as well as first therapy session.     Strengths: Able to follow instructions, Making steady progress towards goals, Supportive " family, Willingly participates in therapeutic activities, Pleasant and cooperative, Motivated for self care and independence  Barriers: Visual impairment, Impaired carryover of learning, Impaired functional cognition    Plan    Cont to address attention, functional problem solving and recall     Speech Therapy Problems     Problem: Memory STGs     Dates: Start: 12/30/20       Goal: STG-Within one week, patient will     Dates: Start: 12/30/20       Description: Description: 1) Individualized goal:  learn and implement functional memory strategies with use of memory book with 80% accuracy provided MIN A. 2) Interventions:  SLP Self Care / ADL Training , SLP Cognitive Skill Development, and SLP Group Treatment                  Problem: Problem Solving STGs     Dates: Start: 12/23/20       Goal: STG-Within one week, patient will     Dates: Start: 12/23/20       Description: 1) Individualized goal:  complete attention tasks related to medication management and financial management skills with 80% accuracy provided MIN A.   2) Interventions:  SLP Self Care / ADL Training , SLP Cognitive Skill Development, and SLP Group Treatment                    Problem: Speech/Swallowing LTGs     Dates: Start: 12/21/20       Goal: LTG-By discharge, patient will     Dates: Start: 12/21/20       Description: 1) Individualized goal:  complete functional problem solving and recall with 80% accuracy provided SPV.   2) Interventions:  SLP Self Care / ADL Training , SLP Cognitive Skill Development, and SLP Group Treatment

## 2021-01-04 NOTE — PROGRESS NOTES
Hospital Medicine Daily Progress Note      Chief Complaint:  Hypertension  Afib  Elevated AP    Interval History:  No significant events or changes since last visit    Review of Systems  Review of Systems   Constitutional: Negative for chills and fever.   Respiratory: Negative for shortness of breath.    Cardiovascular: Negative for chest pain.   Gastrointestinal: Negative for abdominal pain, diarrhea, nausea and vomiting.   Psychiatric/Behavioral: The patient is not nervous/anxious.         Physical Exam  Temp:  [36.8 °C (98.3 °F)-37.2 °C (99 °F)] 36.9 °C (98.4 °F)  Pulse:  [54-64] 64  Resp:  [18] 18  BP: (134-155)/(52-75) 155/75  SpO2:  [93 %-95 %] 95 %    Physical Exam  Vitals signs and nursing note reviewed.   Constitutional:       Appearance: Normal appearance.   HENT:      Head: Atraumatic.   Eyes:      Conjunctiva/sclera: Conjunctivae normal.      Pupils: Pupils are equal, round, and reactive to light.   Neck:      Musculoskeletal: Normal range of motion and neck supple.      Vascular: No JVD.   Cardiovascular:      Rate and Rhythm: Normal rate. Rhythm irregular.      Heart sounds: Normal heart sounds. No murmur.   Pulmonary:      Effort: Pulmonary effort is normal.      Breath sounds: No stridor. No wheezing or rales.   Abdominal:      General: Bowel sounds are normal. There is no distension.      Palpations: Abdomen is soft.      Tenderness: There is no abdominal tenderness.   Musculoskeletal:      Right lower leg: Edema present.      Left lower leg: Edema present.      Comments: Has mild B/L pedal/ankle swelling   Skin:     General: Skin is warm and dry.      Findings: No rash.   Neurological:      Mental Status: She is alert and oriented to person, place, and time.   Psychiatric:         Mood and Affect: Mood normal.         Behavior: Behavior normal.         Fluids    Intake/Output Summary (Last 24 hours) at 1/4/2021 0906  Last data filed at 1/4/2021 0516  Gross per 24 hour   Intake 240 ml   Output --    Net 240 ml       Laboratory                        Assessment/Plan  * CVA (cerebral vascular accident) ([facility])- (present on admission)  Assessment & Plan  Has RLE weakness  On Eliquis  On Lipitor    AF (atrial fibrillation) ([facility])- (present on admission)  Assessment & Plan  HR mostly 60's  On Metoprolol: 50 mg bid  On Eliquis  Cont to monitor    Alkaline phosphatase elevation  Assessment & Plan  Starting to improve  AP: 347 --> 293 (12/28)  Asymptomatic -- denies abd pain, nausea, or vomiting  U/S abd: shows cholelithiasis with gallbladder wall thickening suggesting cholecystitis  Consider HIDA if pt develops GI symptoms  Will need out patient follow up    Hypophosphatemia- (present on admission)  Assessment & Plan  PO4: 2.3 --> 2.8 --> 2.6 (12/28)  On supplements  Monitpr    Vitamin D deficiency- (present on admission)  Assessment & Plan  Vit D: 21  On supplements    Aortic stenosis- (present on admission)  Assessment & Plan  Has some mild B/L LE pedal/ankle swelling  Echo: EF 75%, RVSP 40, moderate AS  Outpt follow up    Type 2 diabetes mellitus without complication, without long-term current use of insulin ([facility])- (present on admission)  Assessment & Plan  Hba1c: 6.2 (12/17)  BS: 100-146  On Metformin: 250 mg bid (12/30)  Now on a diabetic diet (12/30)  Will d/c accuchecks since BS have been good  Note: takes Metformin 500 mg bid at home   Cont to monitor    Essential hypertension- (present on admission)  Assessment & Plan  BP a little elevated with -157  On Lisinopril: 20 mg daily --> 30 mg daily (12/29) --> 40 mg daily (1/1)  On Lopressor: 50 mg bid  Will start Norvasc: 2.5 mg daily (1/4)  Note: on Flomax  Cont to monitor

## 2021-01-04 NOTE — DISCHARGE PLANNING
CM had 50min conversation with patients daughter Keyonna regarding DC needs, FU appt's.  Keyonna would like CM to call her tomorrow at 2pm when her brother and sister in law will be present to answer their questions also as patient lives with them however CM has been instructed to coordinate everything through Keyonna.  Keyonna would like DC meds called into [facility] on [address] Dr. Noel updated.  KJ will continue to follow for DC needs.

## 2021-01-05 PROBLEM — D61.818 PANCYTOPENIA (HCC): Status: ACTIVE | Noted: 2020-12-28

## 2021-01-05 LAB
ANION GAP SERPL CALC-SCNC: 5 MMOL/L (ref 7–16)
BASOPHILS # BLD AUTO: 0.8 % (ref 0–1.8)
BASOPHILS # BLD: 0.03 K/UL (ref 0–0.12)
BUN SERPL-MCNC: 8 MG/DL (ref 8–22)
CALCIUM SERPL-MCNC: 9 MG/DL (ref 8.5–10.5)
CHLORIDE SERPL-SCNC: 104 MMOL/L (ref 96–112)
CO2 SERPL-SCNC: 28 MMOL/L (ref 20–33)
CREAT SERPL-MCNC: 0.52 MG/DL (ref 0.5–1.4)
EOSINOPHIL # BLD AUTO: 0.25 K/UL (ref 0–0.51)
EOSINOPHIL NFR BLD: 7.1 % (ref 0–6.9)
ERYTHROCYTE [DISTWIDTH] IN BLOOD BY AUTOMATED COUNT: 51.8 FL (ref 35.9–50)
GLUCOSE SERPL-MCNC: 110 MG/DL (ref 65–99)
HCT VFR BLD AUTO: 33.8 % (ref 37–47)
HGB BLD-MCNC: 10.5 G/DL (ref 12–16)
IMM GRANULOCYTES # BLD AUTO: 0.01 K/UL (ref 0–0.11)
IMM GRANULOCYTES NFR BLD AUTO: 0.3 % (ref 0–0.9)
LYMPHOCYTES # BLD AUTO: 1.29 K/UL (ref 1–4.8)
LYMPHOCYTES NFR BLD: 36.4 % (ref 22–41)
MAGNESIUM SERPL-MCNC: 1.7 MG/DL (ref 1.5–2.5)
MCH RBC QN AUTO: 28.6 PG (ref 27–33)
MCHC RBC AUTO-ENTMCNC: 31.1 G/DL (ref 33.6–35)
MCV RBC AUTO: 92.1 FL (ref 81.4–97.8)
MONOCYTES # BLD AUTO: 0.27 K/UL (ref 0–0.85)
MONOCYTES NFR BLD AUTO: 7.6 % (ref 0–13.4)
NEUTROPHILS # BLD AUTO: 1.69 K/UL (ref 2–7.15)
NEUTROPHILS NFR BLD: 47.8 % (ref 44–72)
NRBC # BLD AUTO: 0 K/UL
NRBC BLD-RTO: 0 /100 WBC
PHOSPHATE SERPL-MCNC: 3.5 MG/DL (ref 2.5–4.5)
PLATELET # BLD AUTO: 144 K/UL (ref 164–446)
PMV BLD AUTO: 10.1 FL (ref 9–12.9)
POTASSIUM SERPL-SCNC: 4 MMOL/L (ref 3.6–5.5)
RBC # BLD AUTO: 3.67 M/UL (ref 4.2–5.4)
SODIUM SERPL-SCNC: 137 MMOL/L (ref 135–145)
WBC # BLD AUTO: 3.5 K/UL (ref 4.8–10.8)

## 2021-01-05 PROCEDURE — 700102 HCHG RX REV CODE 250 W/ 637 OVERRIDE(OP): Performed by: PHYSICAL MEDICINE & REHABILITATION

## 2021-01-05 PROCEDURE — 36415 COLL VENOUS BLD VENIPUNCTURE: CPT

## 2021-01-05 PROCEDURE — 83735 ASSAY OF MAGNESIUM: CPT

## 2021-01-05 PROCEDURE — 97112 NEUROMUSCULAR REEDUCATION: CPT | Mod: CQ

## 2021-01-05 PROCEDURE — 80048 BASIC METABOLIC PNL TOTAL CA: CPT

## 2021-01-05 PROCEDURE — 700102 HCHG RX REV CODE 250 W/ 637 OVERRIDE(OP): Performed by: HOSPITALIST

## 2021-01-05 PROCEDURE — 99231 SBSQ HOSP IP/OBS SF/LOW 25: CPT | Performed by: PHYSICAL MEDICINE & REHABILITATION

## 2021-01-05 PROCEDURE — A9270 NON-COVERED ITEM OR SERVICE: HCPCS | Performed by: HOSPITALIST

## 2021-01-05 PROCEDURE — A9270 NON-COVERED ITEM OR SERVICE: HCPCS | Performed by: PHYSICAL MEDICINE & REHABILITATION

## 2021-01-05 PROCEDURE — 770010 HCHG ROOM/CARE - REHAB SEMI PRIVAT*

## 2021-01-05 PROCEDURE — 85025 COMPLETE CBC W/AUTO DIFF WBC: CPT

## 2021-01-05 PROCEDURE — 97116 GAIT TRAINING THERAPY: CPT | Mod: CQ

## 2021-01-05 PROCEDURE — 97530 THERAPEUTIC ACTIVITIES: CPT

## 2021-01-05 PROCEDURE — 99232 SBSQ HOSP IP/OBS MODERATE 35: CPT | Performed by: HOSPITALIST

## 2021-01-05 PROCEDURE — 97129 THER IVNTJ 1ST 15 MIN: CPT

## 2021-01-05 PROCEDURE — 82962 GLUCOSE BLOOD TEST: CPT

## 2021-01-05 PROCEDURE — 97112 NEUROMUSCULAR REEDUCATION: CPT

## 2021-01-05 PROCEDURE — 97130 THER IVNTJ EA ADDL 15 MIN: CPT

## 2021-01-05 PROCEDURE — 97530 THERAPEUTIC ACTIVITIES: CPT | Mod: CQ

## 2021-01-05 PROCEDURE — 84100 ASSAY OF PHOSPHORUS: CPT

## 2021-01-05 RX ORDER — ACETAMINOPHEN 325 MG/1
650 TABLET ORAL EVERY 6 HOURS PRN
Status: DISCONTINUED | OUTPATIENT
Start: 2021-01-05 | End: 2021-01-09 | Stop reason: HOSPADM

## 2021-01-05 RX ORDER — AMLODIPINE BESYLATE 5 MG/1
5 TABLET ORAL
Status: DISCONTINUED | OUTPATIENT
Start: 2021-01-06 | End: 2021-01-06

## 2021-01-05 RX ADMIN — GABAPENTIN 400 MG: 400 CAPSULE ORAL at 17:23

## 2021-01-05 RX ADMIN — DOCUSATE SODIUM 50 MG AND SENNOSIDES 8.6 MG 2 TABLET: 8.6; 5 TABLET, FILM COATED ORAL at 08:38

## 2021-01-05 RX ADMIN — TAMSULOSIN HYDROCHLORIDE 0.4 MG: 0.4 CAPSULE ORAL at 20:21

## 2021-01-05 RX ADMIN — METFORMIN HYDROCHLORIDE 250 MG: 500 TABLET ORAL at 17:23

## 2021-01-05 RX ADMIN — POLYETHYLENE GLYCOL 3350 1 PACKET: 17 POWDER, FOR SOLUTION ORAL at 08:41

## 2021-01-05 RX ADMIN — Medication 1000 UNITS: at 08:38

## 2021-01-05 RX ADMIN — DIBASIC SODIUM PHOSPHATE, MONOBASIC POTASSIUM PHOSPHATE AND MONOBASIC SODIUM PHOSPHATE 250 MG: 852; 155; 130 TABLET ORAL at 08:38

## 2021-01-05 RX ADMIN — METOPROLOL TARTRATE 50 MG: 25 TABLET, FILM COATED ORAL at 05:24

## 2021-01-05 RX ADMIN — METOPROLOL TARTRATE 50 MG: 25 TABLET, FILM COATED ORAL at 17:20

## 2021-01-05 RX ADMIN — GABAPENTIN 400 MG: 400 CAPSULE ORAL at 08:38

## 2021-01-05 RX ADMIN — LISINOPRIL 40 MG: 20 TABLET ORAL at 05:25

## 2021-01-05 RX ADMIN — AMLODIPINE BESYLATE 2.5 MG: 5 TABLET ORAL at 05:24

## 2021-01-05 RX ADMIN — APIXABAN 5 MG: 5 TABLET, FILM COATED ORAL at 20:22

## 2021-01-05 RX ADMIN — ATORVASTATIN CALCIUM 40 MG: 40 TABLET, FILM COATED ORAL at 20:22

## 2021-01-05 RX ADMIN — GABAPENTIN 400 MG: 400 CAPSULE ORAL at 12:47

## 2021-01-05 RX ADMIN — DIBASIC SODIUM PHOSPHATE, MONOBASIC POTASSIUM PHOSPHATE AND MONOBASIC SODIUM PHOSPHATE 250 MG: 852; 155; 130 TABLET ORAL at 15:52

## 2021-01-05 RX ADMIN — METFORMIN HYDROCHLORIDE 250 MG: 500 TABLET ORAL at 08:38

## 2021-01-05 RX ADMIN — APIXABAN 5 MG: 5 TABLET, FILM COATED ORAL at 08:39

## 2021-01-05 RX ADMIN — BACLOFEN 10 MG: 10 TABLET ORAL at 20:22

## 2021-01-05 RX ADMIN — ACETAMINOPHEN 650 MG: 325 TABLET, FILM COATED ORAL at 08:39

## 2021-01-05 RX ADMIN — GABAPENTIN 600 MG: 300 CAPSULE ORAL at 20:21

## 2021-01-05 ASSESSMENT — ENCOUNTER SYMPTOMS
EYES NEGATIVE: 1
POLYDIPSIA: 0
COUGH: 0
VOMITING: 0
FEVER: 0
BRUISES/BLEEDS EASILY: 0
PALPITATIONS: 0
FOCAL WEAKNESS: 1
CHILLS: 0
SHORTNESS OF BREATH: 0
ABDOMINAL PAIN: 0
NAUSEA: 0
RESPIRATORY NEGATIVE: 1
MUSCULOSKELETAL NEGATIVE: 1

## 2021-01-05 ASSESSMENT — ACTIVITIES OF DAILY LIVING (ADL)
BED_CHAIR_WHEELCHAIR_TRANSFER_DESCRIPTION: ADAPTIVE EQUIPMENT;VERBAL CUEING;SUPERVISION FOR SAFETY
TOILET_TRANSFER_DESCRIPTION: GRAB BAR;INCREASED TIME;SUPERVISION FOR SAFETY;VERBAL CUEING
TOILETING_LEVEL_OF_ASSIST_DESCRIPTION: ASSIST FOR STANDING BALANCE;GRAB BAR;INCREASED TIME;SET-UP OF EQUIPMENT;SUPERVISION FOR SAFETY;VERBAL CUEING

## 2021-01-05 ASSESSMENT — GAIT ASSESSMENTS
DISTANCE (FEET): 40
ASSISTIVE DEVICE: FRONT WHEEL WALKER
GAIT LEVEL OF ASSIST: TOTAL ASSIST

## 2021-01-05 NOTE — CARE PLAN
Problem: Communication  Goal: The ability to communicate needs accurately and effectively will improve  Description: Patient able to verbalize needs.  Will continue to monitor.   Outcome: PROGRESSING AS EXPECTED     Problem: Safety  Goal: Will remain free from falls  Outcome: PROGRESSING AS EXPECTED     Problem: Infection  Goal: Will remain free from infection  Outcome: PROGRESSING AS EXPECTED

## 2021-01-05 NOTE — CARE PLAN
Problem: Safety  Goal: Will remain free from falls  Intervention: Implement fall precautions  Note: Use of call light to make needs known.Fall precautions and frequent rounding in place for safety.Call light within reach.Will continue to monitor and assess needs and safety.     Problem: Pain Management  Goal: Pain level will decrease to patient's comfort goal  Intervention: Follow pain managment plan developed in collaboration with patient and Interdisciplinary Team  Note: Pain is manageable with scheduled medication.Repositioned with pillows for comfort.Will continue to monitor and assess pain level and medicate as needed.

## 2021-01-05 NOTE — THERAPY
"Occupational Therapy  Daily Treatment     Patient Name: Liudmila Pulido  Age:  88 y.o., Sex:  female  Medical Record #: [id number]  Today's Date: 1/5/2021     Precautions  Precautions: (P) Fall Risk  Comments: (P) R lizeth (LE>UE)    Safety   ADL Safety : Requires Supervision for Safety  Bathroom Safety: Requires Supervision for Safety  Comments: See functional levels of assist below for ADL performance details     Subjective    \"I love to bake\"     Objective       01/05/21 0931   Precautions   Precautions Fall Risk   Comments R lizeth (LE>UE)   Cognition    Level of Consciousness Alert   Functional Level of Assist   Grooming Independent;Seated   Grooming Description Seated in wheelchair at sink   Toileting Contact Guard Assist   Toileting Description Assist for standing balance;Grab bar;Increased time;Set-up of equipment;Supervision for safety;Verbal cueing   Toilet Transfers Contact Guard Assist   Toilet Transfer Description Grab bar;Increased time;Supervision for safety;Verbal cueing  (w/c<>toilet SPT with GB)   IADL Treatments   Kitchen Mobility Education  Pt educated on safe kitchen mobility techniques from w/c level and standing at counter with UB support. Pt retrieved items in cupboards, drawers, countertop, appliances, etc. with initial demonstration/cues for placement of w/c, CGA-SBA while standing at countertop to retrieve items, wash hands, etc. Pt reports that she enjoys baking and cooking and hopes to be able to assist family with meal prep at d/c. Discussed safety techniques, including keeping w/c locked and behind pt when standing at countertop, locking w/c brakes before standing,  keeping frequently used items within easy reach, and completing portions of cooking tasks while seated for safety and energy conservation.    Home Management Pt completed laundry task standing at washer/dryer. Pt able to load clothing and soap in to washer, transfer clothing from washer to dryer, and manage controls with SBA " and use of UB support for balance safety.    Interdisciplinary Plan of Care Collaboration   IDT Collaboration with  Therapy Tech   Patient Position at End of Therapy Seated;Chair Alarm On;Self Releasing Lap Belt Applied;Call Light within Reach;Tray Table within Reach;Phone within Reach   Collaboration Comments tech for w/c follow during ambulation.   OT Total Time Spent   OT Individual Total Time Spent (Mins) 60   OT Charge Group   OT Neuromuscular Re-education / Balance 1   OT Therapy Activity 3       Assessment    Pt tolerated OT session well with focus on ADLs, IADLs and functional ambulation. Pt demonstrated fair carryover of safety strategies during kitchen mobility and laundry activities. She required one cue for locking w/c brakes prior to standing. Pt able to complete toilet transfer, clothing mgt and hygiene with CGA for standing balance.     Strengths: Able to follow instructions, Independent prior level of function, Making steady progress towards goals, Motivated for self care and independence, Pleasant and cooperative, Supportive family, Willingly participates in therapeutic activities  Barriers: Bowel incontinence, Impulsive, Impaired activity tolerance, Hemiparesis, Generalized weakness    Plan    Cont overall strength/endurance, RUE coordination, and standing tolerance/balance to improve ADL's and functional mobility. AE education (reacher/dressing stick/sock aid), DME/equipment needs for d/c (fixed GB by toilet and in shower, shower bench, HHSH, hip kit, raised toilet seat?)    Occupational Therapy Goals     Problem: Dressing     Dates: Start: 12/21/20       Goal: STG-Within one week, patient will dress LB     Dates: Start: 12/21/20       Description: 1) Individualized Goal:  mod A with AE as needed.  2) Interventions:  OT E Stim Attended, OT Self Care/ADL, OT Cognitive Skill Dev, OT Community Reintegration, OT Manual Ther Technique, OT Neuro Re-Ed/Balance, OT Therapeutic Activity, OT Evaluation, and  OT Therapeutic Exercise    Note:     Goal Note filed on 12/30/20 1128 by Gris Padilla, OT    Con't to require min-mod A x2.                         Problem: Functional Transfers     Dates: Start: 12/21/20       Goal: STG-Within one week, patient will transfer to toilet     Dates: Start: 12/21/20       Description: 1) Individualized Goal:  mod A with AD/DME as needed.  2) Interventions:  OT E Stim Attended, OT Self Care/ADL, OT Cognitive Skill Dev, OT Community Reintegration, OT Manual Ther Technique, OT Neuro Re-Ed/Balance, OT Therapeutic Activity, OT Evaluation, and OT Therapeutic Exercise    Note:     Goal Note filed on 12/30/20 1128 by Gris Padilla, OT    Mod-max A x2.                         Problem: Grooming     Dates: Start: 12/21/20             Problem: OT Long Term Goals     Dates: Start: 12/21/20       Goal: LTG-By discharge, patient will complete basic self care tasks     Dates: Start: 12/21/20       Description: 1) Individualized Goal: Supervision to mod I level with AE/DME as needed.  2) Interventions:  OT E Stim Attended, OT Self Care/ADL, OT Cognitive Skill Dev, OT Community Reintegration, OT Manual Ther Technique, OT Neuro Re-Ed/Balance, OT Therapeutic Activity, OT Evaluation, and OT Therapeutic Exercise          Goal: LTG-By discharge, patient will perform bathroom transfers     Dates: Start: 12/21/20       Description: 1) Individualized Goal:  Supervision to mod I level with AD/DME as needed.  2) Interventions:  OT E Stim Attended, OT Self Care/ADL, OT Cognitive Skill Dev, OT Community Reintegration, OT Manual Ther Technique, OT Neuro Re-Ed/Balance, OT Therapeutic Activity, OT Evaluation, and OT Therapeutic Exercise                Problem: Toileting     Dates: Start: 12/21/20       Goal: STG-Within one week, patient will complete toileting tasks     Dates: Start: 12/21/20       Description: 1) Individualized Goal:  max A with AE/DME as needed.  2) Interventions:  OT E Stim Attended, OT Self  Care/ADL, OT Cognitive Skill Dev, OT Community Reintegration, OT Manual Ther Technique, OT Neuro Re-Ed/Balance, OT Therapeutic Activity, OT Evaluation, and OT Therapeutic Exercise    Note:     Goal Note filed on 12/30/20 1128 by Gris Padilla, OT    Con't to require 2nd person present for safety with clothing mgt, hygiene and balance.

## 2021-01-05 NOTE — DISCHARGE PLANNING
CM completed phone conference with patients daughters Keyonna and Joy and also son and daughter in law.  Answered questions about DC needs.  CM available as needs arise and will continue to follow for DC needs.

## 2021-01-05 NOTE — PROGRESS NOTES
"Rehab Progress Note     Date of Service: 1/5/2021  Chief Complaint: Follow-up stroke    Interval Events (Subjective)    Patient seen and examined today in her room.  She reports she was fitted for an AFO this morning.  Because of this she was unable to eat her breakfast.  She is looking forward to lunch.  Patient currently is denying any pain.  She is happy to report she was able to walk in the parallel bars today.  She has no new complaints.    Objective:  VITAL SIGNS: /74   Pulse 79   Temp 36.4 °C (97.5 °F) (Oral)   Resp 18   Ht 1.676 m (5' 5.98\")   Wt 75 kg (165 lb 6.4 oz)   SpO2 96%   BMI 26.71 kg/m²   Gen: alert, no apparent distress  Neuro: notable for right leg weakness and hemianesthesia        Recent Results (from the past 72 hour(s))   ACCU-CHEK GLUCOSE    Collection Time: 01/02/21  5:18 PM   Result Value Ref Range    Glucose - Accu-Ck 100 (H) 65 - 99 mg/dL   ACCU-CHEK GLUCOSE    Collection Time: 01/02/21  8:30 PM   Result Value Ref Range    Glucose - Accu-Ck 125 (H) 65 - 99 mg/dL   ACCU-CHEK GLUCOSE    Collection Time: 01/03/21  7:10 AM   Result Value Ref Range    Glucose - Accu-Ck 104 (H) 65 - 99 mg/dL   ACCU-CHEK GLUCOSE    Collection Time: 01/03/21 11:10 AM   Result Value Ref Range    Glucose - Accu-Ck 105 (H) 65 - 99 mg/dL   ACCU-CHEK GLUCOSE    Collection Time: 01/03/21  5:09 PM   Result Value Ref Range    Glucose - Accu-Ck 117 (H) 65 - 99 mg/dL   ACCU-CHEK GLUCOSE    Collection Time: 01/03/21  9:30 PM   Result Value Ref Range    Glucose - Accu-Ck 146 (H) 65 - 99 mg/dL   ACCU-CHEK GLUCOSE    Collection Time: 01/04/21  7:31 AM   Result Value Ref Range    Glucose - Accu-Ck 113 (H) 65 - 99 mg/dL   ACCU-CHEK GLUCOSE    Collection Time: 01/04/21 11:31 AM   Result Value Ref Range    Glucose - Accu-Ck 110 (H) 65 - 99 mg/dL   ACCU-CHEK GLUCOSE    Collection Time: 01/04/21  5:10 PM   Result Value Ref Range    Glucose - Accu-Ck 109 (H) 65 - 99 mg/dL   Basic Metabolic Panel    Collection Time: " 01/05/21  5:27 AM   Result Value Ref Range    Sodium 137 135 - 145 mmol/L    Potassium 4.0 3.6 - 5.5 mmol/L    Chloride 104 96 - 112 mmol/L    Co2 28 20 - 33 mmol/L    Glucose 110 (H) 65 - 99 mg/dL    Bun 8 8 - 22 mg/dL    Creatinine 0.52 0.50 - 1.40 mg/dL    Calcium 9.0 8.5 - 10.5 mg/dL    Anion Gap 5.0 (L) 7.0 - 16.0   MAGNESIUM    Collection Time: 01/05/21  5:27 AM   Result Value Ref Range    Magnesium 1.7 1.5 - 2.5 mg/dL   PHOSPHORUS    Collection Time: 01/05/21  5:27 AM   Result Value Ref Range    Phosphorus 3.5 2.5 - 4.5 mg/dL   CBC WITH DIFFERENTIAL    Collection Time: 01/05/21  5:27 AM   Result Value Ref Range    WBC 3.5 (L) 4.8 - 10.8 K/uL    RBC 3.67 (L) 4.20 - 5.40 M/uL    Hemoglobin 10.5 (L) 12.0 - 16.0 g/dL    Hematocrit 33.8 (L) 37.0 - 47.0 %    MCV 92.1 81.4 - 97.8 fL    MCH 28.6 27.0 - 33.0 pg    MCHC 31.1 (L) 33.6 - 35.0 g/dL    RDW 51.8 (H) 35.9 - 50.0 fL    Platelet Count 144 (L) 164 - 446 K/uL    MPV 10.1 9.0 - 12.9 fL    Neutrophils-Polys 47.80 44.00 - 72.00 %    Lymphocytes 36.40 22.00 - 41.00 %    Monocytes 7.60 0.00 - 13.40 %    Eosinophils 7.10 (H) 0.00 - 6.90 %    Basophils 0.80 0.00 - 1.80 %    Immature Granulocytes 0.30 0.00 - 0.90 %    Nucleated RBC 0.00 /100 WBC    Neutrophils (Absolute) 1.69 (L) 2.00 - 7.15 K/uL    Lymphs (Absolute) 1.29 1.00 - 4.80 K/uL    Monos (Absolute) 0.27 0.00 - 0.85 K/uL    Eos (Absolute) 0.25 0.00 - 0.51 K/uL    Baso (Absolute) 0.03 0.00 - 0.12 K/uL    Immature Granulocytes (abs) 0.01 0.00 - 0.11 K/uL    NRBC (Absolute) 0.00 K/uL   ESTIMATED GFR    Collection Time: 01/05/21  5:27 AM   Result Value Ref Range    GFR If African American >60 >60 mL/min/1.73 m 2    GFR If Non African American >60 >60 mL/min/1.73 m 2       Current Facility-Administered Medications   Medication Frequency   • amLODIPine (NORVASC) tablet 2.5 mg Q DAY   • lisinopril (PRINIVIL) tablet 40 mg Q DAY   • baclofen (LIORESAL) tablet 10 mg QHS   • metFORMIN (GLUCOPHAGE) tablet 250 mg BID WITH  MEALS   • phosphorus (K-Phos-Neutral) per tablet 250 mg TID   • gabapentin (NEURONTIN) capsule 400 mg TID   • gabapentin (NEURONTIN) capsule 600 mg Q EVENING   • acetaminophen (Tylenol) tablet 650 mg TID   • polyethylene glycol/lytes (MIRALAX) PACKET 1 Packet DAILY    And   • senna-docusate (PERICOLACE or SENOKOT S) 8.6-50 MG per tablet 2 Tab BID    And   • magnesium hydroxide (MILK OF MAGNESIA) suspension 30 mL QDAY PRN    And   • bisacodyl (DULCOLAX) suppository 10 mg QDAY PRN   • vitamin D (cholecalciferol) tablet 1,000 Units DAILY   • tamsulosin (FLOMAX) capsule 0.4 mg QHS   • Respiratory Therapy Consult Continuous RT   • Pharmacy Consult Request ...Pain Management Review 1 Each PHARMACY TO DOSE   • hydrALAZINE (APRESOLINE) tablet 25 mg Q8HRS PRN   • artificial tears ophthalmic solution 1 Drop PRN   • benzocaine-menthol (CEPACOL) lozenge 1 Lozenge Q2HRS PRN   • mag hydrox-al hydrox-simeth (MAALOX PLUS ES or MYLANTA DS) suspension 20 mL Q2HRS PRN   • ondansetron (ZOFRAN ODT) dispertab 4 mg 4X/DAY PRN    Or   • ondansetron (ZOFRAN) syringe/vial injection 4 mg 4X/DAY PRN   • traZODone (DESYREL) tablet 50 mg QHS PRN   • sodium chloride (OCEAN) 0.65 % nasal spray 2 Spray PRN   • acetaminophen (Tylenol) tablet 650 mg Q6HRS PRN   • atorvastatin (LIPITOR) tablet 40 mg Q EVENING   • apixaban (ELIQUIS) tablet 5 mg BID   • metoprolol (LOPRESSOR) tablet 50 mg TWICE DAILY   • guaiFENesin (ROBITUSSIN) 100 MG/5ML solution 100 mg Q6HRS PRN       Orders Placed This Encounter   Procedures   • Diet Order Diet: Consistent CHO (Diabetic) (Please make sure that pt has dentures (uppers and lowers) in place for meal. )     Standing Status:   Standing     Number of Occurrences:   1     Order Specific Question:   Diet:     Answer:   Consistent CHO (Diabetic) [4]     Comments:   Please make sure that pt has dentures (uppers and lowers) in place for meal.        Assessment:  Active Hospital Problems    Diagnosis   • *CVA (cerebral vascular  accident) ([facility])   • AF (atrial fibrillation) ([facility])   • Elevated troponin   • Hyponatremia   • Essential hypertension   • Hyperlipidemia   • Vitamin D deficiency   • Other dysphagia   • Cognitive deficits   • Hypophosphatemia   • Type 2 diabetes mellitus without complication, without long-term current use of insulin ([facility])   • Aortic stenosis     This patient is a 88 y.o. female admitted for acute inpatient rehabilitation with CVA (cerebral vascular accident) ([facility]).    I led and attended the weekly conference, and agree with the IDT conference documentation and plan of care as noted below.    Date of conference: 12/30/2020    Goals and barriers: See IDT note.    Biggest barriers: right leg weakness, mild cognitive impairments    CM/social support: son supportive and will assist at discharge, however her daughter Keyonna is her power of     Anticipated DC date: 1/9    Home health: PT/OT/SLP/RN    Equip: [email or web address], camryn    Follow up: PCP, stroke bridge, Dr. Causey, cardiology      Medical Decision Making and Plan:    High left frontal stroke  Cardio-embolic etiology  Right leg hemiplegia, improved  Right leg hemianesthesia, continues/improved  Cognitive deficits, mild  Dysphagia, resolved  Continue full rehab program  PT/OT/SLP, 1 hr each discipline, 5 days per week    Continue Eliquis and statin for secondary stroke prophylaxis  Consulted Dr. Wasserman, no evidence of depression currently    Outpatient follow-up with the [facility], referral made  Outpatient follow-up with Dr. Causey, referral made    Casted for AFO today    Right leg spasms  Started on baclofen 10 mg QHS  Continue to monitor need to increase  Outpatient follow-up with Dr. Causey    Right leg neuropathic pain, improved/resolved  Started low-dose gabapentin 100 mg 3 times a day 12/22 --> 200 mg TID 12/24 --> 400 mg TID 12/27, added higher nighttime dose of 600 mg, continue to monitor need to increase    Outpatient follow-up with   Padilla    Hypertension  Continue lisinopril 5 mg 12/21 --> 10 mg 12/23 --> 30 mg 12/29 --> 40 mg 1/2  Continue metoprolol 50 mg twice a day  Started on amlodipdine 2.5 mg QD  As needed hydralazine  Consult hospitalist, appreciate assistance    Atrial fibrillation  Continue metoprolol 50 mg twice a day  Outpatient follow-up with cardiology  Currently rate controlled    Diabetes with hyperglycemia  HgbA1c 6.2  Continue metformin 250 BID  SSI discontinued    Aortic stenosis  Outpatient follow-up with cardiology    Vitamin D deficiency  Continue supplementation    Hypophosphatemia, continues  Continue supplementation  Consult hospitalist, appreciate assistance    Elevated alk phosphatase, continues  Elevated AST, improved  Ultrasound with gallbladder wall thickening  Monitor as patient currently asymptomatic  Consult hospitalist, appreciate assistance    Bowel program  Constipation, improved/resolved  Continue bowel medications  Scheduled Sennakot - patient has been refusing  Schedule Miralax  PRN MOM, bisacodyl suppository  Last BM 1/3    Bladder program  Acute urinary retention, resolved  Check PVRs - 68, 98  Started Flomax 0.4 mg 12/21  Bladder scan for no voids  ICP for over 400 cc - required once 12/20  Scheduled toileting    DVT prophylaxis  Eliquis    COVID negative 12/17, 12/19, 12/28    Patient received both her pneumococcal and her flu shot in [place] this year    Total time:  16 minutes.  I spent greater than 50% of the time for patient care, counseling, and coordination on this date, including patient face-to face time, unit/floor time with review of records/pertinent lab data and studies, as well as discussing diagnostic evaluation/work up, planned therapeutic interventions, and future disposition of care, as per the interval events/subjective and the assessment and plan as noted above.    I have performed a physical exam, reviewed and updated ROS, as well as the assessment and plan today 1/5/2021. In  review of note from 1/4/20 there are no new changes except as documented above.        Tangela Noel M.D.   Physical Medicine and Rehabilitation

## 2021-01-05 NOTE — THERAPY
"Physical Therapy   Daily Treatment     Patient Name: Liudmila Pulido  Age:  88 y.o., Sex:  female  Medical Record #: [id number]  Today's Date: 1/5/2021     Precautions  Precautions: (P) Fall Risk  Comments: (P) R lizeth (LE>UE)    Subjective    Pt agreeable to PT     \" Patricio was in here just and he put that thing on my leg, that why I still have to finish my breakfast\"     Objective       01/05/21 0831   Precautions   Precautions Fall Risk   Comments R lizeth (LE>UE)   Gait Functional Level of Assist    Gait Level Of Assist Total Assist  (vector)   Assistive Device Front Wheel Walker   Distance (Feet) 40   # of Times Distance was Traveled 2   Deviation Decreased Base Of Support;Bradykinetic;Step To;Other (Comment)  (solid off-shelf AFO)   Transfer Functional Level of Assist   Bed, Chair, Wheelchair Transfer Contact Guard Assist   Bed Chair Wheelchair Transfer Description Adaptive equipment;Verbal cueing;Supervision for safety   Bed Mobility    Sit to Stand Contact Guard Assist  (wc <> // and wc <> FWW)   Neuro-Muscular Treatments   Neuro-Muscular Treatments Verbal Cuing;Sequencing;Postural Changes;Postural Facilitation;Biofeedback   Comments see note for tx details   Interdisciplinary Plan of Care Collaboration   IDT Collaboration with  Occupational Therapist;Therapy Tech   Patient Position at End of Therapy Seated;Self Releasing Lap Belt Applied;Call Light within Reach;Tray Table within Reach   Collaboration Comments OT CLOF, tech assisted with treatment   PT Charge Group   PT Gait Training 1   PT Neuromuscular Re-Education / Balance 2   PT Therapeutic Activities 1   Supervising Physical Therapist [place] Azar     gait training with [employer] harness using 25% dynamic BWS, solid OTC AFO. 40' x 2 using FWW with 1 180° turn-SBA    Transfer training with FWW wc <> mat table CGA with vc for sequencing x 2     Car transfer with FWW wc <> car CGA    Assessment    Pt with improved ability to complete SPT using a FWW and " AFO ace wrap which allowed improved knee control. Pt remains very motivated toward her recovery and with a positive outlook.    Strengths: Able to follow instructions, Independent prior level of function, Motivated for self care and independence, Pleasant and cooperative, Willingly participates in therapeutic activities  Barriers: Hemiparesis, Home accessibility, Impaired balance, Limited mobility    Plan    continue vector walking with FWW, continue SPT practice with FWW     Exercise/neuro re-ed, review HEP,  with techs, Trial lite gait, wc mob, ramp navigation - uneven, shuttle    Physical Therapy Problems     Problem: Mobility     Dates: Start: 12/21/20       Goal: STG-Within one week, patient will propel wheelchair household distances     Dates: Start: 12/21/20       Description: < 150 ft    Note:     Goal Note filed on 12/30/20 1134 by Carloz Azar, PT    < 150 ft                        Problem: Mobility Transfers     Dates: Start: 12/21/20       Goal: STG-Within one week, patient will transfer bed to chair     Dates: Start: 12/21/20       Description: 2nd person    Note:     Goal Note filed on 12/30/20 1134 by Carloz Azar, PT    2nd person                        Problem: PT-Long Term Goals     Dates: Start: 12/21/20       Goal: LTG-By discharge, patient will propel wheelchair     Dates: Start: 12/21/20       Description: 1) Individualized goal:  150 ft At mod I in order to improve self mobility  2) Interventions:  PT E Stim Attended, PT Orthotics Training, PT Gait Training, PT Therapeutic Exercises, PT Neuro Re-Ed/Balance, PT Aquatic Therapy, PT Therapeutic Activity, PT Manual Therapy, and PT Evaluation          Goal: LTG-By discharge, patient will transfer one surface to another     Dates: Start: 12/21/20       Description: 1) Individualized goal:  At min A with LRD In order to maximize self mobility  2) Interventions:  PT E Stim Attended, PT Orthotics Training, PT Gait Training, PT  Therapeutic Exercises, PT Neuro Re-Ed/Balance, PT Aquatic Therapy, PT Therapeutic Activity, PT Manual Therapy, and PT Evaluation          Goal: LTG-By discharge, patient will transfer in/out of a car     Dates: Start: 12/21/20       Description: 1) Individualized goal:  At min A with LRD In order to maximize self mobility  2) Interventions:  PT E Stim Attended, PT Orthotics Training, PT Gait Training, PT Therapeutic Exercises, PT Neuro Re-Ed/Balance, PT Aquatic Therapy, PT Therapeutic Activity, PT Manual Therapy, and PT Evaluation

## 2021-01-05 NOTE — THERAPY
Speech Language Pathology  Daily Treatment     Patient Name: Liudmila Pulido  Age:  88 y.o., Sex:  female  Medical Record #: [id number]  Today's Date: 1/5/2021     Precautions  Precautions: Fall Risk  Comments: R lizeth (LE>UE)    Subjective    Pt was pleasant and cooperative during this ST session      Objective       01/05/21 1401   SLP Total Time Spent   SLP Individual Total Time Spent (Mins) 60   Treatment Charges   SLP Cognitive Skill Development First 15 Minutes 1   SLP Cognitive Skill Development Additional 15 Minutes 3       Assessment    Pt was able to recall tasks she completed in her ST session yesterday with min cues required.  Pt was able to complete 4 and 5 step sequencing tasks independently with 100% accuracy.  Pt required min cues for attention to complete 6 step sequencing tasks.  Pt required min cues to remember to lock her brakes while transferring from her wheelchair to the toilet.      Strengths: Able to follow instructions, Making steady progress towards goals, Supportive family, Willingly participates in therapeutic activities, Pleasant and cooperative, Motivated for self care and independence  Barriers: Visual impairment, Impaired carryover of learning, Impaired functional cognition    Plan    Continue targeting attention, problem solving and recall     Speech Therapy Problems     Problem: Memory STGs     Dates: Start: 12/30/20       Goal: STG-Within one week, patient will     Dates: Start: 12/30/20       Description: Description: 1) Individualized goal:  learn and implement functional memory strategies with use of memory book with 80% accuracy provided MIN A. 2) Interventions:  SLP Self Care / ADL Training , SLP Cognitive Skill Development, and SLP Group Treatment                  Problem: Problem Solving STGs     Dates: Start: 12/23/20       Goal: STG-Within one week, patient will     Dates: Start: 12/23/20       Description: 1) Individualized goal:  complete attention tasks related to  medication management and financial management skills with 80% accuracy provided MIN A.   2) Interventions:  SLP Self Care / ADL Training , SLP Cognitive Skill Development, and SLP Group Treatment                    Problem: Speech/Swallowing LTGs     Dates: Start: 12/21/20       Goal: LTG-By discharge, patient will     Dates: Start: 12/21/20       Description: 1) Individualized goal:  complete functional problem solving and recall with 80% accuracy provided SPV.   2) Interventions:  SLP Self Care / ADL Training , SLP Cognitive Skill Development, and SLP Group Treatment

## 2021-01-06 ENCOUNTER — ANCILLARY PROCEDURE (OUTPATIENT)
Dept: CARDIOLOGY | Facility: REHABILITATION | Age: 86
DRG: 057 | End: 2021-01-06
Attending: HOSPITALIST
Payer: MEDICARE

## 2021-01-06 PROBLEM — R60.9 EDEMA: Status: ACTIVE | Noted: 2021-01-06

## 2021-01-06 LAB
ALBUMIN SERPL BCP-MCNC: 2.6 G/DL (ref 3.2–4.9)
ALBUMIN/GLOB SERPL: 0.8 G/DL
ALP SERPL-CCNC: 205 U/L (ref 30–99)
ALT SERPL-CCNC: 14 U/L (ref 2–50)
ANION GAP SERPL CALC-SCNC: 3 MMOL/L (ref 7–16)
AST SERPL-CCNC: 22 U/L (ref 12–45)
BASOPHILS # BLD AUTO: 0.9 % (ref 0–1.8)
BASOPHILS # BLD: 0.03 K/UL (ref 0–0.12)
BILIRUB SERPL-MCNC: 0.4 MG/DL (ref 0.1–1.5)
BUN SERPL-MCNC: 8 MG/DL (ref 8–22)
CALCIUM SERPL-MCNC: 9 MG/DL (ref 8.5–10.5)
CHLORIDE SERPL-SCNC: 108 MMOL/L (ref 96–112)
CO2 SERPL-SCNC: 28 MMOL/L (ref 20–33)
CREAT SERPL-MCNC: 0.55 MG/DL (ref 0.5–1.4)
EOSINOPHIL # BLD AUTO: 0.27 K/UL (ref 0–0.51)
EOSINOPHIL NFR BLD: 8.4 % (ref 0–6.9)
ERYTHROCYTE [DISTWIDTH] IN BLOOD BY AUTOMATED COUNT: 50.6 FL (ref 35.9–50)
GLOBULIN SER CALC-MCNC: 3.3 G/DL (ref 1.9–3.5)
GLUCOSE SERPL-MCNC: 102 MG/DL (ref 65–99)
HCT VFR BLD AUTO: 32.2 % (ref 37–47)
HGB BLD-MCNC: 10 G/DL (ref 12–16)
IMM GRANULOCYTES # BLD AUTO: 0 K/UL (ref 0–0.11)
IMM GRANULOCYTES NFR BLD AUTO: 0 % (ref 0–0.9)
LYMPHOCYTES # BLD AUTO: 1.43 K/UL (ref 1–4.8)
LYMPHOCYTES NFR BLD: 44.3 % (ref 22–41)
MCH RBC QN AUTO: 28.3 PG (ref 27–33)
MCHC RBC AUTO-ENTMCNC: 31.1 G/DL (ref 33.6–35)
MCV RBC AUTO: 91.2 FL (ref 81.4–97.8)
MONOCYTES # BLD AUTO: 0.26 K/UL (ref 0–0.85)
MONOCYTES NFR BLD AUTO: 8 % (ref 0–13.4)
NEUTROPHILS # BLD AUTO: 1.24 K/UL (ref 2–7.15)
NEUTROPHILS NFR BLD: 38.4 % (ref 44–72)
NRBC # BLD AUTO: 0 K/UL
NRBC BLD-RTO: 0 /100 WBC
PLATELET # BLD AUTO: 137 K/UL (ref 164–446)
PMV BLD AUTO: 10.5 FL (ref 9–12.9)
POTASSIUM SERPL-SCNC: 4 MMOL/L (ref 3.6–5.5)
PROT SERPL-MCNC: 5.9 G/DL (ref 6–8.2)
RBC # BLD AUTO: 3.53 M/UL (ref 4.2–5.4)
SODIUM SERPL-SCNC: 139 MMOL/L (ref 135–145)
WBC # BLD AUTO: 3.2 K/UL (ref 4.8–10.8)

## 2021-01-06 PROCEDURE — 700102 HCHG RX REV CODE 250 W/ 637 OVERRIDE(OP): Performed by: HOSPITALIST

## 2021-01-06 PROCEDURE — 99232 SBSQ HOSP IP/OBS MODERATE 35: CPT | Performed by: HOSPITALIST

## 2021-01-06 PROCEDURE — 700102 HCHG RX REV CODE 250 W/ 637 OVERRIDE(OP): Performed by: PHYSICAL MEDICINE & REHABILITATION

## 2021-01-06 PROCEDURE — 97116 GAIT TRAINING THERAPY: CPT

## 2021-01-06 PROCEDURE — 97535 SELF CARE MNGMENT TRAINING: CPT

## 2021-01-06 PROCEDURE — A9270 NON-COVERED ITEM OR SERVICE: HCPCS | Performed by: PHYSICAL MEDICINE & REHABILITATION

## 2021-01-06 PROCEDURE — 97530 THERAPEUTIC ACTIVITIES: CPT

## 2021-01-06 PROCEDURE — A9270 NON-COVERED ITEM OR SERVICE: HCPCS | Performed by: HOSPITALIST

## 2021-01-06 PROCEDURE — 770010 HCHG ROOM/CARE - REHAB SEMI PRIVAT*

## 2021-01-06 PROCEDURE — 80053 COMPREHEN METABOLIC PANEL: CPT

## 2021-01-06 PROCEDURE — 700111 HCHG RX REV CODE 636 W/ 250 OVERRIDE (IP): Performed by: HOSPITALIST

## 2021-01-06 PROCEDURE — 85025 COMPLETE CBC W/AUTO DIFF WBC: CPT

## 2021-01-06 PROCEDURE — 36415 COLL VENOUS BLD VENIPUNCTURE: CPT

## 2021-01-06 PROCEDURE — 97110 THERAPEUTIC EXERCISES: CPT

## 2021-01-06 PROCEDURE — 97129 THER IVNTJ 1ST 15 MIN: CPT

## 2021-01-06 PROCEDURE — 93971 EXTREMITY STUDY: CPT | Mod: LT

## 2021-01-06 PROCEDURE — 99233 SBSQ HOSP IP/OBS HIGH 50: CPT | Performed by: PHYSICAL MEDICINE & REHABILITATION

## 2021-01-06 PROCEDURE — 97130 THER IVNTJ EA ADDL 15 MIN: CPT

## 2021-01-06 PROCEDURE — 93971 EXTREMITY STUDY: CPT | Mod: RT

## 2021-01-06 RX ORDER — AMLODIPINE BESYLATE 5 MG/1
10 TABLET ORAL
Status: DISCONTINUED | OUTPATIENT
Start: 2021-01-07 | End: 2021-01-09 | Stop reason: HOSPADM

## 2021-01-06 RX ORDER — POLYVINYL ALCOHOL 14 MG/ML
2 SOLUTION/ DROPS OPHTHALMIC
Status: DISCONTINUED | OUTPATIENT
Start: 2021-01-06 | End: 2021-01-09 | Stop reason: HOSPADM

## 2021-01-06 RX ADMIN — METFORMIN HYDROCHLORIDE 250 MG: 500 TABLET ORAL at 08:33

## 2021-01-06 RX ADMIN — GABAPENTIN 400 MG: 400 CAPSULE ORAL at 17:22

## 2021-01-06 RX ADMIN — ATORVASTATIN CALCIUM 40 MG: 40 TABLET, FILM COATED ORAL at 20:44

## 2021-01-06 RX ADMIN — TBO-FILGRASTIM 300 MCG: 300 INJECTION, SOLUTION SUBCUTANEOUS at 14:35

## 2021-01-06 RX ADMIN — GABAPENTIN 400 MG: 400 CAPSULE ORAL at 08:33

## 2021-01-06 RX ADMIN — BACLOFEN 10 MG: 10 TABLET ORAL at 20:44

## 2021-01-06 RX ADMIN — METOPROLOL TARTRATE 50 MG: 25 TABLET, FILM COATED ORAL at 05:23

## 2021-01-06 RX ADMIN — GABAPENTIN 400 MG: 400 CAPSULE ORAL at 13:00

## 2021-01-06 RX ADMIN — GABAPENTIN 600 MG: 300 CAPSULE ORAL at 20:44

## 2021-01-06 RX ADMIN — POLYVINYL ALCOHOL 2 DROP: 14 SOLUTION/ DROPS OPHTHALMIC at 20:46

## 2021-01-06 RX ADMIN — POLYVINYL ALCOHOL 2 DROP: 14 SOLUTION/ DROPS OPHTHALMIC at 11:50

## 2021-01-06 RX ADMIN — TAMSULOSIN HYDROCHLORIDE 0.4 MG: 0.4 CAPSULE ORAL at 20:44

## 2021-01-06 RX ADMIN — METFORMIN HYDROCHLORIDE 250 MG: 500 TABLET ORAL at 17:22

## 2021-01-06 RX ADMIN — POLYVINYL ALCOHOL 2 DROP: 14 SOLUTION/ DROPS OPHTHALMIC at 14:00

## 2021-01-06 RX ADMIN — APIXABAN 5 MG: 5 TABLET, FILM COATED ORAL at 08:33

## 2021-01-06 RX ADMIN — Medication 1000 UNITS: at 08:33

## 2021-01-06 RX ADMIN — AMLODIPINE BESYLATE 5 MG: 5 TABLET ORAL at 05:23

## 2021-01-06 RX ADMIN — ACETAMINOPHEN 650 MG: 325 TABLET, FILM COATED ORAL at 08:33

## 2021-01-06 RX ADMIN — METOPROLOL TARTRATE 50 MG: 25 TABLET, FILM COATED ORAL at 17:22

## 2021-01-06 RX ADMIN — APIXABAN 5 MG: 5 TABLET, FILM COATED ORAL at 20:44

## 2021-01-06 RX ADMIN — LISINOPRIL 40 MG: 20 TABLET ORAL at 05:23

## 2021-01-06 ASSESSMENT — GAIT ASSESSMENTS
ASSISTIVE DEVICE: FRONT WHEEL WALKER
DISTANCE (FEET): 60
DEVIATION: OTHER (COMMENT)
GAIT LEVEL OF ASSIST: MINIMAL ASSIST

## 2021-01-06 ASSESSMENT — ENCOUNTER SYMPTOMS
VOMITING: 0
EYES NEGATIVE: 1
SHORTNESS OF BREATH: 0
PALPITATIONS: 0
COUGH: 0
FEVER: 0
ABDOMINAL PAIN: 0
NAUSEA: 0
MUSCULOSKELETAL NEGATIVE: 1
CHILLS: 0
BRUISES/BLEEDS EASILY: 0
FOCAL WEAKNESS: 1
POLYDIPSIA: 0

## 2021-01-06 ASSESSMENT — ACTIVITIES OF DAILY LIVING (ADL)
TOILET_TRANSFER_DESCRIPTION: GRAB BAR;INCREASED TIME;SUPERVISION FOR SAFETY
TOILETING_LEVEL_OF_ASSIST_DESCRIPTION: ASSIST FOR STANDING BALANCE;GRAB BAR;INCREASED TIME;SUPERVISION FOR SAFETY

## 2021-01-06 ASSESSMENT — PATIENT HEALTH QUESTIONNAIRE - PHQ9
2. FEELING DOWN, DEPRESSED, IRRITABLE, OR HOPELESS: NOT AT ALL
1. LITTLE INTEREST OR PLEASURE IN DOING THINGS: NOT AT ALL
SUM OF ALL RESPONSES TO PHQ9 QUESTIONS 1 AND 2: 0

## 2021-01-06 ASSESSMENT — PAIN DESCRIPTION - PAIN TYPE
TYPE: ACUTE PAIN
TYPE: ACUTE PAIN

## 2021-01-06 NOTE — CARE PLAN
Problem: Problem Solving STGs  Goal: STG-Within one week, patient will  Description: 1) Individualized goal:  complete attention tasks related to medication management and financial management skills with 80% accuracy provided MIN A.   2) Interventions:  SLP Self Care / ADL Training , SLP Cognitive Skill Development, and SLP Group Treatment      Outcome: NOT MET     Problem: Memory STGs  Goal: STG-Within one week, patient will  Description: Description: 1) Individualized goal:  learn and implement functional memory strategies with use of memory book with 80% accuracy provided MIN A.   2) Interventions:  SLP Self Care / ADL Training , SLP Cognitive Skill Development, and SLP Group Treatment    Outcome: PROGRESSING AS EXPECTED

## 2021-01-06 NOTE — CARE PLAN
Problem: Mobility  Goal: STG-Within one week, patient will propel wheelchair household distances  Description: < 150 ft  Outcome: MET     Problem: Mobility Transfers  Goal: STG-Within one week, patient will transfer bed to chair  Description: 2nd person  Outcome: MET

## 2021-01-06 NOTE — PROGRESS NOTES
Hospital Medicine Daily Progress Note      Chief Complaint  Hypertension  Hypophosphatemia    Interval Problem Update  Pt c/o right leg swelling, denies chest pain or shortness of breath.    Review of Systems  Review of Systems   Constitutional: Negative for chills and fever.   HENT: Negative.    Eyes: Negative.    Respiratory: Negative.  Negative for cough and shortness of breath.    Cardiovascular: Positive for leg swelling. Negative for chest pain and palpitations.   Gastrointestinal: Negative for abdominal pain, nausea and vomiting.   Genitourinary: Negative.    Musculoskeletal: Negative.    Skin: Negative for itching and rash.   Neurological: Positive for focal weakness.   Endo/Heme/Allergies: Negative for polydipsia. Does not bruise/bleed easily.        Physical Exam  Temp:  [36.3 °C (97.4 °F)-36.6 °C (97.8 °F)] 36.6 °C (97.8 °F)  Pulse:  [56-79] 67  Resp:  [18] 18  BP: (151-157)/(73-77) 152/77  SpO2:  [93 %-98 %] 98 %    Physical Exam  Vitals signs reviewed.   Constitutional:       General: She is not in acute distress.     Appearance: Normal appearance. She is not ill-appearing.   HENT:      Head: Normocephalic and atraumatic.      Right Ear: External ear normal.      Left Ear: External ear normal.      Nose: Nose normal.      Mouth/Throat:      Pharynx: Oropharynx is clear.   Eyes:      General:         Right eye: No discharge.         Left eye: No discharge.      Extraocular Movements: Extraocular movements intact.      Conjunctiva/sclera: Conjunctivae normal.   Neck:      Musculoskeletal: Normal range of motion and neck supple.   Cardiovascular:      Comments: irreg irreg  Pulmonary:      Effort: No respiratory distress.      Breath sounds: No wheezing.      Comments: Decreased BS   Abdominal:      General: Bowel sounds are normal. There is no distension.      Palpations: Abdomen is soft.      Tenderness: There is no abdominal tenderness.   Musculoskeletal:      Right lower leg: Edema present.      Left  lower leg: No edema.   Skin:     General: Skin is warm and dry.   Neurological:      Mental Status: She is alert.      Comments: Awake and alert         Fluids    Intake/Output Summary (Last 24 hours) at 1/5/2021 1728  Last data filed at 1/5/2021 1400  Gross per 24 hour   Intake 1200 ml   Output 500 ml   Net 700 ml       Laboratory  Recent Labs     01/05/21  0527   WBC 3.5*   RBC 3.67*   HEMOGLOBIN 10.5*   HEMATOCRIT 33.8*   MCV 92.1   MCH 28.6   MCHC 31.1*   RDW 51.8*   PLATELETCT 144*   MPV 10.1     Recent Labs     01/05/21  0527   SODIUM 137   POTASSIUM 4.0   CHLORIDE 104   CO2 28   GLUCOSE 110*   BUN 8   CREATININE 0.52   CALCIUM 9.0                   Assessment/Plan  * CVA (cerebral vascular accident) ([facility])- (present on admission)  Assessment & Plan  Has RLE weakness  Also has RLE swelling  Request Venous Duplex RLE  On Eliquis and Lipitor    AF (atrial fibrillation) ([facility])- (present on admission)  Assessment & Plan  Rate controlled on Metoprolol  Anticoagulated on Eliquis    Pancytopenia ([facility])  Assessment & Plan  Follow WBC/ANC, H/H, and Plt counts  Monitor need for G-CSF, PRBC, and/or Platelet transfusion    Alkaline phosphatase elevation  Assessment & Plan  Pt w/o abd pain, nausea, or vomiting  U/S Abd +cholelithiasis with gallbladder wall thickening suggesting cholecystitis  Alk Phos levels slowly improving  Consider HIDA if pt develops GI symptoms    Hypophosphatemia- (present on admission)  Assessment & Plan  Phosphorus levels now corrected  Will discontinue Neutra-Phos    Vitamin D deficiency- (present on admission)  Assessment & Plan  Vit D level 21  On supplementation    Aortic stenosis- (present on admission)  Assessment & Plan  Echo EF 75%, RVSP 40 mmHg, and mod AS  Outpt F/U    Type 2 diabetes mellitus without complication, without long-term current use of insulin ([facility])- (present on admission)  Assessment & Plan  HbA1c 6.2  Off FSBS and SSI  On low dose Metformin  Outpt meds include Metformin 500 mg  bid    Essential hypertension- (present on admission)  Assessment & Plan  Increase Norvasc for elevated blood pressures  Continue Lisinopril and Metoprolol  Monitor for orthostatic hypotension on Flomax     Full Code

## 2021-01-06 NOTE — PROGRESS NOTES
Hospital Medicine Daily Progress Note      Chief Complaint  Hypertension  Hypophosphatemia    Interval Problem Update  Today, pt has worsening left leg swelling, denies pain.    Review of Systems  Review of Systems   Constitutional: Negative for chills and fever.   HENT: Negative.    Eyes: Negative.    Respiratory: Negative for cough and shortness of breath.    Cardiovascular: Positive for leg swelling. Negative for chest pain and palpitations.   Gastrointestinal: Negative for abdominal pain, nausea and vomiting.   Genitourinary: Negative.    Musculoskeletal: Negative.    Skin: Negative for itching and rash.   Neurological: Positive for focal weakness.   Endo/Heme/Allergies: Negative for polydipsia. Does not bruise/bleed easily.        Physical Exam  Temp:  [36.5 °C (97.7 °F)-36.7 °C (98.1 °F)] 36.7 °C (98.1 °F)  Pulse:  [53-67] 62  Resp:  [18] 18  BP: (151-158)/(64-85) 151/64  SpO2:  [92 %-98 %] 95 %    Physical Exam  Vitals signs reviewed.   Constitutional:       General: She is not in acute distress.     Appearance: Normal appearance. She is not ill-appearing.   HENT:      Head: Normocephalic and atraumatic.      Right Ear: External ear normal.      Left Ear: External ear normal.      Nose: Nose normal.      Mouth/Throat:      Pharynx: Oropharynx is clear.   Eyes:      General:         Right eye: No discharge.         Left eye: No discharge.      Extraocular Movements: Extraocular movements intact.      Conjunctiva/sclera: Conjunctivae normal.   Neck:      Musculoskeletal: Normal range of motion and neck supple.   Cardiovascular:      Comments: irreg irreg  Pulmonary:      Effort: No respiratory distress.      Breath sounds: No wheezing.      Comments: Decreased BS   Abdominal:      General: Bowel sounds are normal. There is no distension.      Palpations: Abdomen is soft.      Tenderness: There is no abdominal tenderness.   Musculoskeletal:      Right lower leg: Edema present.      Left lower leg: Edema present.    Skin:     General: Skin is warm and dry.   Neurological:      Mental Status: She is alert.      Comments: Awake and alert         Fluids    Intake/Output Summary (Last 24 hours) at 1/6/2021 1347  Last data filed at 1/6/2021 0900  Gross per 24 hour   Intake 1320 ml   Output 550 ml   Net 770 ml       Laboratory  Recent Labs     01/05/21 0527 01/06/21  0545   WBC 3.5* 3.2*   RBC 3.67* 3.53*   HEMOGLOBIN 10.5* 10.0*   HEMATOCRIT 33.8* 32.2*   MCV 92.1 91.2   MCH 28.6 28.3   MCHC 31.1* 31.1*   RDW 51.8* 50.6*   PLATELETCT 144* 137*   MPV 10.1 10.5     Recent Labs     01/05/21 0527 01/06/21  0545   SODIUM 137 139   POTASSIUM 4.0 4.0   CHLORIDE 104 108   CO2 28 28   GLUCOSE 110* 102*   BUN 8 8   CREATININE 0.52 0.55   CALCIUM 9.0 9.0                   Assessment/Plan  * CVA (cerebral vascular accident) ([facility])- (present on admission)  Assessment & Plan  Has RLE weakness  On Eliquis and Lipitor    AF (atrial fibrillation) ([facility])- (present on admission)  Assessment & Plan  Rate controlled on Metoprolol  Anticoagulated on Eliquis    Edema  Assessment & Plan  Has asymmetrical BLE swelling  U/S RLE completed, results pending  Will also request LLE Venous Duplex for worsening edema    Pancytopenia ([facility])  Assessment & Plan  Follow WBC/ANC, H/H, and Plt counts  Monitor need for G-CSF, PRBC, and/or Platelet transfusion  Give Granix x 1 today for neutropenia    Alkaline phosphatase elevation  Assessment & Plan  Pt w/o abd pain, nausea, or vomiting  U/S Abd +cholelithiasis with gallbladder wall thickening suggesting cholecystitis  Alk Phos levels slowly improving  Consider HIDA if pt develops GI symptoms    Vitamin D deficiency- (present on admission)  Assessment & Plan  Vit D level 21  On supplementation    Aortic stenosis- (present on admission)  Assessment & Plan  Echo EF 75%, RVSP 40 mmHg, and mod AS  Outpt F/U    Type 2 diabetes mellitus without complication, without long-term current use of insulin ([facility])- (present on  admission)  Assessment & Plan  HbA1c 6.2  Off FSBS and SSI  On low dose Metformin  Outpt meds include Metformin 500 mg bid    Essential hypertension- (present on admission)  Assessment & Plan  Increase Norvasc further for elevated blood pressures  Continue Lisinopril and Metoprolol  Monitor for orthostatic hypotension on Flomax     Full Code

## 2021-01-06 NOTE — THERAPY
"Speech Language Pathology  Daily Treatment     Patient Name: Liudmila Pulido  Age:  88 y.o., Sex:  female  Medical Record #: [id number]  Today's Date: 1/6/2021     Precautions  Precautions: Fall Risk  Comments: R lizeth (LE>UE)    Subjective    Pt pleasant and cooperative, continues to be persistent about need to remain as indep as possible once d/c home.      Objective       01/06/21 1003   SLP Total Time Spent   SLP Individual Total Time Spent (Mins) 60   Treatment Charges   SLP Cognitive Skill Development First 15 Minutes 1   SLP Cognitive Skill Development Additional 15 Minutes 3       Assessment    Functional problem solving worksheets presented to pt to address medication labels, phone messages and MD directories. Pt completed questions related to images with 76% accuracy indep, increased to 100% provided min-mod verbal cues for attention to details. Pt indep stated \"oh I didn't read it all right, that's why I got the wrong answer.\" Reinforced slowing rate of speed and checking work to ensure accuracy and safety.     Strengths: Able to follow instructions, Making steady progress towards goals, Supportive family, Willingly participates in therapeutic activities, Pleasant and cooperative, Motivated for self care and independence  Barriers: Visual impairment, Impaired carryover of learning, Impaired functional cognition    Plan    D/c planning, family training     Speech Therapy Problems     Problem: Memory STGs     Dates: Start: 12/30/20       Goal: STG-Within one week, patient will     Dates: Start: 12/30/20       Description: Description: 1) Individualized goal:  learn and implement functional memory strategies with use of memory book with 80% accuracy provided MIN A. 2) Interventions:  SLP Self Care / ADL Training , SLP Cognitive Skill Development, and SLP Group Treatment                  Problem: Problem Solving STGs     Dates: Start: 12/23/20       Goal: STG-Within one week, patient will     Dates: Start: " 12/23/20       Description: 1) Individualized goal:  complete attention tasks related to medication management and financial management skills with 80% accuracy provided MIN A.   2) Interventions:  SLP Self Care / ADL Training , SLP Cognitive Skill Development, and SLP Group Treatment                    Problem: Speech/Swallowing LTGs     Dates: Start: 12/21/20       Goal: LTG-By discharge, patient will     Dates: Start: 12/21/20       Description: 1) Individualized goal:  complete functional problem solving and recall with 80% accuracy provided SPV.   2) Interventions:  SLP Self Care / ADL Training , SLP Cognitive Skill Development, and SLP Group Treatment

## 2021-01-06 NOTE — ASSESSMENT & PLAN NOTE
Follow WBC/ANC, H/H, and Plt counts  Monitor need for G-CSF, PRBC, and/or Platelet transfusion  Granix x 1 given 1/6/21 for neutropenia, had good response

## 2021-01-06 NOTE — CARE PLAN
Problem: Safety  Goal: Will remain free from falls  Intervention: Implement fall precautions  Note: Appropriately uses call light to make needs known.Fall precautions and frequent rounding in place for safety.Call light within reach.Will continue to monitor and assess needs and safety.     Problem: Bowel/Gastric:  Goal: Normal bowel function is maintained or improved  Intervention: Educate patient and significant other/support system about signs and symptoms of constipation and interventions to implement  1/5/2021 2348 by LEON GreerNElisha  Note: Pt refused scheduled senna at hs.Continent of bowel per report.LBM 01/05.Will continue to monitor.

## 2021-01-06 NOTE — PROGRESS NOTES
"Rehab Progress Note     Date of Service: 1/6/2021  Chief Complaint: Follow-up stroke    Interval Events (Subjective)    Patient seen and examined today in her room.  She does have some edema in her bilateral lower extremities. Ultrasound is pending.  Patient denies any more spasms in her right leg at night.  She also denies any pain.  She has concerns about going home this week in terms of her family not allowing her to be as independent as possible.  Family training will be completed on Friday in preparation for discharge on Saturday.  Patient has no other concerns or complaints today.    Objective:  VITAL SIGNS: /64   Pulse 62   Temp 36.7 °C (98.1 °F) (Oral)   Resp 18   Ht 1.676 m (5' 5.98\")   Wt 75 kg (165 lb 6.4 oz)   SpO2 95%   BMI 26.71 kg/m²   Gen: alert, no apparent distress  Neuro: notable for right leg weakness and hemianesthesia        Recent Results (from the past 72 hour(s))   ACCU-CHEK GLUCOSE    Collection Time: 01/03/21  5:09 PM   Result Value Ref Range    Glucose - Accu-Ck 117 (H) 65 - 99 mg/dL   ACCU-CHEK GLUCOSE    Collection Time: 01/03/21  9:30 PM   Result Value Ref Range    Glucose - Accu-Ck 146 (H) 65 - 99 mg/dL   ACCU-CHEK GLUCOSE    Collection Time: 01/04/21  7:31 AM   Result Value Ref Range    Glucose - Accu-Ck 113 (H) 65 - 99 mg/dL   ACCU-CHEK GLUCOSE    Collection Time: 01/04/21 11:31 AM   Result Value Ref Range    Glucose - Accu-Ck 110 (H) 65 - 99 mg/dL   ACCU-CHEK GLUCOSE    Collection Time: 01/04/21  5:10 PM   Result Value Ref Range    Glucose - Accu-Ck 109 (H) 65 - 99 mg/dL   Basic Metabolic Panel    Collection Time: 01/05/21  5:27 AM   Result Value Ref Range    Sodium 137 135 - 145 mmol/L    Potassium 4.0 3.6 - 5.5 mmol/L    Chloride 104 96 - 112 mmol/L    Co2 28 20 - 33 mmol/L    Glucose 110 (H) 65 - 99 mg/dL    Bun 8 8 - 22 mg/dL    Creatinine 0.52 0.50 - 1.40 mg/dL    Calcium 9.0 8.5 - 10.5 mg/dL    Anion Gap 5.0 (L) 7.0 - 16.0   MAGNESIUM    Collection Time: 01/05/21 "  5:27 AM   Result Value Ref Range    Magnesium 1.7 1.5 - 2.5 mg/dL   PHOSPHORUS    Collection Time: 01/05/21  5:27 AM   Result Value Ref Range    Phosphorus 3.5 2.5 - 4.5 mg/dL   CBC WITH DIFFERENTIAL    Collection Time: 01/05/21  5:27 AM   Result Value Ref Range    WBC 3.5 (L) 4.8 - 10.8 K/uL    RBC 3.67 (L) 4.20 - 5.40 M/uL    Hemoglobin 10.5 (L) 12.0 - 16.0 g/dL    Hematocrit 33.8 (L) 37.0 - 47.0 %    MCV 92.1 81.4 - 97.8 fL    MCH 28.6 27.0 - 33.0 pg    MCHC 31.1 (L) 33.6 - 35.0 g/dL    RDW 51.8 (H) 35.9 - 50.0 fL    Platelet Count 144 (L) 164 - 446 K/uL    MPV 10.1 9.0 - 12.9 fL    Neutrophils-Polys 47.80 44.00 - 72.00 %    Lymphocytes 36.40 22.00 - 41.00 %    Monocytes 7.60 0.00 - 13.40 %    Eosinophils 7.10 (H) 0.00 - 6.90 %    Basophils 0.80 0.00 - 1.80 %    Immature Granulocytes 0.30 0.00 - 0.90 %    Nucleated RBC 0.00 /100 WBC    Neutrophils (Absolute) 1.69 (L) 2.00 - 7.15 K/uL    Lymphs (Absolute) 1.29 1.00 - 4.80 K/uL    Monos (Absolute) 0.27 0.00 - 0.85 K/uL    Eos (Absolute) 0.25 0.00 - 0.51 K/uL    Baso (Absolute) 0.03 0.00 - 0.12 K/uL    Immature Granulocytes (abs) 0.01 0.00 - 0.11 K/uL    NRBC (Absolute) 0.00 K/uL   ESTIMATED GFR    Collection Time: 01/05/21  5:27 AM   Result Value Ref Range    GFR If African American >60 >60 mL/min/1.73 m 2    GFR If Non African American >60 >60 mL/min/1.73 m 2   CBC WITH DIFFERENTIAL    Collection Time: 01/06/21  5:45 AM   Result Value Ref Range    WBC 3.2 (L) 4.8 - 10.8 K/uL    RBC 3.53 (L) 4.20 - 5.40 M/uL    Hemoglobin 10.0 (L) 12.0 - 16.0 g/dL    Hematocrit 32.2 (L) 37.0 - 47.0 %    MCV 91.2 81.4 - 97.8 fL    MCH 28.3 27.0 - 33.0 pg    MCHC 31.1 (L) 33.6 - 35.0 g/dL    RDW 50.6 (H) 35.9 - 50.0 fL    Platelet Count 137 (L) 164 - 446 K/uL    MPV 10.5 9.0 - 12.9 fL    Neutrophils-Polys 38.40 (L) 44.00 - 72.00 %    Lymphocytes 44.30 (H) 22.00 - 41.00 %    Monocytes 8.00 0.00 - 13.40 %    Eosinophils 8.40 (H) 0.00 - 6.90 %    Basophils 0.90 0.00 - 1.80 %     Immature Granulocytes 0.00 0.00 - 0.90 %    Nucleated RBC 0.00 /100 WBC    Neutrophils (Absolute) 1.24 (L) 2.00 - 7.15 K/uL    Lymphs (Absolute) 1.43 1.00 - 4.80 K/uL    Monos (Absolute) 0.26 0.00 - 0.85 K/uL    Eos (Absolute) 0.27 0.00 - 0.51 K/uL    Baso (Absolute) 0.03 0.00 - 0.12 K/uL    Immature Granulocytes (abs) 0.00 0.00 - 0.11 K/uL    NRBC (Absolute) 0.00 K/uL   Comp Metabolic Panel    Collection Time: 01/06/21  5:45 AM   Result Value Ref Range    Sodium 139 135 - 145 mmol/L    Potassium 4.0 3.6 - 5.5 mmol/L    Chloride 108 96 - 112 mmol/L    Co2 28 20 - 33 mmol/L    Anion Gap 3.0 (L) 7.0 - 16.0    Glucose 102 (H) 65 - 99 mg/dL    Bun 8 8 - 22 mg/dL    Creatinine 0.55 0.50 - 1.40 mg/dL    Calcium 9.0 8.5 - 10.5 mg/dL    AST(SGOT) 22 12 - 45 U/L    ALT(SGPT) 14 2 - 50 U/L    Alkaline Phosphatase 205 (H) 30 - 99 U/L    Total Bilirubin 0.4 0.1 - 1.5 mg/dL    Albumin 2.6 (L) 3.2 - 4.9 g/dL    Total Protein 5.9 (L) 6.0 - 8.2 g/dL    Globulin 3.3 1.9 - 3.5 g/dL    A-G Ratio 0.8 g/dL   ESTIMATED GFR    Collection Time: 01/06/21  5:45 AM   Result Value Ref Range    GFR If African American >60 >60 mL/min/1.73 m 2    GFR If Non African American >60 >60 mL/min/1.73 m 2       Current Facility-Administered Medications   Medication Frequency   • artificial tears ophthalmic solution 2 Drop Q4HRS WHILE AWAKE   • acetaminophen (Tylenol) tablet 650 mg Q6HRS PRN   • amLODIPine (NORVASC) tablet 5 mg Q DAY   • lisinopril (PRINIVIL) tablet 40 mg Q DAY   • baclofen (LIORESAL) tablet 10 mg QHS   • metFORMIN (GLUCOPHAGE) tablet 250 mg BID WITH MEALS   • gabapentin (NEURONTIN) capsule 400 mg TID   • gabapentin (NEURONTIN) capsule 600 mg Q EVENING   • polyethylene glycol/lytes (MIRALAX) PACKET 1 Packet DAILY    And   • senna-docusate (PERICOLACE or SENOKOT S) 8.6-50 MG per tablet 2 Tab BID    And   • magnesium hydroxide (MILK OF MAGNESIA) suspension 30 mL QDAY PRN    And   • bisacodyl (DULCOLAX) suppository 10 mg QDAY PRN   • vitamin  D (cholecalciferol) tablet 1,000 Units DAILY   • tamsulosin (FLOMAX) capsule 0.4 mg QHS   • Respiratory Therapy Consult Continuous RT   • Pharmacy Consult Request ...Pain Management Review 1 Each PHARMACY TO DOSE   • hydrALAZINE (APRESOLINE) tablet 25 mg Q8HRS PRN   • artificial tears ophthalmic solution 1 Drop PRN   • benzocaine-menthol (CEPACOL) lozenge 1 Lozenge Q2HRS PRN   • mag hydrox-al hydrox-simeth (MAALOX PLUS ES or MYLANTA DS) suspension 20 mL Q2HRS PRN   • ondansetron (ZOFRAN ODT) dispertab 4 mg 4X/DAY PRN    Or   • ondansetron (ZOFRAN) syringe/vial injection 4 mg 4X/DAY PRN   • traZODone (DESYREL) tablet 50 mg QHS PRN   • sodium chloride (OCEAN) 0.65 % nasal spray 2 Spray PRN   • atorvastatin (LIPITOR) tablet 40 mg Q EVENING   • apixaban (ELIQUIS) tablet 5 mg BID   • metoprolol (LOPRESSOR) tablet 50 mg TWICE DAILY   • guaiFENesin (ROBITUSSIN) 100 MG/5ML solution 100 mg Q6HRS PRN       Orders Placed This Encounter   Procedures   • Diet Order Diet: Consistent CHO (Diabetic) (Please make sure that pt has dentures (uppers and lowers) in place for meal. )     Standing Status:   Standing     Number of Occurrences:   1     Order Specific Question:   Diet:     Answer:   Consistent CHO (Diabetic) [4]     Comments:   Please make sure that pt has dentures (uppers and lowers) in place for meal.        Assessment:  Active Hospital Problems    Diagnosis   • *CVA (cerebral vascular accident) (Formerly [facility])   • AF (atrial fibrillation) ([facility])   • Elevated troponin   • Hyponatremia   • Essential hypertension   • Hyperlipidemia   • Vitamin D deficiency   • Other dysphagia   • Cognitive deficits   • Hypophosphatemia   • Type 2 diabetes mellitus without complication, without long-term current use of insulin ([facility])   • Aortic stenosis     This patient is a 88 y.o. female admitted for acute inpatient rehabilitation with CVA (cerebral vascular accident) (Formerly [facility]).    I led and attended the weekly conference, and agree with the IDT conference  documentation and plan of care as noted below.    Date of conference: 1/6/2021    Goals and barriers: See IDT note.    Biggest barriers: right leg weakness    Goals in next week: discharge    CM/social support: son supportive and will assist at discharge, however her daughter Keyonna is her power of     Anticipated DC date: 1/9    Home health: PT/OT/SLP/RN    Equip: MWC, ramp    Follow up: PCP, stroke bridge, Dr. Causey, cardiology      Medical Decision Making and Plan:    High left frontal stroke  Cardio-embolic etiology  Right leg hemiplegia, improved  Right leg hemianesthesia, continues/improved  Cognitive deficits, mild  Dysphagia, resolved  Continue full rehab program  PT/OT/SLP, 1 hr each discipline, 5 days per week    Continue Eliquis and statin for secondary stroke prophylaxis  Consulted Dr. Wasserman, no evidence of depression currently    Outpatient follow-up with the stroke [facility] clinic, referral made  Outpatient follow-up with Dr. Causey, referral made    Received AFO for right ankle weakness    Right leg spasms, nocturnal, resolved  Started on baclofen 10 mg QHS  Continue to monitor need to increase  Outpatient follow-up with Dr. Causey    Right leg neuropathic pain, improved/resolved  Started low-dose gabapentin 100 mg 3 times a day 12/22 --> 200 mg TID 12/24 --> 400 mg TID 12/27, added higher nighttime dose of 600 mg, continue to monitor need to increase    Outpatient follow-up with Dr. Causey    Currently well controlled on current dosing    Hypertension  Continue lisinopril 5 mg 12/21 --> 10 mg 12/23 --> 30 mg 12/29 --> 40 mg 1/2  Continue metoprolol 50 mg twice a day  Started on amlodipdine 2.5 mg QD --> 5 mg 1/6  As needed hydralazine  Consult hospitalist, appreciate assistance    Atrial fibrillation  Continue metoprolol 50 mg twice a day  Outpatient follow-up with cardiology  Currently rate controlled    Diabetes with hyperglycemia  HgbA1c 6.2  Continue metformin 250 BID  SSI  discontinued    Aortic stenosis  Outpatient follow-up with cardiology    Vitamin D deficiency  Continue supplementation    Hypophosphatemia, resolved  s/p supplementation  Consult hospitalist, appreciate assistance    Elevated alk phosphatase, continues/improved  Elevated AST, resolved  Ultrasound with gallbladder wall thickening  Monitor as patient currently asymptomatic  Consult hospitalist, appreciate assistance    Bowel program  Constipation, improved/resolved  Continue bowel medications  Scheduled Sennakot  Scheduled Miralax  PRN MOM, bisacodyl suppository  Last BM 1/5    Bladder program  Acute urinary retention, resolved  Check PVRs - 68, 98  Continue Flomax 0.4 mg, started 12/21  Bladder scan for no voids  ICP for over 400 cc - required once 12/20  Scheduled toileting    DVT prophylaxis  Eliquis  Check dopplers, right negative  Left pending    COVID negative 12/17, 12/19, 12/28    Patient received both her pneumococcal and her flu shot in [place] this year    Total time:  40 minutes.  I spent greater than 50% of the time for patient care, counseling, and coordination on this date, including patient face-to face time, unit/floor time with review of records/pertinent lab data and studies, as well as discussing diagnostic evaluation/work up, planned therapeutic interventions, and future disposition of care, as per the interval events/subjective and the assessment and plan as noted above.          Tangela Noel M.D.   Physical Medicine and Rehabilitation

## 2021-01-06 NOTE — THERAPY
"Occupational Therapy  Daily Treatment     Patient Name: Liudmila Pulido  Age:  88 y.o., Sex:  female  Medical Record #: [id number]  Today's Date: 1/6/2021     Precautions  Precautions: (P) Fall Risk  Comments: (P) R lizeth (LE>UE), R AFO    Safety   ADL Safety : Requires Supervision for Safety  Bathroom Safety: Requires Supervision for Safety  Comments: See functional levels of assist below for ADL performance details     Subjective    \"I want my family to realize that I'm still able to do things\"     Objective       01/06/21 1401   Precautions   Precautions Fall Risk   Comments R lizeth (LE>UE), R AFO   Functional Level of Assist   Grooming Independent;Seated   Grooming Description Seated in wheelchair at sink   Toileting Contact Guard Assist   Toileting Description Assist for standing balance;Grab bar;Increased time;Supervision for safety   Toilet Transfers Contact Guard Assist   Toilet Transfer Description Grab bar;Increased time;Supervision for safety  (w/c<>toilet SPT with GB)   Sitting Upper Body Exercises   Chest Press 2 sets of 10;Bilateral;Weight (See Comments for lbs)   Front Arm Raise 2 sets of 10;Bilateral;Weight (See Comments for lbs)   Shoulder Press 2 sets of 10;Bilateral;Weight (See Comments for lbs)   Internal Shoulder Rotation 2 sets of 10;Bilateral;Weight (See Comments for lbs)   External Shoulder Rotation 2 sets of 10;Bilateral;Weight (See Comments for lbs)   Bicep Curls 2 sets of 10;Bilateral;Weight (See Comments for lbs)   Upper Extremity Bike Level 3 Resistance  (FluidoBike for UB strength/endurance x15 min, 0 RB, 1.5km)   Comments 4# weight used for UB therex.   IDT Conference   IDT Conference I have reviewed and discussed the POC and barriers to discharge for this patient.  I am knowledgeable of the patient's needs and have attended the IDT Conference.   Interdisciplinary Plan of Care Collaboration   IDT Collaboration with  Nursing   Patient Position at End of Therapy Seated;Self Releasing Lap " Belt Applied;Call Light within Reach;Tray Table within Reach;Phone within Reach   Collaboration Comments RN for medications.   OT Total Time Spent   OT Individual Total Time Spent (Mins) 60   OT Charge Group   OT Self Care / ADL 1   OT Therapy Activity 1   OT Therapeutic Exercise  2     Phone call placed to pt's daughter in law[place]. Discussed pt's CLOF, POC, plan for family training on Friday, and clarification of home set up in prep for Saturday d/c. [place] reports that ramp is being installed, and pt's bathroom is being renovated to be w/c accessible with GB by toilet and in shower, walk in shower with [facility], shower chair. Also discussed ongoing home health therapies.    Reviewed Passport to [place] with pt. Discussed goals for remainder of rehab stay, d/c prep and education for home safety and fall prevention provided.       Assessment    Pt tolerated OT session well. Con't to demonstrate increased independence with toileting and toilet transfers, with occasional cues for safety. Pt's family is getting her home w/c accessible, including bathroom, in prep for d/c on Saturday.     Strengths: Able to follow instructions, Independent prior level of function, Making steady progress towards goals, Motivated for self care and independence, Pleasant and cooperative, Supportive family, Willingly participates in therapeutic activities  Barriers: Bowel incontinence, Impulsive, Impaired activity tolerance, Hemiparesis, Generalized weakness    Plan    ADLs/IADLs, functional mobility with FWW, w/c mobility, home safety education, FT on Friday.    Occupational Therapy Goals     Problem: Grooming     Dates: Start: 12/21/20             Problem: OT Long Term Goals     Dates: Start: 12/21/20       Goal: LTG-By discharge, patient will complete basic self care tasks     Dates: Start: 12/21/20       Description: 1) Individualized Goal: Supervision to mod I level with AE/DME as needed.  2) Interventions:  OT E Stim Attended,  OT Self Care/ADL, OT Cognitive Skill Dev, OT Community Reintegration, OT Manual Ther Technique, OT Neuro Re-Ed/Balance, OT Therapeutic Activity, OT Evaluation, and OT Therapeutic Exercise          Goal: LTG-By discharge, patient will perform bathroom transfers     Dates: Start: 12/21/20       Description: 1) Individualized Goal:  Supervision to mod I level with AD/DME as needed.  2) Interventions:  OT E Stim Attended, OT Self Care/ADL, OT Cognitive Skill Dev, OT Community Reintegration, OT Manual Ther Technique, OT Neuro Re-Ed/Balance, OT Therapeutic Activity, OT Evaluation, and OT Therapeutic Exercise

## 2021-01-06 NOTE — CARE PLAN
Problem: Dressing  Goal: STG-Within one week, patient will dress LB  Description: 1) Individualized Goal:  mod A with AE as needed.  2) Interventions:  OT E Stim Attended, OT Self Care/ADL, OT Cognitive Skill Dev, OT Community Reintegration, OT Manual Ther Technique, OT Neuro Re-Ed/Balance, OT Therapeutic Activity, OT Evaluation, and OT Therapeutic Exercise  Outcome: MET     Problem: Toileting  Goal: STG-Within one week, patient will complete toileting tasks  Description: 1) Individualized Goal:  max A with AE/DME as needed.  2) Interventions:  OT E Stim Attended, OT Self Care/ADL, OT Cognitive Skill Dev, OT Community Reintegration, OT Manual Ther Technique, OT Neuro Re-Ed/Balance, OT Therapeutic Activity, OT Evaluation, and OT Therapeutic Exercise  Outcome: MET     Problem: Functional Transfers  Goal: STG-Within one week, patient will transfer to toilet  Description: 1) Individualized Goal:  mod A with AD/DME as needed.  2) Interventions:  OT E Stim Attended, OT Self Care/ADL, OT Cognitive Skill Dev, OT Community Reintegration, OT Manual Ther Technique, OT Neuro Re-Ed/Balance, OT Therapeutic Activity, OT Evaluation, and OT Therapeutic Exercise  Outcome: MET

## 2021-01-06 NOTE — THERAPY
Physical Therapy   Daily Treatment     Patient Name: Liudmila Pulido  Age:  88 y.o., Sex:  female  Medical Record #: [id number]  Today's Date: 1/6/2021     Precautions  Precautions: Fall Risk  Comments: R lizeth (LE>UE), R AFO    Subjective    Pt reports she is agreeable to walking     Objective       01/06/21 0831   Gait Functional Level of Assist    Gait Level Of Assist Minimal Assist   Assistive Device Front Wheel Walker   Distance (Feet) 60   # of Times Distance was Traveled 3   Deviation Other (Comment)  (lizeth gait - RLE occasionally buckling, able to self correct)   Wheelchair Functional Level of Assist   Wheelchair Assist Supervised   Distance Wheelchair (Feet or Distance) 150   Wheelchair Description Extra time;Supervision for safety;Verbal cueing   Stairs Functional Level of Assist   Level of Assist with Stairs Unable to Participate   Bed Mobility    Sit to Stand Minimal Assist  (FWW, cues for safety and sequencing)   Interdisciplinary Plan of Care Collaboration   IDT Collaboration with  Other (See Comments)  (orthotist)   Patient Position at End of Therapy Seated;Call Light within Reach;Tray Table within Reach   Collaboration Comments Discussed adding guille strap to AFo for more stability in stance with orthotist -  coming to add this morning   Strengths & Barriers   Strengths Able to follow instructions;Supportive family;Pleasant and cooperative;Motivated for self care and independence;Independent prior level of function;Making steady progress towards goals;Willingly participates in therapeutic activities   Barriers Hemiparesis;Impaired balance;Impaired activity tolerance;Limited mobility   PT Total Time Spent   PT Individual Total Time Spent (Mins) 60   PT Charge Group   PT Gait Training 2   PT Therapeutic Activities 2     Skin check - no redness or skin irritation noted after walking with AFO. Discussed wear schedule and safety with AFO usage - family to bring in bigger shoe to more comfortably fit  patients foot.     Assessment    Pt demos much improved walking this session with decreased level of assist and increased distance. Pt agreeable to all edu on AFO useage    Strengths: Able to follow instructions, Supportive family, Pleasant and cooperative, Motivated for self care and independence, Independent prior level of function, Making steady progress towards goals, Willingly participates in therapeutic activities  Barriers: Hemiparesis, Impaired balance, Impaired activity tolerance, Limited mobility    Plan    continue walking with FWW, continue SPT practice with FWW, start DC assessment in prep for Saturday, fall edu, HEP, family training Friday    Physical Therapy Problems     Problem: Mobility     Dates: Start: 01/06/21       Goal: STG-Within one week, patient will ambulate household distance     Dates: Start: 01/06/21       Description: 1) Individualized goal:  150 ft With LRD at min A in order to progress towards PLOF  2) Interventions:  PT E Stim Attended, PT Orthotics Training, PT Gait Training, PT Therapeutic Exercises, PT Neuro Re-Ed/Balance, PT Aquatic Therapy, PT Therapeutic Activity, PT Manual Therapy, and PT Evaluation                  Problem: Mobility Transfers     Dates: Start: 01/06/21       Goal: STG-Within one week, patient will transfer bed to chair     Dates: Start: 01/06/21       Description: 1) Individualized goal:  At SPV with LRD In order to maximize self mobility  2) Interventions:  PT E Stim Attended, PT Orthotics Training, PT Gait Training, PT Therapeutic Exercises, PT Neuro Re-Ed/Balance, PT Aquatic Therapy, PT Therapeutic Activity, PT Manual Therapy, and PT Evaluation                Problem: PT-Long Term Goals     Dates: Start: 12/21/20       Goal: LTG-By discharge, patient will propel wheelchair     Dates: Start: 12/21/20       Description: 1) Individualized goal:  150 ft At mod I in order to improve self mobility  2) Interventions:  PT E Stim Attended, PT Orthotics Training, PT  Gait Training, PT Therapeutic Exercises, PT Neuro Re-Ed/Balance, PT Aquatic Therapy, PT Therapeutic Activity, PT Manual Therapy, and PT Evaluation          Goal: LTG-By discharge, patient will transfer one surface to another     Dates: Start: 12/21/20       Description: 1) Individualized goal:  At min A with LRD In order to maximize self mobility  2) Interventions:  PT E Stim Attended, PT Orthotics Training, PT Gait Training, PT Therapeutic Exercises, PT Neuro Re-Ed/Balance, PT Aquatic Therapy, PT Therapeutic Activity, PT Manual Therapy, and PT Evaluation          Goal: LTG-By discharge, patient will transfer in/out of a car     Dates: Start: 12/21/20       Description: 1) Individualized goal:  At min A with LRD In order to maximize self mobility  2) Interventions:  PT E Stim Attended, PT Orthotics Training, PT Gait Training, PT Therapeutic Exercises, PT Neuro Re-Ed/Balance, PT Aquatic Therapy, PT Therapeutic Activity, PT Manual Therapy, and PT Evaluation

## 2021-01-06 NOTE — CARE PLAN
Problem: Communication  Goal: The ability to communicate needs accurately and effectively will improve  Description: Patient able to verbalize needs.  Will continue to monitor.   Outcome: PROGRESSING AS EXPECTED     Problem: Safety  Goal: Will remain free from falls  Outcome: PROGRESSING AS EXPECTED     Problem: Pain Management  Goal: Pain level will decrease to patient's comfort goal  Outcome: PROGRESSING AS EXPECTED

## 2021-01-07 DIAGNOSIS — I35.0 AORTIC VALVE STENOSIS, ETIOLOGY OF CARDIAC VALVE DISEASE UNSPECIFIED: ICD-10-CM

## 2021-01-07 DIAGNOSIS — I48.91 ATRIAL FIBRILLATION, UNSPECIFIED TYPE (HCC): ICD-10-CM

## 2021-01-07 PROBLEM — Z74.09 IMPAIRED MOBILITY AND ADLS: Status: ACTIVE | Noted: 2021-01-07

## 2021-01-07 PROBLEM — Z78.9 IMPAIRED MOBILITY AND ADLS: Status: ACTIVE | Noted: 2021-01-07

## 2021-01-07 LAB
BASOPHILS # BLD AUTO: 0.2 % (ref 0–1.8)
BASOPHILS # BLD: 0.03 K/UL (ref 0–0.12)
EOSINOPHIL # BLD AUTO: 0.3 K/UL (ref 0–0.51)
EOSINOPHIL NFR BLD: 2.4 % (ref 0–6.9)
ERYTHROCYTE [DISTWIDTH] IN BLOOD BY AUTOMATED COUNT: 50.7 FL (ref 35.9–50)
GLUCOSE BLD-MCNC: 103 MG/DL (ref 65–99)
HCT VFR BLD AUTO: 34.8 % (ref 37–47)
HGB BLD-MCNC: 10.6 G/DL (ref 12–16)
IMM GRANULOCYTES # BLD AUTO: 0.18 K/UL (ref 0–0.11)
IMM GRANULOCYTES NFR BLD AUTO: 1.5 % (ref 0–0.9)
LYMPHOCYTES # BLD AUTO: 1.87 K/UL (ref 1–4.8)
LYMPHOCYTES NFR BLD: 15.1 % (ref 22–41)
MCH RBC QN AUTO: 28.1 PG (ref 27–33)
MCHC RBC AUTO-ENTMCNC: 30.5 G/DL (ref 33.6–35)
MCV RBC AUTO: 92.3 FL (ref 81.4–97.8)
MONOCYTES # BLD AUTO: 0.62 K/UL (ref 0–0.85)
MONOCYTES NFR BLD AUTO: 5 % (ref 0–13.4)
NEUTROPHILS # BLD AUTO: 9.41 K/UL (ref 2–7.15)
NEUTROPHILS NFR BLD: 75.8 % (ref 44–72)
NRBC # BLD AUTO: 0 K/UL
NRBC BLD-RTO: 0 /100 WBC
PLATELET # BLD AUTO: 148 K/UL (ref 164–446)
PMV BLD AUTO: 10.4 FL (ref 9–12.9)
RBC # BLD AUTO: 3.77 M/UL (ref 4.2–5.4)
WBC # BLD AUTO: 12.4 K/UL (ref 4.8–10.8)

## 2021-01-07 PROCEDURE — 97116 GAIT TRAINING THERAPY: CPT

## 2021-01-07 PROCEDURE — A9270 NON-COVERED ITEM OR SERVICE: HCPCS | Performed by: PHYSICAL MEDICINE & REHABILITATION

## 2021-01-07 PROCEDURE — 97130 THER IVNTJ EA ADDL 15 MIN: CPT

## 2021-01-07 PROCEDURE — 99232 SBSQ HOSP IP/OBS MODERATE 35: CPT | Performed by: HOSPITALIST

## 2021-01-07 PROCEDURE — 97530 THERAPEUTIC ACTIVITIES: CPT | Mod: CO

## 2021-01-07 PROCEDURE — 700102 HCHG RX REV CODE 250 W/ 637 OVERRIDE(OP): Performed by: PHYSICAL MEDICINE & REHABILITATION

## 2021-01-07 PROCEDURE — 97535 SELF CARE MNGMENT TRAINING: CPT

## 2021-01-07 PROCEDURE — A9270 NON-COVERED ITEM OR SERVICE: HCPCS | Performed by: HOSPITALIST

## 2021-01-07 PROCEDURE — 85025 COMPLETE CBC W/AUTO DIFF WBC: CPT

## 2021-01-07 PROCEDURE — 700102 HCHG RX REV CODE 250 W/ 637 OVERRIDE(OP): Performed by: HOSPITALIST

## 2021-01-07 PROCEDURE — 97129 THER IVNTJ 1ST 15 MIN: CPT

## 2021-01-07 PROCEDURE — 99233 SBSQ HOSP IP/OBS HIGH 50: CPT | Performed by: PHYSICAL MEDICINE & REHABILITATION

## 2021-01-07 PROCEDURE — 770010 HCHG ROOM/CARE - REHAB SEMI PRIVAT*

## 2021-01-07 PROCEDURE — 36415 COLL VENOUS BLD VENIPUNCTURE: CPT

## 2021-01-07 PROCEDURE — 97530 THERAPEUTIC ACTIVITIES: CPT

## 2021-01-07 RX ADMIN — GABAPENTIN 600 MG: 300 CAPSULE ORAL at 20:31

## 2021-01-07 RX ADMIN — DOCUSATE SODIUM 50 MG AND SENNOSIDES 8.6 MG 2 TABLET: 8.6; 5 TABLET, FILM COATED ORAL at 07:55

## 2021-01-07 RX ADMIN — METOPROLOL TARTRATE 50 MG: 25 TABLET, FILM COATED ORAL at 05:34

## 2021-01-07 RX ADMIN — POLYVINYL ALCOHOL 1 DROP: 14 SOLUTION/ DROPS OPHTHALMIC at 20:33

## 2021-01-07 RX ADMIN — BACLOFEN 10 MG: 10 TABLET ORAL at 20:30

## 2021-01-07 RX ADMIN — METFORMIN HYDROCHLORIDE 250 MG: 500 TABLET ORAL at 07:55

## 2021-01-07 RX ADMIN — AMLODIPINE BESYLATE 10 MG: 5 TABLET ORAL at 05:35

## 2021-01-07 RX ADMIN — POLYVINYL ALCOHOL 2 DROP: 14 SOLUTION/ DROPS OPHTHALMIC at 05:34

## 2021-01-07 RX ADMIN — GABAPENTIN 400 MG: 400 CAPSULE ORAL at 12:39

## 2021-01-07 RX ADMIN — POLYETHYLENE GLYCOL 3350 1 PACKET: 17 POWDER, FOR SOLUTION ORAL at 07:56

## 2021-01-07 RX ADMIN — APIXABAN 5 MG: 5 TABLET, FILM COATED ORAL at 07:55

## 2021-01-07 RX ADMIN — POLYVINYL ALCOHOL 2 DROP: 14 SOLUTION/ DROPS OPHTHALMIC at 10:00

## 2021-01-07 RX ADMIN — GABAPENTIN 400 MG: 400 CAPSULE ORAL at 07:55

## 2021-01-07 RX ADMIN — POLYVINYL ALCOHOL 2 DROP: 14 SOLUTION/ DROPS OPHTHALMIC at 13:00

## 2021-01-07 RX ADMIN — METOPROLOL TARTRATE 50 MG: 25 TABLET, FILM COATED ORAL at 17:18

## 2021-01-07 RX ADMIN — ATORVASTATIN CALCIUM 40 MG: 40 TABLET, FILM COATED ORAL at 20:31

## 2021-01-07 RX ADMIN — Medication 1000 UNITS: at 07:55

## 2021-01-07 RX ADMIN — GABAPENTIN 400 MG: 400 CAPSULE ORAL at 17:19

## 2021-01-07 RX ADMIN — TAMSULOSIN HYDROCHLORIDE 0.4 MG: 0.4 CAPSULE ORAL at 20:31

## 2021-01-07 RX ADMIN — APIXABAN 5 MG: 5 TABLET, FILM COATED ORAL at 20:31

## 2021-01-07 RX ADMIN — LISINOPRIL 40 MG: 20 TABLET ORAL at 05:34

## 2021-01-07 RX ADMIN — METFORMIN HYDROCHLORIDE 500 MG: 500 TABLET ORAL at 17:18

## 2021-01-07 ASSESSMENT — ENCOUNTER SYMPTOMS
ABDOMINAL PAIN: 0
BRUISES/BLEEDS EASILY: 0
FOCAL WEAKNESS: 1
PALPITATIONS: 0
FEVER: 0
CHILLS: 0
VOMITING: 0
MUSCULOSKELETAL NEGATIVE: 1
SHORTNESS OF BREATH: 0
NAUSEA: 0
COUGH: 0
POLYDIPSIA: 0
EYES NEGATIVE: 1

## 2021-01-07 ASSESSMENT — ACTIVITIES OF DAILY LIVING (ADL)
TOILETING_LEVEL_OF_ASSIST_DESCRIPTION: GRAB BAR;INCREASED TIME;SUPERVISION FOR SAFETY;VERBAL CUEING
BED_CHAIR_WHEELCHAIR_TRANSFER_DESCRIPTION: ADAPTIVE EQUIPMENT;INCREASED TIME
TOILET_TRANSFER_DESCRIPTION: GRAB BAR;INCREASED TIME;SET-UP OF EQUIPMENT;SUPERVISION FOR SAFETY;VERBAL CUEING
TUB_SHOWER_TRANSFER_DESCRIPTION: GRAB BAR;SHOWER BENCH;INCREASED TIME;SET-UP OF EQUIPMENT;SUPERVISION FOR SAFETY;VERBAL CUEING

## 2021-01-07 ASSESSMENT — GAIT ASSESSMENTS
DISTANCE (FEET): 150
ASSISTIVE DEVICE: FRONT WHEEL WALKER
GAIT LEVEL OF ASSIST: MINIMAL ASSIST

## 2021-01-07 NOTE — PROGRESS NOTES
Hospital Medicine Daily Progress Note      Chief Complaint  Hypertension  Hypophosphatemia    Interval Problem Update  No new complaints today.    Review of Systems  Review of Systems   Constitutional: Negative for chills and fever.   HENT: Negative.    Eyes: Negative.    Respiratory: Negative for cough and shortness of breath.    Cardiovascular: Positive for leg swelling. Negative for chest pain and palpitations.   Gastrointestinal: Negative for abdominal pain, nausea and vomiting.   Genitourinary: Negative.    Musculoskeletal: Negative.    Skin: Negative for itching and rash.   Neurological: Positive for focal weakness.   Endo/Heme/Allergies: Negative for polydipsia. Does not bruise/bleed easily.        Physical Exam  Temp:  [36.4 °C (97.5 °F)-36.7 °C (98 °F)] 36.7 °C (98 °F)  Pulse:  [59-70] 67  Resp:  [16-18] 18  BP: (135-148)/(55-69) 144/69  SpO2:  [95 %] 95 %    Physical Exam  Vitals signs reviewed.   Constitutional:       General: She is not in acute distress.     Appearance: Normal appearance. She is not ill-appearing.   HENT:      Head: Normocephalic and atraumatic.      Right Ear: External ear normal.      Left Ear: External ear normal.      Nose: Nose normal.      Mouth/Throat:      Pharynx: Oropharynx is clear.   Eyes:      General:         Right eye: No discharge.         Left eye: No discharge.      Extraocular Movements: Extraocular movements intact.      Conjunctiva/sclera: Conjunctivae normal.   Neck:      Musculoskeletal: Normal range of motion and neck supple.   Cardiovascular:      Comments: irreg irreg  Pulmonary:      Effort: No respiratory distress.      Breath sounds: No wheezing.      Comments: Decreased BS   Abdominal:      General: Bowel sounds are normal. There is no distension.      Palpations: Abdomen is soft.      Tenderness: There is no abdominal tenderness.   Musculoskeletal:      Right lower leg: Edema present.      Left lower leg: Edema present.   Skin:     General: Skin is warm  and dry.   Neurological:      Mental Status: She is alert.      Comments: Awake and alert         Fluids    Intake/Output Summary (Last 24 hours) at 1/7/2021 1201  Last data filed at 1/7/2021 1000  Gross per 24 hour   Intake 1320 ml   Output --   Net 1320 ml       Laboratory  Recent Labs     01/05/21  0527 01/06/21  0545 01/07/21  0612   WBC 3.5* 3.2* 12.4*   RBC 3.67* 3.53* 3.77*   HEMOGLOBIN 10.5* 10.0* 10.6*   HEMATOCRIT 33.8* 32.2* 34.8*   MCV 92.1 91.2 92.3   MCH 28.6 28.3 28.1   MCHC 31.1* 31.1* 30.5*   RDW 51.8* 50.6* 50.7*   PLATELETCT 144* 137* 148*   MPV 10.1 10.5 10.4     Recent Labs     01/05/21 0527 01/06/21  0545   SODIUM 137 139   POTASSIUM 4.0 4.0   CHLORIDE 104 108   CO2 28 28   GLUCOSE 110* 102*   BUN 8 8   CREATININE 0.52 0.55   CALCIUM 9.0 9.0                   Assessment/Plan  * CVA (cerebral vascular accident) ([facility])- (present on admission)  Assessment & Plan  Has RLE weakness  On Eliquis and Lipitor    AF (atrial fibrillation) ([facility])- (present on admission)  Assessment & Plan  Rate controlled on Metoprolol  Anticoagulated on Eliquis    Edema  Assessment & Plan  Has asymmetrical BLE swelling  U/S BLE negative for DVT    Pancytopenia ([facility])  Assessment & Plan  Follow WBC/ANC, H/H, and Plt counts  Monitor need for G-CSF, PRBC, and/or Platelet transfusion  Granix x 1 given 1/6/21 w/ good response    Alkaline phosphatase elevation  Assessment & Plan  Pt w/o abd pain, nausea, or vomiting  U/S Abd +cholelithiasis with gallbladder wall thickening suggesting cholecystitis  Alk Phos levels slowly improving  Consider HIDA if pt develops GI symptoms    Vitamin D deficiency- (present on admission)  Assessment & Plan  Vit D level 21  On supplementation    Aortic stenosis- (present on admission)  Assessment & Plan  Echo EF 75%, RVSP 40 mmHg, and mod AS  Outpt F/U    Type 2 diabetes mellitus without complication, without long-term current use of insulin ([facility])- (present on admission)  Assessment & Plan  HbA1c  6.2  Off FSBS and SSI  Will titrate up Metformin to home dose  Outpt meds include Metformin 500 mg bid    Essential hypertension- (present on admission)  Assessment & Plan  Norvasc serially increased for elevated blood pressures  Continue Lisinopril and Metoprolol  Monitor for orthostatic hypotension on Flomax     Full Code

## 2021-01-07 NOTE — THERAPY
Physical Therapy   Daily Treatment     Patient Name: Liudmila Pulido  Age:  88 y.o., Sex:  female  Medical Record #: [id number]  Today's Date: 1/7/2021     Precautions  Precautions: Fall Risk  Comments: R lizeth (LE>UE), R AFO    Subjective    Pt reports she is agreeable to walking longer distance     Objective       01/07/21 1301   Gait Functional Level of Assist    Gait Level Of Assist Minimal Assist   Assistive Device Front Wheel Walker   Distance (Feet) 150   # of Times Distance was Traveled 2   Deviation   (Cues for safety and sequencing)   Wheelchair Functional Level of Assist   Wheelchair Assist Modified Independent   Distance Wheelchair (Feet or Distance) 150   Transfer Functional Level of Assist   Bed, Chair, Wheelchair Transfer Minimal Assist   Bed Chair Wheelchair Transfer Description Adaptive equipment;Increased time   Bed Mobility    Supine to Sit Supervised   Sit to Supine Minimal Assist   Sit to Stand Contact Guard Assist   Scooting Supervised   Rolling Supervised   Interdisciplinary Plan of Care Collaboration   Patient Position at End of Therapy Seated;Call Light within Reach;Tray Table within Reach;Self Releasing Lap Belt Applied;Chair Alarm On   PT Total Time Spent   PT Individual Total Time Spent (Mins) 60   PT Charge Group   PT Gait Training 1   PT Therapeutic Activities 3     Discussed getting shoes that are wider and better fit with AFO    Assessment    Pt demos much improved walking distance this session and cont to demo improving STS and transfer ability.      Strengths: Able to follow instructions, Supportive family, Pleasant and cooperative, Motivated for self care and independence, Independent prior level of function, Making steady progress towards goals, Willingly participates in therapeutic activities  Barriers: Hemiparesis, Impaired balance, Impaired activity tolerance, Limited mobility    Plan    Family training - walk, car transfer, ramp safety, AFO usage - shoes, complete DC  assessment, fall edu, issue sup HEP    Physical Therapy Problems     Problem: Mobility     Dates: Start: 01/06/21       Goal: STG-Within one week, patient will ambulate household distance     Dates: Start: 01/06/21       Description: 1) Individualized goal:  150 ft With LRD at min A in order to progress towards PLOF  2) Interventions:  PT E Stim Attended, PT Orthotics Training, PT Gait Training, PT Therapeutic Exercises, PT Neuro Re-Ed/Balance, PT Aquatic Therapy, PT Therapeutic Activity, PT Manual Therapy, and PT Evaluation                  Problem: Mobility Transfers     Dates: Start: 01/06/21       Goal: STG-Within one week, patient will transfer bed to chair     Dates: Start: 01/06/21       Description: 1) Individualized goal:  At SPV with LRD In order to maximize self mobility  2) Interventions:  PT E Stim Attended, PT Orthotics Training, PT Gait Training, PT Therapeutic Exercises, PT Neuro Re-Ed/Balance, PT Aquatic Therapy, PT Therapeutic Activity, PT Manual Therapy, and PT Evaluation                Problem: PT-Long Term Goals     Dates: Start: 12/21/20       Goal: LTG-By discharge, patient will propel wheelchair     Dates: Start: 12/21/20       Description: 1) Individualized goal:  150 ft At mod I in order to improve self mobility  2) Interventions:  PT E Stim Attended, PT Orthotics Training, PT Gait Training, PT Therapeutic Exercises, PT Neuro Re-Ed/Balance, PT Aquatic Therapy, PT Therapeutic Activity, PT Manual Therapy, and PT Evaluation          Goal: LTG-By discharge, patient will transfer one surface to another     Dates: Start: 12/21/20       Description: 1) Individualized goal:  At min A with LRD In order to maximize self mobility  2) Interventions:  PT E Stim Attended, PT Orthotics Training, PT Gait Training, PT Therapeutic Exercises, PT Neuro Re-Ed/Balance, PT Aquatic Therapy, PT Therapeutic Activity, PT Manual Therapy, and PT Evaluation          Goal: LTG-By discharge, patient will transfer in/out  of a car     Dates: Start: 12/21/20       Description: 1) Individualized goal:  At min A with LRD In order to maximize self mobility  2) Interventions:  PT E Stim Attended, PT Orthotics Training, PT Gait Training, PT Therapeutic Exercises, PT Neuro Re-Ed/Balance, PT Aquatic Therapy, PT Therapeutic Activity, PT Manual Therapy, and PT Evaluation

## 2021-01-07 NOTE — CARE PLAN
Problem: Bowel/Gastric:  Goal: Normal bowel function is maintained or improved  Intervention: Educate patient and significant other/support system about signs and symptoms of constipation and interventions to implement  Note: Pt refused scheduled senna at .Pt is continent of large loose bm yesterday per report.LBM 01/05.Will continue to monitor.     Problem: Urinary Elimination:  Goal: Ability to reestablish a normal urinary elimination pattern will improve  Intervention: Assist patient to sit on commode or toilet for voiding  Note: Assisted to the bathroom, continent of bladder at this time.Denies any discomfort or dysuria.Will continue to monitor.     Problem: Pain Management  Goal: Pain level will decrease to patient's comfort goal  Intervention: Follow pain managment plan developed in collaboration with patient and Interdisciplinary Team  Note: Pain is manageable with scheduled medications at this time.Repositioned with pillows for comfort.Will continue to monitor and assess pain level and medicate as needed.

## 2021-01-07 NOTE — THERAPY
Speech Language Pathology  Daily Treatment     Patient Name: Liudmila Pulido  Age:  88 y.o., Sex:  female  Medical Record #: [id number]  Today's Date: 1/7/2021     Precautions  Precautions: Fall Risk  Comments: R lizeth (LE>UE), R AFO    Subjective    Pt pleasant and cooperative.      Objective       01/07/21 1003   SCCAN (Scales of Cognitive and Communicative Ability for Neurorehabilitation)   Oral Expression - Raw Score 17   Oral Expression - Scale Performance Score 89   Orientation - Raw Score 12   Orientation - Scale Performance Score 100   Memory - Raw Score 14   Memory - Scale Performance Score 74   Speech Comprehension - Raw Score 12   Speech Comprehension - Scale Performance Score 92   Reading Comprehension - Raw Score 11   Reading Comprehension - Scale Performance Score 92   Writing - Raw Score 7   Writing - Scale Performance Score 100   Attention - Raw Score 10   Attention - Scale Performance Score 62   Problem Solving - Raw Score 17   Problem Solving - Scale Performance Score 74   SCCAN Total Raw Score 81   SCCAN Degree of Severity Mild Impairment   SLP Total Time Spent   SLP Individual Total Time Spent (Mins) 60   Treatment Charges   SLP Cognitive Skill Development First 15 Minutes 1   SLP Cognitive Skill Development Additional 15 Minutes 3       Assessment    Outcome assessment completed at this time with use of SCCAN. Pt demonstrated increase scores across all subtest's. Pt achieved raw score of 81 indicating MILD cognitive impairments. Previous initial assessment completed with raw score of 64 indicating MODERATE cognitive impairments. Pt cont to present with mild impairments in the areas of short term memory and problem solving, moderate impairments in area of attention. Results reviewed with pt at this time and pt asking appropriate questions and demonstrating understanding.     Strengths: Able to follow instructions, Making steady progress towards goals, Supportive family, Willingly participates in  therapeutic activities, Pleasant and cooperative, Motivated for self care and independence  Barriers: Visual impairment, Impaired carryover of learning, Impaired functional cognition    Plan    Complete family training and d/c planning     Speech Therapy Problems     Problem: Memory STGs     Dates: Start: 12/30/20       Goal: STG-Within one week, patient will     Dates: Start: 12/30/20       Description: Description: 1) Individualized goal:  learn and implement functional memory strategies with use of memory book with 80% accuracy provided MIN A. 2) Interventions:  SLP Self Care / ADL Training , SLP Cognitive Skill Development, and SLP Group Treatment                  Problem: Problem Solving STGs     Dates: Start: 12/23/20       Goal: STG-Within one week, patient will     Dates: Start: 12/23/20       Description: 1) Individualized goal:  complete attention tasks related to medication management and financial management skills with 80% accuracy provided MIN A.   2) Interventions:  SLP Self Care / ADL Training , SLP Cognitive Skill Development, and SLP Group Treatment                    Problem: Speech/Swallowing LTGs     Dates: Start: 12/21/20       Goal: LTG-By discharge, patient will     Dates: Start: 12/21/20       Description: 1) Individualized goal:  complete functional problem solving and recall with 80% accuracy provided SPV.   2) Interventions:  SLP Self Care / ADL Training , SLP Cognitive Skill Development, and SLP Group Treatment

## 2021-01-07 NOTE — CARE PLAN
Problem: Communication  Goal: The ability to communicate needs accurately and effectively will improve  Description: Patient able to verbalize needs.  Will continue to monitor.   Outcome: PROGRESSING AS EXPECTED     Problem: Bowel/Gastric:  Goal: Normal bowel function is maintained or improved  Description: Patient having regular bowel movements; last BM 1/5.  Denies s/s constipation; bowel meds available if needed.  Will continue to monitor.   Outcome: PROGRESSING AS EXPECTED

## 2021-01-07 NOTE — THERAPY
"Occupational Therapy  Daily Treatment     Patient Name: Liudmila Pulido  Age:  88 y.o., Sex:  female  Medical Record #: [id number]  Today's Date: 1/7/2021     Precautions  Precautions: (P) Fall Risk  Comments: (P) R lizeth (LE>UE), R AFO    Safety   ADL Safety : Requires Supervision for Safety  Bathroom Safety: Requires Supervision for Safety  Comments: See functional levels of assist below for ADL performance details     Subjective    \" It's still no good .\" referring to right LE.          Objective       01/07/21 0931   Precautions   Precautions Fall Risk   Comments R lizeth (LE>UE), R AFO   Sitting Upper Body Exercises   Upper Extremity Bike Level 3 Resistance  (x 5  minutes  hydrocycle )   Balance   Standing Balance (Static) Fair   Comments stood at raised table  PVC pipe tree design copy task   completed  with  close SBA  no LOB  and no right knee buckling noted.   stood   x 5 minutes   x  2    CGA to SBA  for sit to stands    stand to  sit  SBA .     Interdisciplinary Plan of Care Collaboration   Patient Position at End of Therapy Seated;Call Light within Reach;Tray Table within Reach   OT Total Time Spent   OT Individual Total Time Spent (Mins) 30   OT Charge Group   OT Therapy Activity 2       Assessment    Improved  Right LE strength noted  During   Standing activity     Strengths: Able to follow instructions, Independent prior level of function, Making steady progress towards goals, Motivated for self care and independence, Pleasant and cooperative, Supportive family, Willingly participates in therapeutic activities  Barriers: Bowel incontinence, Impulsive, Impaired activity tolerance, Hemiparesis, Generalized weakness    Plan    ADLs/IADLs, functional mobility with FWW, w/c mobility, UB strengthening, home safety education, FT on Friday.    Occupational Therapy Goals     Problem: Grooming     Dates: Start: 12/21/20             Problem: OT Long Term Goals     Dates: Start: 12/21/20       Goal: LTG-By " discharge, patient will complete basic self care tasks     Dates: Start: 12/21/20       Description: 1) Individualized Goal: Supervision to mod I level with AE/DME as needed.  2) Interventions:  OT E Stim Attended, OT Self Care/ADL, OT Cognitive Skill Dev, OT Community Reintegration, OT Manual Ther Technique, OT Neuro Re-Ed/Balance, OT Therapeutic Activity, OT Evaluation, and OT Therapeutic Exercise          Goal: LTG-By discharge, patient will perform bathroom transfers     Dates: Start: 12/21/20       Description: 1) Individualized Goal:  Supervision to mod I level with AD/DME as needed.  2) Interventions:  OT E Stim Attended, OT Self Care/ADL, OT Cognitive Skill Dev, OT Community Reintegration, OT Manual Ther Technique, OT Neuro Re-Ed/Balance, OT Therapeutic Activity, OT Evaluation, and OT Therapeutic Exercise

## 2021-01-07 NOTE — PROGRESS NOTES
"Rehab Progress Note     Date of Service: 1/7/2021  Chief Complaint: Follow-up stroke    Interval Events (Subjective)    Patient seen and examined today in her room. Her dopplers came back negative for DVTs in her lower extremities.  We confirmed what pharmacy she would like her prescriptions to be sent to.  I then called the patient's daughter and gave her an update.  All questions were answered.    Objective:  VITAL SIGNS: /69   Pulse 67   Temp 36.7 °C (98 °F) (Oral)   Resp 18   Ht 1.676 m (5' 5.98\")   Wt 75 kg (165 lb 6.4 oz)   SpO2 95%   BMI 26.71 kg/m²   Gen: alert, no apparent distress  Neuro: notable for improved right leg weakness and hemianesthesia      Recent Results (from the past 72 hour(s))   ACCU-CHEK GLUCOSE    Collection Time: 01/04/21  5:10 PM   Result Value Ref Range    Glucose - Accu-Ck 109 (H) 65 - 99 mg/dL   Basic Metabolic Panel    Collection Time: 01/05/21  5:27 AM   Result Value Ref Range    Sodium 137 135 - 145 mmol/L    Potassium 4.0 3.6 - 5.5 mmol/L    Chloride 104 96 - 112 mmol/L    Co2 28 20 - 33 mmol/L    Glucose 110 (H) 65 - 99 mg/dL    Bun 8 8 - 22 mg/dL    Creatinine 0.52 0.50 - 1.40 mg/dL    Calcium 9.0 8.5 - 10.5 mg/dL    Anion Gap 5.0 (L) 7.0 - 16.0   MAGNESIUM    Collection Time: 01/05/21  5:27 AM   Result Value Ref Range    Magnesium 1.7 1.5 - 2.5 mg/dL   PHOSPHORUS    Collection Time: 01/05/21  5:27 AM   Result Value Ref Range    Phosphorus 3.5 2.5 - 4.5 mg/dL   CBC WITH DIFFERENTIAL    Collection Time: 01/05/21  5:27 AM   Result Value Ref Range    WBC 3.5 (L) 4.8 - 10.8 K/uL    RBC 3.67 (L) 4.20 - 5.40 M/uL    Hemoglobin 10.5 (L) 12.0 - 16.0 g/dL    Hematocrit 33.8 (L) 37.0 - 47.0 %    MCV 92.1 81.4 - 97.8 fL    MCH 28.6 27.0 - 33.0 pg    MCHC 31.1 (L) 33.6 - 35.0 g/dL    RDW 51.8 (H) 35.9 - 50.0 fL    Platelet Count 144 (L) 164 - 446 K/uL    MPV 10.1 9.0 - 12.9 fL    Neutrophils-Polys 47.80 44.00 - 72.00 %    Lymphocytes 36.40 22.00 - 41.00 %    Monocytes 7.60 " 0.00 - 13.40 %    Eosinophils 7.10 (H) 0.00 - 6.90 %    Basophils 0.80 0.00 - 1.80 %    Immature Granulocytes 0.30 0.00 - 0.90 %    Nucleated RBC 0.00 /100 WBC    Neutrophils (Absolute) 1.69 (L) 2.00 - 7.15 K/uL    Lymphs (Absolute) 1.29 1.00 - 4.80 K/uL    Monos (Absolute) 0.27 0.00 - 0.85 K/uL    Eos (Absolute) 0.25 0.00 - 0.51 K/uL    Baso (Absolute) 0.03 0.00 - 0.12 K/uL    Immature Granulocytes (abs) 0.01 0.00 - 0.11 K/uL    NRBC (Absolute) 0.00 K/uL   ESTIMATED GFR    Collection Time: 01/05/21  5:27 AM   Result Value Ref Range    GFR If African American >60 >60 mL/min/1.73 m 2    GFR If Non African American >60 >60 mL/min/1.73 m 2   CBC WITH DIFFERENTIAL    Collection Time: 01/06/21  5:45 AM   Result Value Ref Range    WBC 3.2 (L) 4.8 - 10.8 K/uL    RBC 3.53 (L) 4.20 - 5.40 M/uL    Hemoglobin 10.0 (L) 12.0 - 16.0 g/dL    Hematocrit 32.2 (L) 37.0 - 47.0 %    MCV 91.2 81.4 - 97.8 fL    MCH 28.3 27.0 - 33.0 pg    MCHC 31.1 (L) 33.6 - 35.0 g/dL    RDW 50.6 (H) 35.9 - 50.0 fL    Platelet Count 137 (L) 164 - 446 K/uL    MPV 10.5 9.0 - 12.9 fL    Neutrophils-Polys 38.40 (L) 44.00 - 72.00 %    Lymphocytes 44.30 (H) 22.00 - 41.00 %    Monocytes 8.00 0.00 - 13.40 %    Eosinophils 8.40 (H) 0.00 - 6.90 %    Basophils 0.90 0.00 - 1.80 %    Immature Granulocytes 0.00 0.00 - 0.90 %    Nucleated RBC 0.00 /100 WBC    Neutrophils (Absolute) 1.24 (L) 2.00 - 7.15 K/uL    Lymphs (Absolute) 1.43 1.00 - 4.80 K/uL    Monos (Absolute) 0.26 0.00 - 0.85 K/uL    Eos (Absolute) 0.27 0.00 - 0.51 K/uL    Baso (Absolute) 0.03 0.00 - 0.12 K/uL    Immature Granulocytes (abs) 0.00 0.00 - 0.11 K/uL    NRBC (Absolute) 0.00 K/uL   Comp Metabolic Panel    Collection Time: 01/06/21  5:45 AM   Result Value Ref Range    Sodium 139 135 - 145 mmol/L    Potassium 4.0 3.6 - 5.5 mmol/L    Chloride 108 96 - 112 mmol/L    Co2 28 20 - 33 mmol/L    Anion Gap 3.0 (L) 7.0 - 16.0    Glucose 102 (H) 65 - 99 mg/dL    Bun 8 8 - 22 mg/dL    Creatinine 0.55 0.50 -  1.40 mg/dL    Calcium 9.0 8.5 - 10.5 mg/dL    AST(SGOT) 22 12 - 45 U/L    ALT(SGPT) 14 2 - 50 U/L    Alkaline Phosphatase 205 (H) 30 - 99 U/L    Total Bilirubin 0.4 0.1 - 1.5 mg/dL    Albumin 2.6 (L) 3.2 - 4.9 g/dL    Total Protein 5.9 (L) 6.0 - 8.2 g/dL    Globulin 3.3 1.9 - 3.5 g/dL    A-G Ratio 0.8 g/dL   ESTIMATED GFR    Collection Time: 01/06/21  5:45 AM   Result Value Ref Range    GFR If African American >60 >60 mL/min/1.73 m 2    GFR If Non African American >60 >60 mL/min/1.73 m 2   CBC WITH DIFFERENTIAL    Collection Time: 01/07/21  6:12 AM   Result Value Ref Range    WBC 12.4 (H) 4.8 - 10.8 K/uL    RBC 3.77 (L) 4.20 - 5.40 M/uL    Hemoglobin 10.6 (L) 12.0 - 16.0 g/dL    Hematocrit 34.8 (L) 37.0 - 47.0 %    MCV 92.3 81.4 - 97.8 fL    MCH 28.1 27.0 - 33.0 pg    MCHC 30.5 (L) 33.6 - 35.0 g/dL    RDW 50.7 (H) 35.9 - 50.0 fL    Platelet Count 148 (L) 164 - 446 K/uL    MPV 10.4 9.0 - 12.9 fL    Neutrophils-Polys 75.80 (H) 44.00 - 72.00 %    Lymphocytes 15.10 (L) 22.00 - 41.00 %    Monocytes 5.00 0.00 - 13.40 %    Eosinophils 2.40 0.00 - 6.90 %    Basophils 0.20 0.00 - 1.80 %    Immature Granulocytes 1.50 (H) 0.00 - 0.90 %    Nucleated RBC 0.00 /100 WBC    Neutrophils (Absolute) 9.41 (H) 2.00 - 7.15 K/uL    Lymphs (Absolute) 1.87 1.00 - 4.80 K/uL    Monos (Absolute) 0.62 0.00 - 0.85 K/uL    Eos (Absolute) 0.30 0.00 - 0.51 K/uL    Baso (Absolute) 0.03 0.00 - 0.12 K/uL    Immature Granulocytes (abs) 0.18 (H) 0.00 - 0.11 K/uL    NRBC (Absolute) 0.00 K/uL       Current Facility-Administered Medications   Medication Frequency   • metFORMIN (GLUCOPHAGE) tablet 500 mg BID WITH MEALS   • artificial tears ophthalmic solution 2 Drop Q4HRS WHILE AWAKE   • amLODIPine (NORVASC) tablet 10 mg Q DAY   • acetaminophen (Tylenol) tablet 650 mg Q6HRS PRN   • lisinopril (PRINIVIL) tablet 40 mg Q DAY   • baclofen (LIORESAL) tablet 10 mg QHS   • gabapentin (NEURONTIN) capsule 400 mg TID   • gabapentin (NEURONTIN) capsule 600 mg Q  EVENING   • polyethylene glycol/lytes (MIRALAX) PACKET 1 Packet DAILY    And   • senna-docusate (PERICOLACE or SENOKOT S) 8.6-50 MG per tablet 2 Tab BID    And   • magnesium hydroxide (MILK OF MAGNESIA) suspension 30 mL QDAY PRN    And   • bisacodyl (DULCOLAX) suppository 10 mg QDAY PRN   • vitamin D (cholecalciferol) tablet 1,000 Units DAILY   • tamsulosin (FLOMAX) capsule 0.4 mg QHS   • Respiratory Therapy Consult Continuous RT   • Pharmacy Consult Request ...Pain Management Review 1 Each PHARMACY TO DOSE   • hydrALAZINE (APRESOLINE) tablet 25 mg Q8HRS PRN   • artificial tears ophthalmic solution 1 Drop PRN   • benzocaine-menthol (CEPACOL) lozenge 1 Lozenge Q2HRS PRN   • mag hydrox-al hydrox-simeth (MAALOX PLUS ES or MYLANTA DS) suspension 20 mL Q2HRS PRN   • ondansetron (ZOFRAN ODT) dispertab 4 mg 4X/DAY PRN    Or   • ondansetron (ZOFRAN) syringe/vial injection 4 mg 4X/DAY PRN   • traZODone (DESYREL) tablet 50 mg QHS PRN   • sodium chloride (OCEAN) 0.65 % nasal spray 2 Spray PRN   • atorvastatin (LIPITOR) tablet 40 mg Q EVENING   • apixaban (ELIQUIS) tablet 5 mg BID   • metoprolol (LOPRESSOR) tablet 50 mg TWICE DAILY   • guaiFENesin (ROBITUSSIN) 100 MG/5ML solution 100 mg Q6HRS PRN       Orders Placed This Encounter   Procedures   • Diet Order Diet: Consistent CHO (Diabetic) (Please make sure that pt has dentures (uppers and lowers) in place for meal. )     Standing Status:   Standing     Number of Occurrences:   1     Order Specific Question:   Diet:     Answer:   Consistent CHO (Diabetic) [4]     Comments:   Please make sure that pt has dentures (uppers and lowers) in place for meal.        Assessment:  Active Hospital Problems    Diagnosis   • *CVA (cerebral vascular accident) (HCC)   • AF (atrial fibrillation) (HCC)   • Elevated troponin   • Hyponatremia   • Essential hypertension   • Hyperlipidemia   • Vitamin D deficiency   • Other dysphagia   • Cognitive deficits   • Hypophosphatemia   • Type 2 diabetes  mellitus without complication, without long-term current use of insulin (Formerly [facility])   • Aortic stenosis     This patient is a 88 y.o. female admitted for acute inpatient rehabilitation with CVA (cerebral vascular accident) (Formerly [facility]).    I led and attended the weekly conference, and agree with the IDT conference documentation and plan of care as noted below.    Date of conference: 1/6/2021    Goals and barriers: See IDT note.    Biggest barriers: right leg weakness    Goals in next week: discharge    CM/social support: son supportive and will assist at discharge, however her daughter Keyonna is her power of     Anticipated DC date: 1/9    Home health: PT/OT/SLP/RN    Equip: MWC, ramp    Follow up: PCP, stroke bridge, Dr. Causey, cardiology      Medical Decision Making and Plan:    High left frontal stroke  Cardio-embolic etiology  Right leg hemiplegia, improved  Right leg hemianesthesia, continues/improved  Cognitive deficits, mild  Dysphagia, resolved  Continue full rehab program  PT/OT/SLP, 1 hr each discipline, 5 days per week    Continue Eliquis and statin for secondary stroke prophylaxis  Consulted Dr. Wasserman, no evidence of depression currently    Outpatient follow-up with the stroke [facility], referral made  Outpatient follow-up with Dr. Causey, referral made    Received AFO for right ankle weakness    Right leg spasms, nocturnal, resolved  Started on baclofen 10 mg QHS, will continue at discharge  Continue to monitor need to increase  Outpatient follow-up with Dr. Causey    Right leg neuropathic pain, improved/resolved  Started low-dose gabapentin 100 mg 3 times a day 12/22 --> 200 mg TID 12/24 --> 400 mg TID 12/27, added higher nighttime dose of 600 mg, continue to monitor need to increase    Outpatient follow-up with Dr. Causey, who can titrate off    Currently well controlled on current dosing    Hypertension  Continue lisinopril 5 mg 12/21 --> 10 mg 12/23 --> 30 mg 12/29 --> 40 mg 1/2  Continue metoprolol 50  mg twice a day   Started on amlodipdine 2.5 mg QD --> 5 mg 1/6 --> 10 mg 1/7  As needed hydralazine  Consult hospitalist, appreciate assistance    Atrial fibrillation  Continue metoprolol 50 mg twice a day  Outpatient follow-up with cardiology  Currently rate controlled    Diabetes with hyperglycemia  HgbA1c 6.2  Continue metformin 250 BID --> 500 mg BID 1/7  SSI discontinued    Aortic stenosis  Outpatient follow-up with cardiology    Vitamin D deficiency  Continue supplementation 1000 units    Hypophosphatemia, resolved  s/p supplementation  Consult hospitalist, appreciate assistance    Elevated alk phosphatase, continues/improved  Elevated AST, resolved  Ultrasound with gallbladder wall thickening  Monitor as patient currently asymptomatic  Consult hospitalist, appreciate assistance  Outpatient follow up with her PCP for lab recheck after discharge    Leukopenia, resolved  Unknown etiology  S/p Granix x 1 1/6    Bowel program  Constipation, improved/resolved  Continue bowel medications  Scheduled Sennakot  Scheduled Miralax  PRN MOM, bisacodyl suppository  Last BM 1/5    Bladder program  Acute urinary retention, resolved  Check PVRs - 68, 98  Continue Flomax 0.4 mg, started 12/21  Bladder scan for no voids  ICP for over 400 cc - required once 12/20  Scheduled toileting    DVT prophylaxis  Eliquis  Negative dopplers on 1/6 on DVT    COVID negative 12/17, 12/19, 12/28    Patient received both her pneumococcal and her flu shot in [place] this year    Total time:  35 minutes.  I spent greater than 50% of the time for patient care, counseling, and coordination on this date, including patient face-to face time, unit/floor time with review of records/pertinent lab data and studies, as well as discussing diagnostic evaluation/work up, planned therapeutic interventions, and future disposition of care, as per the interval events/subjective and the assessment and plan as noted above.    Time today spent calling daughter  Keyonna to update her on her mother's medical and functional progress, and discharge needs.     I have performed a physical exam, reviewed and updated ROS, as well as the assessment and plan today 1/7/2021. In review of note from 1/6/20 there are no new changes except as documented above.          Tangela Noel M.D.   Physical Medicine and Rehabilitation

## 2021-01-08 LAB — GLUCOSE BLD-MCNC: 134 MG/DL (ref 65–99)

## 2021-01-08 PROCEDURE — 700102 HCHG RX REV CODE 250 W/ 637 OVERRIDE(OP): Performed by: PHYSICAL MEDICINE & REHABILITATION

## 2021-01-08 PROCEDURE — 97130 THER IVNTJ EA ADDL 15 MIN: CPT

## 2021-01-08 PROCEDURE — 97116 GAIT TRAINING THERAPY: CPT

## 2021-01-08 PROCEDURE — 700102 HCHG RX REV CODE 250 W/ 637 OVERRIDE(OP): Performed by: HOSPITALIST

## 2021-01-08 PROCEDURE — 99232 SBSQ HOSP IP/OBS MODERATE 35: CPT | Performed by: HOSPITALIST

## 2021-01-08 PROCEDURE — 97110 THERAPEUTIC EXERCISES: CPT

## 2021-01-08 PROCEDURE — A9270 NON-COVERED ITEM OR SERVICE: HCPCS | Performed by: PHYSICAL MEDICINE & REHABILITATION

## 2021-01-08 PROCEDURE — 97530 THERAPEUTIC ACTIVITIES: CPT

## 2021-01-08 PROCEDURE — A9270 NON-COVERED ITEM OR SERVICE: HCPCS | Performed by: HOSPITALIST

## 2021-01-08 PROCEDURE — 97129 THER IVNTJ 1ST 15 MIN: CPT

## 2021-01-08 PROCEDURE — 99231 SBSQ HOSP IP/OBS SF/LOW 25: CPT | Performed by: PHYSICAL MEDICINE & REHABILITATION

## 2021-01-08 PROCEDURE — 770010 HCHG ROOM/CARE - REHAB SEMI PRIVAT*

## 2021-01-08 PROCEDURE — 97535 SELF CARE MNGMENT TRAINING: CPT

## 2021-01-08 RX ORDER — METOPROLOL TARTRATE 50 MG/1
50 TABLET, FILM COATED ORAL 2 TIMES DAILY
Qty: 60 TAB | Refills: 2 | Status: SHIPPED | OUTPATIENT
Start: 2021-01-08 | End: 2021-06-02

## 2021-01-08 RX ORDER — TAMSULOSIN HYDROCHLORIDE 0.4 MG/1
0.4 CAPSULE ORAL
Qty: 30 CAP | Refills: 2 | Status: SHIPPED | OUTPATIENT
Start: 2021-01-08 | End: 2021-02-16

## 2021-01-08 RX ORDER — LISINOPRIL 40 MG/1
40 TABLET ORAL DAILY
Qty: 30 TAB | Refills: 2 | Status: SHIPPED | OUTPATIENT
Start: 2021-01-08

## 2021-01-08 RX ORDER — ATORVASTATIN CALCIUM 40 MG/1
40 TABLET, FILM COATED ORAL EVERY EVENING
Qty: 30 TAB | Refills: 2 | Status: SHIPPED | OUTPATIENT
Start: 2021-01-08

## 2021-01-08 RX ORDER — GABAPENTIN 400 MG/1
400 CAPSULE ORAL
Qty: 90 CAP | Refills: 2 | Status: SHIPPED | OUTPATIENT
Start: 2021-01-08 | End: 2021-06-02

## 2021-01-08 RX ORDER — AMOXICILLIN 250 MG
1 CAPSULE ORAL
Qty: 30 TAB | Refills: 0 | COMMUNITY
Start: 2021-01-08 | End: 2022-01-01

## 2021-01-08 RX ORDER — AMLODIPINE BESYLATE 10 MG/1
10 TABLET ORAL DAILY
Qty: 30 TAB | Refills: 2 | Status: SHIPPED | OUTPATIENT
Start: 2021-01-08

## 2021-01-08 RX ORDER — ACETAMINOPHEN 325 MG/1
650 TABLET ORAL EVERY 6 HOURS PRN
Qty: 30 TAB | Refills: 0 | COMMUNITY
Start: 2021-01-08

## 2021-01-08 RX ORDER — POLYETHYLENE GLYCOL 3350 17 G/17G
17 POWDER, FOR SOLUTION ORAL
COMMUNITY
Start: 2021-01-08 | End: 2021-06-02

## 2021-01-08 RX ORDER — GABAPENTIN 300 MG/1
600 CAPSULE ORAL
Qty: 60 CAP | Refills: 2 | Status: SHIPPED | OUTPATIENT
Start: 2021-01-08 | End: 2021-03-22 | Stop reason: SDUPTHER

## 2021-01-08 RX ORDER — BACLOFEN 10 MG/1
10 TABLET ORAL
Qty: 30 TAB | Refills: 2 | Status: SHIPPED | OUTPATIENT
Start: 2021-01-08 | End: 2021-02-16

## 2021-01-08 RX ADMIN — Medication 1000 UNITS: at 07:51

## 2021-01-08 RX ADMIN — POLYVINYL ALCOHOL 1 DROP: 14 SOLUTION/ DROPS OPHTHALMIC at 21:05

## 2021-01-08 RX ADMIN — DOCUSATE SODIUM 50 MG AND SENNOSIDES 8.6 MG 2 TABLET: 8.6; 5 TABLET, FILM COATED ORAL at 07:51

## 2021-01-08 RX ADMIN — METFORMIN HYDROCHLORIDE 500 MG: 500 TABLET ORAL at 17:28

## 2021-01-08 RX ADMIN — GABAPENTIN 400 MG: 400 CAPSULE ORAL at 07:51

## 2021-01-08 RX ADMIN — METFORMIN HYDROCHLORIDE 500 MG: 500 TABLET ORAL at 07:51

## 2021-01-08 RX ADMIN — POLYVINYL ALCOHOL 2 DROP: 14 SOLUTION/ DROPS OPHTHALMIC at 13:15

## 2021-01-08 RX ADMIN — GABAPENTIN 400 MG: 400 CAPSULE ORAL at 17:28

## 2021-01-08 RX ADMIN — LISINOPRIL 40 MG: 20 TABLET ORAL at 05:34

## 2021-01-08 RX ADMIN — BACLOFEN 10 MG: 10 TABLET ORAL at 21:03

## 2021-01-08 RX ADMIN — GABAPENTIN 600 MG: 300 CAPSULE ORAL at 21:02

## 2021-01-08 RX ADMIN — POLYVINYL ALCOHOL 2 DROP: 14 SOLUTION/ DROPS OPHTHALMIC at 06:00

## 2021-01-08 RX ADMIN — AMLODIPINE BESYLATE 10 MG: 5 TABLET ORAL at 05:34

## 2021-01-08 RX ADMIN — GABAPENTIN 400 MG: 400 CAPSULE ORAL at 13:14

## 2021-01-08 RX ADMIN — DOCUSATE SODIUM 50 MG AND SENNOSIDES 8.6 MG 2 TABLET: 8.6; 5 TABLET, FILM COATED ORAL at 21:02

## 2021-01-08 RX ADMIN — METOPROLOL TARTRATE 50 MG: 25 TABLET, FILM COATED ORAL at 05:32

## 2021-01-08 RX ADMIN — APIXABAN 5 MG: 5 TABLET, FILM COATED ORAL at 21:03

## 2021-01-08 RX ADMIN — ATORVASTATIN CALCIUM 40 MG: 40 TABLET, FILM COATED ORAL at 21:03

## 2021-01-08 RX ADMIN — METOPROLOL TARTRATE 50 MG: 25 TABLET, FILM COATED ORAL at 17:32

## 2021-01-08 RX ADMIN — TAMSULOSIN HYDROCHLORIDE 0.4 MG: 0.4 CAPSULE ORAL at 21:03

## 2021-01-08 RX ADMIN — APIXABAN 5 MG: 5 TABLET, FILM COATED ORAL at 07:51

## 2021-01-08 RX ADMIN — POLYVINYL ALCOHOL 2 DROP: 14 SOLUTION/ DROPS OPHTHALMIC at 17:29

## 2021-01-08 ASSESSMENT — ENCOUNTER SYMPTOMS
FOCAL WEAKNESS: 1
ABDOMINAL PAIN: 0
NAUSEA: 0
FEVER: 0
MUSCULOSKELETAL NEGATIVE: 1
SHORTNESS OF BREATH: 0
CHILLS: 0
BRUISES/BLEEDS EASILY: 0
PALPITATIONS: 0
POLYDIPSIA: 0
COUGH: 0
EYES NEGATIVE: 1
VOMITING: 0

## 2021-01-08 ASSESSMENT — PATIENT HEALTH QUESTIONNAIRE - PHQ9
1. LITTLE INTEREST OR PLEASURE IN DOING THINGS: NOT AT ALL
2. FEELING DOWN, DEPRESSED, IRRITABLE, OR HOPELESS: NOT AT ALL
SUM OF ALL RESPONSES TO PHQ9 QUESTIONS 1 AND 2: 0

## 2021-01-08 ASSESSMENT — ACTIVITIES OF DAILY LIVING (ADL)
TOILET_TRANSFER_LEVEL_OF_ASSIST: REQUIRES SUPERVISION WITH TOILET TRANSFER
TOILETING_LEVEL_OF_ASSIST: REQUIRES SUPERVISION WITH TOILETING
SHOWER_TRANSFER_LEVEL_OF_ASSIST: REQUIRES SUPERVISION WITH SHOWER TRANSFER

## 2021-01-08 NOTE — THERAPY
Speech Language Pathology  Daily Treatment     Patient Name: Liudmila Pulido  Age:  88 y.o., Sex:  female  Medical Record #: [id number]  Today's Date: 1/8/2021     Precautions  Precautions: Fall Risk  Comments: R lizeth (LE>UE), R AFO    Subjective    Pt pleasant and cooperative, DIL present and supportive for family training.      Objective     01/08/21 1033   SLP Total Time Spent   SLP Individual Total Time Spent (Mins) 60   Treatment Charges   SLP Cognitive Skill Development First 15 Minutes 1   SLP Cognitive Skill Development Additional 15 Minutes 3       Assessment    Family training completed with pt and DIL. Reviewed results of initial assessment and outcome assessment, improvement across all subtest's. Pt cont to demonstrate MILD cognitive impairments in the areas of attention and STM recall. Discussed rec: for pt to have assistance with all medications and finances, pts family willing and able to provide support. Reinforced slowing down and checking work to assist with attention to detail to ensure accuracy and safety. Both pt and DIL asked appropriate questions at this time and demonstrated good understanding of the information provided. D/c planning completed in second session. Pt indep able to express changes that will occur at home once d/c from hospital. Pt asked appropriate questions related to stroke and possibility of having another stroke. Pt indep recalled 3/4 pieces of FAST, increased to 4/4 provided min prompting from SLP.     Strengths: Able to follow instructions, Making steady progress towards goals, Supportive family, Willingly participates in therapeutic activities, Pleasant and cooperative, Motivated for self care and independence  Barriers: Visual impairment, Impaired carryover of learning, Impaired functional cognition    Plan    Pt to d/c tomorrow with assistance from family     Speech Therapy Problems     Problem: Memory STGs     Dates: Start: 12/30/20       Goal: STG-Within one week,  patient will     Dates: Start: 12/30/20       Description: Description: 1) Individualized goal:  learn and implement functional memory strategies with use of memory book with 80% accuracy provided MIN A. 2) Interventions:  SLP Self Care / ADL Training , SLP Cognitive Skill Development, and SLP Group Treatment                  Problem: Problem Solving STGs     Dates: Start: 12/23/20       Goal: STG-Within one week, patient will     Dates: Start: 12/23/20       Description: 1) Individualized goal:  complete attention tasks related to medication management and financial management skills with 80% accuracy provided MIN A.   2) Interventions:  SLP Self Care / ADL Training , SLP Cognitive Skill Development, and SLP Group Treatment                    Problem: Speech/Swallowing LTGs     Dates: Start: 12/21/20       Goal: LTG-By discharge, patient will     Dates: Start: 12/21/20       Description: 1) Individualized goal:  complete functional problem solving and recall with 80% accuracy provided SPV.   2) Interventions:  SLP Self Care / ADL Training , SLP Cognitive Skill Development, and SLP Group Treatment

## 2021-01-08 NOTE — CARE PLAN
Problem: Skin Integrity  Goal: Risk for impaired skin integrity will decrease  Outcome: PROGRESSING AS EXPECTED  Note: Patient's skin remains intact and free from new or accidental injury this shift.  Will continue to monitor.      Problem: Urinary Elimination:  Goal: Ability to reestablish a normal urinary elimination pattern will improve  Description: Patient voiding adequate amounts of clear, yellow urine. Denies dysuria and flank pain: afebrile. Will continue to monitor.    Outcome: PROGRESSING AS EXPECTED

## 2021-01-08 NOTE — CARE PLAN
Problem: Mobility Transfers  Goal: STG-Within one week, patient will transfer bed to chair  Description: 1) Individualized goal:  At SPV with LRD In order to maximize self mobility  2) Interventions:  PT E Stim Attended, PT Orthotics Training, PT Gait Training, PT Therapeutic Exercises, PT Neuro Re-Ed/Balance, PT Aquatic Therapy, PT Therapeutic Activity, PT Manual Therapy, and PT Evaluation  Outcome: NOT MET  Note: CGA     Problem: PT-Long Term Goals  Goal: LTG-By discharge, patient will propel wheelchair  Description: 1) Individualized goal:  150 ft At mod I in order to improve self mobility  2) Interventions:  PT E Stim Attended, PT Orthotics Training, PT Gait Training, PT Therapeutic Exercises, PT Neuro Re-Ed/Balance, PT Aquatic Therapy, PT Therapeutic Activity, PT Manual Therapy, and PT Evaluation  Outcome: MET  Goal: LTG-By discharge, patient will transfer one surface to another  Description: 1) Individualized goal:  At min A with LRD In order to maximize self mobility  2) Interventions:  PT E Stim Attended, PT Orthotics Training, PT Gait Training, PT Therapeutic Exercises, PT Neuro Re-Ed/Balance, PT Aquatic Therapy, PT Therapeutic Activity, PT Manual Therapy, and PT Evaluation  Outcome: MET  Goal: LTG-By discharge, patient will transfer in/out of a car  Description: 1) Individualized goal:  At min A with LRD In order to maximize self mobility  2) Interventions:  PT E Stim Attended, PT Orthotics Training, PT Gait Training, PT Therapeutic Exercises, PT Neuro Re-Ed/Balance, PT Aquatic Therapy, PT Therapeutic Activity, PT Manual Therapy, and PT Evaluation  Outcome: MET     Problem: Mobility  Goal: STG-Within one week, patient will ambulate household distance  Description: 1) Individualized goal:  150 ft With LRD at min A in order to progress towards PLOF  2) Interventions:  PT E Stim Attended, PT Orthotics Training, PT Gait Training, PT Therapeutic Exercises, PT Neuro Re-Ed/Balance, PT Aquatic Therapy, PT  Therapeutic Activity, PT Manual Therapy, and PT Evaluation    Outcome: MET

## 2021-01-08 NOTE — CARE PLAN
Problem: OT Long Term Goals  Goal: LTG-By discharge, patient will perform bathroom transfers  Description: 1) Individualized Goal:  Supervision to mod I level with AD/DME as needed.  2) Interventions:  OT E Stim Attended, OT Self Care/ADL, OT Cognitive Skill Dev, OT Community Reintegration, OT Manual Ther Technique, OT Neuro Re-Ed/Balance, OT Therapeutic Activity, OT Evaluation, and OT Therapeutic Exercise  Outcome: MET     Problem: OT Long Term Goals  Goal: LTG-By discharge, patient will complete basic self care tasks  Description: 1) Individualized Goal: Supervision to mod I level with AE/DME as needed.  2) Interventions:  OT E Stim Attended, OT Self Care/ADL, OT Cognitive Skill Dev, OT Community Reintegration, OT Manual Ther Technique, OT Neuro Re-Ed/Balance, OT Therapeutic Activity, OT Evaluation, and OT Therapeutic Exercise  Outcome: DISCHARGED-GOAL NOT MET  Note: Con't to require min-mod A for LB dressing 2/2 RLE weakness; supv-indep with other ADLs.

## 2021-01-08 NOTE — PROGRESS NOTES
"Rehab Progress Note     Date of Service: 1/8/2021  Chief Complaint: Follow-up stroke    Interval Events (Subjective)    Patient seen and examined today in the transitional living apartment where family training is incurring with her daughter-in-law.  All questions were answered.  Patient feels ready for discharge home tomorrow.    Objective:  VITAL SIGNS: /63   Pulse 60   Temp 36.5 °C (97.7 °F) (Oral)   Resp 17   Ht 1.676 m (5' 5.98\")   Wt 75 kg (165 lb 6.4 oz)   SpO2 94%   BMI 26.71 kg/m²   Gen: alert, no apparent distress  Neuro: notable for improved right leg anesthesia and weakness      Recent Results (from the past 72 hour(s))   CBC WITH DIFFERENTIAL    Collection Time: 01/06/21  5:45 AM   Result Value Ref Range    WBC 3.2 (L) 4.8 - 10.8 K/uL    RBC 3.53 (L) 4.20 - 5.40 M/uL    Hemoglobin 10.0 (L) 12.0 - 16.0 g/dL    Hematocrit 32.2 (L) 37.0 - 47.0 %    MCV 91.2 81.4 - 97.8 fL    MCH 28.3 27.0 - 33.0 pg    MCHC 31.1 (L) 33.6 - 35.0 g/dL    RDW 50.6 (H) 35.9 - 50.0 fL    Platelet Count 137 (L) 164 - 446 K/uL    MPV 10.5 9.0 - 12.9 fL    Neutrophils-Polys 38.40 (L) 44.00 - 72.00 %    Lymphocytes 44.30 (H) 22.00 - 41.00 %    Monocytes 8.00 0.00 - 13.40 %    Eosinophils 8.40 (H) 0.00 - 6.90 %    Basophils 0.90 0.00 - 1.80 %    Immature Granulocytes 0.00 0.00 - 0.90 %    Nucleated RBC 0.00 /100 WBC    Neutrophils (Absolute) 1.24 (L) 2.00 - 7.15 K/uL    Lymphs (Absolute) 1.43 1.00 - 4.80 K/uL    Monos (Absolute) 0.26 0.00 - 0.85 K/uL    Eos (Absolute) 0.27 0.00 - 0.51 K/uL    Baso (Absolute) 0.03 0.00 - 0.12 K/uL    Immature Granulocytes (abs) 0.00 0.00 - 0.11 K/uL    NRBC (Absolute) 0.00 K/uL   Comp Metabolic Panel    Collection Time: 01/06/21  5:45 AM   Result Value Ref Range    Sodium 139 135 - 145 mmol/L    Potassium 4.0 3.6 - 5.5 mmol/L    Chloride 108 96 - 112 mmol/L    Co2 28 20 - 33 mmol/L    Anion Gap 3.0 (L) 7.0 - 16.0    Glucose 102 (H) 65 - 99 mg/dL    Bun 8 8 - 22 mg/dL    Creatinine 0.55 " 0.50 - 1.40 mg/dL    Calcium 9.0 8.5 - 10.5 mg/dL    AST(SGOT) 22 12 - 45 U/L    ALT(SGPT) 14 2 - 50 U/L    Alkaline Phosphatase 205 (H) 30 - 99 U/L    Total Bilirubin 0.4 0.1 - 1.5 mg/dL    Albumin 2.6 (L) 3.2 - 4.9 g/dL    Total Protein 5.9 (L) 6.0 - 8.2 g/dL    Globulin 3.3 1.9 - 3.5 g/dL    A-G Ratio 0.8 g/dL   ESTIMATED GFR    Collection Time: 01/06/21  5:45 AM   Result Value Ref Range    GFR If African American >60 >60 mL/min/1.73 m 2    GFR If Non African American >60 >60 mL/min/1.73 m 2   CBC WITH DIFFERENTIAL    Collection Time: 01/07/21  6:12 AM   Result Value Ref Range    WBC 12.4 (H) 4.8 - 10.8 K/uL    RBC 3.77 (L) 4.20 - 5.40 M/uL    Hemoglobin 10.6 (L) 12.0 - 16.0 g/dL    Hematocrit 34.8 (L) 37.0 - 47.0 %    MCV 92.3 81.4 - 97.8 fL    MCH 28.1 27.0 - 33.0 pg    MCHC 30.5 (L) 33.6 - 35.0 g/dL    RDW 50.7 (H) 35.9 - 50.0 fL    Platelet Count 148 (L) 164 - 446 K/uL    MPV 10.4 9.0 - 12.9 fL    Neutrophils-Polys 75.80 (H) 44.00 - 72.00 %    Lymphocytes 15.10 (L) 22.00 - 41.00 %    Monocytes 5.00 0.00 - 13.40 %    Eosinophils 2.40 0.00 - 6.90 %    Basophils 0.20 0.00 - 1.80 %    Immature Granulocytes 1.50 (H) 0.00 - 0.90 %    Nucleated RBC 0.00 /100 WBC    Neutrophils (Absolute) 9.41 (H) 2.00 - 7.15 K/uL    Lymphs (Absolute) 1.87 1.00 - 4.80 K/uL    Monos (Absolute) 0.62 0.00 - 0.85 K/uL    Eos (Absolute) 0.30 0.00 - 0.51 K/uL    Baso (Absolute) 0.03 0.00 - 0.12 K/uL    Immature Granulocytes (abs) 0.18 (H) 0.00 - 0.11 K/uL    NRBC (Absolute) 0.00 K/uL       Current Facility-Administered Medications   Medication Frequency   • metFORMIN (GLUCOPHAGE) tablet 500 mg BID WITH MEALS   • artificial tears ophthalmic solution 2 Drop Q4HRS WHILE AWAKE   • amLODIPine (NORVASC) tablet 10 mg Q DAY   • acetaminophen (Tylenol) tablet 650 mg Q6HRS PRN   • lisinopril (PRINIVIL) tablet 40 mg Q DAY   • baclofen (LIORESAL) tablet 10 mg QHS   • gabapentin (NEURONTIN) capsule 400 mg TID   • gabapentin (NEURONTIN) capsule 600 mg Q  EVENING   • polyethylene glycol/lytes (MIRALAX) PACKET 1 Packet DAILY    And   • senna-docusate (PERICOLACE or SENOKOT S) 8.6-50 MG per tablet 2 Tab BID    And   • magnesium hydroxide (MILK OF MAGNESIA) suspension 30 mL QDAY PRN    And   • bisacodyl (DULCOLAX) suppository 10 mg QDAY PRN   • vitamin D (cholecalciferol) tablet 1,000 Units DAILY   • tamsulosin (FLOMAX) capsule 0.4 mg QHS   • Respiratory Therapy Consult Continuous RT   • Pharmacy Consult Request ...Pain Management Review 1 Each PHARMACY TO DOSE   • hydrALAZINE (APRESOLINE) tablet 25 mg Q8HRS PRN   • artificial tears ophthalmic solution 1 Drop PRN   • benzocaine-menthol (CEPACOL) lozenge 1 Lozenge Q2HRS PRN   • mag hydrox-al hydrox-simeth (MAALOX PLUS ES or MYLANTA DS) suspension 20 mL Q2HRS PRN   • ondansetron (ZOFRAN ODT) dispertab 4 mg 4X/DAY PRN    Or   • ondansetron (ZOFRAN) syringe/vial injection 4 mg 4X/DAY PRN   • traZODone (DESYREL) tablet 50 mg QHS PRN   • sodium chloride (OCEAN) 0.65 % nasal spray 2 Spray PRN   • atorvastatin (LIPITOR) tablet 40 mg Q EVENING   • apixaban (ELIQUIS) tablet 5 mg BID   • metoprolol (LOPRESSOR) tablet 50 mg TWICE DAILY   • guaiFENesin (ROBITUSSIN) 100 MG/5ML solution 100 mg Q6HRS PRN       Orders Placed This Encounter   Procedures   • Diet Order Diet: Consistent CHO (Diabetic) (Please make sure that pt has dentures (uppers and lowers) in place for meal. )     Standing Status:   Standing     Number of Occurrences:   1     Order Specific Question:   Diet:     Answer:   Consistent CHO (Diabetic) [4]     Comments:   Please make sure that pt has dentures (uppers and lowers) in place for meal.        Assessment:  Active Hospital Problems    Diagnosis   • *CVA (cerebral vascular accident) (HCC)   • AF (atrial fibrillation) (HCC)   • Elevated troponin   • Hyponatremia   • Essential hypertension   • Hyperlipidemia   • Vitamin D deficiency   • Other dysphagia   • Cognitive deficits   • Hypophosphatemia   • Type 2 diabetes  mellitus without complication, without long-term current use of insulin ([facility])   • Aortic stenosis     This patient is a 88 y.o. female admitted for acute inpatient rehabilitation with CVA (cerebral vascular accident) ([facility]).    I led and attended the weekly conference, and agree with the IDT conference documentation and plan of care as noted below.    Date of conference: 1/6/2021    Goals and barriers: See IDT note.    Biggest barriers: right leg weakness    Goals in next week: discharge    CM/social support: son supportive and will assist at discharge, however her daughter Keyonna is her power of     Anticipated DC date: 1/9    Home health: PT/OT/SLP/RN    Equip: MWC, ramp    Follow up: PCP, stroke bridge, Dr. Causey, cardiology      Medical Decision Making and Plan:    High left frontal stroke  Cardio-embolic etiology  Right leg hemiplegia, improved  Right leg hemianesthesia, continues/improved  Cognitive deficits, mild  Dysphagia, resolved  Continue full rehab program  PT/OT/SLP, 1 hr each discipline, 5 days per week    Continue Eliquis and statin for secondary stroke prophylaxis  Consulted Dr. Wasserman, no evidence of depression currently    Outpatient follow-up with the stroke Bridge clinic, referral made  Outpatient follow-up with Dr. Causey, referral made    Received AFO for right ankle weakness    Right leg spasms, nocturnal, resolved  Started on baclofen 10 mg QHS, will continue at discharge  Continue to monitor need to increase  Outpatient follow-up with Dr. Causey    Right leg neuropathic pain, improved/resolved  Started low-dose gabapentin 100 mg 3 times a day 12/22 --> 200 mg TID 12/24 --> 400 mg TID 12/27, added higher nighttime dose of 600 mg, continue to monitor need to increase    Outpatient follow-up with Dr. Causey, who can titrate off    Currently well controlled on current dosing    Hypertension  Continue lisinopril 5 mg 12/21 --> 10 mg 12/23 --> 30 mg 12/29 --> 40 mg 1/2  Continue metoprolol 50  mg twice a day   Started on amlodipdine 2.5 mg QD --> 5 mg 1/6 --> 10 mg 1/7  Consulted hospitalist, appreciate assistance    Atrial fibrillation  Continue metoprolol 50 mg twice a day  Outpatient follow-up with cardiology, referral made  Currently rate controlled    Diabetes with hyperglycemia  HgbA1c 6.2  Continue metformin 250 BID --> 500 mg BID 1/7  SSI discontinued    Aortic stenosis  Outpatient follow-up with cardiology, referral made    Vitamin D deficiency  Continue supplementation 1000 units    Hypophosphatemia, resolved  s/p supplementation  Consult hospitalist, appreciate assistance    Elevated alk phosphatase, continues/improved  Elevated AST, resolved  Ultrasound with gallbladder wall thickening  Monitor as patient currently asymptomatic  Consult hospitalist, appreciate assistance  Outpatient follow up with her PCP for lab recheck after discharge    Leukopenia, resolved  Unknown etiology  S/p Granix x 1 1/6    Bowel program  Constipation, improved/resolved  Continue bowel medications  Scheduled Sennakot  Scheduled Miralax  PRN MOM, bisacodyl suppository  Last BM 1/7    Bladder program  Acute urinary retention, resolved  Check PVRs - 68, 98  Continue Flomax 0.4 mg, started 12/21  Bladder scan for no voids  ICP for over 400 cc - required once 12/20  Scheduled toileting    DVT prophylaxis  Eliquis  Negative dopplers on 1/6 on DVT    COVID negative 12/17, 12/19, 12/28    Patient received both her pneumococcal and her flu shot in [place] this year    Total time:  16 minutes.  I spent greater than 50% of the time for patient care, counseling, and coordination on this date, including patient face-to face time, unit/floor time with review of records/pertinent lab data and studies, as well as discussing diagnostic evaluation/work up, planned therapeutic interventions, and future disposition of care, as per the interval events/subjective and the assessment and plan as noted above.    I have performed a  physical exam, reviewed and updated ROS, as well as the assessment and plan today 1/8/2021. In review of note from 1/7/20 there are no new changes except as documented above.            Tangela Neol M.D.   Physical Medicine and Rehabilitation

## 2021-01-08 NOTE — DISCHARGE PLANNING
Case Management Discharge Instructions        Discharge Location: Home with Home Health.     Agency Name /Phone: [facility]: [phone].     Home Health: Registered Nurse, Occupational Therapist, Physical Therapist, Speech Therapist, .     DME Provider / Phone: [facility]:  [phone].     Medical Equipment Ordered: Wheelchair        Follow-up Information:     [facility]  [address] [place] [address]   [id number]   [phone]  On 1/12/2021    [facility]  [address] [facility]  [address]  [place] [address] [phone]  The office will call Laura to schedule follow up appointment.    Xochilt Causey D.O.-Physical Medicine and Rehab  [address] [facility] [place] [id number] [phone]  On 2/8/2021  9:45am-see [facility]     [facility]:   [address]  [place], [place]  [address] [phone]     On 2/8/2021  2pm

## 2021-01-08 NOTE — THERAPY
Occupational Therapy  Daily Treatment     Patient Name: Liudmila Pulido  Age:  88 y.o., Sex:  female  Medical Record #: [id number]  Today's Date: 1/8/2021     Precautions  Precautions: (P) Fall Risk  Comments: (P) R lizeth (LE>UE), R AFO    Safety   ADL Safety : Requires Supervision for Safety  Bathroom Safety: Requires Supervision for Safety  Comments: See functional levels of assist below for ADL performance details     Subjective    Pt and daughter in law ready for family training.      Objective       01/08/21 0931   Precautions   Precautions Fall Risk   Comments R lizeth (LE>UE), R AFO   Cognition    Level of Consciousness Alert   Sitting Upper Body Exercises   Chest Press 1 set of 10;Right ;Left   Front Arm Raise 1 set of 10;Right ;Left   Side Arm Raise 1 set of 10;Right ;Left   Shoulder Press 1 set of 10;Right ;Left   Internal Shoulder Rotation 1 set of 10;Right ;Left   External Shoulder Rotation 1 set of 10;Right ;Left   Bicep Curls 1 set of 10;Right ;Left   Pronation / Supination 1 set of 10;Right ;Left   Comments 4# weight used for UB therex.   Interdisciplinary Plan of Care Collaboration   IDT Collaboration with  Physician;Family / Caregiver   Patient Position at End of Therapy Seated;Family / Friend in Room   Collaboration Comments pt's daugther in [place], present for family training.    OT Total Time Spent   OT Individual Total Time Spent (Mins) 60   OT Charge Group   OT Self Care / ADL 1   OT Therapy Activity 2   OT Therapeutic Exercise  1       Family training completed with pt and daughter in [place], in prep for d/c tomorrow. Discussed pt's CLOF and level of assist needed for ADLs, mobility from w/c and FWW level, and recommendations for equipment. Had pt complete toilet transfer from w/c level with CGA-SBA. [place] able to assist pt safely. Also had pt demonstrate kitchen mobility from w/c level and standing at countertop with SBA.   [place] asking whether pt needs hospital bed. Had pt  complete bed transfer (standard queen bed) with under mattress bedrail with CGA. [place] demonstrated ability to safely assist pt. Provided information for where to order bedrail. [place] reports that bathroom renovations are almost complete, with fixed GB by toilet and in shower, roll in shower with tub bench and HHSH. Ramp is already installed. Discussed placing BSC next to bed for nighttime toileting needs, and also placing over toilet until GB in place. Provided home safety/fall prevention checklist and reviewed briefly.     Assessment    Pt and pt's daughter in law actively participated in family training session. They were receptive to all recommendations and education provided for level of assist needed for ADLs, IADLs and mobility, and recommended bathroom equipment. [place] demonstrates ability to safely assist pt. They feel ready for d/c tomorrow and verbalized no additional concerns.     Strengths: Able to follow instructions, Independent prior level of function, Making steady progress towards goals, Motivated for self care and independence, Pleasant and cooperative, Supportive family, Willingly participates in therapeutic activities  Barriers: Bowel incontinence, Impulsive, Impaired activity tolerance, Hemiparesis, Generalized weakness    Plan     Pt to d/c tomorrow with 24/7 family support and HHOT.     Occupational Therapy Goals     Problem: Grooming     Dates: Start: 12/21/20             Problem: OT Long Term Goals     Dates: Start: 12/21/20       Goal: LTG-By discharge, patient will complete basic self care tasks     Dates: Start: 12/21/20       Description: 1) Individualized Goal: Supervision to mod I level with AE/DME as needed.  2) Interventions:  OT E Stim Attended, OT Self Care/ADL, OT Cognitive Skill Dev, OT Community Reintegration, OT Manual Ther Technique, OT Neuro Re-Ed/Balance, OT Therapeutic Activity, OT Evaluation, and OT Therapeutic Exercise          Goal: LTG-By discharge, patient will  perform bathroom transfers     Dates: Start: 12/21/20       Description: 1) Individualized Goal:  Supervision to mod I level with AD/DME as needed.  2) Interventions:  OT E Stim Attended, OT Self Care/ADL, OT Cognitive Skill Dev, OT Community Reintegration, OT Manual Ther Technique, OT Neuro Re-Ed/Balance, OT Therapeutic Activity, OT Evaluation, and OT Therapeutic Exercise

## 2021-01-08 NOTE — CARE PLAN
Problem: Safety  Goal: Will remain free from injury  Description: Patient care assumed. Report received from day RN. Patient is alert and calm, resting in bed/chair with family at bedside. Call light and bedside table within reach.     Outcome: PROGRESSING AS EXPECTED  Patient is a one assist up to the bathroom.  She consistently uses her call light and waits for assistance.       Problem: Pain Management  Goal: Pain level will decrease to patient's comfort goal  Outcome: PROGRESSING AS EXPECTED  Patient has denied pain or discomfort this shift.

## 2021-01-08 NOTE — PROGRESS NOTES
Hospital Medicine Daily Progress Note      Chief Complaint  Hypertension  Hypophosphatemia    Interval Problem Update  Pt reports leg swelling seems to come and go.    Review of Systems  Review of Systems   Constitutional: Negative for chills and fever.   HENT: Negative.    Eyes: Negative.    Respiratory: Negative for cough and shortness of breath.    Cardiovascular: Positive for leg swelling. Negative for chest pain and palpitations.   Gastrointestinal: Negative for abdominal pain, nausea and vomiting.   Genitourinary: Negative.    Musculoskeletal: Negative.    Skin: Negative for itching and rash.   Neurological: Positive for focal weakness.   Endo/Heme/Allergies: Negative for polydipsia. Does not bruise/bleed easily.        Physical Exam  Temp:  [36.5 °C (97.7 °F)-36.8 °C (98.2 °F)] 36.5 °C (97.7 °F)  Pulse:  [58-73] 60  Resp:  [16-18] 17  BP: (132-153)/(53-69) 135/63  SpO2:  [94 %] 94 %    Physical Exam  Vitals signs reviewed.   Constitutional:       General: She is not in acute distress.     Appearance: Normal appearance. She is not ill-appearing.   HENT:      Head: Normocephalic and atraumatic.      Right Ear: External ear normal.      Left Ear: External ear normal.      Nose: Nose normal.      Mouth/Throat:      Pharynx: Oropharynx is clear.   Eyes:      General:         Right eye: No discharge.         Left eye: No discharge.      Extraocular Movements: Extraocular movements intact.      Conjunctiva/sclera: Conjunctivae normal.   Neck:      Musculoskeletal: Normal range of motion and neck supple.   Cardiovascular:      Comments: irreg irreg  Pulmonary:      Effort: No respiratory distress.      Breath sounds: No wheezing.      Comments: Decreased BS   Abdominal:      General: Bowel sounds are normal. There is no distension.      Palpations: Abdomen is soft.      Tenderness: There is no abdominal tenderness.   Musculoskeletal:      Right lower leg: Edema present.      Left lower leg: Edema present.   Skin:      General: Skin is warm and dry.   Neurological:      Mental Status: She is alert.      Comments: Awake and alert         Fluids    Intake/Output Summary (Last 24 hours) at 1/8/2021 1001  Last data filed at 1/7/2021 1800  Gross per 24 hour   Intake 480 ml   Output --   Net 480 ml       Laboratory  Recent Labs     01/06/21  0545 01/07/21  0612   WBC 3.2* 12.4*   RBC 3.53* 3.77*   HEMOGLOBIN 10.0* 10.6*   HEMATOCRIT 32.2* 34.8*   MCV 91.2 92.3   MCH 28.3 28.1   MCHC 31.1* 30.5*   RDW 50.6* 50.7*   PLATELETCT 137* 148*   MPV 10.5 10.4     Recent Labs     01/06/21  0545   SODIUM 139   POTASSIUM 4.0   CHLORIDE 108   CO2 28   GLUCOSE 102*   BUN 8   CREATININE 0.55   CALCIUM 9.0                   Assessment/Plan  * CVA (cerebral vascular accident) ([facility])- (present on admission)  Assessment & Plan  Has RLE weakness  On Eliquis and Lipitor    AF (atrial fibrillation) ([facility])- (present on admission)  Assessment & Plan  Rate controlled on Metoprolol  Anticoagulated on Eliquis    Edema- (present on admission)  Assessment & Plan  Has asymmetrical BLE swelling  U/S BLE negative for DVT    Pancytopenia ([facility])- (present on admission)  Assessment & Plan  Follow WBC/ANC, H/H, and Plt counts  Monitor need for G-CSF, PRBC, and/or Platelet transfusion  Granix x 1 given 1/6/21 w/ good response    Alkaline phosphatase elevation- (present on admission)  Assessment & Plan  Pt w/o abd pain, nausea, or vomiting  U/S Abd +cholelithiasis with gallbladder wall thickening suggesting cholecystitis  Alk Phos levels slowly improving  Consider HIDA if pt develops GI symptoms    Vitamin D deficiency- (present on admission)  Assessment & Plan  Vit D level 21  On supplementation    Aortic stenosis- (present on admission)  Assessment & Plan  Echo EF 75%, RVSP 40 mmHg, and mod AS  Outpt F/U    Type 2 diabetes mellitus without complication, without long-term current use of insulin ([facility])- (present on admission)  Assessment & Plan  HbA1c 6.2  Off FSBS and  SSI  Continue Metformin  Outpt meds include Metformin 500 mg bid    Essential hypertension- (present on admission)  Assessment & Plan  Blood pressure controlled on Norvasc, Lisinopril, and Metoprolol  Monitor for orthostatic hypotension on Flomax     Full Code

## 2021-01-08 NOTE — THERAPY
Physical Therapy   Daily Treatment     Patient Name: Liudmila Pulido  Age:  88 y.o., Sex:  female  Medical Record #: [id number]  Today's Date: 1/8/2021     Precautions  Precautions: Fall Risk  Comments: R lizeth (LE>UE), R AFO    Subjective    Pt and dtr ready for FT     Objective       01/08/21 1101   Interdisciplinary Plan of Care Collaboration   Patient Position at End of Therapy Seated;Call Light within Reach;Tray Table within Reach   PT Total Time Spent   PT Individual Total Time Spent (Mins) 30   PT Charge Group   PT Gait Training 1   PT Therapeutic Activities 1     FT with DIL, practice guarding and providing CGA - min A for walking , walking uneven surfaces, car transfer. DIL able to provide CGA - min A safely without therapist stepping in to assist. Also discussed safety for skin with AFO and shoes - pt to get shoes with tongue to fit AFO safely. Also discussed ramp safety with pushing up and down wc.     Assessment    Pt safety to DC home with DIL and son providing CGA - min A for mobility.    Strengths: Able to follow instructions, Supportive family, Pleasant and cooperative, Motivated for self care and independence, Independent prior level of function, Making steady progress towards goals, Willingly participates in therapeutic activities  Barriers: Hemiparesis, Impaired balance, Impaired activity tolerance, Limited mobility    Plan    Complete DC assessment, fall edu, HEP, sup ex, passport    Physical Therapy Problems     Problem: Mobility     Dates: Start: 01/06/21       Goal: STG-Within one week, patient will ambulate household distance     Dates: Start: 01/06/21       Description: 1) Individualized goal:  150 ft With LRD at min A in order to progress towards PLOF  2) Interventions:  PT E Stim Attended, PT Orthotics Training, PT Gait Training, PT Therapeutic Exercises, PT Neuro Re-Ed/Balance, PT Aquatic Therapy, PT Therapeutic Activity, PT Manual Therapy, and PT Evaluation                  Problem:  Mobility Transfers     Dates: Start: 01/06/21       Goal: STG-Within one week, patient will transfer bed to chair     Dates: Start: 01/06/21       Description: 1) Individualized goal:  At SPV with LRD In order to maximize self mobility  2) Interventions:  PT E Stim Attended, PT Orthotics Training, PT Gait Training, PT Therapeutic Exercises, PT Neuro Re-Ed/Balance, PT Aquatic Therapy, PT Therapeutic Activity, PT Manual Therapy, and PT Evaluation                Problem: PT-Long Term Goals     Dates: Start: 12/21/20       Goal: LTG-By discharge, patient will propel wheelchair     Dates: Start: 12/21/20       Description: 1) Individualized goal:  150 ft At mod I in order to improve self mobility  2) Interventions:  PT E Stim Attended, PT Orthotics Training, PT Gait Training, PT Therapeutic Exercises, PT Neuro Re-Ed/Balance, PT Aquatic Therapy, PT Therapeutic Activity, PT Manual Therapy, and PT Evaluation          Goal: LTG-By discharge, patient will transfer one surface to another     Dates: Start: 12/21/20       Description: 1) Individualized goal:  At min A with LRD In order to maximize self mobility  2) Interventions:  PT E Stim Attended, PT Orthotics Training, PT Gait Training, PT Therapeutic Exercises, PT Neuro Re-Ed/Balance, PT Aquatic Therapy, PT Therapeutic Activity, PT Manual Therapy, and PT Evaluation          Goal: LTG-By discharge, patient will transfer in/out of a car     Dates: Start: 12/21/20       Description: 1) Individualized goal:  At min A with LRD In order to maximize self mobility  2) Interventions:  PT E Stim Attended, PT Orthotics Training, PT Gait Training, PT Therapeutic Exercises, PT Neuro Re-Ed/Balance, PT Aquatic Therapy, PT Therapeutic Activity, PT Manual Therapy, and PT Evaluation

## 2021-01-08 NOTE — CARE PLAN
Problem: Speech/Swallowing LTGs  Goal: LTG-By discharge, patient will  Description: 1) Individualized goal:  complete functional problem solving and recall with 80% accuracy provided SPV.   2) Interventions:  SLP Self Care / ADL Training , SLP Cognitive Skill Development, and SLP Group Treatment    Outcome: MET     Problem: Memory STGs  Goal: STG-Within one week, patient will  Description: Description: 1) Individualized goal:  learn and implement functional memory strategies with use of memory book with 80% accuracy provided MIN A.   2) Interventions:  SLP Self Care / ADL Training , SLP Cognitive Skill Development, and SLP Group Treatment    Outcome: MET     Problem: Problem Solving STGs  Goal: STG-Within one week, patient will  Description: 1) Individualized goal:  complete attention tasks related to medication management and financial management skills with 80% accuracy provided MIN A.   2) Interventions:  SLP Self Care / ADL Training , SLP Cognitive Skill Development, and SLP Group Treatment      Outcome: DISCHARGED-GOAL NOT MET

## 2021-01-08 NOTE — THERAPY
Physical Therapy   Daily Treatment     Patient Name: Liudmila Pulido  Age:  88 y.o., Sex:  female  Medical Record #: [id number]  Today's Date: 1/8/2021     Precautions  Precautions: Fall Risk  Comments: R lizeth (LE>UE), R AFO    Subjective    Pt reports she has no concerns about DC to home      Objective       01/08/21 1231   Supine Lower Body Exercise   Comments Issued sup ex program for pt to cont to perform exercise at home   Interdisciplinary Plan of Care Collaboration   Patient Position at End of Therapy Seated;Call Light within Reach;Tray Table within Reach;Self Releasing Lap Belt Applied;Chair Alarm On   PT Total Time Spent   PT Individual Total Time Spent (Mins) 30   PT Charge Group   PT Therapeutic Exercise 1   PT Therapeutic Activities 1     Pt education on avoiding falls in the home with recommendations of home modifications and safety considerations to reduce risk for falls at home and in the community. Also education on fall recovery process with suggestions on when and how to get up from floor in case of a fall. Written information provided for remembering details at home.     See IRFPAI for DC assessment results      Assessment    Pt demos CGA - min A level mobility for DC assessment. Pt safe to DC to home with assist from family and manual wc for mobility . Ongoing skilled PT in HH setting recommended for continue improvement of strength, balance, activity tolerance and decreasing risk for falls and rehospitilization at home and in the community.    Strengths: Able to follow instructions, Supportive family, Pleasant and cooperative, Motivated for self care and independence, Independent prior level of function, Making steady progress towards goals, Willingly participates in therapeutic activities  Barriers: Hemiparesis, Impaired balance, Impaired activity tolerance, Limited mobility    Plan    DC    Physical Therapy Problems     Problem: Mobility     Dates: Start: 01/06/21       Goal: STG-Within one  week, patient will ambulate household distance     Dates: Start: 01/06/21       Description: 1) Individualized goal:  150 ft With LRD at min A in order to progress towards PLOF  2) Interventions:  PT E Stim Attended, PT Orthotics Training, PT Gait Training, PT Therapeutic Exercises, PT Neuro Re-Ed/Balance, PT Aquatic Therapy, PT Therapeutic Activity, PT Manual Therapy, and PT Evaluation                  Problem: Mobility Transfers     Dates: Start: 01/06/21       Goal: STG-Within one week, patient will transfer bed to chair     Dates: Start: 01/06/21       Description: 1) Individualized goal:  At SPV with LRD In order to maximize self mobility  2) Interventions:  PT E Stim Attended, PT Orthotics Training, PT Gait Training, PT Therapeutic Exercises, PT Neuro Re-Ed/Balance, PT Aquatic Therapy, PT Therapeutic Activity, PT Manual Therapy, and PT Evaluation                Problem: PT-Long Term Goals     Dates: Start: 12/21/20       Goal: LTG-By discharge, patient will propel wheelchair     Dates: Start: 12/21/20       Description: 1) Individualized goal:  150 ft At mod I in order to improve self mobility  2) Interventions:  PT E Stim Attended, PT Orthotics Training, PT Gait Training, PT Therapeutic Exercises, PT Neuro Re-Ed/Balance, PT Aquatic Therapy, PT Therapeutic Activity, PT Manual Therapy, and PT Evaluation          Goal: LTG-By discharge, patient will transfer one surface to another     Dates: Start: 12/21/20       Description: 1) Individualized goal:  At min A with LRD In order to maximize self mobility  2) Interventions:  PT E Stim Attended, PT Orthotics Training, PT Gait Training, PT Therapeutic Exercises, PT Neuro Re-Ed/Balance, PT Aquatic Therapy, PT Therapeutic Activity, PT Manual Therapy, and PT Evaluation          Goal: LTG-By discharge, patient will transfer in/out of a car     Dates: Start: 12/21/20       Description: 1) Individualized goal:  At min A with LRD In order to maximize self mobility  2)  Interventions:  PT E Stim Attended, PT Orthotics Training, PT Gait Training, PT Therapeutic Exercises, PT Neuro Re-Ed/Balance, PT Aquatic Therapy, PT Therapeutic Activity, PT Manual Therapy, and PT Evaluation

## 2021-01-08 NOTE — DISCHARGE SUMMARY
"Admission Date: 12/20/2020    Discharge Date: 1/9/2021    Attending Provider: Tangela Noel MD    Admission Diagnosis:   Active Hospital Problems    Diagnosis   • *CVA (cerebral vascular accident) (HCC)   • AF (atrial fibrillation) ([facility])   • Elevated troponin   • Hyponatremia   • Essential hypertension   • Hyperlipidemia   • Impaired mobility and ADLs   • Edema   • Pancytopenia (HCC)   • Alkaline phosphatase elevation   • Vitamin D deficiency   • Other dysphagia   • Cognitive deficits   • Hypophosphatemia   • Urinary retention   • Type 2 diabetes mellitus without complication, without long-term current use of insulin (HCC)   • Aortic stenosis       Discharge Diagnosis:  Active Hospital Problems    Diagnosis   • *CVA (cerebral vascular accident) (HCC)   • AF (atrial fibrillation) ([facility])   • Elevated troponin   • Hyponatremia   • Essential hypertension   • Hyperlipidemia   • Impaired mobility and ADLs   • Edema   • Pancytopenia (HCC)   • Alkaline phosphatase elevation   • Vitamin D deficiency   • Other dysphagia   • Cognitive deficits   • Hypophosphatemia   • Urinary retention   • Type 2 diabetes mellitus without complication, without long-term current use of insulin (HCC)   • Aortic stenosis       HPI per H&P:  Per Dr. Causey H&P note:  \"Patient is a 88 y.o. year-old female with a past medical history significant for diabetes mellitus, aortic stenosis, essential hypertension, hyperlipidemia admitted to [facility] on 12/17/2020  7:03 AM with right leg weakness.  The patient was last seen normal at 7 PM the day prior and around 3 AM they noticed she was weaker when they helped her into bed.  Patient was brought to the emergency department where she was found to be tachycardic.  Atrial fibrillation was seen on EKG monitor.  She did have elevated white blood cell count and hyponatremia at 127.  Troponin was elevated.  Head CT negative for any acute finding.  CT perfusion was normal.  CTA of head and neck " "showed no large vessel occlusion. Review of her records showed she had an echocardiogram in January 2020 with normal EF, mild aortic regurgitation and tricuspid regurgitation, and grade 1 diastolic dysfunction, with no mention of atrial fibrillation.   Echocardiogram from 12/2020 revealed hyperdynamic LV with an EF of 75% and moderate aortic stenosis.  Patient was called code stroke.  MRI brain was positive for high frontal infarct.  Given new onset atrial fibrillation patient has been placed on Eliquis 5 mg twice daily.  Patient also had rising troponin.  EKG again showed A. fib and LBBB which was not present prior.  Angiogram from [place] 1/2019 revealed nonobstructive CAD.  Suspected to be demand ischemia.  Cardiology was consulted.  Patient also found to have moderate aortic stenosis on echocardiogram.  This requires outpatient follow-up and not thought to contribute to her current issues.  Currently not a candidate for cardioversion for her atrial fibrillation given her recent CVA.  Could consider as an outpatient.      Review of therapy notes reveals that patient is highly motivated and reports that she has good family support.  With occupational therapy she is mostly moderate to max assist.  With occupational therapy she is moderate to maximal assist.  Cognitively she is largely within functional limits and is on soft-bite sized (6) and thin liquid diet.  Patient continues to have hyponatremia at 130 on 12/19 - not rechecked prior to admission, leukocytosis resolved.  Troponin still elevated, not rechecked prior to admission to rehab.\"     Patient was evaluated by Rehab Medicine physician and Physical Therapy, Occupational Therapy and Speech Therapy and determined to be appropriate for acute inpatient rehab and was transferred to [facility] on 12/20/20.     Rehab Hospital Course by Problem List:    High left frontal stroke  Cardio-embolic etiology  Right leg hemiplegia, improved  Right " leg hemianesthesia, continues/improved  Cognitive deficits, mild  Dysphagia, resolved  Continue full rehab program  PT/OT/SLP, 1 hr each discipline, 5 days per week     Continue Eliquis and statin for secondary stroke prophylaxis  Consulted Dr. Wasserman, no evidence of depression currently     Outpatient [facility] with [facility], referral made  Outpatient follow-up with Dr. Causey, referral made     Received AFO for right ankle weakness     Right leg spasms, nocturnal, resolved  Started on baclofen 10 mg QHS, will continue at discharge  Continue to monitor need to increase  Outpatient follow-up with Dr. Causey     Right leg neuropathic pain, improved/resolved  Started low-dose gabapentin 100 mg 3 times a day 12/22 --> 200 mg TID 12/24 --> 400 mg TID 12/27, added higher nighttime dose of 600 mg, continue to monitor need to increase     Outpatient follow-up with Dr. Causey, who can titrate off     Currently well controlled on current dosing     Hypertension  Continue lisinopril 5 mg 12/21 --> 10 mg 12/23 --> 30 mg 12/29 --> 40 mg 1/2  Continue metoprolol 50 mg twice a day   Started on amlodipdine 2.5 mg QD --> 5 mg 1/6 --> 10 mg 1/7  Consulted hospitalist, appreciate assistance     Atrial fibrillation  Continue metoprolol 50 mg twice a day  Outpatient follow-up with cardiology, referral made  Currently rate controlled     Diabetes with hyperglycemia  HgbA1c 6.2  Continue metformin 250 BID --> 500 mg BID 1/7  SSI discontinued     Aortic stenosis  Outpatient follow-up with cardiology, referral made     Vitamin D deficiency  Continue supplementation 1000 units     Hypophosphatemia, resolved  s/p supplementation  Consult hospitalist, appreciate assistance     Elevated alk phosphatase, continues/improved  Elevated AST, resolved  Ultrasound with gallbladder wall thickening  Monitor as patient currently asymptomatic  Consult hospitalist, appreciate assistance  Outpatient follow up with her PCP for lab recheck after  discharge     Leukopenia, resolved  Unknown etiology  S/p Granix x 1 1/6     Bowel program  Constipation, improved/resolved  Continue bowel medications  Scheduled Sennakot  Scheduled Miralax  PRN MOM, bisacodyl suppository  Last BM 1/7     Bladder program  Acute urinary retention, resolved  Check PVRs - 68, 98  Continue Flomax 0.4 mg, started 12/21  Bladder scan for no voids  ICP for over 400 cc - required once 12/20  Scheduled toileting     DVT prophylaxis  Eliquis  Negative dopplers on 1/6 on DVT     COVID negative 12/17, 12/19, 12/28     Patient received both her pneumococcal and her flu shot in [place] this year    Functional Status at Discharge  Eating:  Independent  Eating Description:  Increased time, Modified diet, Verbal cueing  Grooming:  Independent, Seated  Grooming Description:  Seated in wheelchair at sink  Bathing:  Supervision  Bathing Description:  Grab bar, Hand held shower, Tub bench, Increased time, Set-up of equipment, Supervision for safety, Verbal cueing  Upper Body Dressing:  Independent  Upper Body Dressing Description:  Other (comment)(don/doff pullover shirt)  Lower Body Dressing:  Minimal Assist  Lower Body Dressing Description:  Increased time, Set-up of equipment, Supervision for safety(assist to don tread socks and roscoe hose)  Discharge Location : Home  Patient Discharging with Assist of: Family   Level of Supervision Required: Intermittent Supervision  Recommended Equipment for Discharge: Front-Wheeled Walker;Ramp;Manual Wheelchair;Shower Chair;Grab Bars by Toilet;Grab Bars in Tub / Shower;3 in 1 Commode;Hand Held Shower Head;Reacher  Recommended Services Upon Discharge: Home Health Occupational Therapy  Long Term Goals Met: 1  Long Term Goals Not Met: 1  Reason(s) for Goals Not Met: pt con't to require min-mod A for LB dressing; supv-indep for all other ADLs and txfrs.  Criteria for Termination of Services: Maximum Function Achieved for Inpatient Rehabilitation  Walk:  Minimal  Assist  Distance Walked:  150  Number of Times Distance Was Traveled:  2  Assistive Device:  Front Wheel Walker  Gait Deviation:  (Cues for safety and sequencing)  Wheelchair:  Modified Independent  Distance Propelled:  150   Wheelchair Description:  Extra time, Supervision for safety, Verbal cueing  Stairs Unable to Participate  Stairs Description       Comprehension:  Modified Independent  Comprehension Description:  Verbal cues  Expression:  Modified Independent  Expression Description:  Verbal cueing  Social Interaction:  Modified Independent  Social Interaction Description:     Problem Solving:  Supervision  Problem Solving Description:  Increased time, Therapy schedule, Verbal cueing, Supervision  Memory:  Minimal Assist  Memory Description:  Verbal cueing, Therapy schedule, Supervision, Increased time       Tangela LOVING M.D., personally performed a complete drug regimen review and no potential clinically significant medication issues were identified.     Discharge Medication:     Medication List      START taking these medications      Instructions   acetaminophen 325 MG Tabs  Commonly known as: Tylenol   Take 2 Tabs by mouth every 6 hours as needed for Mild Pain.  Dose: 650 mg     amLODIPine 10 MG Tabs  Commonly known as: NORVASC   Take 1 Tab by mouth every day.  Dose: 10 mg     baclofen 10 MG Tabs  Commonly known as: LIORESAL   Take 1 Tab by mouth every bedtime.  Dose: 10 mg     * gabapentin 400 MG Caps  Commonly known as: NEURONTIN   Take 1 Cap by mouth 3 times a day, with meals.  Dose: 400 mg     * gabapentin 300 MG Caps  Commonly known as: NEURONTIN   Take 2 Caps by mouth every bedtime.  Dose: 600 mg     metoprolol 50 MG Tabs  Commonly known as: LOPRESSOR   Take 1 Tab by mouth 2 Times a Day.  Dose: 50 mg     polyethylene glycol/lytes 17 g Pack  Commonly known as: MIRALAX   Take 1 Packet by mouth 1 time a day as needed.  Dose: 17 g     senna-docusate 8.6-50 MG Tabs  Commonly known as: PERICOLACE  or SENOKOT S   Take 2 Tabs by mouth 2 Times a Day.  Dose: 2 Tab     tamsulosin 0.4 MG capsule  Commonly known as: FLOMAX   Take 1 Cap by mouth every bedtime.  Dose: 0.4 mg     vitamin D 1000 UNIT Tabs  Commonly known as: VITAMIND D3   Take 1 Tab by mouth every day.  Dose: 1,000 Units         * This list has 2 medication(s) that are the same as other medications prescribed for you. Read the directions carefully, and ask your doctor or other care provider to review them with you.            CHANGE how you take these medications      Instructions   lisinopril 40 MG tablet  What changed:   · medication strength  · how much to take  Commonly known as: PRINIVIL   Take 1 Tab by mouth every day.  Dose: 40 mg     metFORMIN 500 MG Tabs  What changed: how much to take  Commonly known as: GLUCOPHAGE   Take 1 Tab by mouth 2 times a day, with meals.  Dose: 500 mg        CONTINUE taking these medications      Instructions   apixaban 5mg Tabs  Commonly known as: ELIQUIS   Take 1 Tab by mouth 2 Times a Day. Indications: Thromboembolism secondary to Atrial Fibrillation  Dose: 5 mg     atorvastatin 40 MG Tabs  Commonly known as: LIPITOR   Take 1 Tab by mouth every evening.  Dose: 40 mg     FIBER PO   Take 1 Tab by mouth 1 time a day as needed (constipation).  Dose: 1 Tab     VITAMIN C PO   Take 1 Tab by mouth every morning.  Dose: 1 Tab        STOP taking these medications    guaiFENesin 100 MG/5ML syrup  Commonly known as: Cough Syrup     metoprolol  MG Tb24  Commonly known as: TOPROL XL            Discharge Diet:  Heart Healthy    Discharge Activity:  Do not return to work or driving until cleared by a physician.         Disposition:  Patient to discharge home with family support and community resources.     Discharge Location: Home with Home Health.     Agency Name /Phone: [facility]: [phone].     Home Health: Registered Nurse, Occupational Therapist, Physical Therapist, Speech Therapist, .     DME  Provider / Phone: [facility]:  [phone].     Medical Equipment Ordered: Wheelchair        Follow-up Information:     CLAUDIA Couch-Primary Care  [address] [place] [address]   [id number]   [phone]  On 1/12/2021     Aide Loera-Cardiology  [address]  [address]  [facility] [id number] [phone]  The office will call Laura to schedule follow up appointment.     Xochilt Causey D.O.-Physical Medicine and Rehab  [address] [facility] [place] [id number] [phone]  On 2/8/2021  9:45am-[facility]:   [address]  [place], [place]  [id number] [phone]     On 2/8/2021  2pm     Condition on Discharge:  Good    Tangela Noel MD  Physical Medicine and Rehabilitation

## 2021-01-08 NOTE — DISCHARGE PLANNING
Case Management Discharge Instructions        Discharge Location: Home with Home Health.     Agency Name /Phone: [facility]: [phone].     Home Health: Registered Nurse, Occupational Therapist, Physical Therapist, Speech Therapist, .           DME Provider / Phone: [facility]: [phone].     Medical Equipment Ordered: Wheelchair.     Follow-up Information:  CLAUDIA Couch  [address] [facility] [address] [phone]  On 1/12/2021  1:45-This appointment is at the [facility]: [address] [place] [id number] [phone].     Xochilt Causey D.O.  [address] [facility] [address] [phone]  On 2/8/2021  10:30am-see map     On 2/8/2021  1:30pm-[facility]: [address] [facility] [address], [place]  [address].         Aide Loera M.D.  [address] [id number] [phone]  Message left to call Laura to coordinate follow up appointment.

## 2021-01-09 VITALS
TEMPERATURE: 98.9 F | RESPIRATION RATE: 17 BRPM | HEIGHT: 66 IN | HEART RATE: 58 BPM | SYSTOLIC BLOOD PRESSURE: 133 MMHG | WEIGHT: 165.4 LBS | DIASTOLIC BLOOD PRESSURE: 77 MMHG | OXYGEN SATURATION: 95 % | BODY MASS INDEX: 26.58 KG/M2

## 2021-01-09 PROCEDURE — A9270 NON-COVERED ITEM OR SERVICE: HCPCS | Performed by: PHYSICAL MEDICINE & REHABILITATION

## 2021-01-09 PROCEDURE — 700102 HCHG RX REV CODE 250 W/ 637 OVERRIDE(OP): Performed by: PHYSICAL MEDICINE & REHABILITATION

## 2021-01-09 PROCEDURE — 700102 HCHG RX REV CODE 250 W/ 637 OVERRIDE(OP): Performed by: HOSPITALIST

## 2021-01-09 PROCEDURE — 99232 SBSQ HOSP IP/OBS MODERATE 35: CPT | Performed by: HOSPITALIST

## 2021-01-09 PROCEDURE — A9270 NON-COVERED ITEM OR SERVICE: HCPCS | Performed by: HOSPITALIST

## 2021-01-09 RX ADMIN — LISINOPRIL 40 MG: 20 TABLET ORAL at 05:37

## 2021-01-09 RX ADMIN — APIXABAN 5 MG: 5 TABLET, FILM COATED ORAL at 08:22

## 2021-01-09 RX ADMIN — METFORMIN HYDROCHLORIDE 500 MG: 500 TABLET ORAL at 08:22

## 2021-01-09 RX ADMIN — METOPROLOL TARTRATE 50 MG: 25 TABLET, FILM COATED ORAL at 05:37

## 2021-01-09 RX ADMIN — AMLODIPINE BESYLATE 10 MG: 5 TABLET ORAL at 05:37

## 2021-01-09 RX ADMIN — POLYVINYL ALCOHOL 2 DROP: 14 SOLUTION/ DROPS OPHTHALMIC at 06:00

## 2021-01-09 RX ADMIN — GABAPENTIN 400 MG: 400 CAPSULE ORAL at 08:22

## 2021-01-09 RX ADMIN — Medication 1000 UNITS: at 08:22

## 2021-01-09 ASSESSMENT — ENCOUNTER SYMPTOMS
SHORTNESS OF BREATH: 0
FOCAL WEAKNESS: 1
MUSCULOSKELETAL NEGATIVE: 1
COUGH: 0
PALPITATIONS: 0
EYES NEGATIVE: 1
BRUISES/BLEEDS EASILY: 0
CHILLS: 0
ABDOMINAL PAIN: 0
POLYDIPSIA: 0
VOMITING: 0
FEVER: 0
NAUSEA: 0

## 2021-01-09 NOTE — PROGRESS NOTES
Hospital Medicine Daily Progress Note      Chief Complaint  Hypertension  Hypophosphatemia    Interval Problem Update  Pt w/o complaints.    Review of Systems  Review of Systems   Constitutional: Negative for chills and fever.   HENT: Negative.    Eyes: Negative.    Respiratory: Negative for cough and shortness of breath.    Cardiovascular: Positive for leg swelling. Negative for chest pain and palpitations.   Gastrointestinal: Negative for abdominal pain, nausea and vomiting.   Genitourinary: Negative.    Musculoskeletal: Negative.    Skin: Negative for itching and rash.   Neurological: Positive for focal weakness.   Endo/Heme/Allergies: Negative for polydipsia. Does not bruise/bleed easily.        Physical Exam  Temp:  [36.6 °C (97.8 °F)-37.2 °C (98.9 °F)] 37.2 °C (98.9 °F)  Pulse:  [58-69] 58  Resp:  [16-17] 17  BP: (107-134)/(52-77) 133/77  SpO2:  [95 %] 95 %    Physical Exam  Vitals signs reviewed.   Constitutional:       General: She is not in acute distress.     Appearance: Normal appearance. She is not ill-appearing.   HENT:      Head: Normocephalic and atraumatic.      Right Ear: External ear normal.      Left Ear: External ear normal.      Nose: Nose normal.      Mouth/Throat:      Pharynx: Oropharynx is clear.   Eyes:      General:         Right eye: No discharge.         Left eye: No discharge.      Extraocular Movements: Extraocular movements intact.      Conjunctiva/sclera: Conjunctivae normal.   Neck:      Musculoskeletal: Normal range of motion and neck supple.   Cardiovascular:      Comments: irreg irreg  Pulmonary:      Effort: No respiratory distress.      Breath sounds: No wheezing.      Comments: Decreased BS   Abdominal:      General: Bowel sounds are normal. There is no distension.      Palpations: Abdomen is soft.      Tenderness: There is no abdominal tenderness. There is no guarding or rebound.   Musculoskeletal:      Right lower leg: Edema present.      Left lower leg: Edema present.    Skin:     General: Skin is warm and dry.   Neurological:      Mental Status: She is alert.      Comments: Awake and alert         Fluids    Intake/Output Summary (Last 24 hours) at 1/9/2021 1027  Last data filed at 1/9/2021 0917  Gross per 24 hour   Intake 840 ml   Output --   Net 840 ml       Laboratory  Recent Labs     01/07/21  0612   WBC 12.4*   RBC 3.77*   HEMOGLOBIN 10.6*   HEMATOCRIT 34.8*   MCV 92.3   MCH 28.1   MCHC 30.5*   RDW 50.7*   PLATELETCT 148*   MPV 10.4                       Assessment/Plan  * CVA (cerebral vascular accident) ([facility])- (present on admission)  Assessment & Plan  Has RLE weakness  On Eliquis and Lipitor    AF (atrial fibrillation) ([facility])- (present on admission)  Assessment & Plan  Rate controlled on Metoprolol  Anticoagulated on Eliquis    Edema- (present on admission)  Assessment & Plan  Has asymmetrical BLE swelling  U/S BLE negative for DVT    Pancytopenia ([facility])- (present on admission)  Assessment & Plan  Follow WBC/ANC, H/H, and Plt counts  Monitor need for G-CSF, PRBC, and/or Platelet transfusion  Granix x 1 given 1/6/21 for neutropenia, had good response    Alkaline phosphatase elevation- (present on admission)  Assessment & Plan  Pt w/o abd pain, nausea, or vomiting  U/S Abd +cholelithiasis with gallbladder wall thickening suggesting cholecystitis  Alk Phos levels slowly improving  Suspect pt may have silently passed a stone  Suggest HIDA if develops GI symptoms    Vitamin D deficiency- (present on admission)  Assessment & Plan  Vit D level 21  On supplementation    Aortic stenosis- (present on admission)  Assessment & Plan  Echo EF 75%, RVSP 40 mmHg, and mod AS  Outpt F/U    Type 2 diabetes mellitus without complication, without long-term current use of insulin ([facility])- (present on admission)  Assessment & Plan  HbA1c 6.2  Off FSBS and SSI  Continue Metformin  Outpt meds include Metformin 500 mg bid    Essential hypertension- (present on admission)  Assessment & Plan  Blood  pressure controlled on Norvasc, Lisinopril, and Metoprolol  Monitor for orthostatic hypotension on Flomax     Full Code

## 2021-01-09 NOTE — CARE PLAN
Problem: Discharge Barriers/Planning  Goal: Patient's continuum of care needs will be met  Outcome: PROGRESSING AS EXPECTED     Problem: Skin Integrity  Goal: Risk for impaired skin integrity will decrease  Outcome: PROGRESSING AS EXPECTED     Problem: Respiratory:  Goal: Respiratory status will improve  Outcome: PROGRESSING AS EXPECTED     Problem: Urinary Elimination:  Goal: Ability to reestablish a normal urinary elimination pattern will improve  Description: Patient voiding adequate amounts of clear, yellow urine. Denies dysuria and flank pain: afebrile. Will continue to monitor.    Outcome: PROGRESSING AS EXPECTED     Problem: Pain Management  Goal: Pain level will decrease to patient's comfort goal  Outcome: PROGRESSING AS EXPECTED

## 2021-01-09 NOTE — CARE PLAN
Problem: Communication  Goal: The ability to communicate needs accurately and effectively will improve  Description: Patient able to verbalize needs.  Will continue to monitor.   Outcome: MET     Problem: Safety  Goal: Will remain free from injury  Description: Patient care assumed. Report received from day RN. Patient is alert and calm, resting in bed/chair with family at bedside. Call light and bedside table within reach.     Outcome: MET  Goal: Will remain free from falls  Outcome: MET     Problem: Bowel/Gastric:  Goal: Normal bowel function is maintained or improved  Description: Patient having regular bowel movements; last BM 1/5.  Denies s/s constipation; bowel meds available if needed.  Will continue to monitor.   Outcome: MET  Goal: Will not experience complications related to bowel motility  Outcome: MET     Problem: Skin Integrity  Goal: Risk for impaired skin integrity will decrease  Outcome: MET     Problem: Urinary Elimination:  Goal: Ability to reestablish a normal urinary elimination pattern will improve  Description: Patient voiding adequate amounts of clear, yellow urine. Denies dysuria and flank pain: afebrile. Will continue to monitor.    Outcome: MET     Problem: Pain Management  Goal: Pain level will decrease to patient's comfort goal  Outcome: MET

## 2021-01-09 NOTE — DISCHARGE INSTRUCTIONS
[facility] NURSING DISCHARGE INSTRUCTIONS    Blood Pressure : 134/69  Weight: 75 kg (165 lb 6.4 oz)  Nursing recommendations for Liudmila Pulido at time of discharge are as follows:  Client and Family Member verbalized understanding of all discharge instructions and prescriptions.     Review all your home medications and newly ordered medications with your doctor and/or pharmacist. Follow medication instructions as directed by your doctor and/or pharmacist.    Pain Management:   Discharge Pain Medication Instructions:  Comfort Goal: 0, Comfort with Movement, Perform Activity, Sleep Comfortably, Stay Alert  Notify your primary care provider if pain is unrelieved with these measures, if the pain is new, or increased in intensity.    Discharge Skin Characteristics: Warm, Dry  Discharge Skin Exam:    Wound 12/20/20 Pressure Injury Buttocks red cecily around right buttock (Active)                                                  Skin / Wound Care Instructions: Please contact your primary care physician for any change in skin integrity.     If You Have Surgical Incisions / Wounds:  Monitor surgical site(s) for signs of increased swelling, redness or symptoms of drainage from the site or fever as this could indicate signs and symptoms of infection. If these symptoms are noted, notifiy your primary care provider.      Discharge Safety Instructions: No Supervision Needed     Discharge Safety Concerns: Unsteady Gait, Weakness, Balance Problems (Dizziness, Light Headedness)  The interdisciplinary team has made recommendation that you should have adult supervision in the house due to weakness and unsteady gait  Anti-embolic stockings are not required to increase circulation to the lower extremities.    Discharge Diet: Diabetic     Discharge Liquids: Thin  Discharge Bowel Function: Continent  Please contact your primary care physician for any changes in bowel habits.  Discharge Bowel Program:     Discharge Bladder Function: Continent  Discharge Urinary Devices:        Nursing Discharge Plan:   Influenza Vaccine Indication: Not indicated: Previously immunized this influenza season and > 8 years of age    Case Management Discharge Instructions:   Discharge Location: Home  Agency Name/Address/Phone: [facility]: [phone]  Home Health: Registered Nurse, Occupational Therapist, Physical Therapist, Speech Therapist,   Outpatient Services:    DME Provider/Phone: [facility]:  [phone]  Medical Equipment Ordered: Wheelchair  Prescription Faxed to:        Discharge Medication Instructions:  Below are the medications your physician expects you to take upon discharge:      Occupational Therapy Discharge Instructions for Liudmila PICHARDO Cindyisidra    1/8/2021    Level of Assist Required for Eating: Able to Complete Eating without Assist  Level of Assist Required for Grooming: Able to Complete Grooming without Assist  Level of Assist Required for Dressing: Requires Physical Assist with Dressing(lower body dressing)  Equipment for Dressing: Reacher  Level of Assist Required for Toileting: Requires Supervision with Toileting  Level of Assist Required for Toilet Transfer: Requires Supervision with Toilet Transfer(w/c level)  Equipment for Toilet Transfer: Grab Bars by Toilet  Level of Assist Required for Bathing: Requires Supervision with Bathing  Equipment for Bathing: Shower Chair, Grab Bars in Tub / Shower, Hand Held Shower Head  Level of Assist Required for Shower Transfer: Requires Supervision with Shower Transfer  Equipment for Shower Transfer: Grab Bars in Tub / Shower, Shower Chair(w/c level)  Level of Assist Required for Home Mgmt: Requires Supervision with Home Management  Level of Assist Required for Meal Prep: Requires Supervision with Meal Preparation  Driving: Please Contact Physician Prior to Driving    It has been a pleasure working with youLiudmila! We will miss you  and wish you all the best!  -Gris Padilla, OT      Physical Therapy Discharge Instructions for Liudmila Pulido    1/8/2021    Level of Assist Required for Ambulation: Physical Assist on Flat Surfaces, Should Not Attempt Stairs at This Time, Should Not Attempt Curbs at This Time  Device Recommended for Ambulation: Front-Wheeled Walker  Level of Assist Required to Propel Wheelchair: Requires No Assist  Level of Assist Required for Transfers: Physical Assist  Device Recommended for Transfers: Front-Wheeled Walker  Home Exercise Program: Refer to Home Exercise Program Handout for Details    Thanks for working hard in PT, it was a pleasure working with you!  Washington Azar, DPT      Speech Therapy Discharge Instructions for Liudmila Pulido    1/8/2021    Diet: Regular (7), Thin (0)  Supervision: intermittent supervision to ensure accuracy and safety  Cognitive therapy - Family training completed with pt and DIL. Reviewed results of initial assessment and outcome assessment, improvement across all subtest's. Pt cont to demonstrate MILD cognitive impairments in the areas of attention and STM recall. Discussed rec: for pt to have assistance with all medications and finances, pts family willing and able to provide support. Reinforced slowing down and checking work to assist with attention to detail to ensure accuracy and safety. Both pt and DIL asked appropriate questions at this time and demonstrated good understanding of the information provided.     Liudmila, it has been a pleasure working with you! Take care, be safe and best wishes to you and your continued recovery!  Sim, Patrizia Campuzano M.S., CCC-SLP    To increase your ability to pay attention and concentrate it is recommended you follow these strategies:     Monitor your fatigue: Monitoring our fatigue is probably one of the most important strategies we can use. Many people suffer from higher levels of fatigue than normal and must be aware that fatigue will  have a major impact on attention capabilities. This means scheduling activities that require your attention at times when you feel at your best.  Make a schedule: Choose a time that’s quiet and unhurried -- maybe at night before you go to bed -- and plan out the next day, down to the task. Use a reminder willow, timer, or alarm to help you stick to that schedule. Alternate things you want to do with ones you don’t to help your mind stay engaged.  Allow plenty of time to complete tasks.  Be realistic about time: Your brain is wired differently than other people’s, and it may take you longer to get things done. That’s OK. Figure out a realistic time frame for your daily tasks -- and don’t forget to build in time for breaks if you think you’ll need them.  Quiet your mind by quieting your space: When it’s time to buckle down and get something done, take away the distractions. Use noise-canceling headphones to drown out sounds. Put your phone on silent. Work in a room with a door you can close. If you can do your job from home, set up the space in a way that helps you focus.  Control clutter: Another way to quiet your brain is to clear your space of things you don’t need. It can prevent distractions, and it can help you stay organized because you’ll have fewer things to tidy up.  Get some organizational helpers like under-the-bed containers or over-the-door holders. Ask a friend to help if it seems like you’re swimming in a sea of debris and you don’t know where to start.    What is memory?   Memory is the ability to learn, store, and retrieve information. New or increasing problems with any or all of these 3 stages of memory often occur after a traumatic brain injury, stroke, brain tumor, multiple sclerosis, or other kind of injury or illness that affects your nervous system.   Some kinds of memory problems may also occur as part of normal aging, when many people have more trouble retrieving new information.     Types of  Memory:    •Long-term (remote): memory for old, well-learned information that has been “rehearsed” (used) over time, such as the name of a childhood pet, memories of past vacations, or where you went to high school. Long-term memory tends to be retained after injury or illness.   •Short-term (recent): memory for new experiences that took place a few minutes, hours, or days ago, such as what you had for breakfast or what you did yesterday. Short-term memory tends to be the most severely affected after injury. People who have had brain injuries may have problems with their attention span, how much memory they can store, how quickly they can think, and how efficiently they learn. These memory problems will make it hard to understand and save short-term memories so that they can be correctly rehearsed and stored in long-term memory.   •Immediate (working): memory for information that is current, that you usually keep track of mentally, such as a phone number you look up, directions someone just gave you, or keeping track of numbers in your head when you add or subtract.   •Prospective: the ability to remember to do something in the future, such as remembering to take a medicine, go to an appointment, or follow through on an assignment or project.       Strategies to Help Improve Your Memory   Your speech therapist can work with you to develop strategies to help you remember new information. There are 2 main types of strategies to help your memory: internal reminders and external reminders.     Internal Reminders     •Rehearsal: retelling yourself information you just learned, or restating it out loud in your own words.   •Repetition: saying the same information over and over, either silently or out loud.   •Clarification: asking someone else to repeat or rephrase information.   •Chunking: grouping items together to reduce the number of items to remember, such as grouping 7-digit phone numbers into 2 chunks, one with 3  numbers and the other with 4 numbers.   •Rhyming: making a rhyme out of important information.   •Acronyms or alphabet cueing: creating a letter for each word you want to remember, or vice versa. One example is using the sentence “Every Good Boy Does Fine” to remember that the lines of a treble staff in music are the notes E, G, B, D, and F.   •Imagery (also called visualization): creating pictures of the information in your mind.   •Association: linking old information or habits with the new, such as remembering to take your medicine every night at the same time that you brush your teeth.   •Personal meaning: making the new information meaningful or emotionally important to you in some way.     External Reminders     •Using a paper or electronic calendar or day planner.   •Setting timers or alarms to remind you to do something.   •Writing down reminders such as to-do lists, shopping lists, and project outlines.   •Recording new information with a voice recorder.   •Using a medicine organizing tool.   •Creating specific, permanent places for important items. One example is always putting your keys, wallet, and cell phone in the same place every time you get home.      Understanding Your Risk for Falls  Each year, millions of people suffer serious injuries from falls. It is important to understand your risk for falling. Talk with your health care provider about your risk and what you can do to lower it. There are actions you can take at home to lower your risk.  If you do have a serious fall, it is important to tell your health care provider. Falling once raises your risk for falling again.  How can falls affect me?  Serious injuries from falls are common. These include:  · Broken bones. Most hip fractures are caused by falls.  · Traumatic brain injury (TBI). Falls are the most common cause of TBI.  Fear of falling can also cause you to avoid activities and stay at home. This can make your muscles weaker and actually  raise your risk for a fall.  What can increase my risk?  Serious injuries from a fall most often happen to people older than age 65. Children and young adults ages 15-29 are also at higher risk. The more risk factors you have for falling, the higher your risk. Risk factors include:  · Weakness in the lower body.  · Lack (deficiency) of vitamin D.  · Weak bones (osteoporosis).  · Being generally weak or confused due to long-term (chronic) illness.  · Dizziness or balance problems.  · Poor vision.  · Having depression.  · Medicine that causes dizziness or drowsiness. These can include medicines for your blood pressure, heart, anxiety, insomnia, or edema, as well as pain medicines and muscle relaxants.  · Drinking alcohol.  · Foot pain or improper footwear.  · Working at a dangerous job.  · Having had a fall in the past.  · Tripping hazards at home, such as floor clutter or loose rugs, or poor lighting.  · Having pets or clutter in your home.  What actions can I take to lower my risk of falling?         · Maintain physical fitness:  ? Do strength and balance exercises. Consider taking a regular class to build strength and balance. Yoga and lenora chi are good options.  ? Have your eyes checked every year and your vision prescription updated as needed.  · Remove all clutter from walkways and stairways, including extension cords.  · Use a cordless phone.  · Do not use throw rugs. Make sure all carpeting is taped or tacked down securely.  · Use good lighting in all rooms. Keep a flashlight near your bed.  · Make sure there is a clear path from your bed to the bathroom. Use night-lights.  · Install grab bars for your tub, shower, and toilet. Use a bath mat in your tub or shower.  · Attach secure railings on both sides of your stairs.  · Repair uneven or broken steps.  · Use a cane or walker as directed by your health care provider.  · Wear nonskid shoes. Do not wear high heels. Do not walk around the house in socks or  slippers.  · Avoid walking on icy or slippery surfaces. Walk on the grass instead of on icy or slick sidewalks. Where you can, use ice melt to get rid of ice on walkways.  Questions to ask your health care provider  · Can you help me evaluate my risk for a fall?  · Do any of my medicines make me more likely to fall?  · Should I take a vitamin D supplement?  · What exercises can I do to improve my strength and balance?  · Should I make an appointment to have my vision checked?  · Do I need a bone density test to check for osteoporosis?  · Would it help to use a cane or a walker?  Where to find more information  · Centers for Disease Control and Prevention, STEADI: cdc.gov  · Community-Based Fall Prevention Programs: cdc.gov  · National [place] on Aging: fr2ozdw.luis.nih.gov  Contact a health care provider if:  · You fall at home.  · You are afraid of falling at home.  · You feel weak, drowsy, or dizzy at home.  Summary  · People 65 and older are at high risk for falling. However, older people are not the only ones injured in falls. Children and young adults have a higher-than-normal risk, too.  · Talk with your health care provider about your risks for falling and how to lower those risks.  · Taking certain precautions at home can lower your risk for falling.  · If you fall, always tell your health care provider.  This information is not intended to replace advice given to you by your health care provider. Make sure you discuss any questions you have with your health care provider.  Document Released: 08/01/2018 Document Revised: 03/19/2019 Document Reviewed: 08/01/2018  Elsevier Patient Education © 2020 Elsevier Inc.        Stroke Prevention  Some medical conditions and lifestyle choices can lead to a higher risk for a stroke. You can help to prevent a stroke by making nutrition, lifestyle, and other changes.  What nutrition changes can be made?    · Eat healthy foods.  ? Choose foods that are high in fiber. These  include:  § Fresh fruits.  § Fresh vegetables.  § Whole grains.  ? Eat at least 5 or more servings of fruits and vegetables each day. Try to fill half of your plate at each meal with fruits and vegetables.  ? Choose lean protein foods. These include:  § Lowfat (lean) cuts of meat.  § Chicken without skin.  § Fish.  § Tofu.  § Beans.  § Nuts.  ? Eat low-fat dairy products.  ? Avoid foods that:  § Are high in salt (sodium).  § Have saturated fat.  § Have trans fat.  § Have cholesterol.  § Are processed.  § Are premade.  · Follow eating guidelines as told by your doctor. These may include:  ? Reducing how many calories you eat and drink each day.  ? Limiting how much salt you eat or drink each day to 1,500 milligrams (mg).  ? Using only healthy fats for cooking. These include:  § Olive oil.  § Canola oil.  § Sunflower oil.  ? Counting how many carbohydrates you eat and drink each day.  What lifestyle changes can be made?  · Try to stay at a healthy weight. Talk to your doctor about what a good weight is for you.  · Get at least 30 minutes of moderate physical activity at least 5 days a week. This can include:  ? Fast walking.  ? Biking.  ? Swimming.  · Do not use any products that have nicotine or tobacco. This includes cigarettes and e-cigarettes. If you need help quitting, ask your doctor. Avoid being around tobacco smoke in general.  · Limit how much alcohol you drink to no more than 1 drink a day for nonpregnant women and 2 drinks a day for men. One drink equals 12 oz of beer, 5 oz of wine, or 1½ oz of hard liquor.  · Do not use drugs.  · Avoid taking birth control pills. Talk to your doctor about the risks of taking birth control pills if:  ? You are over 35 years old.  ? You smoke.  ? You get migraines.  ? You have had a blood clot.  What other changes can be made?  · Manage your cholesterol.  ? It is important to eat a healthy diet.  ? If your cholesterol cannot be managed through your diet, you may also need to  "take medicines. Take medicines as told by your doctor.  · Manage your diabetes.  ? It is important to eat a healthy diet and to exercise regularly.  ? If your blood sugar cannot be managed through diet and exercise, you may need to take medicines. Take medicines as told by your doctor.  · Control your high blood pressure (hypertension).  ? Try to keep your blood pressure below 130/80. This can help lower your risk of stroke.  ? It is important to eat a healthy diet and to exercise regularly.  ? If your blood pressure cannot be managed through diet and exercise, you may need to take medicines. Take medicines as told by your doctor.  ? Ask your doctor if you should check your blood pressure at home.  ? Have your blood pressure checked every year. Do this even if your blood pressure is normal.  · Talk to your doctor about getting checked for a sleep disorder. Signs of this can include:  ? Snoring a lot.  ? Feeling very tired.  · Take over-the-counter and prescription medicines only as told by your doctor. These may include aspirin or blood thinners (antiplatelets or anticoagulants).  · Make sure that any other medical conditions you have are managed.  Where to find more information  · American Stroke Association: www.strokeassociation.org  · National Stroke Association: www.stroke.org  Get help right away if:  · You have any symptoms of stroke. \"BE FAST\" is an easy way to remember the main warning signs:  ? B - Balance. Signs are dizziness, sudden trouble walking, or loss of balance.  ? E - Eyes. Signs are trouble seeing or a sudden change in how you see.  ? F - Face. Signs are sudden weakness or loss of feeling of the face, or the face or eyelid drooping on one side.  ? A - Arms. Signs are weakness or loss of feeling in an arm. This happens suddenly and usually on one side of the body.  ? S - Speech. Signs are sudden trouble speaking, slurred speech, or trouble understanding what people say.  ? T - Time. Time to call " emergency services. Write down what time symptoms started.  · You have other signs of stroke, such as:  ? A sudden, very bad headache with no known cause.  ? Feeling sick to your stomach (nausea).  ? Throwing up (vomiting).  ? Jerky movements you cannot control (seizure).  These symptoms may represent a serious problem that is an emergency. Do not wait to see if the symptoms will go away. Get medical help right away. Call your local emergency services (911 in the [place] Do not drive yourself to the hospital.  Summary  · You can prevent a stroke by eating healthy, exercising, not smoking, drinking less alcohol, and treating other health problems, such as diabetes, high blood pressure, or high cholesterol.  · Do not use any products that contain nicotine or tobacco, such as cigarettes and e-cigarettes.  · Get help right away if you have any signs or symptoms of a stroke.  This information is not intended to replace advice given to you by your health care provider. Make sure you discuss any questions you have with your health care provider.  Document Released: 06/18/2013 Document Revised: 02/13/2020 Document Reviewed: 03/21/2018  Elsevier Patient Education © 2020 Elsevier Inc.

## 2021-01-09 NOTE — DISCHARGE PLANNING
Case management Summary:   CM spoke with patients daughter Laura prior to discharge.     Reviewed all follow up appointments.     Referral made to [facility] and they have accepted referral and are ready to follow.      [employer] has delivered WC to patient.      During hospitalization, I have provided support and education and have been available for questions and information during hours of operation, communicated with therapy team and MD along with providing links/resources  to outside services.    Patient verbalizes agreement with all plans and has an understanding of the next steps within the post acute services.     Individualized Goals:   1. Have ramp at DC  2. Bathroom getting remodeled  3. Establish with PCP at local clinic    Outcome:   1. Completed  2. Completed  3. Appointment scheduled     26-Jul-2020 11:27

## 2021-01-11 NOTE — DISCHARGE PLANNING
"CM rec'd call from patients daughter Laura stating she is very upset at [facility] DC process; \"no pharmacist met with her and the nurse didn't really say much other than read off of a paper\".  She also stated that the WC that was ordered is too wide.  CM explained that therapy determined an 18in wide after CM explained daughter requesting most narrow WC.  CM offered to have pharmacy call her and she declined.      Laura stated much family conflict to the point she is going to relinquish being POA for patient. CM offered support.  Called [employer] to touch base with Laura.  Patient has a transport chair, ramp, FWW, 4WW, 3:1 commode and raised toilet seat.      CM updated Dr. Noel and Rosa Elena Ortega.  CM available as needs arise.    "

## 2021-01-14 ENCOUNTER — TELEPHONE (OUTPATIENT)
Dept: VASCULAR LAB | Facility: MEDICAL CENTER | Age: 86
End: 2021-01-14

## 2021-01-14 NOTE — TELEPHONE ENCOUNTER
Left voicemail message for patient to return call to [facility] to establish care      FELA Teague, Clinical Pharmacist, CDE, CACP

## 2021-01-18 ENCOUNTER — ANTICOAGULATION VISIT (OUTPATIENT)
Dept: VASCULAR LAB | Facility: MEDICAL CENTER | Age: 86
End: 2021-01-18
Attending: INTERNAL MEDICINE
Payer: MEDICARE

## 2021-01-18 DIAGNOSIS — I48.91 ATRIAL FIBRILLATION, UNSPECIFIED TYPE (HCC): Primary | ICD-10-CM

## 2021-01-18 PROCEDURE — 99213 OFFICE O/P EST LOW 20 MIN: CPT

## 2021-01-18 ASSESSMENT — CHA2DS2 SCORE
VASCULAR DISEASE: NO
CHF OR LEFT VENTRICULAR DYSFUNCTION: NO
DIABETES: YES
SEX: FEMALE
HYPERTENSION: YES
PRIOR STROKE OR TIA OR THROMBOEMBOLISM: YES
AGE 75 OR GREATER: YES
AGE 65 TO 74: NO
CHA2DS2 VASC SCORE: 7

## 2021-01-18 NOTE — Clinical Note
Recent Eliquis start following CVA in December. No concerns at time of visit. 1 month follow up with labs ordered

## 2021-01-18 NOTE — PROGRESS NOTES
NEW DOAC   .  Anticoagulation Patient Findings      PCP: Yulia Blackman M.D.  Cardiologist: Not established - referral placed for  Dx: Atrial Fibrillation; CVA  CHADSVASC = 7    Target End Date = INDEFINITE    Pt Hx: Pt with no history of atrial fibrillation presented to ED 12/17/2020 with right leg weakness believed to be due to cerebral infarct 2/2 AF. Pt and her daughter, Laura, presented to clinic today for anticoagulation clinic enrollment.    Labs:  Lab Results   Component Value Date/Time    WBC 12.4 (H) 01/07/2021 06:12 AM    RBC 3.77 (L) 01/07/2021 06:12 AM    HEMOGLOBIN 10.6 (L) 01/07/2021 06:12 AM    HEMATOCRIT 34.8 (L) 01/07/2021 06:12 AM    MCV 92.3 01/07/2021 06:12 AM    MCH 28.1 01/07/2021 06:12 AM    MCHC 30.5 (L) 01/07/2021 06:12 AM    MPV 10.4 01/07/2021 06:12 AM    NEUTSPOLYS 75.80 (H) 01/07/2021 06:12 AM    LYMPHOCYTES 15.10 (L) 01/07/2021 06:12 AM    MONOCYTES 5.00 01/07/2021 06:12 AM    EOSINOPHILS 2.40 01/07/2021 06:12 AM    BASOPHILS 0.20 01/07/2021 06:12 AM      Lab Results   Component Value Date/Time    SODIUM 139 01/06/2021 05:45 AM    POTASSIUM 4.0 01/06/2021 05:45 AM    CHLORIDE 108 01/06/2021 05:45 AM    CO2 28 01/06/2021 05:45 AM    GLUCOSE 102 (H) 01/06/2021 05:45 AM    BUN 8 01/06/2021 05:45 AM    CREATININE 0.55 01/06/2021 05:45 AM          Pt is new to [facility] and new to [facility]. Discussed:   · Indication for DOAC therapy.  · Importance of monitoring and compliance.   · Monitoring parameters, signs and symptoms of bleeding or clotting.  · DOAC therapy, side effects, potential DDIs, OTC medications  · Hormonal therapy No  · Pregnancy No  · ASA: Patient may start per cardiology recommendation - appt next week  · DDI None to note  · Lifestyle safety, ie smoking, ETOH, hobby safety, fall safety/prevention  · Procedures for missed doses or suspected missed doses, surgeries/procedures, travel, dental work, any medication changes    DOAC affordable = Yes, currently affordable    Continue  Eliquis 5mg twice daily  Labs to be completed prior to next f/u - CBC, CMP    F/U - 4 weeks    Jim Cordero, PharmD

## 2021-01-19 ENCOUNTER — TELEPHONE (OUTPATIENT)
Dept: VASCULAR LAB | Facility: MEDICAL CENTER | Age: 86
End: 2021-01-19

## 2021-01-20 NOTE — TELEPHONE ENCOUNTER
Initial anticoag note and most recent d/c summary reviewed.  Patient with afib and chads vasc = approx 7 with h/o stroke    Pending further recommendations, we will continue with indefinite anticoagulation with eliquis as directed at d/c    Will defer all management of rhythm, rate, and other cv issues, aside from anticoagulation, to cards and/or pcp    Michael Bloch, MD  Anticoagulation Clinic    Cc:  ELICEO Blackman

## 2021-02-08 ENCOUNTER — OFFICE VISIT (OUTPATIENT)
Dept: PHYSICAL MEDICINE AND REHAB | Facility: REHABILITATION | Age: 86
End: 2021-02-08
Payer: MEDICARE

## 2021-02-08 ENCOUNTER — OFFICE VISIT (OUTPATIENT)
Dept: NEUROLOGY | Facility: MEDICAL CENTER | Age: 86
End: 2021-02-08
Attending: NURSE PRACTITIONER
Payer: MEDICARE

## 2021-02-08 ENCOUNTER — HOSPITAL ENCOUNTER (OUTPATIENT)
Dept: LAB | Facility: MEDICAL CENTER | Age: 86
End: 2021-02-08
Attending: NURSE PRACTITIONER
Payer: MEDICARE

## 2021-02-08 VITALS
DIASTOLIC BLOOD PRESSURE: 60 MMHG | BODY MASS INDEX: 24.99 KG/M2 | TEMPERATURE: 97.2 F | HEIGHT: 65 IN | OXYGEN SATURATION: 98 % | SYSTOLIC BLOOD PRESSURE: 110 MMHG | RESPIRATION RATE: 16 BRPM | WEIGHT: 150 LBS | HEART RATE: 95 BPM

## 2021-02-08 VITALS
TEMPERATURE: 98.1 F | DIASTOLIC BLOOD PRESSURE: 62 MMHG | HEART RATE: 77 BPM | WEIGHT: 150 LBS | RESPIRATION RATE: 16 BRPM | HEIGHT: 65 IN | SYSTOLIC BLOOD PRESSURE: 110 MMHG | OXYGEN SATURATION: 92 % | BODY MASS INDEX: 24.99 KG/M2

## 2021-02-08 DIAGNOSIS — I10 ESSENTIAL HYPERTENSION: ICD-10-CM

## 2021-02-08 DIAGNOSIS — G47.9 SLEEPING DIFFICULTY: ICD-10-CM

## 2021-02-08 DIAGNOSIS — I48.91 ATRIAL FIBRILLATION, UNSPECIFIED TYPE (HCC): ICD-10-CM

## 2021-02-08 DIAGNOSIS — R25.2 SPASTICITY: ICD-10-CM

## 2021-02-08 DIAGNOSIS — I69.30 LATE EFFECT OF STROKE: ICD-10-CM

## 2021-02-08 DIAGNOSIS — E11.9 TYPE 2 DIABETES MELLITUS WITHOUT COMPLICATION, WITHOUT LONG-TERM CURRENT USE OF INSULIN (HCC): ICD-10-CM

## 2021-02-08 DIAGNOSIS — Z74.09 IMPAIRED MOBILITY AND ACTIVITIES OF DAILY LIVING: ICD-10-CM

## 2021-02-08 DIAGNOSIS — E78.5 HYPERLIPIDEMIA, UNSPECIFIED HYPERLIPIDEMIA TYPE: ICD-10-CM

## 2021-02-08 DIAGNOSIS — I63.40 CEREBROVASCULAR ACCIDENT (CVA) DUE TO EMBOLISM OF CEREBRAL ARTERY (HCC): Primary | ICD-10-CM

## 2021-02-08 DIAGNOSIS — G81.91 RIGHT HEMIPARESIS (HCC): ICD-10-CM

## 2021-02-08 DIAGNOSIS — Z78.9 IMPAIRED MOBILITY AND ACTIVITIES OF DAILY LIVING: ICD-10-CM

## 2021-02-08 DIAGNOSIS — M21.371 RIGHT FOOT DROP: ICD-10-CM

## 2021-02-08 DIAGNOSIS — M79.2 NEUROPATHIC PAIN: ICD-10-CM

## 2021-02-08 LAB
ALBUMIN SERPL BCP-MCNC: 3.1 G/DL (ref 3.2–4.9)
ALBUMIN/GLOB SERPL: 0.7 G/DL
ALP SERPL-CCNC: 461 U/L (ref 30–99)
ALT SERPL-CCNC: 60 U/L (ref 2–50)
ANION GAP SERPL CALC-SCNC: 10 MMOL/L (ref 7–16)
AST SERPL-CCNC: 234 U/L (ref 12–45)
BILIRUB SERPL-MCNC: 1.2 MG/DL (ref 0.1–1.5)
BUN SERPL-MCNC: 9 MG/DL (ref 8–22)
CALCIUM SERPL-MCNC: 10.1 MG/DL (ref 8.5–10.5)
CHLORIDE SERPL-SCNC: 101 MMOL/L (ref 96–112)
CO2 SERPL-SCNC: 24 MMOL/L (ref 20–33)
CREAT SERPL-MCNC: 0.49 MG/DL (ref 0.5–1.4)
FASTING STATUS PATIENT QL REPORTED: NORMAL
GLOBULIN SER CALC-MCNC: 4.7 G/DL (ref 1.9–3.5)
GLUCOSE SERPL-MCNC: 125 MG/DL (ref 65–99)
POTASSIUM SERPL-SCNC: 4.2 MMOL/L (ref 3.6–5.5)
PROT SERPL-MCNC: 7.8 G/DL (ref 6–8.2)
SODIUM SERPL-SCNC: 135 MMOL/L (ref 135–145)

## 2021-02-08 PROCEDURE — 99215 OFFICE O/P EST HI 40 MIN: CPT | Performed by: NURSE PRACTITIONER

## 2021-02-08 PROCEDURE — 99212 OFFICE O/P EST SF 10 MIN: CPT | Performed by: NURSE PRACTITIONER

## 2021-02-08 PROCEDURE — 85027 COMPLETE CBC AUTOMATED: CPT

## 2021-02-08 PROCEDURE — 36415 COLL VENOUS BLD VENIPUNCTURE: CPT

## 2021-02-08 PROCEDURE — 99214 OFFICE O/P EST MOD 30 MIN: CPT | Performed by: PHYSICAL MEDICINE & REHABILITATION

## 2021-02-08 PROCEDURE — 80053 COMPREHEN METABOLIC PANEL: CPT

## 2021-02-08 ASSESSMENT — ENCOUNTER SYMPTOMS
NERVOUS/ANXIOUS: 0
CHILLS: 0
NAUSEA: 1
BACK PAIN: 1
DIZZINESS: 1
BLURRED VISION: 1
BRUISES/BLEEDS EASILY: 0
SHORTNESS OF BREATH: 0
SPEECH CHANGE: 1
COUGH: 0
TINGLING: 0
VOMITING: 0
DIARRHEA: 0
FOCAL WEAKNESS: 1
TINGLING: 0
HEADACHES: 1
FEVER: 0
COUGH: 0
SORE THROAT: 0
CHILLS: 0
MEMORY LOSS: 0
DEPRESSION: 0
HEARTBURN: 0
PALPITATIONS: 0
SENSORY CHANGE: 1
FALLS: 0
FEVER: 0
CONSTIPATION: 0
BRUISES/BLEEDS EASILY: 0
FOCAL WEAKNESS: 1
FALLS: 0

## 2021-02-08 ASSESSMENT — PATIENT HEALTH QUESTIONNAIRE - PHQ9
CLINICAL INTERPRETATION OF PHQ2 SCORE: 2
SUM OF ALL RESPONSES TO PHQ QUESTIONS 1-9: 9
5. POOR APPETITE OR OVEREATING: 0 - NOT AT ALL

## 2021-02-08 ASSESSMENT — FIBROSIS 4 INDEX
FIB4 SCORE: 3.5
FIB4 SCORE: 3.5

## 2021-02-08 ASSESSMENT — PAIN SCALES - GENERAL: PAINLEVEL: 2=MINIMAL-SLIGHT

## 2021-02-08 NOTE — PATIENT INSTRUCTIONS
Iniital NIHSS      Current NIHSS    1a. LOC:   1b. LOC Questions:   1c. LOC Commands:   2. Best Gaze:  3. Visual Fields:   4. Facial Paresis:   5a. Motor arm left:   5b. Motor arm right:   6a. Motor leg left:   6b. Motor leg right:   7. Sensory:   8. Best Language:   9. Limb Ataxia:   10. Dysarthria:   11. Extinction/Inattention:     Total Score Current            Atrial Fibrillation    Atrial fibrillation is a type of heartbeat that is irregular or fast (rapid). If you have this condition, your heart beats without any order. This makes it hard for your heart to pump blood in a normal way. Having this condition gives you more risk for stroke, heart failure, and other heart problems.  Atrial fibrillation may start all of a sudden and then stop on its own, or it may become a long-lasting problem.  What are the causes?  This condition may be caused by heart conditions, such as:  · High blood pressure.  · Heart failure.  · Heart valve disease.  · Heart surgery.  Other causes include:  · Pneumonia.  · Obstructive sleep apnea.  · Lung cancer.  · Thyroid disease.  · Drinking too much alcohol.  Sometimes the cause is not known.  What increases the risk?  You are more likely to develop this condition if:  · You smoke.  · You are older.  · You have diabetes.  · You are overweight.  · You have a family history of this condition.  · You exercise often and hard.  What are the signs or symptoms?  Common symptoms of this condition include:  · A feeling like your heart is beating very fast.  · Chest pain.  · Feeling short of breath.  · Feeling light-headed or weak.  · Getting tired easily.  Follow these instructions at home:  Medicines  · Take over-the-counter and prescription medicines only as told by your doctor.  · If your doctor gives you a blood-thinning medicine, take it exactly as told. Taking too much of it can cause bleeding. Taking too little of it does not protect you against clots. Clots can cause a  "stroke.  Lifestyle         · Do not use any tobacco products. These include cigarettes, chewing tobacco, and e-cigarettes. If you need help quitting, ask your doctor.  · Do not drink alcohol.  · Do not drink beverages that have caffeine. These include coffee, soda, and tea.  · Follow diet instructions as told by your doctor.  · Exercise regularly as told by your doctor.  General instructions  · If you have a condition that causes breathing to stop for a short period of time (apnea), treat it as told by your doctor.  · Keep a healthy weight. Do not use diet pills unless your doctor says they are safe for you. Diet pills may make heart problems worse.  · Keep all follow-up visits as told by your doctor. This is important.  Contact a doctor if:  · You notice a change in the speed, rhythm, or strength of your heartbeat.  · You are taking a blood-thinning medicine and you see more bruising.  · You get tired more easily when you move or exercise.  · You have a sudden change in weight.  Get help right away if:    · You have pain in your chest or your belly (abdomen).  · You have trouble breathing.  · You have blood in your vomit, poop, or pee (urine).  · You have any signs of a stroke. \"BE FAST\" is an easy way to remember the main warning signs:  ? B - Balance. Signs are dizziness, sudden trouble walking, or loss of balance.  ? E - Eyes. Signs are trouble seeing or a change in how you see.  ? F - Face. Signs are sudden weakness or loss of feeling in the face, or the face or eyelid drooping on one side.  ? A - Arms. Signs are weakness or loss of feeling in an arm. This happens suddenly and usually on one side of the body.  ? S - Speech. Signs are sudden trouble speaking, slurred speech, or trouble understanding what people say.  ? T - Time. Time to call emergency services. Write down what time symptoms started.  · You have other signs of a stroke, such as:  ? A sudden, very bad headache with no known cause.  ? Feeling sick " to your stomach (nausea).  ? Throwing up (vomiting).  ? Jerky movements you cannot control (seizure).  These symptoms may be an emergency. Do not wait to see if the symptoms will go away. Get medical help right away. Call your local emergency services (911 in the [place] Do not drive yourself to the hospital.  Summary  · Atrial fibrillation is a type of heartbeat that is irregular or fast (rapid).  · You are at higher risk of this condition if you smoke, are older, have diabetes, or are overweight.  · Follow your doctor's instructions about medicines, diet, exercise, and follow-up visits.  · Get help right away if you think that you have signs of a stroke.  This information is not intended to replace advice given to you by your health care provider. Make sure you discuss any questions you have with your health care provider.  Document Released: 09/26/2009 Document Revised: 02/21/2019 Document Reviewed: 02/08/2019  [employer] Patient Education © 2020 [employer]  Stroke Prevention  Some medical conditions and lifestyle choices can lead to a higher risk for a stroke. You can help to prevent a stroke by making nutrition, lifestyle, and other changes.  What nutrition changes can be made?    · Eat healthy foods.  ? Choose foods that are high in fiber. These include:  § Fresh fruits.  § Fresh vegetables.  § Whole grains.  ? Eat at least 5 or more servings of fruits and vegetables each day. Try to fill half of your plate at each meal with fruits and vegetables.  ? Choose lean protein foods. These include:  § Lowfat (lean) cuts of meat.  § Chicken without skin.  § Fish.  § Tofu.  § Beans.  § Nuts.  ? Eat low-fat dairy products.  ? Avoid foods that:  § Are high in salt (sodium).  § Have saturated fat.  § Have trans fat.  § Have cholesterol.  § Are processed.  § Are premade.  · Follow eating guidelines as told by your doctor. These may include:  ? Reducing how many calories you eat and drink each day.  ? Limiting how much salt  you eat or drink each day to 1,500 milligrams (mg).  ? Using only healthy fats for cooking. These include:  § Olive oil.  § Canola oil.  § Sunflower oil.  ? Counting how many carbohydrates you eat and drink each day.  What lifestyle changes can be made?  · Try to stay at a healthy weight. Talk to your doctor about what a good weight is for you.  · Get at least 30 minutes of moderate physical activity at least 5 days a week. This can include:  ? Fast walking.  ? Biking.  ? Swimming.  · Do not use any products that have nicotine or tobacco. This includes cigarettes and e-cigarettes. If you need help quitting, ask your doctor. Avoid being around tobacco smoke in general.  · Limit how much alcohol you drink to no more than 1 drink a day for nonpregnant women and 2 drinks a day for men. One drink equals 12 oz of beer, 5 oz of wine, or 1½ oz of hard liquor.  · Do not use drugs.  · Avoid taking birth control pills. Talk to your doctor about the risks of taking birth control pills if:  ? You are over 35 years old.  ? You smoke.  ? You get migraines.  ? You have had a blood clot.  What other changes can be made?  · Manage your cholesterol.  ? It is important to eat a healthy diet.  ? If your cholesterol cannot be managed through your diet, you may also need to take medicines. Take medicines as told by your doctor.  · Manage your diabetes.  ? It is important to eat a healthy diet and to exercise regularly.  ? If your blood sugar cannot be managed through diet and exercise, you may need to take medicines. Take medicines as told by your doctor.  · Control your high blood pressure (hypertension).  ? Try to keep your blood pressure below 130/80. This can help lower your risk of stroke.  ? It is important to eat a healthy diet and to exercise regularly.  ? If your blood pressure cannot be managed through diet and exercise, you may need to take medicines. Take medicines as told by your doctor.  ? Ask your doctor if you should check  "your blood pressure at home.  ? Have your blood pressure checked every year. Do this even if your blood pressure is normal.  · Talk to your doctor about getting checked for a sleep disorder. Signs of this can include:  ? Snoring a lot.  ? Feeling very tired.  · Take over-the-counter and prescription medicines only as told by your doctor. These may include aspirin or blood thinners (antiplatelets or anticoagulants).  · Make sure that any other medical conditions you have are managed.  Where to find more information  · American Stroke Association: www.strokeassociation.org  · National Stroke Association: www.stroke.org  Get help right away if:  · You have any symptoms of stroke. \"BE FAST\" is an easy way to remember the main warning signs:  ? B - Balance. Signs are dizziness, sudden trouble walking, or loss of balance.  ? E - Eyes. Signs are trouble seeing or a sudden change in how you see.  ? F - Face. Signs are sudden weakness or loss of feeling of the face, or the face or eyelid drooping on one side.  ? A - Arms. Signs are weakness or loss of feeling in an arm. This happens suddenly and usually on one side of the body.  ? S - Speech. Signs are sudden trouble speaking, slurred speech, or trouble understanding what people say.  ? T - Time. Time to call emergency services. Write down what time symptoms started.  · You have other signs of stroke, such as:  ? A sudden, very bad headache with no known cause.  ? Feeling sick to your stomach (nausea).  ? Throwing up (vomiting).  ? Jerky movements you cannot control (seizure).  These symptoms may represent a serious problem that is an emergency. Do not wait to see if the symptoms will go away. Get medical help right away. Call your local emergency services (911 in the [place] Do not drive yourself to the hospital.  Summary  · You can prevent a stroke by eating healthy, exercising, not smoking, drinking less alcohol, and treating other health problems, such as diabetes, high " blood pressure, or high cholesterol.  · Do not use any products that contain nicotine or tobacco, such as cigarettes and e-cigarettes.  · Get help right away if you have any signs or symptoms of a stroke.  This information is not intended to replace advice given to you by your health care provider. Make sure you discuss any questions you have with your health care provider.  Document Released: 06/18/2013 Document Revised: 02/13/2020 Document Reviewed: 03/21/2018  [employer] Patient Education © 2020 [employer]

## 2021-02-08 NOTE — PROGRESS NOTES
[facility]  [facility]  [facility] [place] [address]  Ph: [phone]    NEW PATIENT EVALUATION    *Patient established in PM&R practice, however, patient new to writer as patient is transferring care. Therefore, patient billed as established.     Patient Name: Liudmila Pulido   Patient : 1932  Patient Age: 88 y.o.     Examining Physician: Dr. Xochilt Causey, DO    PM&R History to Date - Background Information:  Dates of Admission/Discharge to ARU: 2020-2021    SUBJECTIVE:   Patient Identification: Liudmila Pulido is a 88 y.o. female with PMH significant for diabetes mellitus, aortic stenosis, essential hypertension, hyperlipidemia and rehabilitation history significant for left frontal ischemic stroke 2020 suspect cardioembolic etiology (on Eliquis) and is presenting to PM&R clinic for a NEW OUTPATIENT evaluation with the following chief complaint/s:    Chief Complaint: Upset stomach.     Accompanied by Today: Perico, Laura and Ban. Ban is one that stays with patient.   History of Present Illness:   -Records reviewed: Acute rehab discharge summary reviewed in its entirety.  - Had upset stomach, better now.   - Had home health and did 6-7 visits and has been discharged and is doing outpatient PT in [facility].   - Therapy is going well. Strength is improving. Walking with with walker. Doing her own laundry. Uses R AFO.   - Had some trouble sleeping. Thinking might be related to sciatic nerve. PT gave exercises and has improved. Slept well past 2 nights.   - Still working with Speech therapy on memory and concentration.   - Denies any neuropathic pain. But, does get intermittent radicular pain on the right. Is on Gabapentin 400/400/400/600. PT is helping. Interested in weaning later.   - Bladder: Voiding volitionally. Is on Flomax.   - Bowel: Senokot - being tapered/titrated as needed. Taking one as needed right now. Has not  needed Miralax.   - Spasticity: Still on Baclofen 10mg QHS. Denies spasticity.   - Saw cardiologist.     Review of Systems:  Review of Systems   Constitutional: Negative for chills and fever.   HENT: Negative for congestion and sore throat.    Respiratory: Negative for cough and shortness of breath.    Cardiovascular: Negative for palpitations and leg swelling.   Gastrointestinal: Negative for constipation and diarrhea.   Genitourinary: Positive for frequency. Negative for dysuria and urgency.   Musculoskeletal: Negative for falls and joint pain.   Neurological: Positive for focal weakness. Negative for tingling.        Radicular symptoms.    Endo/Heme/Allergies: Does not bruise/bleed easily.   Psychiatric/Behavioral: Negative for memory loss.      All other pertinent positive review of systems are noted above in HPI.   All other systems reviewed and are negative.    Past Medical History:  Past Medical History:   Diagnosis Date   • Type 2 diabetes mellitus without complication, without long-term current use of insulin ([facility]) 12/18/2020   • Hyperlipidemia    • Hypertension       History reviewed. No pertinent surgical history.     Current Outpatient Medications:   •  apixaban (ELIQUIS) 5mg Tab, Take 1 Tab by mouth 2 Times a Day. Indications: Thromboembolism secondary to Atrial Fibrillation, Disp: 60 Tab, Rfl: 2  •  atorvastatin (LIPITOR) 40 MG Tab, Take 1 Tab by mouth every evening., Disp: 30 Tab, Rfl: 2  •  lisinopril (PRINIVIL) 40 MG tablet, Take 1 Tab by mouth every day., Disp: 30 Tab, Rfl: 2  •  metFORMIN (GLUCOPHAGE) 500 MG Tab, Take 1 Tab by mouth 2 times a day, with meals., Disp: 60 Tab, Rfl: 2  •  acetaminophen (TYLENOL) 325 MG Tab, Take 2 Tabs by mouth every 6 hours as needed for Mild Pain., Disp: 30 Tab, Rfl: 0  •  amLODIPine (NORVASC) 10 MG Tab, Take 1 Tab by mouth every day., Disp: 30 Tab, Rfl: 2  •  baclofen (LIORESAL) 10 MG Tab, Take 1 Tab by mouth every bedtime., Disp: 30 Tab, Rfl: 2  •  gabapentin  (NEURONTIN) 400 MG Cap, Take 1 Cap by mouth 3 times a day, with meals., Disp: 90 Cap, Rfl: 2  •  gabapentin (NEURONTIN) 300 MG Cap, Take 2 Caps by mouth every bedtime., Disp: 60 Cap, Rfl: 2  •  metoprolol (LOPRESSOR) 50 MG Tab, Take 1 Tab by mouth 2 Times a Day., Disp: 60 Tab, Rfl: 2  •  senna-docusate (PERICOLACE OR SENOKOT S) 8.6-50 MG Tab, Take 2 Tabs by mouth 2 Times a Day., Disp: 30 Tab, Rfl: 0  •  polyethylene glycol/lytes (MIRALAX) 17 g Pack, Take 1 Packet by mouth 1 time a day as needed., Disp: , Rfl:   •  tamsulosin (FLOMAX) 0.4 MG capsule, Take 1 Cap by mouth every bedtime., Disp: 30 Cap, Rfl: 2  •  vitamin D (VITAMIND D3) 1000 UNIT Tab, Take 1 Tab by mouth every day., Disp: 30 Tab, Rfl: 2  •  Ascorbic Acid (VITAMIN C PO), Take 1 Tab by mouth every morning., Disp: , Rfl:   •  FIBER PO, Take 1 Tab by mouth 1 time a day as needed (constipation)., Disp: , Rfl:   Allergies   Allergen Reactions   • Demerol Unspecified     Convulsions     • Bloodless      Amish reason   • Penicillins      Per daughter, Laura   • Erythromycin Vomiting and Nausea        Past Social History:  Social History     Socioeconomic History   • Marital status:      Spouse name: Not on file   • Number of children: 3   • Years of education: Not on file   • Highest education level: Not on file   Occupational History     Employer: Retired   Social Needs   • Financial resource strain: Not on file   • Food insecurity     Worry: Not on file     Inability: Not on file   • Transportation needs     Medical: Not on file     Non-medical: Not on file   Tobacco Use   • Smoking status: Never Smoker   • Smokeless tobacco: Never Used   Substance and Sexual Activity   • Alcohol use: Yes     Frequency: Monthly or less     Drinks per session: 1 or 2     Binge frequency: Never   • Drug use: Never   • Sexual activity: Not on file   Lifestyle   • Physical activity     Days per week: Not on file     Minutes per session: Not on file   • Stress: Not on  file   Relationships   • Social connections     Talks on phone: Not on file     Gets together: Not on file     Attends Anabaptism service: Not on file     Active member of club or organization: Not on file     Attends meetings of clubs or organizations: Not on file     Relationship status: Not on file   • Intimate partner violence     Fear of current or ex partner: Not on file     Emotionally abused: Not on file     Physically abused: Not on file     Forced sexual activity: Not on file   Other Topics Concern   •  Service No   • Blood Transfusions No   • Caffeine Concern No   • Occupational Exposure No   • Hobby Hazards No   • Sleep Concern No   • Stress Concern No   • Weight Concern No   • Special Diet No   • Back Care No   • Exercise Yes   • Bike Helmet No   • Seat Belt No   • Self-Exams Yes   Social History Narrative   • Not on file        Family History:  History reviewed. No pertinent family history.    Depression and Opioid Screening  PHQ-9:  Depression Screen (PHQ-2/PHQ-9) 1/7/2021 1/8/2021 1/9/2021   PHQ-2 Total Score 0 0 0     Interpretation of PHQ-9 Total Score   Score Severity   1-4 No Depression   5-9 Mild Depression   10-14 Moderate Depression   15-19 Moderately Severe Depression   20-27 Severe Depression     OBJECTIVE:   Vital Signs:  Vitals:    02/08/21 1040   BP: 110/60   Pulse: 95   Resp: 16   Temp: 36.2 °C (97.2 °F)   SpO2: 98%        Pertinent Labs:  Lab Results   Component Value Date/Time    SODIUM 139 01/06/2021 05:45 AM    POTASSIUM 4.0 01/06/2021 05:45 AM    CHLORIDE 108 01/06/2021 05:45 AM    CO2 28 01/06/2021 05:45 AM    GLUCOSE 102 (H) 01/06/2021 05:45 AM    BUN 8 01/06/2021 05:45 AM    CREATININE 0.55 01/06/2021 05:45 AM       Lab Results   Component Value Date/Time    HBA1C 6.2 (H) 12/17/2020 07:06 AM       Lab Results   Component Value Date/Time    WBC 12.4 (H) 01/07/2021 06:12 AM    RBC 3.77 (L) 01/07/2021 06:12 AM    HEMOGLOBIN 10.6 (L) 01/07/2021 06:12 AM    HEMATOCRIT 34.8 (L)  01/07/2021 06:12 AM    MCV 92.3 01/07/2021 06:12 AM    MCH 28.1 01/07/2021 06:12 AM    MCHC 30.5 (L) 01/07/2021 06:12 AM    MPV 10.4 01/07/2021 06:12 AM    NEUTSPOLYS 75.80 (H) 01/07/2021 06:12 AM    LYMPHOCYTES 15.10 (L) 01/07/2021 06:12 AM    MONOCYTES 5.00 01/07/2021 06:12 AM    EOSINOPHILS 2.40 01/07/2021 06:12 AM    BASOPHILS 0.20 01/07/2021 06:12 AM       Lab Results   Component Value Date/Time    ASTSGOT 22 01/06/2021 05:45 AM    ALTSGPT 14 01/06/2021 05:45 AM        Physical Exam:   GEN: No apparent distress  HEENT: Head normocephalic, atraumatic.  Sclera nonicteric bilaterally, no ocular discharge appreciated bilaterally. Mask donned.   CV: Extremities warm and well-perfused, no peripheral edema appreciated bilaterally.  PULMONARY: Breathing nonlabored on room air, no respiratory accessory muscle use.  Not requiring supplemental oxygen.  ABD: Soft, nontender.  SKIN: No appreciable skin breakdown on exposed areas of skin.  PSYCH: Mood and affect within normal limits.  NEURO: Awake alert.  Answers questions appropriately.  Logical thought content.  No significant tone on exam  LLE 5/5. RLE grossly anti-gravity.     Imaging:   MRI Brain 2/17/21  1.  Moderate-advanced cerebral atrophy. Age-appropriate.  2.  Small and clustered gyriform foci of bright diffusion signal involving the left far posterior parasagittal frontal lobe and left parietal lobe consistent with acute cerebral infarction. No hemorrhagic transformation.  3.  Moderate supratentorial white matter disease most consistent with microvascular ischemic change.  4.  Old lacunar infarct left posterior frontal lobe white matter at the level of the centrum semiovale.  5.  No evidence of acute hemorrhage or mass lesion.    ASSESSMENT/PLAN: Liudmila Pulido  is a 88 y.o. female with rehabilitation history significant for left frontal ischemic stroke 12/17/2020 suspect cardioembolic etiology, here for evaluation. The following plan was discussed with  the patient who is in agreement.     Visit Diagnoses     ICD-10-CM   1. Cerebrovascular accident (CVA) due to embolism of cerebral artery ([facility])  I63.40   2. Impaired mobility and activities of daily living  Z74.09    Z78.9   3. Right hemiparesis (HCC)  G81.91   4. Right foot drop  M21.371   5. Neuropathic pain  M79.2   6. Sleeping difficulty  G47.9   7. Spasticity  R25.2        Dtrs assist with history 2/2 patient's stroke and short term memory loss.     Rehab/Neuro:   1. Left frontal ischemic stroke 12/17/2020 suspect cardioembolic etiology  - Records reviewed as aforementioned in the HPI.  - Therapy: Outpatient in [facility]  - Consultants: Cardiology, [facility]    Assessment of Current Functional Status: 2/2021 -improving since discharge, ambulating with walker at times.  Wheelchair for transportation.  Bowel bladder stabilizing.    Spasticity:   -Med management: Stop Baclofen 10mg  -Counseling/education: Counseled on potential recurrence of spasticity if stopped and also that spasticity can develop and worsen over time.    Neuropathic Pain:   -Med management: Continue Gabapentin 400/400/400/600mg  -Counseling/education: Counseled that I recommend continuing this right now especially given that she has radicular symptoms at least while she completes physical therapy and then we can reevaluate weaning of gabapentin.    Neurogenic Bladder:   -Med management: Stop Flomax.  -Counseling/education: Counseled on potential for urinary retention and    Neurogenic Bowel:   -Med management: Continue Senokot PRN.     Endo:   -Labs reviewed: Vitamin D 21 12/21/20  -Med management: Continue supplementation will recheck in future.    Sleep: Improved with PT exercises, limited due to R radicular pain  -Continue to monitor    Follow up: 6 weeks medication reevaluation    Total time spent was 31 minutes.  Included in this time is the time spent preparing for the visit including record review, my exam and evaluation,  counseling and education regarding the aforementioned in the assessment and plan, and ordering the appropriate labs, tests, procedures, referrals. Furthermore, including this time as the time spent obtaining history from someone other than the patient given that the patient has short-term memory deficits from her stroke. Extensive discussion involved the patient and daughters.    Please note that this dictation was created using voice recognition software. I have made every reasonable attempt to correct obvious errors but there may be errors of grammar and content that I may have overlooked prior to finalization of this note.    Dr. Xochilt Causey DO, MS  Department of Physical Medicine & Rehabilitation  Neuro Rehabilitation Clinic  [facility]

## 2021-02-08 NOTE — PROGRESS NOTES
Subjective:    HPI  Liudmila Pulido is an 88 y.o.right handed female who presents to [facility] for evaluation of left frontal and parietal lobe infarcts.     She presented to [facility] on 12/17/2020  with complaints of right sided weakness.  She was found to be in atrial fibrillation during workup and was therefore discharged on Eliquis. She went to [facility] on discharge.      PMH includes DM, HTN, HLD  Social History: Denies history of smoking or alcohol use.  She is living family now, lived alone prior to stroke.     She is here today with her daughter, Keyonna who is her DPOA.   She is getting therapy in [place].  No falls at home.  She ambulates with a walker and a brace, she is in a wheelchair today.  Per her daughter she does have a harder time looking for the words.     Review of Systems   Constitutional: Positive for malaise/fatigue. Negative for chills and fever.   HENT: Positive for hearing loss.    Eyes: Positive for blurred vision.   Respiratory: Negative for cough.    Cardiovascular: Negative for chest pain.   Gastrointestinal: Positive for nausea. Negative for heartburn and vomiting.   Genitourinary: Negative for dysuria.   Musculoskeletal: Positive for back pain. Negative for falls.   Skin: Negative for rash.   Neurological: Positive for dizziness, sensory change, speech change, focal weakness and headaches. Negative for tingling.   Endo/Heme/Allergies: Does not bruise/bleed easily.   Psychiatric/Behavioral: Negative for depression. The patient is not nervous/anxious.       Current Outpatient Medications on File Prior to Visit   Medication Sig Dispense Refill   • apixaban (ELIQUIS) 5mg Tab Take 1 Tab by mouth 2 Times a Day. Indications: Thromboembolism secondary to Atrial Fibrillation 60 Tab 2   • atorvastatin (LIPITOR) 40 MG Tab Take 1 Tab by mouth every evening. 30 Tab 2   • lisinopril (PRINIVIL) 40 MG tablet Take 1 Tab by mouth every day. 30 Tab 2   •  metFORMIN (GLUCOPHAGE) 500 MG Tab Take 1 Tab by mouth 2 times a day, with meals. 60 Tab 2   • acetaminophen (TYLENOL) 325 MG Tab Take 2 Tabs by mouth every 6 hours as needed for Mild Pain. 30 Tab 0   • amLODIPine (NORVASC) 10 MG Tab Take 1 Tab by mouth every day. 30 Tab 2   • baclofen (LIORESAL) 10 MG Tab Take 1 Tab by mouth every bedtime. 30 Tab 2   • gabapentin (NEURONTIN) 400 MG Cap Take 1 Cap by mouth 3 times a day, with meals. 90 Cap 2   • gabapentin (NEURONTIN) 300 MG Cap Take 2 Caps by mouth every bedtime. 60 Cap 2   • metoprolol (LOPRESSOR) 50 MG Tab Take 1 Tab by mouth 2 Times a Day. 60 Tab 2   • senna-docusate (PERICOLACE OR SENOKOT S) 8.6-50 MG Tab Take 2 Tabs by mouth 2 Times a Day. 30 Tab 0   • tamsulosin (FLOMAX) 0.4 MG capsule Take 1 Cap by mouth every bedtime. 30 Cap 2   • vitamin D (VITAMIND D3) 1000 UNIT Tab Take 1 Tab by mouth every day. 30 Tab 2   • Ascorbic Acid (VITAMIN C PO) Take 1 Tab by mouth every morning.     • polyethylene glycol/lytes (MIRALAX) 17 g Pack Take 1 Packet by mouth 1 time a day as needed.     • FIBER PO Take 1 Tab by mouth 1 time a day as needed (constipation).       No current facility-administered medications on file prior to visit.      Past Medical History:   Diagnosis Date   • Hyperlipidemia    • Hypertension    • Type 2 diabetes mellitus without complication, without long-term current use of insulin ([facility]) 12/18/2020          Objective:   I personally reviewed imaging below and agree with the findings  MRI brain 12/17/2020  1.  Moderate-advanced cerebral atrophy. Age-appropriate.  2.  Small and clustered gyriform foci of bright diffusion signal involving the left far posterior parasagittal frontal lobe and left parietal lobe consistent with acute cerebral infarction. No hemorrhagic transformation.  3.  Moderate supratentorial white matter disease most consistent with microvascular ischemic change.  4.  Old lacunar infarct left posterior frontal lobe white matter at the  "level of the centrum semiovale.  5.  No evidence of acute hemorrhage or mass lesion.  CTA head  1 No large vessel occlusion or aneurysm identified.  2Approximately 50% narrowing of the left P2 segment, could represent vessel spasm, stenosis, or small nonocclusive thrombus.  CTA neck     1.  CT angiogram of the neck within normal limits.    TTE:  LVEF 75%, mld concentric LVH, atrial fibrillation, bubble negative, mildly dilated LA, TIARA 40.     Stroke Labs:  Creat 0.66 A1C 6.2, LDL 48    Vitals  Blood Pressure : 110/62  Temperature: 36.7 °C (98.1 °F)  Temp src: Temporal  Pulse: 77  Respiration: 16  Pulse Oximetry: 92 %  Height: 165.1 cm (5' 5\")  Weight: 68 kg (150 lb)  Encounter Vitals  Temperature: 36.7 °C (98.1 °F)  Temp src: Temporal  Blood Pressure : 110/62  Pulse: 77  Respiration: 16  Pulse Oximetry: 92 %  Weight: 68 kg (150 lb)  Height: 165.1 cm (5' 5\")  BMI (Calculated): 24.96       Physical Exam      Constitutional:  Alert, appears very fatigued.   Psych:   mood and affect WNL  Neuro:  Oriented X 4, speech fluent, naming and memory intact  Muskuloskeletal:  strength 4+/5 RUE, 5/5 LUE, RLE 2/5, LLE 3/5, unable to lift RLE without assistance,  Able to lift LLE but it immediately falls.  CN II: Visual fields are full to confrontation. Fundoscopic exam is normal with sharp discs and no vascular changes. Pupils are wmm and briskly reactive to light.   CN III, IV, VI  EOMs intact, no ptosis  CN V: Facial sensation is intact to pinprick in all 3 divisions bilaterally. Corneal responses are intact.  CN VII: Face is symmetric with normal eye closure and smile.  CN VII: Hearing is normal to rubbing fingers  CN IX, X: Palate elevates symmetrically. Phonation is normal.  CN XI: Head turning and shoulder shrug are intact  CN XII: Tongue is midline with normal movements and no atrophy.                           Sensation to PP equal bilaterally                Slight ataxia out of proportion to weakness RUE                  " In Wheelchair.                            Biceps,brachioradialis, tricep, 2+ bilaterally.  Lower extremity reflexes deferred due to c/o pain       Cardiovascular:    S1S2, no abnormal rhythm auscultated, Grade 5 systolic bruit noted, no peripheral edema  Neck:                     No carotid bruits noted   Pulmonary:            Respirations easy, lungs clear to auscultation all fields.     Skin:                     No obvious rashes.    Iniital NIHSS  Unknown      Current NIHSS    1a. LOC: 0  1b. LOC Questions: 0  1c. LOC Commands: 0  2. Best Gaze:0  3. Visual Fields: 0  4. Facial Paresis: 0  5a. Motor arm left: 0  5b. Motor arm right: 0  6a. Motor leg left: 3  6b. Motor leg right: 4  7. Sensory: 0  8. Best Language: 0  9. Limb Ataxia: 1  10. Dysarthria: 0  11. Extinction/Inattention: 0    Total Score Current  8        Current mRS  4       Assessment/Plan:   1. Late effect of stroke. L MCA territory infarcts secondary to newly diagnosed atrial fibrillation.  Residual effects of right sided weakness; especially RLE.   Daughter also reports some speech defects, not noted on assessment today.     Presumed Mechanism by Toast:  __  Large Artery Atherosclerosis  __  Small Vessel (lacunar)  XX   Cardioembolic  __   Other (Sickle cell, vasculitis, hypercoagulable)  __   Unknown  __   ESUS      Stroke risk factors include atrial fibrillation, HTN, HLD, DM  Continue follow up with rehab medicine, Dr. Causey.     2. Atrial fibrillation, unspecified type (HCC)  Continue  Eliquis 5mg BID for atrial fibrillation, discussed side effects, signs of bleeding, follow up with cardiology.  If Creat increases to over 1.5 or weight decreases below 60kg, will need reduced dose (2.5mg BID).     3. Type 2 diabetes mellitus without complication, without long-term current use of insulin (HCC)  A1C goal less than 7.0, current 6.2, continue follow up with primary care.    4. Essential hypertension  Blood pressure goal less than 130/80, currently  at goal.      5. Hyperlipidemia, unspecified hyperlipidemia type  LDL goal < 70, current 48  continue Atorvastatin 40mg,, discussed medication side effects, will need follow up with primary care evaluate liver function and intervals and refill      I spent a total of 47 minutes providing care to patient,  my time includes counseling, review of systems, HPI and assessment, review of images, labs and testing as above.  I reviewed the hospital records, PMH, social and family history.       I have counseled patient and her daughter on stroke prevention strategies, stroke symptoms and mimics.  Diet and exercise modifications.  We discussed medication side effects and instructions.        I have provided patient a written personalized stroke prevention plan, it is filed under the media tab under ‘[facility]”.    Follow up PRN.

## 2021-02-09 LAB
ERYTHROCYTE [DISTWIDTH] IN BLOOD BY AUTOMATED COUNT: 50.7 FL (ref 35.9–50)
HCT VFR BLD AUTO: 41.5 % (ref 37–47)
HGB BLD-MCNC: 12.8 G/DL (ref 12–16)
MCH RBC QN AUTO: 27.8 PG (ref 27–33)
MCHC RBC AUTO-ENTMCNC: 30.8 G/DL (ref 33.6–35)
MCV RBC AUTO: 90 FL (ref 81.4–97.8)
PLATELET # BLD AUTO: 249 K/UL (ref 164–446)
PMV BLD AUTO: 10.3 FL (ref 9–12.9)
RBC # BLD AUTO: 4.61 M/UL (ref 4.2–5.4)
WBC # BLD AUTO: 8.1 K/UL (ref 4.8–10.8)

## 2021-02-16 ENCOUNTER — ANTICOAGULATION VISIT (OUTPATIENT)
Dept: VASCULAR LAB | Facility: MEDICAL CENTER | Age: 86
End: 2021-02-16
Attending: INTERNAL MEDICINE
Payer: MEDICARE

## 2021-02-16 VITALS — DIASTOLIC BLOOD PRESSURE: 65 MMHG | SYSTOLIC BLOOD PRESSURE: 143 MMHG | HEART RATE: 61 BPM

## 2021-02-16 DIAGNOSIS — Z79.01 CHRONIC ANTICOAGULATION: Primary | ICD-10-CM

## 2021-02-16 PROCEDURE — 99212 OFFICE O/P EST SF 10 MIN: CPT

## 2021-02-16 NOTE — PROGRESS NOTES
Target end date: Indefinite     Indication: CVA; AF     Drug: Eliquis 5mg twice daily     CHADsVASC = 7    Health Status Since Last Assessment   Patient denies any new relevant medical problems, ED visits or hospitalizations   Patient denies any embolic events (stroke/tia/systemic embolism)    Adherence with DOAC Therapy   Pt has ZERO missed any doses in the average week    Bleeding Risk Assessment     DENIES Epistaxis   Pt denies any excessive or unusual bleeding/hematomas.  Pt denies any GI bleeds or hematemesis.  Pt denies any concerning daily headache or sub dural hematoma symptoms.     Pt denies any hematuria or abnormal vaginal bleeding.   Latest Hemoglobin improved to WNL (2/8/2021)   ETOH overuse NONE - may have one-half a drink on rare occasion     Creatinine Clearance/Renal Function     Latest ClCr > 60 mL/min (2/8/2021)    Hepatic function   Latest LFTs   ELEVATED and increased from baseline; AST 22 --> 234; ALT 14 --> 60;  --> 461     Pt denies any history of liver dysfunction      Drug Interactions   Platelets: WNL (2/8/2021)   ASA/other antiplatelets None   NSAID None -    Other drug interactions None to note   Verified no anticonvulsant or azole therapy, education provided for future use.     Examination   Blood Pressure 143/65   Symptomatic hypotension Denies   Significant gait impairment/imbalance/high risk for falls? Pt presented in a wheelchair. She uses a walker at home and states she does not go far without a family member around    Final Assessment and Recommendations:   Patient appears stable from the anticoagulation standpoint with the exception of increased LFTs. Discussed with pt and daughter the option of changing to alternate DOAC. They were agreeable to switch to Xarelto. Explained the differences between medications and reviewed safety information. Samples of Xarelto were provided to next appointment.  - Pt also started atorvastatin following her CVA though she trialed  atorvastatin in the past and stopped due to feelings of dizziness/lightheaded/confusion- no history of liver dysfunction. Consider change to rosuvastatin at next appointment if LFTs do not resolve   Benefits of continued DOAC therapy outweigh risks for this patient- - will switch DOAC   Recommend pt trial DOAC therapy Xarelto 20mg once daily     Other Actions: cmp hemogram ordered prior to next visit    Follow up:   Will follow up with patient in 3 weeks       Jim Cordero, ShelleyD

## 2021-03-01 ENCOUNTER — HOSPITAL ENCOUNTER (OUTPATIENT)
Dept: LAB | Facility: MEDICAL CENTER | Age: 86
End: 2021-03-01
Attending: NURSE PRACTITIONER
Payer: MEDICARE

## 2021-03-01 DIAGNOSIS — Z79.01 CHRONIC ANTICOAGULATION: ICD-10-CM

## 2021-03-01 PROCEDURE — 80053 COMPREHEN METABOLIC PANEL: CPT

## 2021-03-01 PROCEDURE — 36415 COLL VENOUS BLD VENIPUNCTURE: CPT

## 2021-03-02 LAB
ALBUMIN SERPL BCP-MCNC: 3.3 G/DL (ref 3.2–4.9)
ALBUMIN/GLOB SERPL: 0.7 G/DL
ALP SERPL-CCNC: 211 U/L (ref 30–99)
ALT SERPL-CCNC: 11 U/L (ref 2–50)
ANION GAP SERPL CALC-SCNC: 11 MMOL/L (ref 7–16)
AST SERPL-CCNC: 26 U/L (ref 12–45)
BILIRUB SERPL-MCNC: 0.5 MG/DL (ref 0.1–1.5)
BUN SERPL-MCNC: 9 MG/DL (ref 8–22)
CALCIUM SERPL-MCNC: 10.6 MG/DL (ref 8.5–10.5)
CHLORIDE SERPL-SCNC: 105 MMOL/L (ref 96–112)
CO2 SERPL-SCNC: 24 MMOL/L (ref 20–33)
CREAT SERPL-MCNC: 0.51 MG/DL (ref 0.5–1.4)
GLOBULIN SER CALC-MCNC: 4.9 G/DL (ref 1.9–3.5)
GLUCOSE SERPL-MCNC: 89 MG/DL (ref 65–99)
POTASSIUM SERPL-SCNC: 4.6 MMOL/L (ref 3.6–5.5)
PROT SERPL-MCNC: 8.2 G/DL (ref 6–8.2)
SODIUM SERPL-SCNC: 140 MMOL/L (ref 135–145)

## 2021-03-04 ENCOUNTER — ANTICOAGULATION VISIT (OUTPATIENT)
Dept: VASCULAR LAB | Facility: MEDICAL CENTER | Age: 86
End: 2021-03-04
Attending: INTERNAL MEDICINE
Payer: MEDICARE

## 2021-03-04 ENCOUNTER — TELEPHONE (OUTPATIENT)
Dept: VASCULAR LAB | Facility: MEDICAL CENTER | Age: 86
End: 2021-03-04

## 2021-03-04 DIAGNOSIS — I48.11 LONGSTANDING PERSISTENT ATRIAL FIBRILLATION (HCC): ICD-10-CM

## 2021-03-04 PROCEDURE — 99212 OFFICE O/P EST SF 10 MIN: CPT | Performed by: NURSE PRACTITIONER

## 2021-03-04 NOTE — Clinical Note
Hi,  This patient was hoping to see you today. Her AST/ALTs have come down. I have her getting labs again in 1 month again. They asked if you could call her in 1 month to review labs and then return to clinic in 3 months.  Do you mind calling her daughter, Laura, on April 1st to review labs? She said around 11 am would be optimal.  Thanks Jim! Sorry, it's hard for me to do scheduled phone calls since I'm in clinic.  Let me know if you can't and I'll figure something out.  Thanks, Ban

## 2021-03-04 NOTE — PROGRESS NOTES
Diagnosis: AF, CVA  Drug: Xarelto 20 mg daily  LOT: Indefinite  CHADSVASC: 7  HAS-BLED: 2 (age, cva)    Health Status Since Last Assessment  Patient recently switched from Eliquis to Xarelto due to elevated LFTs. Most recent labs (3/1/21) show improvement. AST/ALT 26/11 (234/60 previous).    She is tolerating Xarelto well. No problems with bleeding. No s/sx of CVA.    Adherence with DOAC Therapy  0 missed dose(s)  BLEEDING RISK ASSESSMENT NB:    Bleeding Risk Assessment  Severe epistaxis - no   Hemoptysis - no  Excessive or unusual bruising / hematomas - no  GIB/melena/BRBPR/hematemesis - no  Hematuria - no   Abnormal vaginal bleeding - no  Concerning daily headache or subdural hematoma symptoms - no  Decreasing hemoglobin or new anemia - no  Falls, presyncope, syncope, or seizures - no  Platelets: 249  Latest hemoglobin:     Lab Results   Component Value Date/Time    WBC 8.1 02/08/2021 12:14 PM    RBC 4.61 02/08/2021 12:14 PM    HEMOGLOBIN 12.8 02/08/2021 12:14 PM    HEMATOCRIT 41.5 02/08/2021 12:14 PM    MCV 90.0 02/08/2021 12:14 PM    MCH 27.8 02/08/2021 12:14 PM    MCHC 30.8 (L) 02/08/2021 12:14 PM    MPV 10.3 02/08/2021 12:14 PM    NEUTSPOLYS 75.80 (H) 01/07/2021 06:12 AM    LYMPHOCYTES 15.10 (L) 01/07/2021 06:12 AM    MONOCYTES 5.00 01/07/2021 06:12 AM    EOSINOPHILS 2.40 01/07/2021 06:12 AM    BASOPHILS 0.20 01/07/2021 06:12 AM        HAS-BLED:  Hypertension (uncontrolled, >160 mmHg systolic) - no  Renal disease (dialysis, transplant, Cr >2.26 mg/dL or >200 µmol/L) - no  Liver disease (cirrhosis or bilirubin >2x normal with AST/ALT/AP >3x normal) - no  Stroke history - yes  Prior major bleeding or predisposition to bleeding - no  Labile INR Unstable/high INRs, time in therapeutic range <60% - no  Age >65 - yes  Medication usage predisposing to bleeding (aspirin, clopidogrel, NSAIDs) - no  Alcohol use  ?8 drinks/week - no      Creatinine Clearance/Renal Function  Latest eGFR / creatinine:  Lab Results    Component Value Date/Time    SODIUM 140 03/01/2021 12:19 PM    POTASSIUM 4.6 03/01/2021 12:19 PM    CHLORIDE 105 03/01/2021 12:19 PM    CO2 24 03/01/2021 12:19 PM    GLUCOSE 89 03/01/2021 12:19 PM    BUN 9 03/01/2021 12:19 PM    CREATININE 0.51 03/01/2021 12:19 PM      • Is eGFR less than 50ml/min  -  no  Cr cl ~80 ml/min    If YES, calculate CrCl (see back)  Any recent dehydrating illness or medications added/changed? i.e. Diuretics      Drug Interactions  ASA/antiplatelets - avoids  NSAID - avoids  Other drug interactions -  (Review med list / OTCs;)  Current Outpatient Medications on File Prior to Visit   Medication Sig Dispense Refill   • apixaban (ELIQUIS) 5mg Tab Take 1 Tab by mouth 2 Times a Day. Indications: Thromboembolism secondary to Atrial Fibrillation 60 Tab 2   • atorvastatin (LIPITOR) 40 MG Tab Take 1 Tab by mouth every evening. 30 Tab 2   • lisinopril (PRINIVIL) 40 MG tablet Take 1 Tab by mouth every day. 30 Tab 2   • metFORMIN (GLUCOPHAGE) 500 MG Tab Take 1 Tab by mouth 2 times a day, with meals. 60 Tab 2   • acetaminophen (TYLENOL) 325 MG Tab Take 2 Tabs by mouth every 6 hours as needed for Mild Pain. 30 Tab 0   • amLODIPine (NORVASC) 10 MG Tab Take 1 Tab by mouth every day. 30 Tab 2   • gabapentin (NEURONTIN) 400 MG Cap Take 1 Cap by mouth 3 times a day, with meals. 90 Cap 2   • gabapentin (NEURONTIN) 300 MG Cap Take 2 Caps by mouth every bedtime. 60 Cap 2   • metoprolol (LOPRESSOR) 50 MG Tab Take 1 Tab by mouth 2 Times a Day. 60 Tab 2   • senna-docusate (PERICOLACE OR SENOKOT S) 8.6-50 MG Tab Take 2 Tabs by mouth 2 Times a Day. 30 Tab 0   • polyethylene glycol/lytes (MIRALAX) 17 g Pack Take 1 Packet by mouth 1 time a day as needed.     • vitamin D (VITAMIND D3) 1000 UNIT Tab Take 1 Tab by mouth every day. 30 Tab 2   • FIBER PO Take 1 Tab by mouth 1 time a day as needed (constipation).     • Ascorbic Acid (VITAMIN C PO) Take 1 Tab by mouth every morning.       No current facility-administered  medications on file prior to visit.     Verified no anticonvulsant or azole therapy, education provided for future use.      Significant gait impairment / imbalance / high risk for falls - age is a risk. Currently in a wheel chair.    Final Assessment and Recommendations:  Patient appears stable from the anticoagulation standpoint  Benefits of continued DOAC therapy outweigh risks for this patient  Recommend continue current DOAC at same dose    Patient has some difficulty coming in for appointments due to transportation and mobility issues. Will have patient get labs done in 1 month and we will call patient's daughter, Laura, to discuss labs (home [phone]; cell [phone]). Plan to call Laura, April 1st at 11:00 am.    Follow up in clinic in 3 months.    Other Actions:   The rationale for continued DOAC therapy  The potential for minor, major or life-threatening bleeding  Dosing instructions, adherence, risks of non-adherence, handling missed doses  Avoiding OTC ASA & NSAIDs & minimizing EtOH to reduce bleeding risks  Dosing around upcoming procedure / surgery if applicable (see back)    Follow up:  Will follow up with patient in 1 month by phone, 3 months in clinic.       Ban BUENO

## 2021-03-22 ENCOUNTER — OFFICE VISIT (OUTPATIENT)
Dept: PHYSICAL MEDICINE AND REHAB | Facility: REHABILITATION | Age: 86
End: 2021-03-22
Payer: MEDICARE

## 2021-03-22 VITALS
OXYGEN SATURATION: 96 % | BODY MASS INDEX: 24.99 KG/M2 | TEMPERATURE: 97.6 F | DIASTOLIC BLOOD PRESSURE: 64 MMHG | HEART RATE: 59 BPM | HEIGHT: 65 IN | WEIGHT: 150 LBS | RESPIRATION RATE: 18 BRPM | SYSTOLIC BLOOD PRESSURE: 110 MMHG

## 2021-03-22 DIAGNOSIS — G81.91 RIGHT HEMIPARESIS (HCC): ICD-10-CM

## 2021-03-22 DIAGNOSIS — Z99.89 WALKER AS AMBULATION AID: ICD-10-CM

## 2021-03-22 DIAGNOSIS — Z86.73 HISTORY OF STROKE: Primary | ICD-10-CM

## 2021-03-22 DIAGNOSIS — I48.11 LONGSTANDING PERSISTENT ATRIAL FIBRILLATION (HCC): ICD-10-CM

## 2021-03-22 DIAGNOSIS — M79.2 NEUROPATHIC PAIN: ICD-10-CM

## 2021-03-22 PROCEDURE — 99214 OFFICE O/P EST MOD 30 MIN: CPT | Performed by: PHYSICAL MEDICINE & REHABILITATION

## 2021-03-22 RX ORDER — GABAPENTIN 300 MG/1
600 CAPSULE ORAL
Qty: 28 CAPSULE | Refills: 0 | Status: SHIPPED | OUTPATIENT
Start: 2021-03-22 | End: 2021-04-05

## 2021-03-22 ASSESSMENT — ENCOUNTER SYMPTOMS
TINGLING: 0
COUGH: 0
FALLS: 1
SORE THROAT: 0
CHILLS: 0
SHORTNESS OF BREATH: 0
CONSTIPATION: 0
MEMORY LOSS: 0
PALPITATIONS: 0
FEVER: 0
BRUISES/BLEEDS EASILY: 1
DIARRHEA: 0
WEAKNESS: 1

## 2021-03-22 ASSESSMENT — FIBROSIS 4 INDEX: FIB4 SCORE: 2.77

## 2021-03-22 NOTE — PROGRESS NOTES
[facility]  [facility]  [address] [place] [address]  Ph: [phone]    FOLLOW UP PATIENT EVALUATION      Patient Name: Liudmila Pulido   Patient : 1932  Patient Age: 88 y.o.     Examining Physician: Dr. Xochilt Causey, DO    PM&R History to Date - Background Information:  Dates of Admission/Discharge to ARU: 2020-2021    SUBJECTIVE:   Patient Identification: Liudmila Pulido is a 88 y.o. female with PMH significant for diabetes mellitus, aortic stenosis, essential hypertension, hyperlipidemia and rehabilitation history significant for left frontal ischemic stroke 2020 suspect cardioembolic etiology (on Eliquis) and is presenting to PM&R clinic for a FOLLOW UP OUTPATIENT evaluation with the following chief complaint/s:     Chief Complaint: Recent fall.     Accompanied by Today: Perico, Laura and Ban (ban is on phone). Ban is one that stays with patient.   History of Present Illness:   - In general been going okay.   - 1x fall yesterday. Hit chin and arms, but it was slow controlled fall.   - Still getting OP therapy. Using walker. Doing dishes and own laundry.   - PT is pleased with progress.   - Scheduled through end of April.   - Using the walker all of the time. W/c for transport.   - Uses walker to help exercise her arms.   - Starting with ramps and just starting on steps.   - Has had no issues coming off of Baclofen.   - Still on Gabapentin. Had been worked hard at PT and had some pain. Then the pain resolved after a day or two.     Review of Systems:  Review of Systems   Constitutional: Negative for chills and fever.   HENT: Negative for congestion and sore throat.    Respiratory: Negative for cough and shortness of breath.    Cardiovascular: Negative for palpitations and leg swelling.   Gastrointestinal: Negative for constipation and diarrhea.   Musculoskeletal: Positive for falls ( 1x). Negative for joint pain.   Neurological:  Positive for weakness. Negative for tingling.   Endo/Heme/Allergies: Bruises/bleeds easily ( On NOAC).   Psychiatric/Behavioral: Negative for memory loss.      All other pertinent positive review of systems are noted above in HPI.   All other systems reviewed and are negative.    Past Medical History:  Past Medical History:   Diagnosis Date   • Type 2 diabetes mellitus without complication, without long-term current use of insulin ([facility]) 12/18/2020   • Hyperlipidemia    • Hypertension       History reviewed. No pertinent surgical history.     Current Outpatient Medications:   •  gabapentin (NEURONTIN) 300 MG Cap, Take 2 Capsules by mouth every bedtime for 14 days., Disp: 28 capsule, Rfl: 0  •  rivaroxaban (XARELTO) 20 MG Tab tablet, Take 1 tablet by mouth with dinner., Disp: 30 tablet, Rfl: 1  •  atorvastatin (LIPITOR) 40 MG Tab, Take 1 Tab by mouth every evening., Disp: 30 Tab, Rfl: 2  •  lisinopril (PRINIVIL) 40 MG tablet, Take 1 Tab by mouth every day., Disp: 30 Tab, Rfl: 2  •  metFORMIN (GLUCOPHAGE) 500 MG Tab, Take 1 Tab by mouth 2 times a day, with meals., Disp: 60 Tab, Rfl: 2  •  acetaminophen (TYLENOL) 325 MG Tab, Take 2 Tabs by mouth every 6 hours as needed for Mild Pain., Disp: 30 Tab, Rfl: 0  •  amLODIPine (NORVASC) 10 MG Tab, Take 1 Tab by mouth every day., Disp: 30 Tab, Rfl: 2  •  gabapentin (NEURONTIN) 400 MG Cap, Take 1 Cap by mouth 3 times a day, with meals., Disp: 90 Cap, Rfl: 2  •  metoprolol (LOPRESSOR) 50 MG Tab, Take 1 Tab by mouth 2 Times a Day., Disp: 60 Tab, Rfl: 2  •  senna-docusate (PERICOLACE OR SENOKOT S) 8.6-50 MG Tab, Take 2 Tabs by mouth 2 Times a Day., Disp: 30 Tab, Rfl: 0  •  polyethylene glycol/lytes (MIRALAX) 17 g Pack, Take 1 Packet by mouth 1 time a day as needed., Disp: , Rfl:   •  vitamin D (VITAMIND D3) 1000 UNIT Tab, Take 1 Tab by mouth every day., Disp: 30 Tab, Rfl: 2  •  FIBER PO, Take 1 Tab by mouth 1 time a day as needed (constipation)., Disp: , Rfl:   •  Ascorbic Acid  (VITAMIN C PO), Take 1 Tab by mouth every morning., Disp: , Rfl:   Allergies   Allergen Reactions   • Demerol Unspecified     Convulsions     • Bloodless      Holiness reason   • Penicillins      Per daughter, Laura   • Erythromycin Vomiting and Nausea        Past Social History:  Social History     Socioeconomic History   • Marital status:      Spouse name: Not on file   • Number of children: 3   • Years of education: Not on file   • Highest education level: Not on file   Occupational History     Employer: Retired   Tobacco Use   • Smoking status: Never Smoker   • Smokeless tobacco: Never Used   Substance and Sexual Activity   • Alcohol use: Yes   • Drug use: Never   • Sexual activity: Not on file   Other Topics Concern   •  Service No   • Blood Transfusions No   • Caffeine Concern No   • Occupational Exposure No   • Hobby Hazards No   • Sleep Concern No   • Stress Concern No   • Weight Concern No   • Special Diet No   • Back Care No   • Exercise Yes   • Bike Helmet No   • Seat Belt No   • Self-Exams Yes   Social History Narrative   • Not on file     Social Determinants of Health     Financial Resource Strain:    • Difficulty of Paying Living Expenses:    Food Insecurity:    • Worried About Running Out of Food in the Last Year:    • Ran Out of Food in the Last Year:    Transportation Needs:    • Lack of Transportation (Medical):    • Lack of Transportation (Non-Medical):    Physical Activity:    • Days of Exercise per Week:    • Minutes of Exercise per Session:    Stress:    • Feeling of Stress :    Social Connections:    • Frequency of Communication with Friends and Family:    • Frequency of Social Gatherings with Friends and Family:    • Attends Holiness Services:    • Active Member of Clubs or Organizations:    • Attends Club or Organization Meetings:    • Marital Status:    Intimate Partner Violence:    • Fear of Current or Ex-Partner:    • Emotionally Abused:    • Physically Abused:    •  Sexually Abused:         Family History:  History reviewed. No pertinent family history.    Depression and Opioid Screening  PHQ-9:  Depression Screen (PHQ-2/PHQ-9) 1/8/2021 1/9/2021 2/8/2021   PHQ-2 Total Score 0 0 -   PHQ-2 Total Score - - 2   PHQ-9 Total Score - - 9     Interpretation of PHQ-9 Total Score   Score Severity   1-4 No Depression   5-9 Mild Depression   10-14 Moderate Depression   15-19 Moderately Severe Depression   20-27 Severe Depression     OBJECTIVE:   Vital Signs:  Vitals:    03/22/21 1112   BP: 110/64   Pulse: (!) 59   Resp: 18   Temp: 36.4 °C (97.6 °F)   SpO2: 96%        Pertinent Labs:  Lab Results   Component Value Date/Time    SODIUM 140 03/01/2021 12:19 PM    POTASSIUM 4.6 03/01/2021 12:19 PM    CHLORIDE 105 03/01/2021 12:19 PM    CO2 24 03/01/2021 12:19 PM    GLUCOSE 89 03/01/2021 12:19 PM    BUN 9 03/01/2021 12:19 PM    CREATININE 0.51 03/01/2021 12:19 PM       Lab Results   Component Value Date/Time    HBA1C 6.2 (H) 12/17/2020 07:06 AM       Lab Results   Component Value Date/Time    WBC 8.1 02/08/2021 12:14 PM    RBC 4.61 02/08/2021 12:14 PM    HEMOGLOBIN 12.8 02/08/2021 12:14 PM    HEMATOCRIT 41.5 02/08/2021 12:14 PM    MCV 90.0 02/08/2021 12:14 PM    MCH 27.8 02/08/2021 12:14 PM    MCHC 30.8 (L) 02/08/2021 12:14 PM    MPV 10.3 02/08/2021 12:14 PM    NEUTSPOLYS 75.80 (H) 01/07/2021 06:12 AM    LYMPHOCYTES 15.10 (L) 01/07/2021 06:12 AM    MONOCYTES 5.00 01/07/2021 06:12 AM    EOSINOPHILS 2.40 01/07/2021 06:12 AM    BASOPHILS 0.20 01/07/2021 06:12 AM       Lab Results   Component Value Date/Time    ASTSGOT 26 03/01/2021 12:19 PM    ALTSGPT 11 03/01/2021 12:19 PM        Physical Exam:   GEN: No apparent distress  HEENT: Head normocephalic, atraumatic.  Sclera nonicteric bilaterally, no ocular discharge appreciated bilaterally.  Mask donned.  CV: Extremities warm and well-perfused, no significant peripheral edema appreciated bilaterally.  PULMONARY: Breathing nonlabored on room air, no  respiratory accessory muscle use.  Not requiring supplemental oxygen.  SKIN: Areas of ecchymosis across right side chin, right hand, left forearm.  Left forearm with skin breakdown from fall which is covered and not visualized directly today.  PSYCH: Mood and affect within normal limits.  NEURO: Awake alert.  Answers questions appropriately.  Logical thought content.  Moving extremities volitionally.  No significant spasticity appreciated on exam.    Imaging: No new imaging pertinent to today's visit.    ASSESSMENT/PLAN: Liudmila Pulido  is a 88 y.o. female with rehabilitation history significant for left frontal ischemic stroke 12/17/2020 suspect cardioembolic etiology, here for evaluation. The following plan was discussed with the patient who is in agreement.     Visit Diagnoses     ICD-10-CM   1. History of stroke  Z86.73   2. Neuropathic pain  M79.2   3. Walker as ambulation aid  Z99.89   4. Right hemiparesis (HCC)  G81.91        Dtrs assist with history 2/2 patient's stroke and short term memory loss.     Rehab/Neuro:   1. Left frontal ischemic stroke 12/17/2020 suspect cardioembolic etiology  -Therapy: Continue outpatient therapy at [facility], scheduled to end end of April at this point  -Function: Continues to ambulate with walker.  Only using wheelchair for transportation    Spasticity: No subjective or objective spasticity.  Remain off of baclofen.    Neuropathic Pain: Denies neuropathic pain today.  Does get pain related to muscle fatigue and soreness after therapy.  -Med management: Counseled on taper program for gabapentin -go from 400/400/400/600--> 400/400/600--> 400/600--> 600--> 300--stop.  -Prescription sent for gabapentin so can complete appropriate taper program.    Neurogenic Bladder: Voiding volitionally.  Remain off of Flomax.    Follow up: 6 weeks for functional reevaluation and medication evaluation after tapering gabapentin, likely DC if everything stable.    Total time spent was  32 minutes.  Included in this time is the time spent preparing for the visit including record review, my exam and evaluation, counseling and education regarding that which is aforementioned in the assessment and plan. Time was spent ordering the appropriate labs, tests, procedures, referrals, medications.  Time was spent obtaining history from someone other than patient.  Discussion involved the patient and daughter, Ban Sanchez is on phone    Please note that this dictation was created using voice recognition software. I have made every reasonable attempt to correct obvious errors but there may be errors of grammar and content that I may have overlooked prior to finalization of this note.    Dr. Xochilt Causey DO, MS  Department of Physical Medicine & Rehabilitation  Neuro Rehabilitation Clinic  [facility]

## 2021-03-22 NOTE — PATIENT INSTRUCTIONS
Start taking:  - Gabapentin 400mg in the morning, 400mg in the afternoon, and 600mg at night for 5 days  - Then Gabapentin 400mg in the morning and 600mg at night for 5 days.   - Then Gabapentin 600mg at night for 5 days  - Then Gabapentin 300mg at night for 5 days  - Then STOP completely.

## 2021-03-29 ENCOUNTER — HOSPITAL ENCOUNTER (OUTPATIENT)
Dept: LAB | Facility: MEDICAL CENTER | Age: 86
End: 2021-03-29
Attending: NURSE PRACTITIONER
Payer: MEDICARE

## 2021-03-29 DIAGNOSIS — I48.11 LONGSTANDING PERSISTENT ATRIAL FIBRILLATION (HCC): ICD-10-CM

## 2021-03-29 LAB
ALBUMIN SERPL BCP-MCNC: 3.7 G/DL (ref 3.2–4.9)
ALBUMIN/GLOB SERPL: 1 G/DL
ALP SERPL-CCNC: 138 U/L (ref 30–99)
ALT SERPL-CCNC: 15 U/L (ref 2–50)
ANION GAP SERPL CALC-SCNC: 7 MMOL/L (ref 7–16)
AST SERPL-CCNC: 25 U/L (ref 12–45)
BILIRUB SERPL-MCNC: 0.7 MG/DL (ref 0.1–1.5)
BUN SERPL-MCNC: 11 MG/DL (ref 8–22)
CALCIUM SERPL-MCNC: 10.1 MG/DL (ref 8.5–10.5)
CHLORIDE SERPL-SCNC: 100 MMOL/L (ref 96–112)
CO2 SERPL-SCNC: 25 MMOL/L (ref 20–33)
CREAT SERPL-MCNC: 0.58 MG/DL (ref 0.5–1.4)
GLOBULIN SER CALC-MCNC: 3.7 G/DL (ref 1.9–3.5)
GLUCOSE SERPL-MCNC: 95 MG/DL (ref 65–99)
POTASSIUM SERPL-SCNC: 4.6 MMOL/L (ref 3.6–5.5)
PROT SERPL-MCNC: 7.4 G/DL (ref 6–8.2)
SODIUM SERPL-SCNC: 132 MMOL/L (ref 135–145)

## 2021-03-29 PROCEDURE — 80053 COMPREHEN METABOLIC PANEL: CPT

## 2021-03-29 PROCEDURE — 36415 COLL VENOUS BLD VENIPUNCTURE: CPT

## 2021-03-31 ENCOUNTER — TELEPHONE (OUTPATIENT)
Dept: VASCULAR LAB | Facility: MEDICAL CENTER | Age: 86
End: 2021-03-31

## 2021-03-31 NOTE — TELEPHONE ENCOUNTER
Reviewed recent LFTs which remain normal. Alk phos decreasing - currently 138 (211, 461 previously). Left VM for patient. Will f/u with pt in June 2021.    Ban BUENO

## 2021-04-01 ENCOUNTER — TELEPHONE (OUTPATIENT)
Dept: VASCULAR LAB | Facility: MEDICAL CENTER | Age: 86
End: 2021-04-01

## 2021-04-29 ENCOUNTER — HOSPITAL ENCOUNTER (OUTPATIENT)
Dept: LAB | Facility: MEDICAL CENTER | Age: 86
End: 2021-04-29
Attending: NURSE PRACTITIONER
Payer: MEDICARE

## 2021-04-29 PROCEDURE — 85025 COMPLETE CBC W/AUTO DIFF WBC: CPT

## 2021-04-29 PROCEDURE — 80053 COMPREHEN METABOLIC PANEL: CPT

## 2021-04-29 PROCEDURE — 80061 LIPID PANEL: CPT

## 2021-04-29 PROCEDURE — 36415 COLL VENOUS BLD VENIPUNCTURE: CPT

## 2021-04-30 LAB
ALBUMIN SERPL BCP-MCNC: 3.7 G/DL (ref 3.2–4.9)
ALBUMIN/GLOB SERPL: 0.9 G/DL
ALP SERPL-CCNC: 133 U/L (ref 30–99)
ALT SERPL-CCNC: 19 U/L (ref 2–50)
ANION GAP SERPL CALC-SCNC: 9 MMOL/L (ref 7–16)
AST SERPL-CCNC: 33 U/L (ref 12–45)
BASOPHILS # BLD AUTO: 0.9 % (ref 0–1.8)
BASOPHILS # BLD: 0.05 K/UL (ref 0–0.12)
BILIRUB SERPL-MCNC: 0.8 MG/DL (ref 0.1–1.5)
BUN SERPL-MCNC: 14 MG/DL (ref 8–22)
CALCIUM SERPL-MCNC: 10.1 MG/DL (ref 8.5–10.5)
CHLORIDE SERPL-SCNC: 103 MMOL/L (ref 96–112)
CHOLEST SERPL-MCNC: 111 MG/DL (ref 100–199)
CO2 SERPL-SCNC: 24 MMOL/L (ref 20–33)
CREAT SERPL-MCNC: 0.59 MG/DL (ref 0.5–1.4)
EOSINOPHIL # BLD AUTO: 0.08 K/UL (ref 0–0.51)
EOSINOPHIL NFR BLD: 1.5 % (ref 0–6.9)
ERYTHROCYTE [DISTWIDTH] IN BLOOD BY AUTOMATED COUNT: 48 FL (ref 35.9–50)
FASTING STATUS PATIENT QL REPORTED: NORMAL
GLOBULIN SER CALC-MCNC: 3.9 G/DL (ref 1.9–3.5)
GLUCOSE SERPL-MCNC: 100 MG/DL (ref 65–99)
HCT VFR BLD AUTO: 41.6 % (ref 37–47)
HDLC SERPL-MCNC: 48 MG/DL
HGB BLD-MCNC: 13.6 G/DL (ref 12–16)
IMM GRANULOCYTES # BLD AUTO: 0 K/UL (ref 0–0.11)
IMM GRANULOCYTES NFR BLD AUTO: 0 % (ref 0–0.9)
LDLC SERPL CALC-MCNC: 44 MG/DL
LYMPHOCYTES # BLD AUTO: 2.51 K/UL (ref 1–4.8)
LYMPHOCYTES NFR BLD: 47.2 % (ref 22–41)
MCH RBC QN AUTO: 28.6 PG (ref 27–33)
MCHC RBC AUTO-ENTMCNC: 32.7 G/DL (ref 33.6–35)
MCV RBC AUTO: 87.6 FL (ref 81.4–97.8)
MONOCYTES # BLD AUTO: 0.33 K/UL (ref 0–0.85)
MONOCYTES NFR BLD AUTO: 6.2 % (ref 0–13.4)
NEUTROPHILS # BLD AUTO: 2.35 K/UL (ref 2–7.15)
NEUTROPHILS NFR BLD: 44.2 % (ref 44–72)
NRBC # BLD AUTO: 0 K/UL
NRBC BLD-RTO: 0 /100 WBC
PLATELET # BLD AUTO: 232 K/UL (ref 164–446)
PMV BLD AUTO: 10.7 FL (ref 9–12.9)
POTASSIUM SERPL-SCNC: 4.1 MMOL/L (ref 3.6–5.5)
PROT SERPL-MCNC: 7.6 G/DL (ref 6–8.2)
RBC # BLD AUTO: 4.75 M/UL (ref 4.2–5.4)
SODIUM SERPL-SCNC: 136 MMOL/L (ref 135–145)
TRIGL SERPL-MCNC: 93 MG/DL (ref 0–149)
WBC # BLD AUTO: 5.3 K/UL (ref 4.8–10.8)

## 2021-05-05 ENCOUNTER — OFFICE VISIT (OUTPATIENT)
Dept: PHYSICAL MEDICINE AND REHAB | Facility: REHABILITATION | Age: 86
End: 2021-05-05
Payer: MEDICARE

## 2021-05-05 VITALS
HEIGHT: 66 IN | SYSTOLIC BLOOD PRESSURE: 124 MMHG | BODY MASS INDEX: 24.11 KG/M2 | TEMPERATURE: 98.6 F | HEART RATE: 68 BPM | DIASTOLIC BLOOD PRESSURE: 80 MMHG | WEIGHT: 150 LBS | RESPIRATION RATE: 16 BRPM

## 2021-05-05 DIAGNOSIS — I63.40 CEREBROVASCULAR ACCIDENT (CVA) DUE TO EMBOLISM OF CEREBRAL ARTERY (HCC): Primary | ICD-10-CM

## 2021-05-05 DIAGNOSIS — Z99.89 WALKER AS AMBULATION AID: ICD-10-CM

## 2021-05-05 DIAGNOSIS — M79.604 PAIN AND SWELLING OF RIGHT LOWER EXTREMITY: ICD-10-CM

## 2021-05-05 DIAGNOSIS — Z86.73 HISTORY OF STROKE: ICD-10-CM

## 2021-05-05 DIAGNOSIS — M79.89 PAIN AND SWELLING OF RIGHT LOWER EXTREMITY: ICD-10-CM

## 2021-05-05 DIAGNOSIS — G81.91 RIGHT HEMIPARESIS (HCC): ICD-10-CM

## 2021-05-05 DIAGNOSIS — M79.2 NEUROPATHIC PAIN: ICD-10-CM

## 2021-05-05 PROCEDURE — 99213 OFFICE O/P EST LOW 20 MIN: CPT | Performed by: PHYSICAL MEDICINE & REHABILITATION

## 2021-05-05 ASSESSMENT — FIBROSIS 4 INDEX: FIB4 SCORE: 2.9

## 2021-05-05 ASSESSMENT — ENCOUNTER SYMPTOMS
DIARRHEA: 0
CHILLS: 0
MEMORY LOSS: 0
SHORTNESS OF BREATH: 0
SORE THROAT: 0
FALLS: 0
CONSTIPATION: 0
WEAKNESS: 1
FEVER: 0
BRUISES/BLEEDS EASILY: 0
COUGH: 0
FOCAL WEAKNESS: 1
PALPITATIONS: 0

## 2021-05-05 NOTE — PROGRESS NOTES
[facility]  PM&R Neuro Rehabilitation Clinic  [address] [place] [address]  Ph: [phone]    FOLLOW UP PATIENT EVALUATION      Patient Name: Liudmila Pulido   Patient : 1932  Patient Age: 88 y.o.     Examining Physician: Dr. Xochilt Causey, DO    PM&R History to Date - Background Information:  Dates of Admission/Discharge to ARU: 2020-2021    SUBJECTIVE:   Patient Identification: Liudmila Pulido is a 88 y.o. female with PMH significant for diabetes mellitus, aortic stenosis, essential hypertension, hyperlipidemia and rehabilitation history significant for left frontal ischemic stroke 2020 suspect cardioembolic etiology (on Eliquis) and is presenting to PM&R clinic for a FOLLOW UP OUTPATIENT evaluation with the following chief complaint/s:     Chief Complaint: Recent fall.     Accompanied by Today: Kathy River   History of Present Illness:   - Last PT was in April. Would like a break from therapy until October.   - Has completely been off of the Gabapentin.   - The AFO is too loose. Gets bruising on the side of her right foot.   - No neuropathic pain but does have pain in RLE. It is below the knee.   - On/off swelling. Pain in calf and foot.   - Overall does feel like she is going better and better.   - Walking more without brace at home. Could contribute to foot soreness.   - No falls.     Review of Systems:  Review of Systems   Constitutional: Negative for chills and fever.   HENT: Negative for congestion and sore throat.    Respiratory: Negative for cough and shortness of breath.    Cardiovascular: Positive for leg swelling. Negative for palpitations.        Right leg pain   Gastrointestinal: Negative for constipation and diarrhea.   Musculoskeletal: Negative for falls and joint pain.   Skin:        Bruising lateral right foot   Neurological: Positive for focal weakness and weakness.   Endo/Heme/Allergies: Does not bruise/bleed easily.    Psychiatric/Behavioral: Negative for memory loss.      All other pertinent positive review of systems are noted above in HPI.   All other systems reviewed and are negative.    Past Medical History:  Past Medical History:   Diagnosis Date   • Type 2 diabetes mellitus without complication, without long-term current use of insulin ([facility]) 12/18/2020   • Hyperlipidemia    • Hypertension       History reviewed. No pertinent surgical history.     Current Outpatient Medications:   •  rivaroxaban (XARELTO) 20 MG Tab tablet, Take 1 tablet by mouth with dinner., Disp: 30 tablet, Rfl: 1  •  atorvastatin (LIPITOR) 40 MG Tab, Take 1 Tab by mouth every evening., Disp: 30 Tab, Rfl: 2  •  lisinopril (PRINIVIL) 40 MG tablet, Take 1 Tab by mouth every day., Disp: 30 Tab, Rfl: 2  •  metFORMIN (GLUCOPHAGE) 500 MG Tab, Take 1 Tab by mouth 2 times a day, with meals., Disp: 60 Tab, Rfl: 2  •  acetaminophen (TYLENOL) 325 MG Tab, Take 2 Tabs by mouth every 6 hours as needed for Mild Pain., Disp: 30 Tab, Rfl: 0  •  amLODIPine (NORVASC) 10 MG Tab, Take 1 Tab by mouth every day., Disp: 30 Tab, Rfl: 2  •  metoprolol (LOPRESSOR) 50 MG Tab, Take 1 Tab by mouth 2 Times a Day. (Patient taking differently: Take 100 mg by mouth every day.), Disp: 60 Tab, Rfl: 2  •  senna-docusate (PERICOLACE OR SENOKOT S) 8.6-50 MG Tab, Take 2 Tabs by mouth 2 Times a Day., Disp: 30 Tab, Rfl: 0  •  vitamin D (VITAMIND D3) 1000 UNIT Tab, Take 1 Tab by mouth every day., Disp: 30 Tab, Rfl: 2  •  Ascorbic Acid (VITAMIN C PO), Take 1 Tab by mouth every morning., Disp: , Rfl:   •  gabapentin (NEURONTIN) 400 MG Cap, Take 1 Cap by mouth 3 times a day, with meals., Disp: 90 Cap, Rfl: 2  •  polyethylene glycol/lytes (MIRALAX) 17 g Pack, Take 1 Packet by mouth 1 time a day as needed., Disp: , Rfl:   •  FIBER PO, Take 1 Tab by mouth 1 time a day as needed (constipation)., Disp: , Rfl:   Allergies   Allergen Reactions   • Demerol Unspecified     Convulsions     • Bloodless       Episcopalian reason   • Penicillins      Per daughter, Laura   • Erythromycin Vomiting and Nausea        Past Social History:  Social History     Socioeconomic History   • Marital status:      Spouse name: Not on file   • Number of children: 3   • Years of education: Not on file   • Highest education level: Not on file   Occupational History     Employer: Retired   Tobacco Use   • Smoking status: Never Smoker   • Smokeless tobacco: Never Used   Substance and Sexual Activity   • Alcohol use: Yes   • Drug use: Never   • Sexual activity: Not on file   Other Topics Concern   •  Service No   • Blood Transfusions No   • Caffeine Concern No   • Occupational Exposure No   • Hobby Hazards No   • Sleep Concern No   • Stress Concern No   • Weight Concern No   • Special Diet No   • Back Care No   • Exercise Yes   • Bike Helmet No   • Seat Belt No   • Self-Exams Yes   Social History Narrative   • Not on file     Social Determinants of Health     Financial Resource Strain:    • Difficulty of Paying Living Expenses:    Food Insecurity:    • Worried About Running Out of Food in the Last Year:    • Ran Out of Food in the Last Year:    Transportation Needs:    • Lack of Transportation (Medical):    • Lack of Transportation (Non-Medical):    Physical Activity:    • Days of Exercise per Week:    • Minutes of Exercise per Session:    Stress:    • Feeling of Stress :    Social Connections:    • Frequency of Communication with Friends and Family:    • Frequency of Social Gatherings with Friends and Family:    • Attends Ep[employer]opalian Services:    • Active Member of Clubs or Organizations:    • Attends Club or Organization Meetings:    • Marital Status:    Intimate Partner Violence:    • Fear of Current or Ex-Partner:    • Emotionally Abused:    • Physically Abused:    • Sexually Abused:         Family History:  History reviewed. No pertinent family history.    Depression and Opioid Screening  PHQ-9:  Depression Screen (PHQ-2/PHQ-9)  1/8/2021 1/9/2021 2/8/2021   PHQ-2 Total Score 0 0 -   PHQ-2 Total Score - - 2   PHQ-9 Total Score - - 9     Interpretation of PHQ-9 Total Score   Score Severity   1-4 No Depression   5-9 Mild Depression   10-14 Moderate Depression   15-19 Moderately Severe Depression   20-27 Severe Depression     OBJECTIVE:   Vital Signs:  Vitals:    05/05/21 1338   BP: 124/80   Pulse: 68   Resp: 16   Temp: 37 °C (98.6 °F)        Pertinent Labs:  Lab Results   Component Value Date/Time    SODIUM 136 04/29/2021 11:03 AM    POTASSIUM 4.1 04/29/2021 11:03 AM    CHLORIDE 103 04/29/2021 11:03 AM    CO2 24 04/29/2021 11:03 AM    GLUCOSE 100 (H) 04/29/2021 11:03 AM    BUN 14 04/29/2021 11:03 AM    CREATININE 0.59 04/29/2021 11:03 AM       Lab Results   Component Value Date/Time    HBA1C 6.2 (H) 12/17/2020 07:06 AM       Lab Results   Component Value Date/Time    WBC 5.3 04/29/2021 11:03 AM    RBC 4.75 04/29/2021 11:03 AM    HEMOGLOBIN 13.6 04/29/2021 11:03 AM    HEMATOCRIT 41.6 04/29/2021 11:03 AM    MCV 87.6 04/29/2021 11:03 AM    MCH 28.6 04/29/2021 11:03 AM    MCHC 32.7 (L) 04/29/2021 11:03 AM    MPV 10.7 04/29/2021 11:03 AM    NEUTSPOLYS 44.20 04/29/2021 11:03 AM    LYMPHOCYTES 47.20 (H) 04/29/2021 11:03 AM    MONOCYTES 6.20 04/29/2021 11:03 AM    EOSINOPHILS 1.50 04/29/2021 11:03 AM    BASOPHILS 0.90 04/29/2021 11:03 AM       Lab Results   Component Value Date/Time    ASTSGOT 33 04/29/2021 11:03 AM    ALTSGPT 19 04/29/2021 11:03 AM        Physical Exam:   GEN: No apparent distress  HEENT: Head normocephalic, atraumatic.  Sclera nonicteric bilaterally, no ocular discharge appreciated bilaterally.  CV: Extremities warm and well-perfused, peripheral edema appreciated right lower extremity, tenderness to palpation and with calf squeeze.  PULMONARY: Breathing nonlabored on room air, no respiratory accessory muscle use.  Not requiring supplemental oxygen.  ABD: Soft, nontender.  SKIN: No appreciable skin breakdown on exposed areas of  skin.  PSYCH: Mood and affect within normal limits.  NEURO: Awake alert.  Conversational.  Logical thought content.  Ambulatory with walker, mildly spastic gait.  Uses AFO on the right lower extremity.  Very mild spasticity of the plantar flexors 1/4..    Imaging: No new imaging pertinent to today's visit.    ASSESSMENT/PLAN: Liudmila Pulido  is a 88 y.o. female with rehabilitation history significant for left frontal ischemic stroke 12/17/2020 suspect cardioembolic etiology, here for evaluation. The following plan was discussed with the patient who is in agreement.     Visit Diagnoses     ICD-10-CM   1. Cerebrovascular accident (CVA) due to embolism of cerebral artery (HCC)  I63.40   2. Pain and swelling of right lower extremity  M79.604    M79.89   3. Neuropathic pain  M79.2   4. History of stroke  Z86.73   5. Walker as ambulation aid  Z99.89   6. Right hemiparesis (HCC)  G81.91        Dtrs assist with history 2/2 patient's stroke and short term memory loss.     Rehab/Neuro:   1. Left frontal ischemic stroke 12/17/2020 suspect cardioembolic etiology  -Therapy: Discharged from outpatient physical therapy at [facility].  -Function: Patient making significant progress more ambulatory with walker.  Has been too unsteady to use cane yet.  Mostly independent with all ADLs.  -Equipment: Right AFO too large.  Has bruising lateral foot.  Patient even has some swelling right now and AFO is still too large, slides up and down causing friction.    Spasticity: Very mild 1/4 plantar flexor spasticity right lower extremity.  -Counseled extensively on spasticity and appropriate management of.  -Conservative: Continue conservative methods of spasticity management using stretching and range of motion.    Neuropathic Pain: Tapered off of gabapentin completely.  Denies any resurgence of neuropathic pain.  -Med management: Remain off of gabapentin at this time.    Right lower extremity swelling/pain: Has been walking more  without the AFO, may be playing a part in sore muscles, however, patient did have stroke while she was on Eliquis and is currently anticoagulated but only has pain and swelling of the right lower leg.  More proximally she has no issues.  Concern for clot.  -Imaging: Urgent Doppler ultrasound to rule out DVT.    Follow up: 10 weeks functional reevaluation consideration referral for additional PT.    Total time spent was 28 minutes.  Included in this time is the time spent preparing for the visit including record review, my exam and evaluation, counseling and education regarding that which is aforementioned in the assessment and plan. Time was spent ordering the appropriate labs, tests, procedures, referrals, medications. Included this time as the time spent obtaining history from someone other than the patient. Discussion involved the patient and daughters.    Please note that this dictation was created using voice recognition software. I have made every reasonable attempt to correct obvious errors but there may be errors of grammar and content that I may have overlooked prior to finalization of this note.    Dr. Xochilt Causey DO, MS  Department of Physical Medicine & Rehabilitation  Neuro Rehabilitation Clinic  [facility]

## 2021-05-06 ENCOUNTER — HOSPITAL ENCOUNTER (OUTPATIENT)
Dept: RADIOLOGY | Facility: MEDICAL CENTER | Age: 86
End: 2021-05-06
Attending: PHYSICAL MEDICINE & REHABILITATION
Payer: MEDICARE

## 2021-05-06 DIAGNOSIS — M79.89 PAIN AND SWELLING OF RIGHT LOWER EXTREMITY: ICD-10-CM

## 2021-05-06 DIAGNOSIS — M79.604 PAIN AND SWELLING OF RIGHT LOWER EXTREMITY: ICD-10-CM

## 2021-05-06 PROCEDURE — 93971 EXTREMITY STUDY: CPT | Mod: RT

## 2021-05-25 ENCOUNTER — HOSPITAL ENCOUNTER (OUTPATIENT)
Dept: LAB | Facility: MEDICAL CENTER | Age: 86
End: 2021-05-25
Attending: INTERNAL MEDICINE
Payer: MEDICARE

## 2021-05-25 ENCOUNTER — HOSPITAL ENCOUNTER (OUTPATIENT)
Dept: LAB | Facility: MEDICAL CENTER | Age: 86
End: 2021-05-25
Attending: NURSE PRACTITIONER
Payer: MEDICARE

## 2021-05-25 DIAGNOSIS — Z79.01 CHRONIC ANTICOAGULATION: ICD-10-CM

## 2021-05-25 LAB
ALBUMIN SERPL BCP-MCNC: 3.7 G/DL (ref 3.2–4.9)
ALBUMIN/GLOB SERPL: 0.9 G/DL
ALP SERPL-CCNC: 111 U/L (ref 30–99)
ALT SERPL-CCNC: 23 U/L (ref 2–50)
ANION GAP SERPL CALC-SCNC: 9 MMOL/L (ref 7–16)
AST SERPL-CCNC: 26 U/L (ref 12–45)
BASOPHILS # BLD AUTO: 1 % (ref 0–1.8)
BASOPHILS # BLD: 0.05 K/UL (ref 0–0.12)
BILIRUB SERPL-MCNC: 0.7 MG/DL (ref 0.1–1.5)
BUN SERPL-MCNC: 11 MG/DL (ref 8–22)
CALCIUM SERPL-MCNC: 10.4 MG/DL (ref 8.5–10.5)
CHLORIDE SERPL-SCNC: 103 MMOL/L (ref 96–112)
CO2 SERPL-SCNC: 25 MMOL/L (ref 20–33)
CREAT SERPL-MCNC: 0.52 MG/DL (ref 0.5–1.4)
EOSINOPHIL # BLD AUTO: 0.09 K/UL (ref 0–0.51)
EOSINOPHIL NFR BLD: 1.8 % (ref 0–6.9)
ERYTHROCYTE [DISTWIDTH] IN BLOOD BY AUTOMATED COUNT: 50.3 FL (ref 35.9–50)
EST. AVERAGE GLUCOSE BLD GHB EST-MCNC: 134 MG/DL
GLOBULIN SER CALC-MCNC: 3.9 G/DL (ref 1.9–3.5)
GLUCOSE SERPL-MCNC: 97 MG/DL (ref 65–99)
HBA1C MFR BLD: 6.3 % (ref 4–5.6)
HCT VFR BLD AUTO: 42.3 % (ref 37–47)
HGB BLD-MCNC: 13.7 G/DL (ref 12–16)
IMM GRANULOCYTES # BLD AUTO: 0.01 K/UL (ref 0–0.11)
IMM GRANULOCYTES NFR BLD AUTO: 0.2 % (ref 0–0.9)
LYMPHOCYTES # BLD AUTO: 2.6 K/UL (ref 1–4.8)
LYMPHOCYTES NFR BLD: 50.9 % (ref 22–41)
MCH RBC QN AUTO: 29.1 PG (ref 27–33)
MCHC RBC AUTO-ENTMCNC: 32.4 G/DL (ref 33.6–35)
MCV RBC AUTO: 89.8 FL (ref 81.4–97.8)
MONOCYTES # BLD AUTO: 0.29 K/UL (ref 0–0.85)
MONOCYTES NFR BLD AUTO: 5.7 % (ref 0–13.4)
NEUTROPHILS # BLD AUTO: 2.07 K/UL (ref 2–7.15)
NEUTROPHILS NFR BLD: 40.4 % (ref 44–72)
NRBC # BLD AUTO: 0 K/UL
NRBC BLD-RTO: 0 /100 WBC
PLATELET # BLD AUTO: 185 K/UL (ref 164–446)
PMV BLD AUTO: 10.6 FL (ref 9–12.9)
POTASSIUM SERPL-SCNC: 4.1 MMOL/L (ref 3.6–5.5)
PROT SERPL-MCNC: 7.6 G/DL (ref 6–8.2)
RBC # BLD AUTO: 4.71 M/UL (ref 4.2–5.4)
SODIUM SERPL-SCNC: 137 MMOL/L (ref 135–145)
WBC # BLD AUTO: 5.1 K/UL (ref 4.8–10.8)

## 2021-05-25 PROCEDURE — 83036 HEMOGLOBIN GLYCOSYLATED A1C: CPT | Mod: GA

## 2021-05-25 PROCEDURE — 85025 COMPLETE CBC W/AUTO DIFF WBC: CPT

## 2021-05-25 PROCEDURE — 80053 COMPREHEN METABOLIC PANEL: CPT

## 2021-05-25 PROCEDURE — 36415 COLL VENOUS BLD VENIPUNCTURE: CPT

## 2021-06-02 ENCOUNTER — ANTICOAGULATION VISIT (OUTPATIENT)
Dept: VASCULAR LAB | Facility: MEDICAL CENTER | Age: 86
End: 2021-06-02
Attending: INTERNAL MEDICINE
Payer: MEDICARE

## 2021-06-02 DIAGNOSIS — I48.11 LONGSTANDING PERSISTENT ATRIAL FIBRILLATION (HCC): ICD-10-CM

## 2021-06-02 PROCEDURE — 99212 OFFICE O/P EST SF 10 MIN: CPT

## 2021-06-02 RX ORDER — LORATADINE 10 MG/1
10 TABLET ORAL DAILY
COMMUNITY
End: 2022-01-01

## 2021-06-02 RX ORDER — METOPROLOL SUCCINATE 100 MG/1
100 TABLET, EXTENDED RELEASE ORAL EVERY MORNING
COMMUNITY

## 2021-06-02 NOTE — PROGRESS NOTES
Target end date:Indefinate     Indication: Stroke prophylaxis     Drug: Xarelto      CHADsVASC = 7     HAS-BLED = 2    Health Status Since Last Assessment   Patient denies any new relevant medical problems, ED visits or hospitalizations   Patient denies any embolic events (stroke/tia/systemic embolism)    Adherence with DOAC Therapy   Pt has not missed any doses in the average week     Bleeding Risk Assessment     No Epistaxis   Pt denies any excessive or unusual bleeding/hematomas.  Pt denies any GI bleeds or hematemesis.  Pt denies any concerning daily headache or sub dural hematoma symptoms.     Pt denies any hematuria or no abnormal vaginal bleeding.   Latest Hemoglobin 13.7   ETOH overuse no     Creatinine Clearance/Renal Function     Latest ClCr >50 ml/min    Hepatic function   Latest LFTs ALT and AST WNL Alkaline phosphatase elevated at 111   Pt denies any history of liver dysfunction      Drug Interactions   Platelets: 185   ASA/other antiplatelets No   NSAID No   Other drug interactions No   No Verified no anticonvulsant or azole therapy, education provided for future use.     Examination   Blood Pressure PT brought BP log (today 131/80)   Symptomatic hypotension No   Significant gait impairment/imbalance/high risk for falls? PT uses a walker     Final Assessment and Recommendations:   Patient appears stable from the anticoagulation standpoint.     Benefits of continued DOAC therapy outweigh risks for this patient   Recommend pt continue with current DOAC therapy xarelto 20 mg     Other Actions: cmp/ cbc hemogram ordered prior to next visit    Follow up:   Will follow up with patient 3  months.      Randee Muñiz, PharmD  [employer] student pharmacist

## 2021-07-14 ENCOUNTER — OFFICE VISIT (OUTPATIENT)
Dept: PHYSICAL MEDICINE AND REHAB | Facility: REHABILITATION | Age: 86
End: 2021-07-14
Payer: MEDICARE

## 2021-07-14 VITALS
TEMPERATURE: 97.4 F | RESPIRATION RATE: 18 BRPM | SYSTOLIC BLOOD PRESSURE: 116 MMHG | DIASTOLIC BLOOD PRESSURE: 80 MMHG | HEART RATE: 56 BPM | OXYGEN SATURATION: 93 %

## 2021-07-14 DIAGNOSIS — M21.371 RIGHT FOOT DROP: ICD-10-CM

## 2021-07-14 DIAGNOSIS — G81.91 RIGHT HEMIPARESIS (HCC): ICD-10-CM

## 2021-07-14 DIAGNOSIS — Z86.73 HISTORY OF STROKE: Primary | ICD-10-CM

## 2021-07-14 PROCEDURE — 99214 OFFICE O/P EST MOD 30 MIN: CPT | Performed by: PHYSICAL MEDICINE & REHABILITATION

## 2021-07-14 NOTE — PROGRESS NOTES
[facility]  PM&R Neuro Rehabilitation Clinic  [address] [place] [address]  Ph: [phone]    FOLLOW UP PATIENT EVALUATION      Patient Name: Liudmila Pulido   Patient : 1932  Patient Age: 88 y.o.     Examining Physician: Dr. Xochilt Causey, DO    PM&R History to Date - Background Information:  Dates of Admission/Discharge to ARU: 2020-2021    SUBJECTIVE:   Patient Identification: Liudmila Pulido is a 88 y.o. female with PMH significant for diabetes mellitus, aortic stenosis, essential hypertension, hyperlipidemia and rehabilitation history significant for left frontal ischemic stroke 2020 suspect cardioembolic etiology (on Eliquis) and is presenting to PM&R clinic for a FOLLOW UP OUTPATIENT evaluation with the following chief complaint/s:     Chief Complaint: Not making as much progress as would like    Accompanied by Today: Dtmarcus, Laura and Ban   History of Present Illness:   - No falls  - Walking with walker at home. Uses w/c for transport.   - Is now doing stairs.   - Still uses AFO R LE.   - Has not had PT since April.   - Making occupational type improvements at home.  - Has been doing vacuuming and some cooking.    - No longer having any pain in the RLE.   - Takes Tylenol PRN    Review of Systems:  All other pertinent positive review of systems are noted above in HPI.   All other systems reviewed and are negative.    Past Medical History:  Past Medical History:   Diagnosis Date   • Type 2 diabetes mellitus without complication, without long-term current use of insulin ([facility]) 2020   • Hyperlipidemia    • Hypertension       History reviewed. No pertinent surgical history.     Current Outpatient Medications:   •  rivaroxaban (XARELTO) 20 MG Tab tablet, Take 1 tablet by mouth with dinner., Disp: 90 tablet, Rfl: 1  •  metoprolol SR (TOPROL XL) 100 MG TABLET SR 24 HR, Take 100 mg by mouth every day., Disp: , Rfl:   •  loratadine (CLARITIN) 10 MG Tab, Take 10  mg by mouth every day., Disp: , Rfl:   •  atorvastatin (LIPITOR) 40 MG Tab, Take 1 Tab by mouth every evening., Disp: 30 Tab, Rfl: 2  •  lisinopril (PRINIVIL) 40 MG tablet, Take 1 Tab by mouth every day., Disp: 30 Tab, Rfl: 2  •  metFORMIN (GLUCOPHAGE) 500 MG Tab, Take 1 Tab by mouth 2 times a day, with meals., Disp: 60 Tab, Rfl: 2  •  acetaminophen (TYLENOL) 325 MG Tab, Take 2 Tabs by mouth every 6 hours as needed for Mild Pain., Disp: 30 Tab, Rfl: 0  •  amLODIPine (NORVASC) 10 MG Tab, Take 1 Tab by mouth every day., Disp: 30 Tab, Rfl: 2  •  senna-docusate (PERICOLACE OR SENOKOT S) 8.6-50 MG Tab, Take 1 tablet by mouth every 48 hours., Disp: 30 Tab, Rfl: 0  •  vitamin D (VITAMIND D3) 1000 UNIT Tab, Take 1 Tab by mouth every day., Disp: 30 Tab, Rfl: 2  •  FIBER PO, Take 1 tablet by mouth every day., Disp: , Rfl:   •  Ascorbic Acid (VITAMIN C PO), Take 1 Tab by mouth every morning., Disp: , Rfl:   Allergies   Allergen Reactions   • Demerol Unspecified     Convulsions     • Bloodless      Islam reason   • Penicillins      Per daughter, Laura   • Erythromycin Vomiting and Nausea        Past Social History:  Social History     Socioeconomic History   • Marital status:      Spouse name: Not on file   • Number of children: 3   • Years of education: Not on file   • Highest education level: Not on file   Occupational History     Employer: Retired   Tobacco Use   • Smoking status: Never Smoker   • Smokeless tobacco: Never Used   Vaping Use   • Vaping Use: Never used   Substance and Sexual Activity   • Alcohol use: Yes   • Drug use: Never   • Sexual activity: Not on file   Other Topics Concern   •  Service No   • Blood Transfusions No   • Caffeine Concern No   • Occupational Exposure No   • Hobby Hazards No   • Sleep Concern No   • Stress Concern No   • Weight Concern No   • Special Diet No   • Back Care No   • Exercise Yes   • Bike Helmet No   • Seat Belt No   • Self-Exams Yes   Social History Narrative    • Not on file     Social Determinants of Health     Financial Resource Strain:    • Difficulty of Paying Living Expenses:    Food Insecurity:    • Worried About Running Out of Food in the Last Year:    • Ran Out of Food in the Last Year:    Transportation Needs:    • Lack of Transportation (Medical):    • Lack of Transportation (Non-Medical):    Physical Activity:    • Days of Exercise per Week:    • Minutes of Exercise per Session:    Stress:    • Feeling of Stress :    Social Connections:    • Frequency of Communication with Friends and Family:    • Frequency of Social Gatherings with Friends and Family:    • Attends Pentecostalism Services:    • Active Member of Clubs or Organizations:    • Attends Club or Organization Meetings:    • Marital Status:    Intimate Partner Violence:    • Fear of Current or Ex-Partner:    • Emotionally Abused:    • Physically Abused:    • Sexually Abused:         Family History:  History reviewed. No pertinent family history.    Depression and Opioid Screening  PHQ-9:  Depression Screen (PHQ-2/PHQ-9) 1/8/2021 1/9/2021 2/8/2021   PHQ-2 Total Score 0 0 -   PHQ-2 Total Score - - 2   PHQ-9 Total Score - - 9     Interpretation of PHQ-9 Total Score   Score Severity   1-4 No Depression   5-9 Mild Depression   10-14 Moderate Depression   15-19 Moderately Severe Depression   20-27 Severe Depression     OBJECTIVE:   Vital Signs:  Vitals:    07/14/21 1441   BP: 116/80   Pulse: (!) 56   Resp: 18   Temp: 36.3 °C (97.4 °F)   SpO2: 93%        Pertinent Labs:  Lab Results   Component Value Date/Time    SODIUM 137 05/25/2021 11:30 AM    POTASSIUM 4.1 05/25/2021 11:30 AM    CHLORIDE 103 05/25/2021 11:30 AM    CO2 25 05/25/2021 11:30 AM    GLUCOSE 97 05/25/2021 11:30 AM    BUN 11 05/25/2021 11:30 AM    CREATININE 0.52 05/25/2021 11:30 AM       Lab Results   Component Value Date/Time    HBA1C 6.3 (H) 05/25/2021 11:30 AM       Lab Results   Component Value Date/Time    WBC 5.1 05/25/2021 11:30 AM    RBC 4.71  05/25/2021 11:30 AM    HEMOGLOBIN 13.7 05/25/2021 11:30 AM    HEMATOCRIT 42.3 05/25/2021 11:30 AM    MCV 89.8 05/25/2021 11:30 AM    MCH 29.1 05/25/2021 11:30 AM    MCHC 32.4 (L) 05/25/2021 11:30 AM    MPV 10.6 05/25/2021 11:30 AM    NEUTSPOLYS 40.40 (L) 05/25/2021 11:30 AM    LYMPHOCYTES 50.90 (H) 05/25/2021 11:30 AM    MONOCYTES 5.70 05/25/2021 11:30 AM    EOSINOPHILS 1.80 05/25/2021 11:30 AM    BASOPHILS 1.00 05/25/2021 11:30 AM       Lab Results   Component Value Date/Time    ASTSGOT 26 05/25/2021 11:30 AM    ALTSGPT 23 05/25/2021 11:30 AM        Physical Exam:   GEN: No apparent distress  HEENT: Head normocephalic, atraumatic.  Sclera nonicteric bilaterally, no ocular discharge appreciated bilaterally.  CV: Extremities warm and well-perfused, mild edema RLE  PULMONARY: Breathing nonlabored on room air, no respiratory accessory muscle use.  Not requiring supplemental oxygen.  SKIN: Bruising R shin.   PSYCH: Mood and affect within normal limits.  NEURO: Awake alert.  Conversational.  Logical thought content.    Motor Exam Lower Extremities  ? Myotome R L   Hip flexion L2 3+ 5   Knee extension L3 4 5   Ankle dorsiflexion L4 3 5   Toe extension L5 3 5   Ankle plantarflexion S1 4 5     Has R AFO    Imaging:   Doppler US 5/2021   CONCLUSIONS   No DVT.    ASSESSMENT/PLAN: Liudmila Pulido  is a 88 y.o. female with rehabilitation history significant for left frontal ischemic stroke 12/17/2020 suspect cardioembolic etiology, here for evaluation. The following plan was discussed with the patient who is in agreement.     Visit Diagnoses     ICD-10-CM   1. History of stroke  Z86.73   2. Right hemiparesis (HCC)  G81.91   3. Right foot drop  M21.371        Dtr assist with history 2/2 patient's stroke and short term memory loss.     Rehab/Neuro:   1. Left frontal ischemic stroke 12/17/2020 suspect cardioembolic etiology  -Therapy: New referral to OP PT, specific strengthening of hip flexors and dorsiflexors.  -  Counseling: Counseled on exercises to do to strengthen HFs and DFs on right.   -Function: Making progress, ambulating more with walker.   -Equipment: Right AFO - too large, but serving its purpose.     Spasticity: No significant spasticity on today's exam.     Neuropathic Pain: s/p Gabapentin    Right lower extremity swelling/pain: Has been walking more without the AFO, may be playing a part in sore muscles, however, patient did have stroke while she was on Eliquis and is currently anticoagulated but only has pain and swelling of the right lower leg.  More proximally she has no issues.  Concern for clot. Negative 5/6/2021.     Blurry vision:   - Recommend going to the optometrist.     Follow up: 3 months    Total time spent was 35 minutes.  Included in this time is the time spent preparing for the visit including record review, my exam and evaluation, counseling and education regarding that which is aforementioned in the assessment and plan. Time was spent ordering the appropriate labs, tests, procedures, referrals, medications. Included this time as the time spent obtaining history from someone other than the patient. Discussion involved the patient and dtr.     Please note that this dictation was created using voice recognition software. I have made every reasonable attempt to correct obvious errors but there may be errors of grammar and content that I may have overlooked prior to finalization of this note.    Dr. Xochilt Causey DO, MS  Department of Physical Medicine & Rehabilitation  Neuro Rehabilitation Clinic  [facility]

## 2021-09-01 ENCOUNTER — HOSPITAL ENCOUNTER (OUTPATIENT)
Dept: LAB | Facility: MEDICAL CENTER | Age: 86
End: 2021-09-01
Attending: NURSE PRACTITIONER
Payer: MEDICARE

## 2021-09-01 DIAGNOSIS — I48.11 LONGSTANDING PERSISTENT ATRIAL FIBRILLATION (HCC): ICD-10-CM

## 2021-09-01 LAB
ALBUMIN SERPL BCP-MCNC: 4 G/DL (ref 3.2–4.9)
ALBUMIN/GLOB SERPL: 1.3 G/DL
ALP SERPL-CCNC: 97 U/L (ref 30–99)
ALT SERPL-CCNC: 18 U/L (ref 2–50)
ANION GAP SERPL CALC-SCNC: 13 MMOL/L (ref 7–16)
AST SERPL-CCNC: 33 U/L (ref 12–45)
BASOPHILS # BLD AUTO: 0.7 % (ref 0–1.8)
BASOPHILS # BLD: 0.03 K/UL (ref 0–0.12)
BILIRUB SERPL-MCNC: 0.5 MG/DL (ref 0.1–1.5)
BUN SERPL-MCNC: 9 MG/DL (ref 8–22)
CALCIUM SERPL-MCNC: 10.2 MG/DL (ref 8.5–10.5)
CHLORIDE SERPL-SCNC: 105 MMOL/L (ref 96–112)
CO2 SERPL-SCNC: 23 MMOL/L (ref 20–33)
CREAT SERPL-MCNC: 0.55 MG/DL (ref 0.5–1.4)
EOSINOPHIL # BLD AUTO: 0.07 K/UL (ref 0–0.51)
EOSINOPHIL NFR BLD: 1.7 % (ref 0–6.9)
ERYTHROCYTE [DISTWIDTH] IN BLOOD BY AUTOMATED COUNT: 50.4 FL (ref 35.9–50)
GLOBULIN SER CALC-MCNC: 3.1 G/DL (ref 1.9–3.5)
GLUCOSE SERPL-MCNC: 95 MG/DL (ref 65–99)
HCT VFR BLD AUTO: 39.8 % (ref 37–47)
HGB BLD-MCNC: 12.5 G/DL (ref 12–16)
IMM GRANULOCYTES # BLD AUTO: 0 K/UL (ref 0–0.11)
IMM GRANULOCYTES NFR BLD AUTO: 0 % (ref 0–0.9)
LYMPHOCYTES # BLD AUTO: 2.2 K/UL (ref 1–4.8)
LYMPHOCYTES NFR BLD: 53.5 % (ref 22–41)
MCH RBC QN AUTO: 29.1 PG (ref 27–33)
MCHC RBC AUTO-ENTMCNC: 31.4 G/DL (ref 33.6–35)
MCV RBC AUTO: 92.8 FL (ref 81.4–97.8)
MONOCYTES # BLD AUTO: 0.2 K/UL (ref 0–0.85)
MONOCYTES NFR BLD AUTO: 4.9 % (ref 0–13.4)
NEUTROPHILS # BLD AUTO: 1.61 K/UL (ref 2–7.15)
NEUTROPHILS NFR BLD: 39.2 % (ref 44–72)
NRBC # BLD AUTO: 0 K/UL
NRBC BLD-RTO: 0 /100 WBC
PLATELET # BLD AUTO: 202 K/UL (ref 164–446)
PMV BLD AUTO: 10.3 FL (ref 9–12.9)
POTASSIUM SERPL-SCNC: 4.4 MMOL/L (ref 3.6–5.5)
PROT SERPL-MCNC: 7.1 G/DL (ref 6–8.2)
RBC # BLD AUTO: 4.29 M/UL (ref 4.2–5.4)
SODIUM SERPL-SCNC: 141 MMOL/L (ref 135–145)
WBC # BLD AUTO: 4.1 K/UL (ref 4.8–10.8)

## 2021-09-01 PROCEDURE — 85025 COMPLETE CBC W/AUTO DIFF WBC: CPT

## 2021-09-01 PROCEDURE — 36415 COLL VENOUS BLD VENIPUNCTURE: CPT

## 2021-09-01 PROCEDURE — 80053 COMPREHEN METABOLIC PANEL: CPT

## 2021-09-08 ENCOUNTER — ANTICOAGULATION VISIT (OUTPATIENT)
Dept: VASCULAR LAB | Facility: MEDICAL CENTER | Age: 86
End: 2021-09-08
Attending: INTERNAL MEDICINE
Payer: MEDICARE

## 2021-09-08 VITALS — DIASTOLIC BLOOD PRESSURE: 68 MMHG | HEART RATE: 53 BPM | SYSTOLIC BLOOD PRESSURE: 147 MMHG

## 2021-09-08 DIAGNOSIS — I48.11 LONGSTANDING PERSISTENT ATRIAL FIBRILLATION (HCC): ICD-10-CM

## 2021-09-08 PROCEDURE — 99212 OFFICE O/P EST SF 10 MIN: CPT | Performed by: NURSE PRACTITIONER

## 2021-09-08 NOTE — PROGRESS NOTES
Target end date:Indefinate                                      Indication: Stroke prophylaxis                                      Drug: Xarelto                                       CHADsVASC = 7                                      HAS-BLED = 2   Health Status Since Last Assessment  Any new relevant medical problems, ED visits/hospitalizations - no  Any embolic events (stroke / TIA / systemic embolism) - no    Adherence with DOAC Therapy  0 missed dose(s)  BLEEDING RISK ASSESSMENT NB:    Bleeding Risk Assessment  Severe epistaxis - no   Hemoptysis - no  Excessive or unusual bruising / hematomas - no  GIB/melena/BRBPR/hematemesis - no  Hematuria - no   Abnormal vaginal bleeding - no  Concerning daily headache or subdural hematoma symptoms - no  Decreasing hemoglobin or new anemia - no  Falls, presyncope, syncope, or seizures - no  Platelets: 202 Latest hemoglobin:     Lab Results   Component Value Date/Time    WBC 4.1 (L) 09/01/2021 11:05 AM    RBC 4.29 09/01/2021 11:05 AM    HEMOGLOBIN 12.5 09/01/2021 11:05 AM    HEMATOCRIT 39.8 09/01/2021 11:05 AM    MCV 92.8 09/01/2021 11:05 AM    MCH 29.1 09/01/2021 11:05 AM    MCHC 31.4 (L) 09/01/2021 11:05 AM    MPV 10.3 09/01/2021 11:05 AM    NEUTSPOLYS 39.20 (L) 09/01/2021 11:05 AM    LYMPHOCYTES 53.50 (H) 09/01/2021 11:05 AM    MONOCYTES 4.90 09/01/2021 11:05 AM    EOSINOPHILS 1.70 09/01/2021 11:05 AM    BASOPHILS 0.70 09/01/2021 11:05 AM        HAS-BLED:  Hypertension (uncontrolled, >160 mmHg systolic) - no  Renal disease (dialysis, transplant, Cr >2.26 mg/dL or >200 µmol/L) - no  Liver disease (cirrhosis or bilirubin >2x normal with AST/ALT/AP >3x normal) - no  Stroke history - yes  Prior major bleeding or predisposition to bleeding - no  Labile INR Unstable/high INRs, time in therapeutic range <60% - no  Age >65 - yes  Medication usage predisposing to bleeding (aspirin, clopidogrel, NSAIDs) - no  Alcohol use  ?8 drinks/week - no      Creatinine Clearance/Renal  Function  Latest eGFR / creatinine:  Lab Results   Component Value Date/Time    SODIUM 141 09/01/2021 11:05 AM    POTASSIUM 4.4 09/01/2021 11:05 AM    CHLORIDE 105 09/01/2021 11:05 AM    CO2 23 09/01/2021 11:05 AM    GLUCOSE 95 09/01/2021 11:05 AM    BUN 9 09/01/2021 11:05 AM    CREATININE 0.55 09/01/2021 11:05 AM      • Is eGFR less than 50ml/min  - no  Cr cl ~74 ml/min    If YES, calculate CrCl (see back)  Any recent dehydrating illness or medications added/changed? i.e. Diuretics    Hepatic function              Latest LFTs + alk phos WNL               Pt denies any history of liver dysfunction                 Drug Interactions  ASA/antiplatelets - avoids  NSAID - avoids  Other drug interactions -  (Review med list / OTCs;)  Current Outpatient Medications on File Prior to Visit   Medication Sig Dispense Refill   • metoprolol SR (TOPROL XL) 100 MG TABLET SR 24 HR Take 100 mg by mouth every day.     • loratadine (CLARITIN) 10 MG Tab Take 10 mg by mouth every day.     • atorvastatin (LIPITOR) 40 MG Tab Take 1 Tab by mouth every evening. 30 Tab 2   • lisinopril (PRINIVIL) 40 MG tablet Take 1 Tab by mouth every day. 30 Tab 2   • metFORMIN (GLUCOPHAGE) 500 MG Tab Take 1 Tab by mouth 2 times a day, with meals. 60 Tab 2   • acetaminophen (TYLENOL) 325 MG Tab Take 2 Tabs by mouth every 6 hours as needed for Mild Pain. 30 Tab 0   • amLODIPine (NORVASC) 10 MG Tab Take 1 Tab by mouth every day. 30 Tab 2   • senna-docusate (PERICOLACE OR SENOKOT S) 8.6-50 MG Tab Take 1 tablet by mouth every 48 hours. 30 Tab 0   • vitamin D (VITAMIND D3) 1000 UNIT Tab Take 1 Tab by mouth every day. 30 Tab 2   • FIBER PO Take 1 tablet by mouth every day.     • Ascorbic Acid (VITAMIN C PO) Take 1 Tab by mouth every morning.       No current facility-administered medications on file prior to visit.     Verified no anticonvulsant or azole therapy, education provided for future use.      Examination  Blood pressure:  147/68  • Elevated BP - no (sBP  greater than 160 mmHg)  • Symptomatic hypotension - no  Significant gait impairment / imbalance / high risk for falls - age is a risk    Final Assessment and Recommendations:  Patient appears stable from the anticoagulation standpoint  Benefits of continued DOAC therapy outweigh risks for this patient  Recommend continue current DOAC at same dose      - Continue taking Xarelto 20 mg daily    Other Actions:   CMP prior to next visit.    Follow up:  Will follow up with patient in 3 months.      Ban BUENO

## 2021-10-13 ENCOUNTER — OFFICE VISIT (OUTPATIENT)
Dept: PHYSICAL MEDICINE AND REHAB | Facility: REHABILITATION | Age: 86
End: 2021-10-13
Payer: MEDICARE

## 2021-10-13 VITALS
SYSTOLIC BLOOD PRESSURE: 142 MMHG | HEART RATE: 59 BPM | DIASTOLIC BLOOD PRESSURE: 70 MMHG | TEMPERATURE: 98.2 F | BODY MASS INDEX: 24.96 KG/M2 | OXYGEN SATURATION: 94 % | RESPIRATION RATE: 16 BRPM | HEIGHT: 65 IN

## 2021-10-13 DIAGNOSIS — Z74.09 IMPAIRED MOBILITY AND ACTIVITIES OF DAILY LIVING: ICD-10-CM

## 2021-10-13 DIAGNOSIS — I63.40 CEREBROVASCULAR ACCIDENT (CVA) DUE TO EMBOLISM OF CEREBRAL ARTERY (HCC): Primary | ICD-10-CM

## 2021-10-13 DIAGNOSIS — Z78.9 IMPAIRED MOBILITY AND ACTIVITIES OF DAILY LIVING: ICD-10-CM

## 2021-10-13 DIAGNOSIS — R25.2 SPASTICITY: ICD-10-CM

## 2021-10-13 PROCEDURE — 99214 OFFICE O/P EST MOD 30 MIN: CPT | Performed by: PHYSICAL MEDICINE & REHABILITATION

## 2021-10-13 NOTE — PROGRESS NOTES
[facility]  [facility]  [address] [place] [address]  Ph: [phone]    FOLLOW UP PATIENT EVALUATION      Patient Name: Liudmila Pulido   Patient : 1932  Patient Age: 89 y.o.     Examining Physician: Dr. Xochilt Causey, DO    PM&R History to Date - Background Information:  Dates of Admission/Discharge to ARU: 2020-2021    SUBJECTIVE:   Patient Identification: Liudmila Pulido is a 89 y.o. female with PMH significant for diabetes mellitus, aortic stenosis, essential hypertension, hyperlipidemia and rehabilitation history significant for left frontal ischemic stroke 2020 suspect cardioembolic etiology (on Eliquis) and is presenting to PM&R clinic for a FOLLOW UP OUTPATIENT evaluation with the following chief complaint/s:     Chief Complaint: Improving function    Accompanied by Today: Perico, Laura and Ban   History of Present Illness:   - Has new foot drop brace which is helpful, has more ankle mobility.   - Has been working with cane and gait belt.   - Doing more ADLs such as cleaning, laundry.   - Has 2 sessions PT left.   - Can do stairs and ramps.   - Is trying to do HEP as well.   - Uses traditional AFO.   - Has some knee hyper extension, wears brace. Not custom.   - For the most part completely independent. Working on making food alone.   - No pain. Only post-exercise. Relieved with Tylenol.     Review of Systems:  All other pertinent positive review of systems are noted above in HPI.   All other systems reviewed and are negative.    Past Medical History:  Past Medical History:   Diagnosis Date   • Type 2 diabetes mellitus without complication, without long-term current use of insulin ([facility]) 2020   • Hyperlipidemia    • Hypertension       History reviewed. No pertinent surgical history.     Current Outpatient Medications:   •  rivaroxaban (XARELTO) 20 MG Tab tablet, Take 1 Tablet by mouth with dinner., Disp: 90 Tablet, Rfl: 1  •   metoprolol SR (TOPROL XL) 100 MG TABLET SR 24 HR, Take 100 mg by mouth every day., Disp: , Rfl:   •  loratadine (CLARITIN) 10 MG Tab, Take 10 mg by mouth every day., Disp: , Rfl:   •  atorvastatin (LIPITOR) 40 MG Tab, Take 1 Tab by mouth every evening., Disp: 30 Tab, Rfl: 2  •  lisinopril (PRINIVIL) 40 MG tablet, Take 1 Tab by mouth every day., Disp: 30 Tab, Rfl: 2  •  metFORMIN (GLUCOPHAGE) 500 MG Tab, Take 1 Tab by mouth 2 times a day, with meals., Disp: 60 Tab, Rfl: 2  •  acetaminophen (TYLENOL) 325 MG Tab, Take 2 Tabs by mouth every 6 hours as needed for Mild Pain., Disp: 30 Tab, Rfl: 0  •  amLODIPine (NORVASC) 10 MG Tab, Take 1 Tab by mouth every day., Disp: 30 Tab, Rfl: 2  •  senna-docusate (PERICOLACE OR SENOKOT S) 8.6-50 MG Tab, Take 1 tablet by mouth every 48 hours., Disp: 30 Tab, Rfl: 0  •  vitamin D (VITAMIND D3) 1000 UNIT Tab, Take 1 Tab by mouth every day., Disp: 30 Tab, Rfl: 2  •  FIBER PO, Take 1 tablet by mouth every day., Disp: , Rfl:   •  Ascorbic Acid (VITAMIN C PO), Take 1 Tab by mouth every morning., Disp: , Rfl:   Allergies   Allergen Reactions   • Demerol Unspecified     Convulsions     • Bloodless      [employer] reason   • Penicillins      Per daughter, Laura   • Erythromycin Vomiting and Nausea        Past Social History:  Social History     Socioeconomic History   • Marital status:      Spouse name: Not on file   • Number of children: 3   • Years of education: Not on file   • Highest education level: Not on file   Occupational History     Employer: Retired   Tobacco Use   • Smoking status: Never Smoker   • Smokeless tobacco: Never Used   Vaping Use   • Vaping Use: Never used   Substance and Sexual Activity   • Alcohol use: Yes   • Drug use: Never   • Sexual activity: Not on file   Other Topics Concern   •  Service No   • Blood Transfusions No   • Caffeine Concern No   • Occupational Exposure No   • Hobby Hazards No   • Sleep Concern No   • Stress Concern No   • Weight Concern  No   • Special Diet No   • Back Care No   • Exercise Yes   • Bike Helmet No   • Seat Belt No   • Self-Exams Yes   Social History Narrative   • Not on file     Social Determinants of Health     Financial Resource Strain:    • Difficulty of Paying Living Expenses:    Food Insecurity:    • Worried About Running Out of Food in the Last Year:    • Ran Out of Food in the Last Year:    Transportation Needs:    • Lack of Transportation (Medical):    • Lack of Transportation (Non-Medical):    Physical Activity:    • Days of Exercise per Week:    • Minutes of Exercise per Session:    Stress:    • Feeling of Stress :    Social Connections:    • Frequency of Communication with Friends and Family:    • Frequency of Social Gatherings with Friends and Family:    • Attends [employer] Services:    • Active Member of Clubs or Organizations:    • Attends Club or Organization Meetings:    • Marital Status:    Intimate Partner Violence:    • Fear of Current or Ex-Partner:    • Emotionally Abused:    • Physically Abused:    • Sexually Abused:         Family History:  History reviewed. No pertinent family history.    Depression and Opioid Screening  PHQ-9:  Depression Screen (PHQ-2/PHQ-9) 1/8/2021 1/9/2021 2/8/2021   PHQ-2 Total Score 0 0 -   PHQ-2 Total Score - - 2   PHQ-9 Total Score - - 9     Interpretation of PHQ-9 Total Score   Score Severity   1-4 No Depression   5-9 Mild Depression   10-14 Moderate Depression   15-19 Moderately Severe Depression   20-27 Severe Depression     OBJECTIVE:   Vital Signs:  Vitals:    10/13/21 1321   BP: 142/70   Pulse: (!) 59   Resp: 16   Temp: 36.8 °C (98.2 °F)   SpO2: 94%        Pertinent Labs:  Lab Results   Component Value Date/Time    SODIUM 141 09/01/2021 11:05 AM    POTASSIUM 4.4 09/01/2021 11:05 AM    CHLORIDE 105 09/01/2021 11:05 AM    CO2 23 09/01/2021 11:05 AM    GLUCOSE 95 09/01/2021 11:05 AM    BUN 9 09/01/2021 11:05 AM    CREATININE 0.55 09/01/2021 11:05 AM       Lab Results   Component  Value Date/Time    HBA1C 6.3 (H) 05/25/2021 11:30 AM       Lab Results   Component Value Date/Time    WBC 4.1 (L) 09/01/2021 11:05 AM    RBC 4.29 09/01/2021 11:05 AM    HEMOGLOBIN 12.5 09/01/2021 11:05 AM    HEMATOCRIT 39.8 09/01/2021 11:05 AM    MCV 92.8 09/01/2021 11:05 AM    MCH 29.1 09/01/2021 11:05 AM    MCHC 31.4 (L) 09/01/2021 11:05 AM    MPV 10.3 09/01/2021 11:05 AM    NEUTSPOLYS 39.20 (L) 09/01/2021 11:05 AM    LYMPHOCYTES 53.50 (H) 09/01/2021 11:05 AM    MONOCYTES 4.90 09/01/2021 11:05 AM    EOSINOPHILS 1.70 09/01/2021 11:05 AM    BASOPHILS 0.70 09/01/2021 11:05 AM       Lab Results   Component Value Date/Time    ASTSGOT 33 09/01/2021 11:05 AM    ALTSGPT 18 09/01/2021 11:05 AM        Physical Exam:   GEN: No apparent distress  HEENT: Head normocephalic, atraumatic.  Sclera nonicteric bilaterally, no ocular discharge appreciated bilaterally.  CV: Extremities warm and well-perfused, no peripheral edema appreciated bilaterally.  PULMONARY: Breathing nonlabored on room air, no respiratory accessory muscle use.  Not requiring supplemental oxygen.  SKIN: No appreciable skin breakdown on exposed areas of skin.  PSYCH: Mood and affect within normal limits.  NEURO: Awake alert.  Conversational.  Logical thought content.  Answers questions appropriately.    Motor Exam Lower Extremities  ? Myotome R L   Hip flexion L2 3+ 5   Knee extension L3 5 5   Ankle dorsiflexion L4 4- 5   Toe extension L5 4 5   Ankle plantarflexion S1 4 5     Ambulatory with walker.  Has modified AFO  Relatively smooth gait with modified AFO, foot drag is partially related to weak quadriceps as she cannot lift high enough to clear floor.  No significant spasticity via modified Claudia scale.    ASSESSMENT/PLAN: Liudmila Pulido  is a 89 y.o. female with rehabilitation history significant for left frontal ischemic stroke 12/17/2020 suspect cardioembolic etiology, here for evaluation. The following plan was discussed with the patient who  is in agreement.     Visit Diagnoses     ICD-10-CM   1. Cerebrovascular accident (CVA) due to embolism of cerebral artery (HCC)  I63.40   2. Spasticity  R25.2   3. Impaired mobility and activities of daily living  Z74.09    Z78.9      Dtrs assist with history 2/2 patient's stroke and short term memory loss.     Rehab/Neuro:   1. Left frontal ischemic stroke 12/17/2020 suspect cardioembolic etiology  -Therapy: Continue physical therapy.  2 more sessions left.  -Function: Patient continues to make progress.  She does rely on both the wheelchair and a walker but is largely independent aside from some meal prep.  -Equipment: Rigid AFO at home.  Has modified AFO which works better for the patient.  -Counseling: Counseled that the next time we meet will see how she is doing, might be prudent to revisit physical therapy in the new year.  Counseled on importance of continuing home exercise program while not in therapy.    Spasticity: There is no spasticity of plantar flexors which is limiting adequate dorsiflexion.    Blurry vision:   -Visited optometrist and has new prescription.    Mood:  -Counseling: Counseled on risk of depression at 1 year post stroke    Follow up: 3 months functional reevaluation and see if new therapy referral needed.  Currently trying to transition PCPs.    Total time spent was 35 minutes.  Included in this time is the time spent preparing for the visit including record review, my exam and evaluation, counseling and education regarding that which is aforementioned in the assessment and plan. Included this time as the time spent obtaining history from someone other than the patient. Discussion involved the patient and daughters    Please note that this dictation was created using voice recognition software. I have made every reasonable attempt to correct obvious errors but there may be errors of grammar and content that I may have overlooked prior to finalization of this note.    Dr. Xochilt Ozuna  DO Padilla, MS  Department of Physical Medicine & Rehabilitation  Neuro Rehabilitation Clinic  [facility]

## 2021-10-15 ENCOUNTER — TELEPHONE (OUTPATIENT)
Dept: SCHEDULING | Facility: IMAGING CENTER | Age: 86
End: 2021-10-15

## 2021-11-01 SDOH — ECONOMIC STABILITY: TRANSPORTATION INSECURITY
IN THE PAST 12 MONTHS, HAS THE LACK OF TRANSPORTATION KEPT YOU FROM MEDICAL APPOINTMENTS OR FROM GETTING MEDICATIONS?: NO

## 2021-11-01 SDOH — ECONOMIC STABILITY: FOOD INSECURITY: WITHIN THE PAST 12 MONTHS, THE FOOD YOU BOUGHT JUST DIDN'T LAST AND YOU DIDN'T HAVE MONEY TO GET MORE.: NEVER TRUE

## 2021-11-01 SDOH — ECONOMIC STABILITY: HOUSING INSECURITY
IN THE LAST 12 MONTHS, WAS THERE A TIME WHEN YOU DID NOT HAVE A STEADY PLACE TO SLEEP OR SLEPT IN A SHELTER (INCLUDING NOW)?: NO

## 2021-11-01 SDOH — ECONOMIC STABILITY: HOUSING INSECURITY: IN THE LAST 12 MONTHS, HOW MANY PLACES HAVE YOU LIVED?: 1

## 2021-11-01 SDOH — HEALTH STABILITY: PHYSICAL HEALTH: ON AVERAGE, HOW MANY MINUTES DO YOU ENGAGE IN EXERCISE AT THIS LEVEL?: 20 MIN

## 2021-11-01 SDOH — ECONOMIC STABILITY: INCOME INSECURITY: IN THE LAST 12 MONTHS, WAS THERE A TIME WHEN YOU WERE NOT ABLE TO PAY THE MORTGAGE OR RENT ON TIME?: NO

## 2021-11-01 SDOH — ECONOMIC STABILITY: TRANSPORTATION INSECURITY
IN THE PAST 12 MONTHS, HAS LACK OF RELIABLE TRANSPORTATION KEPT YOU FROM MEDICAL APPOINTMENTS, MEETINGS, WORK OR FROM GETTING THINGS NEEDED FOR DAILY LIVING?: NO

## 2021-11-01 SDOH — HEALTH STABILITY: PHYSICAL HEALTH: ON AVERAGE, HOW MANY DAYS PER WEEK DO YOU ENGAGE IN MODERATE TO STRENUOUS EXERCISE (LIKE A BRISK WALK)?: 6 DAYS

## 2021-11-01 SDOH — ECONOMIC STABILITY: INCOME INSECURITY: HOW HARD IS IT FOR YOU TO PAY FOR THE VERY BASICS LIKE FOOD, HOUSING, MEDICAL CARE, AND HEATING?: NOT VERY HARD

## 2021-11-01 SDOH — ECONOMIC STABILITY: FOOD INSECURITY: WITHIN THE PAST 12 MONTHS, YOU WORRIED THAT YOUR FOOD WOULD RUN OUT BEFORE YOU GOT MONEY TO BUY MORE.: NEVER TRUE

## 2021-11-01 SDOH — ECONOMIC STABILITY: TRANSPORTATION INSECURITY
IN THE PAST 12 MONTHS, HAS LACK OF TRANSPORTATION KEPT YOU FROM MEETINGS, WORK, OR FROM GETTING THINGS NEEDED FOR DAILY LIVING?: NO

## 2021-11-01 SDOH — HEALTH STABILITY: MENTAL HEALTH
STRESS IS WHEN SOMEONE FEELS TENSE, NERVOUS, ANXIOUS, OR CAN'T SLEEP AT NIGHT BECAUSE THEIR MIND IS TROUBLED. HOW STRESSED ARE YOU?: ONLY A LITTLE

## 2021-11-01 ASSESSMENT — SOCIAL DETERMINANTS OF HEALTH (SDOH)
WITHIN THE PAST 12 MONTHS, YOU WORRIED THAT YOUR FOOD WOULD RUN OUT BEFORE YOU GOT THE MONEY TO BUY MORE: NEVER TRUE
IN A TYPICAL WEEK, HOW MANY TIMES DO YOU TALK ON THE PHONE WITH FAMILY, FRIENDS, OR NEIGHBORS?: MORE THAN THREE TIMES A WEEK
HOW HARD IS IT FOR YOU TO PAY FOR THE VERY BASICS LIKE FOOD, HOUSING, MEDICAL CARE, AND HEATING?: NOT VERY HARD
HOW OFTEN DO YOU ATTEND CHURCH OR RELIGIOUS SERVICES?: MORE THAN 4 TIMES PER YEAR
HOW OFTEN DO YOU HAVE A DRINK CONTAINING ALCOHOL: MONTHLY OR LESS
DO YOU BELONG TO ANY CLUBS OR ORGANIZATIONS SUCH AS CHURCH GROUPS UNIONS, FRATERNAL OR ATHLETIC GROUPS, OR SCHOOL GROUPS?: YES
HOW OFTEN DO YOU GET TOGETHER WITH FRIENDS OR RELATIVES?: MORE THAN THREE TIMES A WEEK
HOW OFTEN DO YOU ATTEND CHURCH OR RELIGIOUS SERVICES?: MORE THAN 4 TIMES PER YEAR
HOW OFTEN DO YOU ATTENT MEETINGS OF THE CLUB OR ORGANIZATION YOU BELONG TO?: MORE THAN 4 TIMES PER YEAR
DO YOU BELONG TO ANY CLUBS OR ORGANIZATIONS SUCH AS CHURCH GROUPS UNIONS, FRATERNAL OR ATHLETIC GROUPS, OR SCHOOL GROUPS?: YES
HOW MANY DRINKS CONTAINING ALCOHOL DO YOU HAVE ON A TYPICAL DAY WHEN YOU ARE DRINKING: 1 OR 2
HOW OFTEN DO YOU HAVE SIX OR MORE DRINKS ON ONE OCCASION: NEVER
HOW OFTEN DO YOU GET TOGETHER WITH FRIENDS OR RELATIVES?: MORE THAN THREE TIMES A WEEK
HOW OFTEN DO YOU ATTENT MEETINGS OF THE CLUB OR ORGANIZATION YOU BELONG TO?: MORE THAN 4 TIMES PER YEAR
IN A TYPICAL WEEK, HOW MANY TIMES DO YOU TALK ON THE PHONE WITH FAMILY, FRIENDS, OR NEIGHBORS?: MORE THAN THREE TIMES A WEEK

## 2021-11-01 ASSESSMENT — LIFESTYLE VARIABLES
HOW OFTEN DO YOU HAVE SIX OR MORE DRINKS ON ONE OCCASION: NEVER
HOW OFTEN DO YOU HAVE A DRINK CONTAINING ALCOHOL: MONTHLY OR LESS
HOW MANY STANDARD DRINKS CONTAINING ALCOHOL DO YOU HAVE ON A TYPICAL DAY: 1 OR 2

## 2021-11-02 ENCOUNTER — OFFICE VISIT (OUTPATIENT)
Dept: MEDICAL GROUP | Facility: PHYSICIAN GROUP | Age: 86
End: 2021-11-02
Payer: MEDICARE

## 2021-11-02 VITALS
RESPIRATION RATE: 14 BRPM | BODY MASS INDEX: 23.95 KG/M2 | HEART RATE: 60 BPM | TEMPERATURE: 97.8 F | SYSTOLIC BLOOD PRESSURE: 118 MMHG | DIASTOLIC BLOOD PRESSURE: 68 MMHG | WEIGHT: 149 LBS | OXYGEN SATURATION: 97 % | HEIGHT: 66 IN

## 2021-11-02 DIAGNOSIS — E11.69 DYSLIPIDEMIA ASSOCIATED WITH TYPE 2 DIABETES MELLITUS (HCC): ICD-10-CM

## 2021-11-02 DIAGNOSIS — E78.5 DYSLIPIDEMIA ASSOCIATED WITH TYPE 2 DIABETES MELLITUS (HCC): ICD-10-CM

## 2021-11-02 DIAGNOSIS — I10 ESSENTIAL HYPERTENSION: ICD-10-CM

## 2021-11-02 DIAGNOSIS — I48.11 LONGSTANDING PERSISTENT ATRIAL FIBRILLATION (HCC): ICD-10-CM

## 2021-11-02 DIAGNOSIS — Z13.21 ENCOUNTER FOR VITAMIN DEFICIENCY SCREENING: ICD-10-CM

## 2021-11-02 DIAGNOSIS — I63.40 CEREBROVASCULAR ACCIDENT (CVA) DUE TO EMBOLISM OF CEREBRAL ARTERY (HCC): ICD-10-CM

## 2021-11-02 PROBLEM — E87.1 HYPONATREMIA: Status: RESOLVED | Noted: 2020-12-17 | Resolved: 2021-11-02

## 2021-11-02 PROBLEM — E83.39 HYPOPHOSPHATEMIA: Status: RESOLVED | Noted: 2020-12-21 | Resolved: 2021-11-02

## 2021-11-02 PROBLEM — D61.818 PANCYTOPENIA (HCC): Status: RESOLVED | Noted: 2020-12-28 | Resolved: 2021-11-02

## 2021-11-02 PROBLEM — E55.9 VITAMIN D DEFICIENCY: Status: RESOLVED | Noted: 2020-12-21 | Resolved: 2021-11-02

## 2021-11-02 PROCEDURE — 99204 OFFICE O/P NEW MOD 45 MIN: CPT | Performed by: INTERNAL MEDICINE

## 2021-11-02 ASSESSMENT — FIBROSIS 4 INDEX: FIB4 SCORE: 3.43

## 2021-11-02 NOTE — PROGRESS NOTES
Subjective:     CC: Establish care    HISTORY OF THE PRESENT ILLNESS: Patient is a 89 y.o. female. This pleasant patient is here today to establish care and discuss the following issues:    The patient had a stroke December 2020.  She was subsequently diagnosed with atrial fibrillation.  She is on both rate control and anticoagulation.  She is managed by cardiology at [place].  She recently completed her last round of PT.  No recent falls.  She uses a walker at home.  She rides her recumbent bike daily as well as does PT exercises.  She has lost about 30 pounds since her stroke, but most recently her weight has been stable.  She lives with her daughter.  She is scheduled to have labs drawn on 11/24/2021.    Allergies: Demerol, Bloodless, Penicillins, and Erythromycin    Current Outpatient Medications Ordered in Epic   Medication Sig Dispense Refill   • rivaroxaban (XARELTO) 20 MG Tab tablet Take 1 Tablet by mouth with dinner. 90 Tablet 1   • metoprolol SR (TOPROL XL) 100 MG TABLET SR 24 HR Take 100 mg by mouth every day.     • loratadine (CLARITIN) 10 MG Tab Take 10 mg by mouth every day.     • atorvastatin (LIPITOR) 40 MG Tab Take 1 Tab by mouth every evening. 30 Tab 2   • lisinopril (PRINIVIL) 40 MG tablet Take 1 Tab by mouth every day. 30 Tab 2   • metFORMIN (GLUCOPHAGE) 500 MG Tab Take 1 Tab by mouth 2 times a day, with meals. 60 Tab 2   • acetaminophen (TYLENOL) 325 MG Tab Take 2 Tabs by mouth every 6 hours as needed for Mild Pain. 30 Tab 0   • amLODIPine (NORVASC) 10 MG Tab Take 1 Tab by mouth every day. 30 Tab 2   • senna-docusate (PERICOLACE OR SENOKOT S) 8.6-50 MG Tab Take 1 tablet by mouth every 48 hours. 30 Tab 0   • vitamin D (VITAMIND D3) 1000 UNIT Tab Take 1 Tab by mouth every day. 30 Tab 2   • FIBER PO Take 1 tablet by mouth every day.     • Ascorbic Acid (VITAMIN C PO) Take 1 Tab by mouth every morning.       No current [facility]-ordered facility-administered medications on file.       Past Medical  "History:   Diagnosis Date   • Hyperlipidemia    • Hypertension    • Hyponatremia 12/17/2020   • Hypophosphatemia 12/21/2020   • Pancytopenia (HCC) 12/28/2020   • Type 2 diabetes mellitus without complication, without long-term current use of insulin (HCC) 12/18/2020   • Vitamin D deficiency 12/21/2020       History reviewed. No pertinent surgical history.    Social History     Tobacco Use   • Smoking status: Never Smoker   • Smokeless tobacco: Never Used   Vaping Use   • Vaping Use: Never used   Substance Use Topics   • Alcohol use: Yes   • Drug use: Never       Social History     Social History Narrative   • Not on file       History reviewed. No pertinent family history.    Health Maintenance: Completed    ROS:   Gen: no fevers/chills  Pulm: no sob, no cough  CV: no chest pain, no palpitations  GI: no nausea/vomiting, no diarrhea  Neuro: no headaches, no numbness/tingling      Objective:     Exam: /68   Pulse 60   Temp 36.6 °C (97.8 °F)   Resp 14   Ht 1.676 m (5' 6\")   Wt 67.6 kg (149 lb)   SpO2 97%  Body mass index is 24.05 kg/m².    General: Normal appearing. No distress.  HEENT: Normocephalic. Eyes conjunctiva clear lids without ptosis, pupils equal and reactive to light accommodation, oropharynx is without erythema, edema or exudates.   Pulmonary: Clear to ausculation.  Normal effort. No rales, ronchi, or wheezing.  Cardiovascular: Regular rate and rhythm without murmur.   Abdomen: Soft, nontender, nondistended.   Musculoskeletal: No extremity cyanosis, clubbing, or edema.  Psych: Normal mood and affect. Alert and oriented x3. Judgment and insight is normal.    Assessment & Plan:   89 y.o. female with the following -    Dyslipidemia associated with type 2 diabetes mellitus (HCC)  With regard to the patient's type 2 diabetes, chronic, ongoing.  The patient is on metformin 500 mg twice daily.  Hemoglobin A1c from 5/25/2021 was 6.3. With regard to the patient's dyslipidemia, chronic, ongoing.  The " patient is on atorvastatin 40 mg nightly.  Lipid panel from 4/29/2021 showed a total cholesterol 111, LDL 44, HDL 48, triglycerides 93.  -Labs ordered by her cardiologist to [facility]  - MICROALBUMIN CREAT RATIO URINE; Future    Longstanding persistent atrial fibrillation (HCC)  Essential hypertension  These are chronic conditions.  Well-controlled.  She is followed by cardiology at [facility].  The patient is on metoprolol 100 mg twice daily for rate control Xarelto 20 mg daily for anticoagulation.  She is also on lisinopril 40 mg daily blood pressure and amlodipine 10 mg daily.  -Continue Xarelto 20 mg daily  -Continue metoprolol 100 mg twice daily  -Continue lisinopril 40 mg daily  -Continue amlodipine 10 mg daily    Cerebrovascular accident (CVA) due to embolism of cerebral artery (HCC)  This is a chronic condition.  The patient had a left frontal ischemic stroke 12/17/2020.  Brain MRI 12/17/2020 showed moderate to advanced cerebral atrophy, left frontal and parietal lobe cerebral infarction, microvascular ischemic changes, and an old lacunar infarct of the left posterior frontal lobe.  TTE at that time showed atrial fibrillation.    Encounter for vitamin deficiency screening  - VITAMIN D,25 HYDROXY; Future    I spent a total of 41 minutes with record review, exam, communication with the patient, communication with other providers, and documentation of this encounter.    Return in about 6 months (around 5/2/2022) for f/u labs.    Please note that this dictation was created using voice recognition software. I have made every reasonable attempt to correct obvious errors, but I expect that there are errors of grammar and possibly content that I did not discover before finalizing the note.

## 2021-11-30 NOTE — TELEPHONE ENCOUNTER
----- Message from Haim Ta M.D. sent at 11/29/2021  4:36 PM PST -----  Please notify patient about their result(s)     My name is Haim Ta MD.  I am covering for your primary care provider Nabila De Jesus M.D.  who is out of the office this week.     Vitamin d level, urine microalbumin: are within acceptable levels.       Please follow up with your pcp Nabila De Jesus M.D.        YASIR Ta MD

## 2021-12-01 NOTE — PROGRESS NOTES
Target end date:Indefinite   Indication: Stroke prophylaxis  Drug: Xarelto   CHADsVASC = 7  HAS-BLED = 2   Health Status Since Last Assessment    Health Status Since Last Assessment   Patient denies any new relevant medical problems, ED visits or hospitalizations   Patient denies any embolic events (stroke/tia/systemic embolism)    Adherence with DOAC Therapy   Pt has not missed any doses in the average week    Bleeding Risk Assessment     Denies Epistaxis   Pt denies any excessive or unusual bleeding/hematomas.  Pt denies any GI bleeds or hematemesis.  Pt denies any concerning daily headache or sub dural hematoma symptoms.     Pt denies any hematuria   Latest Hemoglobin 13.4   ETOH overuse Denies     Creatinine Clearance/Renal Function     Latest ClCr 65 mL/min    Hepatic function   Latest LFTs WNL   Pt denies any history of liver dysfunction      Drug Interactions   Platelets: 212   ASA/other antiplatelets Denies   NSAID Denies   Other drug interactions No significant drug interactions found   Verified no anticonvulsant or azole therapy, education provided for future use.     Examination   Blood Pressure 144/74 pulse 58   Symptomatic hypotension Denies   Significant gait impairment/imbalance/high risk for falls? Denies    Final Assessment and Recommendations:   Patient appears stable from the anticoagulation standpoint.     Benefits of continued DOAC therapy outweigh risks for this patient   Recommend pt continue with current DOAC therapy Xarelto 20mg once a day (with dose adjustment)   DOAC is affordable.    Other Actions: cmp/ cbc hemogram ordered prior to next visit    Follow up:   Will follow up with patient 6 months.

## 2022-01-01 ENCOUNTER — HOSPITAL ENCOUNTER (OUTPATIENT)
Dept: LAB | Facility: MEDICAL CENTER | Age: 87
End: 2022-05-25
Attending: NURSE PRACTITIONER
Payer: MEDICARE

## 2022-01-01 ENCOUNTER — HOSPITAL ENCOUNTER (INPATIENT)
Facility: MEDICAL CENTER | Age: 87
LOS: 5 days | DRG: 391 | End: 2022-11-14
Attending: EMERGENCY MEDICINE | Admitting: INTERNAL MEDICINE
Payer: MEDICARE

## 2022-01-01 ENCOUNTER — APPOINTMENT (OUTPATIENT)
Dept: RADIOLOGY | Facility: MEDICAL CENTER | Age: 87
DRG: 391 | End: 2022-01-01
Attending: EMERGENCY MEDICINE
Payer: MEDICARE

## 2022-01-01 ENCOUNTER — HOSPITAL ENCOUNTER (OUTPATIENT)
Dept: LAB | Facility: MEDICAL CENTER | Age: 87
End: 2022-05-25
Attending: INTERNAL MEDICINE
Payer: MEDICARE

## 2022-01-01 ENCOUNTER — APPOINTMENT (OUTPATIENT)
Dept: RADIOLOGY | Facility: MEDICAL CENTER | Age: 87
DRG: 391 | End: 2022-01-01
Attending: INTERNAL MEDICINE
Payer: MEDICARE

## 2022-01-01 ENCOUNTER — ANTICOAGULATION VISIT (OUTPATIENT)
Dept: VASCULAR LAB | Facility: MEDICAL CENTER | Age: 87
End: 2022-01-01
Attending: INTERNAL MEDICINE
Payer: MEDICARE

## 2022-01-01 ENCOUNTER — APPOINTMENT (OUTPATIENT)
Dept: MEDICAL GROUP | Facility: PHYSICIAN GROUP | Age: 87
End: 2022-01-01
Payer: COMMERCIAL

## 2022-01-01 ENCOUNTER — OFFICE VISIT (OUTPATIENT)
Dept: PHYSICAL MEDICINE AND REHAB | Facility: REHABILITATION | Age: 87
End: 2022-01-01
Payer: MEDICARE

## 2022-01-01 ENCOUNTER — OFFICE VISIT (OUTPATIENT)
Dept: MEDICAL GROUP | Facility: PHYSICIAN GROUP | Age: 87
End: 2022-01-01
Payer: MEDICARE

## 2022-01-01 ENCOUNTER — TELEPHONE (OUTPATIENT)
Dept: MEDICAL GROUP | Facility: PHYSICIAN GROUP | Age: 87
End: 2022-01-01
Payer: COMMERCIAL

## 2022-01-01 VITALS
BODY MASS INDEX: 25.18 KG/M2 | TEMPERATURE: 98.6 F | OXYGEN SATURATION: 95 % | WEIGHT: 142.1 LBS | HEART RATE: 60 BPM | RESPIRATION RATE: 20 BRPM | SYSTOLIC BLOOD PRESSURE: 120 MMHG | HEIGHT: 63 IN | DIASTOLIC BLOOD PRESSURE: 62 MMHG

## 2022-01-01 VITALS
HEART RATE: 70 BPM | RESPIRATION RATE: 18 BRPM | SYSTOLIC BLOOD PRESSURE: 116 MMHG | DIASTOLIC BLOOD PRESSURE: 62 MMHG | TEMPERATURE: 97.4 F | OXYGEN SATURATION: 98 %

## 2022-01-01 VITALS
RESPIRATION RATE: 18 BRPM | HEIGHT: 63 IN | TEMPERATURE: 97.9 F | OXYGEN SATURATION: 92 % | HEART RATE: 101 BPM | WEIGHT: 162.48 LBS | DIASTOLIC BLOOD PRESSURE: 51 MMHG | BODY MASS INDEX: 28.79 KG/M2 | SYSTOLIC BLOOD PRESSURE: 98 MMHG

## 2022-01-01 VITALS
TEMPERATURE: 98.6 F | RESPIRATION RATE: 18 BRPM | SYSTOLIC BLOOD PRESSURE: 130 MMHG | OXYGEN SATURATION: 98 % | HEART RATE: 62 BPM | DIASTOLIC BLOOD PRESSURE: 80 MMHG

## 2022-01-01 DIAGNOSIS — I10 PRIMARY HYPERTENSION: ICD-10-CM

## 2022-01-01 DIAGNOSIS — I48.11 LONGSTANDING PERSISTENT ATRIAL FIBRILLATION (HCC): ICD-10-CM

## 2022-01-01 DIAGNOSIS — E11.69 HYPERLIPIDEMIA ASSOCIATED WITH TYPE 2 DIABETES MELLITUS (HCC): ICD-10-CM

## 2022-01-01 DIAGNOSIS — R53.1 WEAKNESS: ICD-10-CM

## 2022-01-01 DIAGNOSIS — M21.371 RIGHT FOOT DROP: ICD-10-CM

## 2022-01-01 DIAGNOSIS — R74.8 ELEVATED ALKALINE PHOSPHATASE LEVEL: ICD-10-CM

## 2022-01-01 DIAGNOSIS — Z74.09 IMPAIRED MOBILITY AND ACTIVITIES OF DAILY LIVING: ICD-10-CM

## 2022-01-01 DIAGNOSIS — R19.7 DIARRHEA OF PRESUMED INFECTIOUS ORIGIN: ICD-10-CM

## 2022-01-01 DIAGNOSIS — G81.91 RIGHT HEMIPARESIS (HCC): ICD-10-CM

## 2022-01-01 DIAGNOSIS — Z79.01 CHRONIC ANTICOAGULATION: ICD-10-CM

## 2022-01-01 DIAGNOSIS — Z13.29 THYROID DISORDER SCREEN: ICD-10-CM

## 2022-01-01 DIAGNOSIS — Z99.89 WALKER AS AMBULATION AID: ICD-10-CM

## 2022-01-01 DIAGNOSIS — E86.0 DEHYDRATION: ICD-10-CM

## 2022-01-01 DIAGNOSIS — Z78.9 IMPAIRED MOBILITY AND ACTIVITIES OF DAILY LIVING: ICD-10-CM

## 2022-01-01 DIAGNOSIS — R77.1 ELEVATED SERUM GLOBULIN LEVEL: ICD-10-CM

## 2022-01-01 DIAGNOSIS — E78.5 HYPERLIPIDEMIA ASSOCIATED WITH TYPE 2 DIABETES MELLITUS (HCC): ICD-10-CM

## 2022-01-01 DIAGNOSIS — I63.40 CEREBROVASCULAR ACCIDENT (CVA) DUE TO EMBOLISM OF CEREBRAL ARTERY (HCC): Primary | ICD-10-CM

## 2022-01-01 DIAGNOSIS — Z13.0 SCREENING FOR DEFICIENCY ANEMIA: ICD-10-CM

## 2022-01-01 DIAGNOSIS — Z74.09 IMPAIRED MOBILITY AND ENDURANCE: ICD-10-CM

## 2022-01-01 LAB
ALBUMIN SERPL BCP-MCNC: 1.8 G/DL (ref 3.2–4.9)
ALBUMIN SERPL BCP-MCNC: 2.3 G/DL (ref 3.2–4.9)
ALBUMIN SERPL BCP-MCNC: 2.4 G/DL (ref 3.2–4.9)
ALBUMIN SERPL BCP-MCNC: 2.8 G/DL (ref 3.2–4.9)
ALBUMIN SERPL BCP-MCNC: 3.2 G/DL (ref 3.2–4.9)
ALBUMIN/GLOB SERPL: 0.5 G/DL
ALBUMIN/GLOB SERPL: 0.6 G/DL
ALBUMIN/GLOB SERPL: 0.8 G/DL
ALP SERPL-CCNC: 121 U/L (ref 30–99)
ALP SERPL-CCNC: 124 U/L (ref 30–99)
ALP SERPL-CCNC: 135 U/L (ref 30–99)
ALP SERPL-CCNC: 144 U/L (ref 30–99)
ALP SERPL-CCNC: 163 U/L (ref 30–99)
ALT SERPL-CCNC: 21 U/L (ref 2–50)
ALT SERPL-CCNC: 23 U/L (ref 2–50)
ALT SERPL-CCNC: 24 U/L (ref 2–50)
ALT SERPL-CCNC: 31 U/L (ref 2–50)
ALT SERPL-CCNC: 33 U/L (ref 2–50)
AMMONIA PLAS-SCNC: 17 UMOL/L (ref 11–45)
AMPHET UR QL SCN: NEGATIVE
ANION GAP SERPL CALC-SCNC: 10 MMOL/L (ref 7–16)
ANION GAP SERPL CALC-SCNC: 15 MMOL/L (ref 7–16)
ANION GAP SERPL CALC-SCNC: 16 MMOL/L (ref 7–16)
ANION GAP SERPL CALC-SCNC: 7 MMOL/L (ref 7–16)
ANION GAP SERPL CALC-SCNC: 9 MMOL/L (ref 7–16)
APPEARANCE UR: ABNORMAL
AST SERPL-CCNC: 102 U/L (ref 12–45)
AST SERPL-CCNC: 44 U/L (ref 12–45)
AST SERPL-CCNC: 66 U/L (ref 12–45)
AST SERPL-CCNC: 84 U/L (ref 12–45)
AST SERPL-CCNC: 93 U/L (ref 12–45)
BACTERIA #/AREA URNS HPF: ABNORMAL /HPF
BACTERIA BLD CULT: NORMAL
BACTERIA BLD CULT: NORMAL
BACTERIA UR CULT: NORMAL
BARBITURATES UR QL SCN: NEGATIVE
BASE EXCESS BLDV CALC-SCNC: -6 MMOL/L
BASOPHILS # BLD AUTO: 0.2 % (ref 0–1.8)
BASOPHILS # BLD AUTO: 0.3 % (ref 0–1.8)
BASOPHILS # BLD AUTO: 0.5 % (ref 0–1.8)
BASOPHILS # BLD AUTO: 0.5 % (ref 0–1.8)
BASOPHILS # BLD: 0.01 K/UL (ref 0–0.12)
BASOPHILS # BLD: 0.01 K/UL (ref 0–0.12)
BASOPHILS # BLD: 0.03 K/UL (ref 0–0.12)
BASOPHILS # BLD: 0.03 K/UL (ref 0–0.12)
BENZODIAZ UR QL SCN: NEGATIVE
BILIRUB SERPL-MCNC: 0.6 MG/DL (ref 0.1–1.5)
BILIRUB SERPL-MCNC: 0.6 MG/DL (ref 0.1–1.5)
BILIRUB SERPL-MCNC: 0.7 MG/DL (ref 0.1–1.5)
BILIRUB SERPL-MCNC: 0.7 MG/DL (ref 0.1–1.5)
BILIRUB SERPL-MCNC: 0.8 MG/DL (ref 0.1–1.5)
BILIRUB UR QL STRIP.AUTO: NEGATIVE
BODY TEMPERATURE: 36.6 CENTIGRADE
BUN SERPL-MCNC: 16 MG/DL (ref 8–22)
BUN SERPL-MCNC: 18 MG/DL (ref 8–22)
BUN SERPL-MCNC: 6 MG/DL (ref 8–22)
BUN SERPL-MCNC: 9 MG/DL (ref 8–22)
BUN SERPL-MCNC: 9 MG/DL (ref 8–22)
BZE UR QL SCN: NEGATIVE
C DIFF DNA SPEC QL NAA+PROBE: NEGATIVE
C DIFF TOX GENS STL QL NAA+PROBE: NEGATIVE
CALCIUM SERPL-MCNC: 8.4 MG/DL (ref 8.5–10.5)
CALCIUM SERPL-MCNC: 8.4 MG/DL (ref 8.5–10.5)
CALCIUM SERPL-MCNC: 8.7 MG/DL (ref 8.5–10.5)
CALCIUM SERPL-MCNC: 8.8 MG/DL (ref 8.5–10.5)
CALCIUM SERPL-MCNC: 9.9 MG/DL (ref 8.5–10.5)
CANNABINOIDS UR QL SCN: NEGATIVE
CHLORIDE SERPL-SCNC: 103 MMOL/L (ref 96–112)
CHLORIDE SERPL-SCNC: 103 MMOL/L (ref 96–112)
CHLORIDE SERPL-SCNC: 104 MMOL/L (ref 96–112)
CHLORIDE SERPL-SCNC: 108 MMOL/L (ref 96–112)
CHLORIDE SERPL-SCNC: 110 MMOL/L (ref 96–112)
CHOLEST SERPL-MCNC: 120 MG/DL (ref 100–199)
CO2 SERPL-SCNC: 15 MMOL/L (ref 20–33)
CO2 SERPL-SCNC: 16 MMOL/L (ref 20–33)
CO2 SERPL-SCNC: 20 MMOL/L (ref 20–33)
CO2 SERPL-SCNC: 22 MMOL/L (ref 20–33)
CO2 SERPL-SCNC: 24 MMOL/L (ref 20–33)
COLOR UR: YELLOW
CREAT SERPL-MCNC: 0.47 MG/DL (ref 0.5–1.4)
CREAT SERPL-MCNC: 0.47 MG/DL (ref 0.5–1.4)
CREAT SERPL-MCNC: 0.57 MG/DL (ref 0.5–1.4)
CREAT SERPL-MCNC: 0.67 MG/DL (ref 0.5–1.4)
CREAT SERPL-MCNC: 0.81 MG/DL (ref 0.5–1.4)
EKG IMPRESSION: NORMAL
EOSINOPHIL # BLD AUTO: 0.02 K/UL (ref 0–0.51)
EOSINOPHIL # BLD AUTO: 0.02 K/UL (ref 0–0.51)
EOSINOPHIL # BLD AUTO: 0.03 K/UL (ref 0–0.51)
EOSINOPHIL # BLD AUTO: 0.14 K/UL (ref 0–0.51)
EOSINOPHIL NFR BLD: 0.3 % (ref 0–6.9)
EOSINOPHIL NFR BLD: 0.5 % (ref 0–6.9)
EOSINOPHIL NFR BLD: 0.6 % (ref 0–6.9)
EOSINOPHIL NFR BLD: 2.3 % (ref 0–6.9)
EPI CELLS #/AREA URNS HPF: ABNORMAL /HPF
ERYTHROCYTE [DISTWIDTH] IN BLOOD BY AUTOMATED COUNT: 46.9 FL (ref 35.9–50)
ERYTHROCYTE [DISTWIDTH] IN BLOOD BY AUTOMATED COUNT: 53.7 FL (ref 35.9–50)
ERYTHROCYTE [DISTWIDTH] IN BLOOD BY AUTOMATED COUNT: 54.9 FL (ref 35.9–50)
ERYTHROCYTE [DISTWIDTH] IN BLOOD BY AUTOMATED COUNT: 55.3 FL (ref 35.9–50)
ERYTHROCYTE [DISTWIDTH] IN BLOOD BY AUTOMATED COUNT: 55.4 FL (ref 35.9–50)
ERYTHROCYTE [DISTWIDTH] IN BLOOD BY AUTOMATED COUNT: 55.5 FL (ref 35.9–50)
ERYTHROCYTE [DISTWIDTH] IN BLOOD BY AUTOMATED COUNT: 57.5 FL (ref 35.9–50)
EST. AVERAGE GLUCOSE BLD GHB EST-MCNC: 126 MG/DL
ETHANOL BLD-MCNC: <10.1 MG/DL
FASTING STATUS PATIENT QL REPORTED: NORMAL
FASTING STATUS PATIENT QL REPORTED: NORMAL
GFR SERPLBLD CREATININE-BSD FMLA CKD-EPI: 69 ML/MIN/1.73 M 2
GFR SERPLBLD CREATININE-BSD FMLA CKD-EPI: 83 ML/MIN/1.73 M 2
GFR SERPLBLD CREATININE-BSD FMLA CKD-EPI: 86 ML/MIN/1.73 M 2
GFR SERPLBLD CREATININE-BSD FMLA CKD-EPI: 90 ML/MIN/1.73 M 2
GFR SERPLBLD CREATININE-BSD FMLA CKD-EPI: 90 ML/MIN/1.73 M 2
GLOBULIN SER CALC-MCNC: 3.3 G/DL (ref 1.9–3.5)
GLOBULIN SER CALC-MCNC: 3.6 G/DL (ref 1.9–3.5)
GLOBULIN SER CALC-MCNC: 4.2 G/DL (ref 1.9–3.5)
GLOBULIN SER CALC-MCNC: 4.2 G/DL (ref 1.9–3.5)
GLOBULIN SER CALC-MCNC: 4.5 G/DL (ref 1.9–3.5)
GLUCOSE BLD STRIP.AUTO-MCNC: 101 MG/DL (ref 65–99)
GLUCOSE BLD STRIP.AUTO-MCNC: 101 MG/DL (ref 65–99)
GLUCOSE BLD STRIP.AUTO-MCNC: 105 MG/DL (ref 65–99)
GLUCOSE BLD STRIP.AUTO-MCNC: 107 MG/DL (ref 65–99)
GLUCOSE BLD STRIP.AUTO-MCNC: 114 MG/DL (ref 65–99)
GLUCOSE BLD STRIP.AUTO-MCNC: 119 MG/DL (ref 65–99)
GLUCOSE BLD STRIP.AUTO-MCNC: 124 MG/DL (ref 65–99)
GLUCOSE BLD STRIP.AUTO-MCNC: 126 MG/DL (ref 65–99)
GLUCOSE BLD STRIP.AUTO-MCNC: 127 MG/DL (ref 65–99)
GLUCOSE BLD STRIP.AUTO-MCNC: 129 MG/DL (ref 65–99)
GLUCOSE BLD STRIP.AUTO-MCNC: 141 MG/DL (ref 65–99)
GLUCOSE BLD STRIP.AUTO-MCNC: 145 MG/DL (ref 65–99)
GLUCOSE BLD STRIP.AUTO-MCNC: 152 MG/DL (ref 65–99)
GLUCOSE BLD STRIP.AUTO-MCNC: 48 MG/DL (ref 65–99)
GLUCOSE BLD STRIP.AUTO-MCNC: 53 MG/DL (ref 65–99)
GLUCOSE BLD STRIP.AUTO-MCNC: 65 MG/DL (ref 65–99)
GLUCOSE BLD STRIP.AUTO-MCNC: 71 MG/DL (ref 65–99)
GLUCOSE BLD STRIP.AUTO-MCNC: 73 MG/DL (ref 65–99)
GLUCOSE BLD STRIP.AUTO-MCNC: 78 MG/DL (ref 65–99)
GLUCOSE BLD STRIP.AUTO-MCNC: 81 MG/DL (ref 65–99)
GLUCOSE BLD STRIP.AUTO-MCNC: 82 MG/DL (ref 65–99)
GLUCOSE BLD STRIP.AUTO-MCNC: 84 MG/DL (ref 65–99)
GLUCOSE BLD STRIP.AUTO-MCNC: 86 MG/DL (ref 65–99)
GLUCOSE BLD STRIP.AUTO-MCNC: 87 MG/DL (ref 65–99)
GLUCOSE BLD STRIP.AUTO-MCNC: 87 MG/DL (ref 65–99)
GLUCOSE BLD STRIP.AUTO-MCNC: 91 MG/DL (ref 65–99)
GLUCOSE BLD STRIP.AUTO-MCNC: 92 MG/DL (ref 65–99)
GLUCOSE BLD STRIP.AUTO-MCNC: 94 MG/DL (ref 65–99)
GLUCOSE BLD STRIP.AUTO-MCNC: 98 MG/DL (ref 65–99)
GLUCOSE SERPL-MCNC: 108 MG/DL (ref 65–99)
GLUCOSE SERPL-MCNC: 113 MG/DL (ref 65–99)
GLUCOSE SERPL-MCNC: 83 MG/DL (ref 65–99)
GLUCOSE SERPL-MCNC: 85 MG/DL (ref 65–99)
GLUCOSE SERPL-MCNC: 97 MG/DL (ref 65–99)
GLUCOSE UR STRIP.AUTO-MCNC: NEGATIVE MG/DL
HBA1C MFR BLD: 6 % (ref 4–5.6)
HCO3 BLDV-SCNC: 20 MMOL/L (ref 24–28)
HCT VFR BLD AUTO: 30.1 % (ref 37–47)
HCT VFR BLD AUTO: 33.5 % (ref 37–47)
HCT VFR BLD AUTO: 36.7 % (ref 37–47)
HCT VFR BLD AUTO: 37.4 % (ref 37–47)
HCT VFR BLD AUTO: 37.7 % (ref 37–47)
HCT VFR BLD AUTO: 40.3 % (ref 37–47)
HCT VFR BLD AUTO: 42.8 % (ref 37–47)
HDLC SERPL-MCNC: 58 MG/DL
HGB BLD-MCNC: 10 G/DL (ref 12–16)
HGB BLD-MCNC: 11.4 G/DL (ref 12–16)
HGB BLD-MCNC: 12.4 G/DL (ref 12–16)
HGB BLD-MCNC: 12.7 G/DL (ref 12–16)
HGB BLD-MCNC: 12.8 G/DL (ref 12–16)
HGB BLD-MCNC: 13.1 G/DL (ref 12–16)
HGB BLD-MCNC: 14.4 G/DL (ref 12–16)
HYALINE CASTS #/AREA URNS LPF: ABNORMAL /LPF
IMM GRANULOCYTES # BLD AUTO: 0.01 K/UL (ref 0–0.11)
IMM GRANULOCYTES # BLD AUTO: 0.01 K/UL (ref 0–0.11)
IMM GRANULOCYTES # BLD AUTO: 0.02 K/UL (ref 0–0.11)
IMM GRANULOCYTES # BLD AUTO: 0.03 K/UL (ref 0–0.11)
IMM GRANULOCYTES NFR BLD AUTO: 0.2 % (ref 0–0.9)
IMM GRANULOCYTES NFR BLD AUTO: 0.2 % (ref 0–0.9)
IMM GRANULOCYTES NFR BLD AUTO: 0.5 % (ref 0–0.9)
IMM GRANULOCYTES NFR BLD AUTO: 0.5 % (ref 0–0.9)
INR PPP: 1.98 (ref 0.87–1.13)
KETONES UR STRIP.AUTO-MCNC: ABNORMAL MG/DL
LACTATE SERPL-SCNC: 1.5 MMOL/L (ref 0.5–2)
LACTATE SERPL-SCNC: 1.6 MMOL/L (ref 0.5–2)
LACTATE SERPL-SCNC: 1.8 MMOL/L (ref 0.5–2)
LACTATE SERPL-SCNC: 1.9 MMOL/L (ref 0.5–2)
LACTATE SERPL-SCNC: 2.3 MMOL/L (ref 0.5–2)
LACTATE SERPL-SCNC: 2.5 MMOL/L (ref 0.5–2)
LACTATE SERPL-SCNC: 2.5 MMOL/L (ref 0.5–2)
LACTATE SERPL-SCNC: 2.8 MMOL/L (ref 0.5–2)
LACTATE SERPL-SCNC: 3 MMOL/L (ref 0.5–2)
LACTATE SERPL-SCNC: 4.2 MMOL/L (ref 0.5–2)
LACTATE SERPL-SCNC: 4.5 MMOL/L (ref 0.5–2)
LACTATE SERPL-SCNC: 4.5 MMOL/L (ref 0.5–2)
LACTATE SERPL-SCNC: 4.8 MMOL/L (ref 0.5–2)
LACTATE SERPL-SCNC: 5 MMOL/L (ref 0.5–2)
LDLC SERPL CALC-MCNC: 46 MG/DL
LEUKOCYTE ESTERASE UR QL STRIP.AUTO: ABNORMAL
LYMPHOCYTES # BLD AUTO: 1.35 K/UL (ref 1–4.8)
LYMPHOCYTES # BLD AUTO: 1.68 K/UL (ref 1–4.8)
LYMPHOCYTES # BLD AUTO: 1.97 K/UL (ref 1–4.8)
LYMPHOCYTES # BLD AUTO: 2.06 K/UL (ref 1–4.8)
LYMPHOCYTES NFR BLD: 31.8 % (ref 22–41)
LYMPHOCYTES NFR BLD: 32.1 % (ref 22–41)
LYMPHOCYTES NFR BLD: 32.8 % (ref 22–41)
LYMPHOCYTES NFR BLD: 34.4 % (ref 22–41)
MAGNESIUM SERPL-MCNC: 1 MG/DL (ref 1.5–2.5)
MAGNESIUM SERPL-MCNC: 2.3 MG/DL (ref 1.5–2.5)
MCH RBC QN AUTO: 29.1 PG (ref 27–33)
MCH RBC QN AUTO: 30.8 PG (ref 27–33)
MCH RBC QN AUTO: 31.1 PG (ref 27–33)
MCH RBC QN AUTO: 31.1 PG (ref 27–33)
MCH RBC QN AUTO: 31.4 PG (ref 27–33)
MCHC RBC AUTO-ENTMCNC: 32.5 G/DL (ref 33.6–35)
MCHC RBC AUTO-ENTMCNC: 33.2 G/DL (ref 33.6–35)
MCHC RBC AUTO-ENTMCNC: 33.6 G/DL (ref 33.6–35)
MCHC RBC AUTO-ENTMCNC: 33.8 G/DL (ref 33.6–35)
MCHC RBC AUTO-ENTMCNC: 34 G/DL (ref 33.6–35)
MCV RBC AUTO: 89.6 FL (ref 81.4–97.8)
MCV RBC AUTO: 90.5 FL (ref 81.4–97.8)
MCV RBC AUTO: 90.6 FL (ref 81.4–97.8)
MCV RBC AUTO: 91.3 FL (ref 81.4–97.8)
MCV RBC AUTO: 91.5 FL (ref 81.4–97.8)
MCV RBC AUTO: 92.4 FL (ref 81.4–97.8)
MCV RBC AUTO: 94.7 FL (ref 81.4–97.8)
METHADONE UR QL SCN: NEGATIVE
MICRO URNS: ABNORMAL
MONOCYTES # BLD AUTO: 0.33 K/UL (ref 0–0.85)
MONOCYTES # BLD AUTO: 0.36 K/UL (ref 0–0.85)
MONOCYTES # BLD AUTO: 0.44 K/UL (ref 0–0.85)
MONOCYTES # BLD AUTO: 0.52 K/UL (ref 0–0.85)
MONOCYTES NFR BLD AUTO: 6.8 % (ref 0–13.4)
MONOCYTES NFR BLD AUTO: 6.9 % (ref 0–13.4)
MONOCYTES NFR BLD AUTO: 8.4 % (ref 0–13.4)
MONOCYTES NFR BLD AUTO: 8.7 % (ref 0–13.4)
NEUTROPHILS # BLD AUTO: 2.19 K/UL (ref 2–7.15)
NEUTROPHILS # BLD AUTO: 3.19 K/UL (ref 2–7.15)
NEUTROPHILS # BLD AUTO: 3.31 K/UL (ref 2–7.15)
NEUTROPHILS # BLD AUTO: 3.85 K/UL (ref 2–7.15)
NEUTROPHILS NFR BLD: 55.2 % (ref 44–72)
NEUTROPHILS NFR BLD: 55.9 % (ref 44–72)
NEUTROPHILS NFR BLD: 60 % (ref 44–72)
NEUTROPHILS NFR BLD: 60.4 % (ref 44–72)
NITRITE UR QL STRIP.AUTO: POSITIVE
NRBC # BLD AUTO: 0 K/UL
NRBC BLD-RTO: 0 /100 WBC
NT-PROBNP SERPL IA-MCNC: 2154 PG/ML (ref 0–125)
OPIATES UR QL SCN: NEGATIVE
OXYCODONE UR QL SCN: NEGATIVE
PCO2 BLDV: 40.9 MMHG (ref 41–51)
PCO2 TEMP ADJ BLDV: 40.2 MMHG (ref 41–51)
PCP UR QL SCN: NEGATIVE
PH BLDV: 7.31 [PH] (ref 7.31–7.45)
PH TEMP ADJ BLDV: 7.32 [PH] (ref 7.31–7.45)
PH UR STRIP.AUTO: 5.5 [PH] (ref 5–8)
PHOSPHATE SERPL-MCNC: 1.5 MG/DL (ref 2.5–4.5)
PLATELET # BLD AUTO: 126 K/UL (ref 164–446)
PLATELET # BLD AUTO: 145 K/UL (ref 164–446)
PLATELET # BLD AUTO: 159 K/UL (ref 164–446)
PLATELET # BLD AUTO: 165 K/UL (ref 164–446)
PLATELET # BLD AUTO: 198 K/UL (ref 164–446)
PLATELET # BLD AUTO: 202 K/UL (ref 164–446)
PLATELET # BLD AUTO: 258 K/UL (ref 164–446)
PMV BLD AUTO: 10.3 FL (ref 9–12.9)
PMV BLD AUTO: 10.5 FL (ref 9–12.9)
PMV BLD AUTO: 9.6 FL (ref 9–12.9)
PMV BLD AUTO: 9.6 FL (ref 9–12.9)
PMV BLD AUTO: 9.7 FL (ref 9–12.9)
PMV BLD AUTO: 9.8 FL (ref 9–12.9)
PMV BLD AUTO: 9.9 FL (ref 9–12.9)
PO2 BLDV: 23.5 MMHG (ref 25–40)
PO2 TEMP ADJ BLDV: 22.8 MMHG (ref 25–40)
POTASSIUM SERPL-SCNC: 3.4 MMOL/L (ref 3.6–5.5)
POTASSIUM SERPL-SCNC: 3.5 MMOL/L (ref 3.6–5.5)
POTASSIUM SERPL-SCNC: 3.5 MMOL/L (ref 3.6–5.5)
POTASSIUM SERPL-SCNC: 3.8 MMOL/L (ref 3.6–5.5)
POTASSIUM SERPL-SCNC: 4.5 MMOL/L (ref 3.6–5.5)
PROCALCITONIN SERPL-MCNC: 2.36 NG/ML
PROPOXYPH UR QL SCN: NEGATIVE
PROT SERPL-MCNC: 5.1 G/DL (ref 6–8.2)
PROT SERPL-MCNC: 5.9 G/DL (ref 6–8.2)
PROT SERPL-MCNC: 6.6 G/DL (ref 6–8.2)
PROT SERPL-MCNC: 7.3 G/DL (ref 6–8.2)
PROT SERPL-MCNC: 7.4 G/DL (ref 6–8.2)
PROT UR QL STRIP: NEGATIVE MG/DL
PROTHROMBIN TIME: 22 SEC (ref 12–14.6)
RBC # BLD AUTO: 3.18 M/UL (ref 4.2–5.4)
RBC # BLD AUTO: 3.7 M/UL (ref 4.2–5.4)
RBC # BLD AUTO: 4.02 M/UL (ref 4.2–5.4)
RBC # BLD AUTO: 4.12 M/UL (ref 4.2–5.4)
RBC # BLD AUTO: 4.13 M/UL (ref 4.2–5.4)
RBC # BLD AUTO: 4.5 M/UL (ref 4.2–5.4)
RBC # BLD AUTO: 4.63 M/UL (ref 4.2–5.4)
RBC # URNS HPF: ABNORMAL /HPF
RBC UR QL AUTO: ABNORMAL
SAO2 % BLDV: 29.8 %
SCCMEC + MECA PNL NOSE NAA+PROBE: NEGATIVE
SIGNIFICANT IND 70042: NORMAL
SITE SITE: NORMAL
SODIUM SERPL-SCNC: 133 MMOL/L (ref 135–145)
SODIUM SERPL-SCNC: 135 MMOL/L (ref 135–145)
SODIUM SERPL-SCNC: 135 MMOL/L (ref 135–145)
SODIUM SERPL-SCNC: 138 MMOL/L (ref 135–145)
SODIUM SERPL-SCNC: 141 MMOL/L (ref 135–145)
SOURCE SOURCE: NORMAL
SP GR UR STRIP.AUTO: 1.02
TRIGL SERPL-MCNC: 80 MG/DL (ref 0–149)
TROPONIN T SERPL-MCNC: 38 NG/L (ref 6–19)
TSH SERPL DL<=0.005 MIU/L-ACNC: 3.35 UIU/ML (ref 0.38–5.33)
UROBILINOGEN UR STRIP.AUTO-MCNC: 1 MG/DL
WBC # BLD AUTO: 3.9 K/UL (ref 4.8–10.8)
WBC # BLD AUTO: 5.1 K/UL (ref 4.8–10.8)
WBC # BLD AUTO: 5.3 K/UL (ref 4.8–10.8)
WBC # BLD AUTO: 5.5 K/UL (ref 4.8–10.8)
WBC # BLD AUTO: 5.9 K/UL (ref 4.8–10.8)
WBC # BLD AUTO: 6 K/UL (ref 4.8–10.8)
WBC # BLD AUTO: 6.4 K/UL (ref 4.8–10.8)
WBC #/AREA URNS HPF: ABNORMAL /HPF
YEAST BUDDING URNS QL: PRESENT /HPF

## 2022-01-01 PROCEDURE — 82962 GLUCOSE BLOOD TEST: CPT | Mod: 91

## 2022-01-01 PROCEDURE — 700117 HCHG RX CONTRAST REV CODE 255: Performed by: EMERGENCY MEDICINE

## 2022-01-01 PROCEDURE — 700105 HCHG RX REV CODE 258: Performed by: EMERGENCY MEDICINE

## 2022-01-01 PROCEDURE — 85610 PROTHROMBIN TIME: CPT

## 2022-01-01 PROCEDURE — 700105 HCHG RX REV CODE 258: Performed by: STUDENT IN AN ORGANIZED HEALTH CARE EDUCATION/TRAINING PROGRAM

## 2022-01-01 PROCEDURE — 51798 US URINE CAPACITY MEASURE: CPT

## 2022-01-01 PROCEDURE — A9270 NON-COVERED ITEM OR SERVICE: HCPCS | Performed by: NURSE PRACTITIONER

## 2022-01-01 PROCEDURE — 93005 ELECTROCARDIOGRAM TRACING: CPT | Performed by: STUDENT IN AN ORGANIZED HEALTH CARE EDUCATION/TRAINING PROGRAM

## 2022-01-01 PROCEDURE — A9270 NON-COVERED ITEM OR SERVICE: HCPCS | Performed by: INTERNAL MEDICINE

## 2022-01-01 PROCEDURE — 81001 URINALYSIS AUTO W/SCOPE: CPT

## 2022-01-01 PROCEDURE — 82803 BLOOD GASES ANY COMBINATION: CPT

## 2022-01-01 PROCEDURE — 99223 1ST HOSP IP/OBS HIGH 75: CPT | Mod: AI | Performed by: INTERNAL MEDICINE

## 2022-01-01 PROCEDURE — 84145 PROCALCITONIN (PCT): CPT

## 2022-01-01 PROCEDURE — 80053 COMPREHEN METABOLIC PANEL: CPT

## 2022-01-01 PROCEDURE — 97530 THERAPEUTIC ACTIVITIES: CPT

## 2022-01-01 PROCEDURE — 700111 HCHG RX REV CODE 636 W/ 250 OVERRIDE (IP): Performed by: STUDENT IN AN ORGANIZED HEALTH CARE EDUCATION/TRAINING PROGRAM

## 2022-01-01 PROCEDURE — 770020 HCHG ROOM/CARE - TELE (206)

## 2022-01-01 PROCEDURE — 85025 COMPLETE CBC W/AUTO DIFF WBC: CPT

## 2022-01-01 PROCEDURE — 96375 TX/PRO/DX INJ NEW DRUG ADDON: CPT

## 2022-01-01 PROCEDURE — 700102 HCHG RX REV CODE 250 W/ 637 OVERRIDE(OP): Performed by: STUDENT IN AN ORGANIZED HEALTH CARE EDUCATION/TRAINING PROGRAM

## 2022-01-01 PROCEDURE — 99215 OFFICE O/P EST HI 40 MIN: CPT | Performed by: PHYSICAL MEDICINE & REHABILITATION

## 2022-01-01 PROCEDURE — 306565 RIGID MIT RESTRAINT(PAIR): Performed by: STUDENT IN AN ORGANIZED HEALTH CARE EDUCATION/TRAINING PROGRAM

## 2022-01-01 PROCEDURE — 700111 HCHG RX REV CODE 636 W/ 250 OVERRIDE (IP): Performed by: INTERNAL MEDICINE

## 2022-01-01 PROCEDURE — 700102 HCHG RX REV CODE 250 W/ 637 OVERRIDE(OP): Performed by: NURSE PRACTITIONER

## 2022-01-01 PROCEDURE — 700101 HCHG RX REV CODE 250: Performed by: STUDENT IN AN ORGANIZED HEALTH CARE EDUCATION/TRAINING PROGRAM

## 2022-01-01 PROCEDURE — 83605 ASSAY OF LACTIC ACID: CPT | Mod: 91

## 2022-01-01 PROCEDURE — 700105 HCHG RX REV CODE 258: Performed by: NURSE PRACTITIONER

## 2022-01-01 PROCEDURE — 82962 GLUCOSE BLOOD TEST: CPT

## 2022-01-01 PROCEDURE — A9270 NON-COVERED ITEM OR SERVICE: HCPCS | Performed by: STUDENT IN AN ORGANIZED HEALTH CARE EDUCATION/TRAINING PROGRAM

## 2022-01-01 PROCEDURE — 700102 HCHG RX REV CODE 250 W/ 637 OVERRIDE(OP): Performed by: INTERNAL MEDICINE

## 2022-01-01 PROCEDURE — 700111 HCHG RX REV CODE 636 W/ 250 OVERRIDE (IP): Performed by: NURSE PRACTITIONER

## 2022-01-01 PROCEDURE — 97167 OT EVAL HIGH COMPLEX 60 MIN: CPT

## 2022-01-01 PROCEDURE — 700101 HCHG RX REV CODE 250: Performed by: INTERNAL MEDICINE

## 2022-01-01 PROCEDURE — 36415 COLL VENOUS BLD VENIPUNCTURE: CPT

## 2022-01-01 PROCEDURE — 93010 ELECTROCARDIOGRAM REPORT: CPT | Performed by: STUDENT IN AN ORGANIZED HEALTH CARE EDUCATION/TRAINING PROGRAM

## 2022-01-01 PROCEDURE — 700105 HCHG RX REV CODE 258: Performed by: INTERNAL MEDICINE

## 2022-01-01 PROCEDURE — 97602 WOUND(S) CARE NON-SELECTIVE: CPT

## 2022-01-01 PROCEDURE — 99233 SBSQ HOSP IP/OBS HIGH 50: CPT | Performed by: STUDENT IN AN ORGANIZED HEALTH CARE EDUCATION/TRAINING PROGRAM

## 2022-01-01 PROCEDURE — 99232 SBSQ HOSP IP/OBS MODERATE 35: CPT | Performed by: STUDENT IN AN ORGANIZED HEALTH CARE EDUCATION/TRAINING PROGRAM

## 2022-01-01 PROCEDURE — 700102 HCHG RX REV CODE 250 W/ 637 OVERRIDE(OP): Performed by: EMERGENCY MEDICINE

## 2022-01-01 PROCEDURE — 99285 EMERGENCY DEPT VISIT HI MDM: CPT

## 2022-01-01 PROCEDURE — 87493 C DIFF AMPLIFIED PROBE: CPT

## 2022-01-01 PROCEDURE — 99214 OFFICE O/P EST MOD 30 MIN: CPT | Performed by: PHYSICAL MEDICINE & REHABILITATION

## 2022-01-01 PROCEDURE — 82140 ASSAY OF AMMONIA: CPT

## 2022-01-01 PROCEDURE — 96365 THER/PROPH/DIAG IV INF INIT: CPT

## 2022-01-01 PROCEDURE — 80307 DRUG TEST PRSMV CHEM ANLYZR: CPT

## 2022-01-01 PROCEDURE — 87040 BLOOD CULTURE FOR BACTERIA: CPT

## 2022-01-01 PROCEDURE — 87641 MR-STAPH DNA AMP PROBE: CPT

## 2022-01-01 PROCEDURE — 70450 CT HEAD/BRAIN W/O DYE: CPT

## 2022-01-01 PROCEDURE — 83735 ASSAY OF MAGNESIUM: CPT

## 2022-01-01 PROCEDURE — 97162 PT EVAL MOD COMPLEX 30 MIN: CPT

## 2022-01-01 PROCEDURE — 74177 CT ABD & PELVIS W/CONTRAST: CPT

## 2022-01-01 PROCEDURE — 84100 ASSAY OF PHOSPHORUS: CPT

## 2022-01-01 PROCEDURE — 85027 COMPLETE CBC AUTOMATED: CPT

## 2022-01-01 PROCEDURE — 83605 ASSAY OF LACTIC ACID: CPT

## 2022-01-01 PROCEDURE — 71045 X-RAY EXAM CHEST 1 VIEW: CPT

## 2022-01-01 PROCEDURE — A9270 NON-COVERED ITEM OR SERVICE: HCPCS | Performed by: EMERGENCY MEDICINE

## 2022-01-01 PROCEDURE — 83880 ASSAY OF NATRIURETIC PEPTIDE: CPT

## 2022-01-01 PROCEDURE — 87086 URINE CULTURE/COLONY COUNT: CPT

## 2022-01-01 PROCEDURE — 99214 OFFICE O/P EST MOD 30 MIN: CPT | Performed by: INTERNAL MEDICINE

## 2022-01-01 PROCEDURE — 80061 LIPID PANEL: CPT

## 2022-01-01 PROCEDURE — 83036 HEMOGLOBIN GLYCOSYLATED A1C: CPT | Mod: GA

## 2022-01-01 PROCEDURE — 82077 ASSAY SPEC XCP UR&BREATH IA: CPT

## 2022-01-01 PROCEDURE — 84484 ASSAY OF TROPONIN QUANT: CPT

## 2022-01-01 PROCEDURE — 99212 OFFICE O/P EST SF 10 MIN: CPT

## 2022-01-01 PROCEDURE — 84443 ASSAY THYROID STIM HORMONE: CPT

## 2022-01-01 RX ORDER — SODIUM CHLORIDE, SODIUM LACTATE, POTASSIUM CHLORIDE, AND CALCIUM CHLORIDE .6; .31; .03; .02 G/100ML; G/100ML; G/100ML; G/100ML
1000 INJECTION, SOLUTION INTRAVENOUS ONCE
Status: COMPLETED | OUTPATIENT
Start: 2022-01-01 | End: 2022-01-01

## 2022-01-01 RX ORDER — ENOXAPARIN SODIUM 100 MG/ML
1 INJECTION SUBCUTANEOUS EVERY 12 HOURS
Status: DISCONTINUED | OUTPATIENT
Start: 2022-01-01 | End: 2022-01-01

## 2022-01-01 RX ORDER — LEVOFLOXACIN 5 MG/ML
1 SOLUTION/ DROPS TOPICAL
Status: DISCONTINUED | OUTPATIENT
Start: 2022-01-01 | End: 2022-01-01

## 2022-01-01 RX ORDER — METOPROLOL SUCCINATE 50 MG/1
100 TABLET, EXTENDED RELEASE ORAL DAILY
Status: DISCONTINUED | OUTPATIENT
Start: 2022-01-01 | End: 2022-11-15 | Stop reason: HOSPADM

## 2022-01-01 RX ORDER — QUETIAPINE FUMARATE 25 MG/1
25 TABLET, FILM COATED ORAL NIGHTLY
Status: DISCONTINUED | OUTPATIENT
Start: 2022-01-01 | End: 2022-01-01

## 2022-01-01 RX ORDER — MAGNESIUM SULFATE HEPTAHYDRATE 40 MG/ML
2 INJECTION, SOLUTION INTRAVENOUS ONCE
Status: COMPLETED | OUTPATIENT
Start: 2022-01-01 | End: 2022-01-01

## 2022-01-01 RX ORDER — ENOXAPARIN SODIUM 100 MG/ML
40 INJECTION SUBCUTANEOUS DAILY
Status: DISCONTINUED | OUTPATIENT
Start: 2022-01-01 | End: 2022-01-01

## 2022-01-01 RX ORDER — SODIUM CHLORIDE 9 MG/ML
1000 INJECTION, SOLUTION INTRAVENOUS ONCE
Status: COMPLETED | OUTPATIENT
Start: 2022-01-01 | End: 2022-01-01

## 2022-01-01 RX ORDER — MAGNESIUM SULFATE HEPTAHYDRATE 40 MG/ML
2 INJECTION, SOLUTION INTRAVENOUS ONCE
Status: DISCONTINUED | OUTPATIENT
Start: 2022-01-01 | End: 2022-01-01

## 2022-01-01 RX ORDER — MOXIFLOXACIN 5 MG/ML
1 SOLUTION/ DROPS OPHTHALMIC 3 TIMES DAILY
Status: DISCONTINUED | OUTPATIENT
Start: 2022-01-01 | End: 2022-11-15 | Stop reason: HOSPADM

## 2022-01-01 RX ORDER — POTASSIUM CHLORIDE 20 MEQ/1
40 TABLET, EXTENDED RELEASE ORAL ONCE
Status: COMPLETED | OUTPATIENT
Start: 2022-01-01 | End: 2022-01-01

## 2022-01-01 RX ORDER — SODIUM CHLORIDE, SODIUM LACTATE, POTASSIUM CHLORIDE, AND CALCIUM CHLORIDE .6; .31; .03; .02 G/100ML; G/100ML; G/100ML; G/100ML
2000 INJECTION, SOLUTION INTRAVENOUS ONCE
Status: COMPLETED | OUTPATIENT
Start: 2022-01-01 | End: 2022-01-01

## 2022-01-01 RX ORDER — LABETALOL HYDROCHLORIDE 5 MG/ML
10 INJECTION, SOLUTION INTRAVENOUS EVERY 4 HOURS PRN
Status: DISCONTINUED | OUTPATIENT
Start: 2022-01-01 | End: 2022-11-15 | Stop reason: HOSPADM

## 2022-01-01 RX ORDER — ONDANSETRON 2 MG/ML
4 INJECTION INTRAMUSCULAR; INTRAVENOUS EVERY 4 HOURS PRN
Status: DISCONTINUED | OUTPATIENT
Start: 2022-01-01 | End: 2022-11-15 | Stop reason: HOSPADM

## 2022-01-01 RX ORDER — CLONIDINE HYDROCHLORIDE 0.1 MG/1
TABLET ORAL
Qty: 60 TABLET | OUTPATIENT
Start: 2022-01-01

## 2022-01-01 RX ORDER — ONDANSETRON 4 MG/1
4 TABLET, ORALLY DISINTEGRATING ORAL EVERY 4 HOURS PRN
Status: DISCONTINUED | OUTPATIENT
Start: 2022-01-01 | End: 2022-11-15 | Stop reason: HOSPADM

## 2022-01-01 RX ORDER — SODIUM CHLORIDE, SODIUM LACTATE, POTASSIUM CHLORIDE, CALCIUM CHLORIDE 600; 310; 30; 20 MG/100ML; MG/100ML; MG/100ML; MG/100ML
INJECTION, SOLUTION INTRAVENOUS CONTINUOUS
Status: ACTIVE | OUTPATIENT
Start: 2022-01-01 | End: 2022-01-01

## 2022-01-01 RX ORDER — ACETAMINOPHEN 325 MG/1
650 TABLET ORAL EVERY 6 HOURS PRN
Status: DISCONTINUED | OUTPATIENT
Start: 2022-01-01 | End: 2022-11-15 | Stop reason: HOSPADM

## 2022-01-01 RX ORDER — NITROFURANTOIN 25; 75 MG/1; MG/1
100 CAPSULE ORAL 2 TIMES DAILY WITH MEALS
Status: DISCONTINUED | OUTPATIENT
Start: 2022-01-01 | End: 2022-01-01

## 2022-01-01 RX ORDER — ENOXAPARIN SODIUM 100 MG/ML
80 INJECTION SUBCUTANEOUS EVERY 12 HOURS
Status: DISCONTINUED | OUTPATIENT
Start: 2022-01-01 | End: 2022-11-15 | Stop reason: HOSPADM

## 2022-01-01 RX ORDER — POLYETHYLENE GLYCOL 3350 17 G/17G
1 POWDER, FOR SOLUTION ORAL
Status: DISCONTINUED | OUTPATIENT
Start: 2022-01-01 | End: 2022-01-01

## 2022-01-01 RX ORDER — AMOXICILLIN 250 MG
2 CAPSULE ORAL 2 TIMES DAILY
Status: DISCONTINUED | OUTPATIENT
Start: 2022-01-01 | End: 2022-01-01

## 2022-01-01 RX ORDER — OLANZAPINE 5 MG/1
2.5 TABLET ORAL
Status: COMPLETED | OUTPATIENT
Start: 2022-01-01 | End: 2022-01-01

## 2022-01-01 RX ORDER — SODIUM CHLORIDE 9 MG/ML
500 INJECTION, SOLUTION INTRAVENOUS ONCE
Status: COMPLETED | OUTPATIENT
Start: 2022-01-01 | End: 2022-01-01

## 2022-01-01 RX ORDER — BISACODYL 10 MG
10 SUPPOSITORY, RECTAL RECTAL
Status: DISCONTINUED | OUTPATIENT
Start: 2022-01-01 | End: 2022-01-01

## 2022-01-01 RX ORDER — ACETAMINOPHEN 500 MG
1000 TABLET ORAL ONCE
Status: COMPLETED | OUTPATIENT
Start: 2022-01-01 | End: 2022-01-01

## 2022-01-01 RX ORDER — SODIUM CHLORIDE 9 MG/ML
INJECTION, SOLUTION INTRAVENOUS CONTINUOUS
Status: ACTIVE | OUTPATIENT
Start: 2022-01-01 | End: 2022-01-01

## 2022-01-01 RX ORDER — ATORVASTATIN CALCIUM 40 MG/1
40 TABLET, FILM COATED ORAL EVERY EVENING
Status: DISCONTINUED | OUTPATIENT
Start: 2022-01-01 | End: 2022-11-15 | Stop reason: HOSPADM

## 2022-01-01 RX ORDER — OXYCODONE HYDROCHLORIDE 5 MG/1
5 TABLET ORAL
Status: DISCONTINUED | OUTPATIENT
Start: 2022-01-01 | End: 2022-11-15 | Stop reason: HOSPADM

## 2022-01-01 RX ORDER — LINEZOLID 2 MG/ML
600 INJECTION, SOLUTION INTRAVENOUS EVERY 12 HOURS
Status: DISCONTINUED | OUTPATIENT
Start: 2022-01-01 | End: 2022-01-01

## 2022-01-01 RX ORDER — GUAIFENESIN 600 MG/1
1200 TABLET, EXTENDED RELEASE ORAL EVERY 12 HOURS
Status: DISCONTINUED | OUTPATIENT
Start: 2022-01-01 | End: 2022-11-15 | Stop reason: HOSPADM

## 2022-01-01 RX ORDER — DEXTROSE AND SODIUM CHLORIDE 5; .9 G/100ML; G/100ML
INJECTION, SOLUTION INTRAVENOUS CONTINUOUS
Status: DISCONTINUED | OUTPATIENT
Start: 2022-01-01 | End: 2022-11-15 | Stop reason: HOSPADM

## 2022-01-01 RX ORDER — METOPROLOL TARTRATE 1 MG/ML
5 INJECTION, SOLUTION INTRAVENOUS
Status: DISCONTINUED | OUTPATIENT
Start: 2022-01-01 | End: 2022-11-15 | Stop reason: HOSPADM

## 2022-01-01 RX ORDER — OXYCODONE HYDROCHLORIDE 5 MG/1
2.5 TABLET ORAL
Status: DISCONTINUED | OUTPATIENT
Start: 2022-01-01 | End: 2022-11-15 | Stop reason: HOSPADM

## 2022-01-01 RX ORDER — MORPHINE SULFATE 4 MG/ML
2 INJECTION INTRAVENOUS
Status: DISCONTINUED | OUTPATIENT
Start: 2022-01-01 | End: 2022-11-15 | Stop reason: HOSPADM

## 2022-01-01 RX ORDER — DEXTROSE, SODIUM CHLORIDE, SODIUM LACTATE, POTASSIUM CHLORIDE, AND CALCIUM CHLORIDE 5; .6; .31; .03; .02 G/100ML; G/100ML; G/100ML; G/100ML; G/100ML
INJECTION, SOLUTION INTRAVENOUS CONTINUOUS
Status: DISCONTINUED | OUTPATIENT
Start: 2022-01-01 | End: 2022-01-01

## 2022-01-01 RX ORDER — UBIDECARENONE 75 MG
100 CAPSULE ORAL DAILY
COMMUNITY
End: 2022-01-01

## 2022-01-01 RX ORDER — LINEZOLID 600 MG/1
600 TABLET, FILM COATED ORAL EVERY 12 HOURS
Status: DISCONTINUED | OUTPATIENT
Start: 2022-01-01 | End: 2022-01-01

## 2022-01-01 RX ORDER — SODIUM CHLORIDE AND POTASSIUM CHLORIDE 300; 900 MG/100ML; MG/100ML
INJECTION, SOLUTION INTRAVENOUS CONTINUOUS
Status: DISCONTINUED | OUTPATIENT
Start: 2022-01-01 | End: 2022-01-01

## 2022-01-01 RX ORDER — SODIUM CHLORIDE, SODIUM LACTATE, POTASSIUM CHLORIDE, AND CALCIUM CHLORIDE .6; .31; .03; .02 G/100ML; G/100ML; G/100ML; G/100ML
250 INJECTION, SOLUTION INTRAVENOUS ONCE
Status: COMPLETED | OUTPATIENT
Start: 2022-01-01 | End: 2022-01-01

## 2022-01-01 RX ORDER — POTASSIUM CHLORIDE 7.45 MG/ML
10 INJECTION INTRAVENOUS
Status: COMPLETED | OUTPATIENT
Start: 2022-01-01 | End: 2022-01-01

## 2022-01-01 RX ORDER — QUETIAPINE FUMARATE 25 MG/1
50 TABLET, FILM COATED ORAL NIGHTLY
Status: DISCONTINUED | OUTPATIENT
Start: 2022-01-01 | End: 2022-01-01

## 2022-01-01 RX ORDER — RIVAROXABAN 20 MG/1
TABLET, FILM COATED ORAL
Qty: 90 TABLET | Refills: 3 | Status: SHIPPED | OUTPATIENT
Start: 2022-01-01

## 2022-01-01 RX ORDER — LORAZEPAM 2 MG/ML
1 INJECTION INTRAMUSCULAR EVERY 6 HOURS PRN
Status: DISCONTINUED | OUTPATIENT
Start: 2022-01-01 | End: 2022-11-15 | Stop reason: HOSPADM

## 2022-01-01 RX ADMIN — DIBASIC SODIUM PHOSPHATE, MONOBASIC POTASSIUM PHOSPHATE AND MONOBASIC SODIUM PHOSPHATE 250 MG: 852; 155; 130 TABLET ORAL at 17:28

## 2022-01-01 RX ADMIN — ATORVASTATIN CALCIUM 40 MG: 40 TABLET, FILM COATED ORAL at 17:28

## 2022-01-01 RX ADMIN — FAMOTIDINE 20 MG: 10 INJECTION INTRAVENOUS at 05:30

## 2022-01-01 RX ADMIN — MAGNESIUM SULFATE HEPTAHYDRATE 2 G: 40 INJECTION, SOLUTION INTRAVENOUS at 01:06

## 2022-01-01 RX ADMIN — DEXTROSE AND SODIUM CHLORIDE: 5; 900 INJECTION, SOLUTION INTRAVENOUS at 05:29

## 2022-01-01 RX ADMIN — ENOXAPARIN SODIUM 80 MG: 80 INJECTION SUBCUTANEOUS at 17:35

## 2022-01-01 RX ADMIN — DIBASIC SODIUM PHOSPHATE, MONOBASIC POTASSIUM PHOSPHATE AND MONOBASIC SODIUM PHOSPHATE 250 MG: 852; 155; 130 TABLET ORAL at 05:10

## 2022-01-01 RX ADMIN — CEFTRIAXONE SODIUM 1000 MG: 10 INJECTION, POWDER, FOR SOLUTION INTRAVENOUS at 05:30

## 2022-01-01 RX ADMIN — FAMOTIDINE 20 MG: 10 INJECTION INTRAVENOUS at 05:18

## 2022-01-01 RX ADMIN — METOPROLOL TARTRATE 5 MG: 1 INJECTION, SOLUTION INTRAVENOUS at 09:36

## 2022-01-01 RX ADMIN — MOXIFLOXACIN 1 DROP: 5 SOLUTION/ DROPS OPHTHALMIC at 09:00

## 2022-01-01 RX ADMIN — FAMOTIDINE 20 MG: 10 INJECTION INTRAVENOUS at 20:15

## 2022-01-01 RX ADMIN — DEXTROSE AND SODIUM CHLORIDE: 5; 900 INJECTION, SOLUTION INTRAVENOUS at 03:11

## 2022-01-01 RX ADMIN — MINERAL OIL, PETROLATUM 1 APPLICATION: 425; 573 OINTMENT OPHTHALMIC at 01:50

## 2022-01-01 RX ADMIN — CEFEPIME 2 G: 2 INJECTION, POWDER, FOR SOLUTION INTRAVENOUS at 01:18

## 2022-01-01 RX ADMIN — METOPROLOL SUCCINATE 100 MG: 50 TABLET, EXTENDED RELEASE ORAL at 05:57

## 2022-01-01 RX ADMIN — POTASSIUM CHLORIDE 40 MEQ: 1500 TABLET, EXTENDED RELEASE ORAL at 08:23

## 2022-01-01 RX ADMIN — POTASSIUM CHLORIDE 10 MEQ: 7.46 INJECTION, SOLUTION INTRAVENOUS at 15:17

## 2022-01-01 RX ADMIN — GUAIFENESIN 1200 MG: 600 TABLET, EXTENDED RELEASE ORAL at 13:20

## 2022-01-01 RX ADMIN — ATORVASTATIN CALCIUM 40 MG: 40 TABLET, FILM COATED ORAL at 17:35

## 2022-01-01 RX ADMIN — RIVAROXABAN 15 MG: 15 TABLET, FILM COATED ORAL at 17:28

## 2022-01-01 RX ADMIN — POTASSIUM CHLORIDE 10 MEQ: 7.46 INJECTION, SOLUTION INTRAVENOUS at 16:26

## 2022-01-01 RX ADMIN — IOHEXOL 80 ML: 350 INJECTION, SOLUTION INTRAVENOUS at 18:42

## 2022-01-01 RX ADMIN — ENOXAPARIN SODIUM 60 MG: 60 INJECTION SUBCUTANEOUS at 17:39

## 2022-01-01 RX ADMIN — MOXIFLOXACIN 1 DROP: 5 SOLUTION/ DROPS OPHTHALMIC at 12:02

## 2022-01-01 RX ADMIN — OLANZAPINE 2.5 MG: 5 TABLET, FILM COATED ORAL at 14:29

## 2022-01-01 RX ADMIN — DEXTROSE MONOHYDRATE 25 G: 100 INJECTION, SOLUTION INTRAVENOUS at 05:39

## 2022-01-01 RX ADMIN — DEXTROSE MONOHYDRATE 25 G: 100 INJECTION, SOLUTION INTRAVENOUS at 11:28

## 2022-01-01 RX ADMIN — THIAMINE HYDROCHLORIDE 100 MG: 100 INJECTION, SOLUTION INTRAMUSCULAR; INTRAVENOUS at 00:15

## 2022-01-01 RX ADMIN — MOXIFLOXACIN 1 DROP: 5 SOLUTION/ DROPS OPHTHALMIC at 18:00

## 2022-01-01 RX ADMIN — POTASSIUM CHLORIDE 10 MEQ: 7.46 INJECTION, SOLUTION INTRAVENOUS at 14:10

## 2022-01-01 RX ADMIN — DEXTROSE MONOHYDRATE 25 G: 100 INJECTION, SOLUTION INTRAVENOUS at 00:27

## 2022-01-01 RX ADMIN — ENOXAPARIN SODIUM 60 MG: 60 INJECTION SUBCUTANEOUS at 05:25

## 2022-01-01 RX ADMIN — POTASSIUM CHLORIDE 10 MEQ: 7.46 INJECTION, SOLUTION INTRAVENOUS at 12:22

## 2022-01-01 RX ADMIN — CEFTRIAXONE SODIUM 1000 MG: 10 INJECTION, POWDER, FOR SOLUTION INTRAVENOUS at 05:25

## 2022-01-01 RX ADMIN — SODIUM CHLORIDE, POTASSIUM CHLORIDE, SODIUM LACTATE AND CALCIUM CHLORIDE: 600; 310; 30; 20 INJECTION, SOLUTION INTRAVENOUS at 09:41

## 2022-01-01 RX ADMIN — CEFTRIAXONE SODIUM 1000 MG: 10 INJECTION, POWDER, FOR SOLUTION INTRAVENOUS at 11:28

## 2022-01-01 RX ADMIN — SODIUM CHLORIDE, POTASSIUM CHLORIDE, SODIUM LACTATE AND CALCIUM CHLORIDE 250 ML: 600; 310; 30; 20 INJECTION, SOLUTION INTRAVENOUS at 20:50

## 2022-01-01 RX ADMIN — SODIUM CHLORIDE: 9 INJECTION, SOLUTION INTRAVENOUS at 00:03

## 2022-01-01 RX ADMIN — GUAIFENESIN 1200 MG: 600 TABLET, EXTENDED RELEASE ORAL at 17:35

## 2022-01-01 RX ADMIN — OXYCODONE 5 MG: 5 TABLET ORAL at 09:09

## 2022-01-01 RX ADMIN — ENOXAPARIN SODIUM 60 MG: 60 INJECTION SUBCUTANEOUS at 17:16

## 2022-01-01 RX ADMIN — GUAIFENESIN 1200 MG: 600 TABLET, EXTENDED RELEASE ORAL at 05:30

## 2022-01-01 RX ADMIN — DIBASIC SODIUM PHOSPHATE, MONOBASIC POTASSIUM PHOSPHATE AND MONOBASIC SODIUM PHOSPHATE 250 MG: 852; 155; 130 TABLET ORAL at 23:54

## 2022-01-01 RX ADMIN — ATORVASTATIN CALCIUM 40 MG: 40 TABLET, FILM COATED ORAL at 17:39

## 2022-01-01 RX ADMIN — SODIUM CHLORIDE, POTASSIUM CHLORIDE, SODIUM LACTATE AND CALCIUM CHLORIDE 1000 ML: 600; 310; 30; 20 INJECTION, SOLUTION INTRAVENOUS at 17:32

## 2022-01-01 RX ADMIN — FAMOTIDINE 20 MG: 10 INJECTION INTRAVENOUS at 05:25

## 2022-01-01 RX ADMIN — FAMOTIDINE 20 MG: 10 INJECTION INTRAVENOUS at 05:10

## 2022-01-01 RX ADMIN — MAGNESIUM SULFATE HEPTAHYDRATE 2 G: 40 INJECTION, SOLUTION INTRAVENOUS at 03:24

## 2022-01-01 RX ADMIN — MINERAL OIL, PETROLATUM 1 APPLICATION: 425; 573 OINTMENT OPHTHALMIC at 05:30

## 2022-01-01 RX ADMIN — POTASSIUM CHLORIDE AND SODIUM CHLORIDE: 900; 300 INJECTION, SOLUTION INTRAVENOUS at 09:01

## 2022-01-01 RX ADMIN — SODIUM CHLORIDE, POTASSIUM CHLORIDE, SODIUM LACTATE AND CALCIUM CHLORIDE 2000 ML: 600; 310; 30; 20 INJECTION, SOLUTION INTRAVENOUS at 08:04

## 2022-01-01 RX ADMIN — POTASSIUM CHLORIDE AND SODIUM CHLORIDE: 900; 300 INJECTION, SOLUTION INTRAVENOUS at 20:15

## 2022-01-01 RX ADMIN — DIBASIC SODIUM PHOSPHATE, MONOBASIC POTASSIUM PHOSPHATE AND MONOBASIC SODIUM PHOSPHATE 250 MG: 852; 155; 130 TABLET ORAL at 00:14

## 2022-01-01 RX ADMIN — ENOXAPARIN SODIUM 60 MG: 60 INJECTION SUBCUTANEOUS at 05:30

## 2022-01-01 RX ADMIN — CEFEPIME 2 G: 2 INJECTION, POWDER, FOR SOLUTION INTRAVENOUS at 02:09

## 2022-01-01 RX ADMIN — DIBASIC SODIUM PHOSPHATE, MONOBASIC POTASSIUM PHOSPHATE AND MONOBASIC SODIUM PHOSPHATE 250 MG: 852; 155; 130 TABLET ORAL at 05:57

## 2022-01-01 RX ADMIN — ACETAMINOPHEN 1000 MG: 500 TABLET ORAL at 20:16

## 2022-01-01 RX ADMIN — SODIUM CHLORIDE 500 ML: 9 INJECTION, SOLUTION INTRAVENOUS at 18:05

## 2022-01-01 RX ADMIN — CEFEPIME 2 G: 2 INJECTION, POWDER, FOR SOLUTION INTRAVENOUS at 14:36

## 2022-01-01 RX ADMIN — DIBASIC SODIUM PHOSPHATE, MONOBASIC POTASSIUM PHOSPHATE AND MONOBASIC SODIUM PHOSPHATE 250 MG: 852; 155; 130 TABLET ORAL at 11:38

## 2022-01-01 RX ADMIN — SODIUM CHLORIDE, POTASSIUM CHLORIDE, SODIUM LACTATE AND CALCIUM CHLORIDE 2000 ML: 600; 310; 30; 20 INJECTION, SOLUTION INTRAVENOUS at 14:30

## 2022-01-01 RX ADMIN — QUETIAPINE FUMARATE 25 MG: 25 TABLET, FILM COATED ORAL at 21:24

## 2022-01-01 RX ADMIN — SODIUM CHLORIDE, POTASSIUM CHLORIDE, SODIUM LACTATE AND CALCIUM CHLORIDE 1000 ML: 600; 310; 30; 20 INJECTION, SOLUTION INTRAVENOUS at 08:21

## 2022-01-01 RX ADMIN — ATORVASTATIN CALCIUM 40 MG: 40 TABLET, FILM COATED ORAL at 22:11

## 2022-01-01 RX ADMIN — SODIUM CHLORIDE 1000 ML: 9 INJECTION, SOLUTION INTRAVENOUS at 19:19

## 2022-01-01 RX ADMIN — SODIUM CHLORIDE, POTASSIUM CHLORIDE, SODIUM LACTATE AND CALCIUM CHLORIDE 2000 ML: 600; 310; 30; 20 INJECTION, SOLUTION INTRAVENOUS at 17:54

## 2022-01-01 RX ADMIN — CEFEPIME 2 G: 2 INJECTION, POWDER, FOR SOLUTION INTRAVENOUS at 14:29

## 2022-01-01 RX ADMIN — METOPROLOL SUCCINATE 100 MG: 50 TABLET, EXTENDED RELEASE ORAL at 05:10

## 2022-01-01 RX ADMIN — SODIUM CHLORIDE, POTASSIUM CHLORIDE, SODIUM LACTATE AND CALCIUM CHLORIDE 1000 ML: 600; 310; 30; 20 INJECTION, SOLUTION INTRAVENOUS at 10:40

## 2022-01-01 RX ADMIN — SODIUM CHLORIDE, POTASSIUM CHLORIDE, SODIUM LACTATE AND CALCIUM CHLORIDE: 600; 310; 30; 20 INJECTION, SOLUTION INTRAVENOUS at 10:08

## 2022-01-01 ASSESSMENT — COGNITIVE AND FUNCTIONAL STATUS - GENERAL
SUGGESTED CMS G CODE MODIFIER MOBILITY: CM
WALKING IN HOSPITAL ROOM: TOTAL
MOVING TO AND FROM BED TO CHAIR: A LOT
SUGGESTED CMS G CODE MODIFIER DAILY ACTIVITY: CN
MOVING TO AND FROM BED TO CHAIR: UNABLE
MOBILITY SCORE: 10
TOILETING: TOTAL
STANDING UP FROM CHAIR USING ARMS: A LOT
MOBILITY SCORE: 14
MOVING FROM LYING ON BACK TO SITTING ON SIDE OF FLAT BED: UNABLE
MOBILITY SCORE: 7
CLIMB 3 TO 5 STEPS WITH RAILING: TOTAL
EATING MEALS: TOTAL
MOVING FROM LYING ON BACK TO SITTING ON SIDE OF FLAT BED: A LOT
DRESSING REGULAR LOWER BODY CLOTHING: TOTAL
STANDING UP FROM CHAIR USING ARMS: A LOT
HELP NEEDED FOR BATHING: A LITTLE
DAILY ACTIVITIY SCORE: 6
DRESSING REGULAR LOWER BODY CLOTHING: A LITTLE
SUGGESTED CMS G CODE MODIFIER MOBILITY: CL
STANDING UP FROM CHAIR USING ARMS: A LOT
TOILETING: A LITTLE
MOVING FROM LYING ON BACK TO SITTING ON SIDE OF FLAT BED: A LOT
DAILY ACTIVITIY SCORE: 18
SUGGESTED CMS G CODE MODIFIER DAILY ACTIVITY: CK
TURNING FROM BACK TO SIDE WHILE IN FLAT BAD: UNABLE
EATING MEALS: A LITTLE
MOVING TO AND FROM BED TO CHAIR: A LITTLE
CLIMB 3 TO 5 STEPS WITH RAILING: A LOT
PERSONAL GROOMING: A LITTLE
DRESSING REGULAR UPPER BODY CLOTHING: TOTAL
DRESSING REGULAR UPPER BODY CLOTHING: A LITTLE
CLIMB 3 TO 5 STEPS WITH RAILING: TOTAL
HELP NEEDED FOR BATHING: TOTAL
TURNING FROM BACK TO SIDE WHILE IN FLAT BAD: A LOT
WALKING IN HOSPITAL ROOM: A LOT
SUGGESTED CMS G CODE MODIFIER MOBILITY: CL
TURNING FROM BACK TO SIDE WHILE IN FLAT BAD: A LITTLE
WALKING IN HOSPITAL ROOM: TOTAL
PERSONAL GROOMING: TOTAL

## 2022-01-01 ASSESSMENT — CHA2DS2 SCORE
HYPERTENSION: YES
SEX: FEMALE
CHA2DS2 VASC SCORE: 7
SEX: FEMALE
CHA2DS2 VASC SCORE: 6
VASCULAR DISEASE: NO
VASCULAR DISEASE: NO
DIABETES: NO
AGE 65 TO 74: NO
AGE 75 OR GREATER: YES
CHF OR LEFT VENTRICULAR DYSFUNCTION: NO
HYPERTENSION: YES
PRIOR STROKE OR TIA OR THROMBOEMBOLISM: YES
CHF OR LEFT VENTRICULAR DYSFUNCTION: NO
AGE 65 TO 74: NO
DIABETES: YES
PRIOR STROKE OR TIA OR THROMBOEMBOLISM: YES
AGE 75 OR GREATER: YES

## 2022-01-01 ASSESSMENT — COPD QUESTIONNAIRES
HAVE YOU SMOKED AT LEAST 100 CIGARETTES IN YOUR ENTIRE LIFE: NO/DON'T KNOW
DURING THE PAST 4 WEEKS HOW MUCH DID YOU FEEL SHORT OF BREATH: SOME OF THE TIME
COPD SCREENING SCORE: 3
DO YOU EVER COUGH UP ANY MUCUS OR PHLEGM?: NO/ONLY WITH OCCASIONAL COLDS OR INFECTIONS

## 2022-01-01 ASSESSMENT — PATIENT HEALTH QUESTIONNAIRE - PHQ9
SUM OF ALL RESPONSES TO PHQ9 QUESTIONS 1 AND 2: 0
1. LITTLE INTEREST OR PLEASURE IN DOING THINGS: NOT AT ALL
5. POOR APPETITE OR OVEREATING: 3 - NEARLY EVERY DAY
SUM OF ALL RESPONSES TO PHQ QUESTIONS 1-9: 6
2. FEELING DOWN, DEPRESSED, IRRITABLE, OR HOPELESS: NOT AT ALL
CLINICAL INTERPRETATION OF PHQ2 SCORE: 1
CLINICAL INTERPRETATION OF PHQ2 SCORE: 0

## 2022-01-01 ASSESSMENT — FIBROSIS 4 INDEX
FIB4 SCORE: 10.05
FIB4 SCORE: 9.11
FIB4 SCORE: 7.64
FIB4 SCORE: 3.2
FIB4 SCORE: 6.55
FIB4 SCORE: 7.64
FIB4 SCORE: 3.2
FIB4 SCORE: 11.02

## 2022-01-01 ASSESSMENT — GAIT ASSESSMENTS
GAIT LEVEL OF ASSIST: UNABLE TO PARTICIPATE
GAIT LEVEL OF ASSIST: UNABLE TO PARTICIPATE

## 2022-01-01 ASSESSMENT — PAIN DESCRIPTION - PAIN TYPE
TYPE: ACUTE PAIN

## 2022-01-11 NOTE — PROGRESS NOTES
[facility]  PM&R Neuro Rehabilitation Clinic  [facility] [place] [address]  Ph: [phone]    FOLLOW UP PATIENT EVALUATION      Patient Name: Liudmila Pulido   Patient : 1932  Patient Age: 89 y.o.     Examining Physician: Dr. Xochilt Causey, DO    PM&R History to Date - Background Information:  Dates of Admission/Discharge to ARU: 2020-2021    SUBJECTIVE:   Patient Identification: Liudmila Pulido is a 89 y.o. female with PMH significant for diabetes mellitus, aortic stenosis, essential hypertension, hyperlipidemia and rehabilitation history significant for left frontal ischemic stroke 2020 suspect cardioembolic etiology (on Eliquis) and is presenting to PM&R clinic for a FOLLOW UP OUTPATIENT evaluation with the following chief complaint/s:     Chief Complaint: Would like to do more exercise.     History of Present Illness:   - Would like to ride her bike more than she has been able to do.   - Dtr would like to be around when patient on bike and hasn't had time.   - Dtr would like her to be safe.   - Patient can get on and off of the bike herself, but dtr likes to be there to be safe.   - Patient is very independent; she mops, sweeps, cleans.   - Her current prescribed AFO is very large.   - Has an AFO which they purchased online that works better.   - Had been working with cane only in therapy.   - Has been feeling down and depressed lately, mood better when in physical therapy.     Review of Systems:  All other pertinent positive review of systems are noted above in HPI.   All other systems reviewed and are negative.    Past Medical History:  Past Medical History:   Diagnosis Date   • Pancytopenia (HCC) 2020   • Hypophosphatemia 2020   • Vitamin D deficiency 2020   • Type 2 diabetes mellitus without complication, without long-term current use of insulin (HCC) 2020   • Hyponatremia 2020   • Hyperlipidemia    • Hypertension        History reviewed. No pertinent surgical history.     Current Outpatient Medications:   •  rivaroxaban (XARELTO) 20 MG Tab tablet, Take 1 Tablet by mouth with dinner., Disp: 90 Tablet, Rfl: 1  •  metoprolol SR (TOPROL XL) 100 MG TABLET SR 24 HR, Take 100 mg by mouth every day., Disp: , Rfl:   •  loratadine (CLARITIN) 10 MG Tab, Take 10 mg by mouth every day., Disp: , Rfl:   •  atorvastatin (LIPITOR) 40 MG Tab, Take 1 Tab by mouth every evening., Disp: 30 Tab, Rfl: 2  •  lisinopril (PRINIVIL) 40 MG tablet, Take 1 Tab by mouth every day., Disp: 30 Tab, Rfl: 2  •  metFORMIN (GLUCOPHAGE) 500 MG Tab, Take 1 Tab by mouth 2 times a day, with meals., Disp: 60 Tab, Rfl: 2  •  acetaminophen (TYLENOL) 325 MG Tab, Take 2 Tabs by mouth every 6 hours as needed for Mild Pain., Disp: 30 Tab, Rfl: 0  •  amLODIPine (NORVASC) 10 MG Tab, Take 1 Tab by mouth every day., Disp: 30 Tab, Rfl: 2  •  senna-docusate (PERICOLACE OR SENOKOT S) 8.6-50 MG Tab, Take 1 tablet by mouth every 48 hours., Disp: 30 Tab, Rfl: 0  •  vitamin D (VITAMIND D3) 1000 UNIT Tab, Take 1 Tab by mouth every day., Disp: 30 Tab, Rfl: 2  •  FIBER PO, Take 1 tablet by mouth every day., Disp: , Rfl:   •  Ascorbic Acid (VITAMIN C PO), Take 1 Tab by mouth every morning., Disp: , Rfl:   Allergies   Allergen Reactions   • Demerol Unspecified     Convulsions     • Bloodless      Jew reason   • Penicillins      Per daughter, Laura   • Erythromycin Vomiting and Nausea        Past Social History:  Social History     Socioeconomic History   • Marital status:      Spouse name: Not on file   • Number of children: 3   • Years of education: Not on file   • Highest education level: 12th grade   Occupational History     Employer: Retired   Tobacco Use   • Smoking status: Never Smoker   • Smokeless tobacco: Never Used   Vaping Use   • Vaping Use: Never used   Substance and Sexual Activity   • Alcohol use: Yes   • Drug use: Never   • Sexual activity: Not on file   Other  Topics Concern   •  Service No   • Blood Transfusions No   • Caffeine Concern No   • Occupational Exposure No   • Hobby Hazards No   • Sleep Concern No   • Stress Concern No   • Weight Concern No   • Special Diet No   • Back Care No   • Exercise Yes   • Bike Helmet No   • Seat Belt No   • Self-Exams Yes   Social History Narrative   • Not on file     Social Determinants of Health     Financial Resource Strain: Low Risk    • Difficulty of Paying Living Expenses: Not very hard   Food Insecurity: No Food Insecurity   • Worried About Running Out of Food in the Last Year: Never true   • Ran Out of Food in the Last Year: Never true   Transportation Needs: No Transportation Needs   • Lack of Transportation (Medical): No   • Lack of Transportation (Non-Medical): No   Physical Activity: Insufficiently Active   • Days of Exercise per Week: 6 days   • Minutes of Exercise per Session: 20 min   Stress: No Stress Concern Present   • Feeling of Stress : Only a little   Social Connections: Moderately Integrated   • Frequency of Communication with Friends and Family: More than three times a week   • Frequency of Social Gatherings with Friends and Family: More than three times a week   • Attends Gnosticist Services: More than 4 times per year   • Active Member of Clubs or Organizations: Yes   • Attends Club or Organization Meetings: More than 4 times per year   • Marital Status:    Intimate Partner Violence:    • Fear of Current or Ex-Partner: Not on file   • Emotionally Abused: Not on file   • Physically Abused: Not on file   • Sexually Abused: Not on file   Housing Stability: Low Risk    • Unable to Pay for Housing in the Last Year: No   • Number of Places Lived in the Last Year: 1   • Unstable Housing in the Last Year: No        Family History:  History reviewed. No pertinent family history.    Depression and Opioid Screening  PHQ-9:  Depression Screen (PHQ-2/PHQ-9) 1/9/2021 2/8/2021 1/11/2022   PHQ-2 Total Score 0 -  -   PHQ-2 Total Score - 2 1   PHQ-9 Total Score - 9 6     Interpretation of PHQ-9 Total Score   Score Severity   1-4 No Depression   5-9 Mild Depression   10-14 Moderate Depression   15-19 Moderately Severe Depression   20-27 Severe Depression     OBJECTIVE:   Vital Signs:  Vitals:    01/11/22 1055   BP: 130/80   Pulse: 62   Resp: 18   Temp: 37 °C (98.6 °F)   SpO2: 98%        Pertinent Labs:  Lab Results   Component Value Date/Time    SODIUM 139 11/24/2021 11:02 AM    POTASSIUM 4.6 11/24/2021 11:02 AM    CHLORIDE 104 11/24/2021 11:02 AM    CO2 25 11/24/2021 11:02 AM    GLUCOSE 115 (H) 11/24/2021 11:02 AM    BUN 10 11/24/2021 11:02 AM    CREATININE 0.55 11/24/2021 11:02 AM       Lab Results   Component Value Date/Time    HBA1C 6.1 (H) 11/24/2021 11:02 AM       Lab Results   Component Value Date/Time    WBC 4.8 11/24/2021 11:02 AM    RBC 4.67 11/24/2021 11:02 AM    HEMOGLOBIN 13.4 11/24/2021 11:02 AM    HEMATOCRIT 41.5 11/24/2021 11:02 AM    MCV 88.9 11/24/2021 11:02 AM    MCH 28.7 11/24/2021 11:02 AM    MCHC 32.3 (L) 11/24/2021 11:02 AM    MPV 10.3 11/24/2021 11:02 AM    NEUTSPOLYS 42.10 (L) 11/24/2021 11:02 AM    LYMPHOCYTES 49.40 (H) 11/24/2021 11:02 AM    MONOCYTES 5.60 11/24/2021 11:02 AM    EOSINOPHILS 2.10 11/24/2021 11:02 AM    BASOPHILS 0.60 11/24/2021 11:02 AM       Lab Results   Component Value Date/Time    ASTSGOT 31 11/24/2021 11:02 AM    ALTSGPT 18 11/24/2021 11:02 AM        Physical Exam:   GEN: No apparent distress  HEENT: Head normocephalic, atraumatic.  Sclera nonicteric bilaterally, no ocular discharge appreciated bilaterally.  CV: Extremities warm and well-perfused, no peripheral edema appreciated bilaterally.  PULMONARY: Breathing nonlabored on room air, no respiratory accessory muscle use.  Not requiring supplemental oxygen.  SKIN: No appreciable skin breakdown on exposed areas of skin.  PSYCH: Mood and affect within normal limits.  NEURO: Awake alert.  Conversational.  Logical thought content.  Right foot drop with ambulation, slight toe drag, can clear floor with cueing. No high steppage gait. Uses walker. Strength in DF 3+/5.     ASSESSMENT/PLAN: Liudmila Pulido  is a 89 y.o. female with rehabilitation history significant for left frontal ischemic stroke 12/17/2020 suspect cardioembolic etiology, here for evaluation. The following plan was discussed with the patient who is in agreement.     Visit Diagnoses     ICD-10-CM   1. Cerebrovascular accident (CVA) due to embolism of cerebral artery ([facility])  I63.40   2. Impaired mobility and activities of daily living  Z74.09    Z78.9   3. Right hemiparesis ([facility])  G81.91   4. Right foot drop  M21.371      Laura assists with history.     Rehab/Neuro:   1. Left frontal ischemic stroke 12/17/2020 suspect cardioembolic etiology  2. Right foot drop, improving  3. Poor memory  -Therapy: Referral for physical therapy   -Function: 10/2021 Patient continues to make progress.  She does rely on both the wheelchair and a walker but is largely independent aside from some meal prep. 1/11/2022 continues to be very independent.  Lives with her daughter Ban who is often away for several hours of the day.  Patient is able to clean house and perform mostly all of her ADLs.  Foot drop improved.  -Counseled extensively on the importance of continued physical therapy at this time for general conditioning as well as continue to work on her right foot drop given that she is having improvement.  She is unable to get a lot of physical exercise at home given that her daughter would like to be home when she is exercising and this is difficult to coordinate.  Referral to physical therapy as aforementioned.  - Counseled that her current AFO is very large and puts her at increased risk of falls given that it is so ill fitting.  Recommend against wearing this at this time.  They have an alternative AFO which they showed me today in clinic which I think will be sufficient for purposes of  patient.    Mood: Improved when in physical therapy.  Improved when she can get out and socialize with friends.  She can do this when she goes to [facility] for therapy, she can visit friends afterwards.  -PT referral as aforementioned.     Follow up: 3 months for general reevaluation.    Total time spent was 46 minutes.  Included in this time is the time spent preparing for the visit including record review, my exam and evaluation, counseling and education regarding that which is aforementioned in the assessment and plan. Time was spent ordering the appropriate labs, tests, procedures, referrals, medications. Included this time as the time spent obtaining history from someone other than the patient. Discussion involved the patient and Laura.      Please note that this dictation was created using voice recognition software. I have made every reasonable attempt to correct obvious errors but there may be errors of grammar and content that I may have overlooked prior to finalization of this note.    Dr. Xochilt Causey DO, MS  Department of Physical Medicine & Rehabilitation  Neuro Rehabilitation Clinic  [facility]

## 2022-04-13 NOTE — PROGRESS NOTES
[facility]  PM&R Neuro Rehabilitation Clinic  [address] [place] [address]  Ph: [phone]    FOLLOW UP PATIENT EVALUATION      Patient Name: Liudmila Pulido   Patient : 1932  Patient Age: 89 y.o.     Examining Physician: Dr. Xochilt Causey, DO    PM&R History to Date - Background Information:  Dates of Admission/Discharge to ARU: 2020-2021    SUBJECTIVE:   Patient Identification: Liudmila Pulido is a 89 y.o. female with PMH significant for diabetes mellitus, aortic stenosis, essential hypertension, hyperlipidemia and rehabilitation history significant for left frontal ischemic stroke 2020 suspect cardioembolic etiology (on Eliquis) and is presenting to PM&R clinic for a FOLLOW UP OUTPATIENT evaluation with the following chief complaint/s:     Chief Complaint: Physical therapy follow-up    History of Present Illness:   - Has been doing therapy and has improved a lot.   - Foot still inverts, but not any better with old AFO.   - Still riding the bike at home and is riding every day.   - Is ambulating better and still doing household chores.  -Patient feels like she is doing a lot better and feels well being able to go to physical therapy.  -She is largely independent within the home.  She is able to dress herself, do household chores, use the restroom by herself.  -She does use a raised toilet seat and has grab bars within the bathroom.  Lately she has not been taking the walker into the bathroom just using the grab bars.  -She might be able to stay at home by herself 1 or 2 nights a week, but first they are looking into life alert.  -Physical therapy should be sending progress summary.    Review of Systems:  All other pertinent positive review of systems are noted above in HPI.   All other systems reviewed and are negative.    Past Medical History:  Past Medical History:   Diagnosis Date   • Pancytopenia (HCC) 2020   • Hypophosphatemia 2020   • Vitamin D  deficiency 12/21/2020   • Type 2 diabetes mellitus without complication, without long-term current use of insulin (HCC) 12/18/2020   • Hyponatremia 12/17/2020   • Hyperlipidemia    • Hypertension       No past surgical history on file.     Current Outpatient Medications:   •  cyanocobalamin (VITAMIN B-12) 100 MCG Tab, Take 100 mcg by mouth every day., Disp: , Rfl:   •  rivaroxaban (XARELTO) 20 MG Tab tablet, Take 1 Tablet by mouth with dinner., Disp: 90 Tablet, Rfl: 1  •  metoprolol SR (TOPROL XL) 100 MG TABLET SR 24 HR, Take 100 mg by mouth every day., Disp: , Rfl:   •  loratadine (CLARITIN) 10 MG Tab, Take 10 mg by mouth every day., Disp: , Rfl:   •  atorvastatin (LIPITOR) 40 MG Tab, Take 1 Tab by mouth every evening., Disp: 30 Tab, Rfl: 2  •  lisinopril (PRINIVIL) 40 MG tablet, Take 1 Tab by mouth every day., Disp: 30 Tab, Rfl: 2  •  metFORMIN (GLUCOPHAGE) 500 MG Tab, Take 1 Tab by mouth 2 times a day, with meals., Disp: 60 Tab, Rfl: 2  •  acetaminophen (TYLENOL) 325 MG Tab, Take 2 Tabs by mouth every 6 hours as needed for Mild Pain., Disp: 30 Tab, Rfl: 0  •  amLODIPine (NORVASC) 10 MG Tab, Take 1 Tab by mouth every day., Disp: 30 Tab, Rfl: 2  •  senna-docusate (PERICOLACE OR SENOKOT S) 8.6-50 MG Tab, Take 1 tablet by mouth every 48 hours., Disp: 30 Tab, Rfl: 0  •  vitamin D (VITAMIND D3) 1000 UNIT Tab, Take 1 Tab by mouth every day., Disp: 30 Tab, Rfl: 2  •  FIBER PO, Take 1 Tablet by mouth every day., Disp: , Rfl:   •  Ascorbic Acid (VITAMIN C PO), Take 1 Tab by mouth every morning., Disp: , Rfl:   Allergies   Allergen Reactions   • Demerol Unspecified     Convulsions     • Bloodless      Mosque reason   • Penicillins      Per daughter, Laura   • Erythromycin Vomiting and Nausea        Past Social History:  Social History     Socioeconomic History   • Marital status:      Spouse name: Not on file   • Number of children: 3   • Years of education: Not on file   • Highest education level: 12th grade    Occupational History     Employer: Retired   Tobacco Use   • Smoking status: Never Smoker   • Smokeless tobacco: Never Used   Vaping Use   • Vaping Use: Never used   Substance and Sexual Activity   • Alcohol use: Yes   • Drug use: Never   • Sexual activity: Not on file   Other Topics Concern   •  Service No   • Blood Transfusions No   • Caffeine Concern No   • Occupational Exposure No   • Hobby Hazards No   • Sleep Concern No   • Stress Concern No   • Weight Concern No   • Special Diet No   • Back Care No   • Exercise Yes   • Bike Helmet No   • Seat Belt No   • Self-Exams Yes   Social History Narrative   • Not on file     Social Determinants of Health     Financial Resource Strain: Low Risk    • Difficulty of Paying Living Expenses: Not very hard   Food Insecurity: No Food Insecurity   • Worried About Running Out of Food in the Last Year: Never true   • Ran Out of Food in the Last Year: Never true   Transportation Needs: No Transportation Needs   • Lack of Transportation (Medical): No   • Lack of Transportation (Non-Medical): No   Physical Activity: Insufficiently Active   • Days of Exercise per Week: 6 days   • Minutes of Exercise per Session: 20 min   Stress: No Stress Concern Present   • Feeling of Stress : Only a little   Social Connections: Moderately Integrated   • Frequency of Communication with Friends and Family: More than three times a week   • Frequency of Social Gatherings with Friends and Family: More than three times a week   • Attends Christianity Services: More than 4 times per year   • Active Member of Clubs or Organizations: Yes   • Attends Club or Organization Meetings: More than 4 times per year   • Marital Status:    Intimate Partner Violence: Not on file   Housing Stability: Low Risk    • Unable to Pay for Housing in the Last Year: No   • Number of Places Lived in the Last Year: 1   • Unstable Housing in the Last Year: No        Family History:  No family history on  file.    Depression and Opioid Screening  PHQ-9:  Depression Screen (PHQ-2/PHQ-9) 2/8/2021 1/11/2022 4/13/2022   PHQ-2 Total Score - - -   PHQ-2 Total Score 2 1 0   PHQ-9 Total Score 9 6 -     Interpretation of PHQ-9 Total Score   Score Severity   1-4 No Depression   5-9 Mild Depression   10-14 Moderate Depression   15-19 Moderately Severe Depression   20-27 Severe Depression     OBJECTIVE:   Vital Signs:  Vitals:    04/13/22 1121   BP: 116/62   Pulse: 70   Resp: 18   Temp: 36.3 °C (97.4 °F)   SpO2: 98%        Pertinent Labs:  Lab Results   Component Value Date/Time    SODIUM 139 11/24/2021 11:02 AM    POTASSIUM 4.6 11/24/2021 11:02 AM    CHLORIDE 104 11/24/2021 11:02 AM    CO2 25 11/24/2021 11:02 AM    GLUCOSE 115 (H) 11/24/2021 11:02 AM    BUN 10 11/24/2021 11:02 AM    CREATININE 0.55 11/24/2021 11:02 AM       Lab Results   Component Value Date/Time    HBA1C 6.1 (H) 11/24/2021 11:02 AM       Lab Results   Component Value Date/Time    WBC 4.8 11/24/2021 11:02 AM    RBC 4.67 11/24/2021 11:02 AM    HEMOGLOBIN 13.4 11/24/2021 11:02 AM    HEMATOCRIT 41.5 11/24/2021 11:02 AM    MCV 88.9 11/24/2021 11:02 AM    MCH 28.7 11/24/2021 11:02 AM    MCHC 32.3 (L) 11/24/2021 11:02 AM    MPV 10.3 11/24/2021 11:02 AM    NEUTSPOLYS 42.10 (L) 11/24/2021 11:02 AM    LYMPHOCYTES 49.40 (H) 11/24/2021 11:02 AM    MONOCYTES 5.60 11/24/2021 11:02 AM    EOSINOPHILS 2.10 11/24/2021 11:02 AM    BASOPHILS 0.60 11/24/2021 11:02 AM       Lab Results   Component Value Date/Time    ASTSGOT 31 11/24/2021 11:02 AM    ALTSGPT 18 11/24/2021 11:02 AM        Physical Exam:   GEN: No apparent distress  HEENT: Head normocephalic, atraumatic.  Sclera nonicteric bilaterally, no ocular discharge appreciated bilaterally.  CV: Extremities warm and well-perfused, right lower extremity with some edema compared to the left.  PULMONARY: Breathing nonlabored on room air, no respiratory accessory muscle use.  Not requiring supplemental oxygen.  SKIN: No appreciable  skin breakdown on exposed areas of skin.  PSYCH: Mood and affect within normal limits.  NEURO: Awake alert.  Conversational.  Logical thought content.  Ambulatory with walker.  She does have inversion and some plantar flexion of her right foot during swing phase but she is cognizant of this and has relatively smooth gait when focusing.  Wearing adapted right AFO      ASSESSMENT/PLAN: Liudmila Pulido  is a 89 y.o. female with rehabilitation history significant for left frontal ischemic stroke 12/17/2020 suspect cardioembolic etiology, here for evaluation. The following plan was discussed with the patient who is in agreement.     Visit Diagnoses     ICD-10-CM   1. Cerebrovascular accident (CVA) due to embolism of cerebral artery (HCC)  I63.40   2. Impaired mobility and endurance  Z74.09   3. Walker as ambulation aid  Z99.89   4. Right foot drop  M21.371        Laura assists with history.     Rehab/Neuro:   1. Left frontal ischemic stroke 12/17/2020 suspect cardioembolic etiology  2. Right foot drop, improving  3. Poor memory  -Therapy: Continue physical therapy with Norma in [place] once weekly.  -Counseled that I recommend she continue to do this as long she is able to.  -Function: 10/2021 Patient continues to make progress.  She does rely on both the wheelchair and a walker but is largely independent aside from some meal prep. 1/11/2022 continues to be very independent.  Lives with her daughter Ban who is often away for several hours of the day.  Patient is able to clean house and perform mostly all of her ADLs.  Foot drop improved. 4/13/2022 continues to improve.  Exercising almost daily.  Enjoys physical therapy.  Ambulatory with walker.  -Counseled on post stroke recovery.      Mood:   -Status: Significantly improved given that she is making some functional improvements and his affect that she is able to get out of the house and go to physical therapy.    Follow up: As needed    Total time spent was 34  minutes.  Included in this time is the time spent preparing for the visit including record review, my exam and evaluation, counseling and education regarding that which is aforementioned in the assessment and plan.  Time was spent documenting into patient's electronic health record.  Time was spent ordering the appropriate labs, tests, procedures, referrals, medications. Included this time as the time spent obtaining history from someone other than the patient. Discussion involved the patient and daughter.    Please note that this dictation was created using voice recognition software. I have made every reasonable attempt to correct obvious errors but there may be errors of grammar and content that I may have overlooked prior to finalization of this note.    Dr. Xochilt Causey DO, MS  Department of Physical Medicine & Rehabilitation  Neuro Rehabilitation Clinic  [facility]

## 2022-05-03 NOTE — PROGRESS NOTES
Subjective:     CC: No chief complaint on file.        HPI:   Liudmila presents today to discuss the following issues:        Past Medical History:   Diagnosis Date   • Hyperlipidemia    • Hypertension    • Hyponatremia 12/17/2020   • Hypophosphatemia 12/21/2020   • Pancytopenia (HCC) 12/28/2020   • Type 2 diabetes mellitus without complication, without long-term current use of insulin (HCC) 12/18/2020   • Vitamin D deficiency 12/21/2020       Social History     Tobacco Use   • Smoking status: Never Smoker   • Smokeless tobacco: Never Used   Vaping Use   • Vaping Use: Never used   Substance Use Topics   • Alcohol use: Yes   • Drug use: Never       Current Outpatient Medications Ordered in Epic   Medication Sig Dispense Refill   • cyanocobalamin (VITAMIN B-12) 100 MCG Tab Take 100 mcg by mouth every day.     • rivaroxaban (XARELTO) 20 MG Tab tablet Take 1 Tablet by mouth with dinner. 90 Tablet 1   • metoprolol SR (TOPROL XL) 100 MG TABLET SR 24 HR Take 100 mg by mouth every day.     • loratadine (CLARITIN) 10 MG Tab Take 10 mg by mouth every day.     • atorvastatin (LIPITOR) 40 MG Tab Take 1 Tab by mouth every evening. 30 Tab 2   • lisinopril (PRINIVIL) 40 MG tablet Take 1 Tab by mouth every day. 30 Tab 2   • metFORMIN (GLUCOPHAGE) 500 MG Tab Take 1 Tab by mouth 2 times a day, with meals. 60 Tab 2   • acetaminophen (TYLENOL) 325 MG Tab Take 2 Tabs by mouth every 6 hours as needed for Mild Pain. 30 Tab 0   • amLODIPine (NORVASC) 10 MG Tab Take 1 Tab by mouth every day. 30 Tab 2   • senna-docusate (PERICOLACE OR SENOKOT S) 8.6-50 MG Tab Take 1 tablet by mouth every 48 hours. 30 Tab 0   • vitamin D (VITAMIND D3) 1000 UNIT Tab Take 1 Tab by mouth every day. 30 Tab 2   • FIBER PO Take 1 Tablet by mouth every day.     • Ascorbic Acid (VITAMIN C PO) Take 1 Tab by mouth every morning.       No current [facility]-ordered facility-administered medications on file.       Allergies:  Demerol, Bloodless, Penicillins, and  Erythromycin    Health Maintenance: Completed    ROS:   Denies any recent fevers or chills. No nausea or vomiting. No chest pains or shortness of breath.      Objective:       Exam:  There were no vitals taken for this visit. There is no height or weight on file to calculate BMI.    Gen: Alert and oriented, No apparent distress.  Lungs: Normal effort, CTA bilaterally, no wheezes, rhonchi, or rales  CV: Regular rate and rhythm. No murmurs, rubs, or gallops.  Ext: No clubbing, cyanosis, edema.        Assessment & Plan:     89 y.o. female with the following -       I spent a total of *** minutes with record review, exam, communication with the patient, communication with other providers, and documentation of this encounter.      No follow-ups on file.    Please note that this dictation was created using voice recognition software. I have made every reasonable attempt to correct obvious errors, but I expect that there are errors of grammar and possibly content that I did not discover before finalizing the note.

## 2022-05-11 NOTE — TELEPHONE ENCOUNTER
Received refill request for clonidine from [facility].  Need to know dose and instructions.  Left msg for pt to call back

## 2022-06-01 NOTE — PROGRESS NOTES
Anticoagulation Summary  As of 6/1/2022    INR goal:     TTR:  --   INR used for dosing:     Warfarin maintenance plan:  No maintenance plan   Next INR check:     Target end date:  Indefinite    Indications    Atrial fibrillation (HCC) [I48.91]             Anticoagulation Episode Summary     INR check location:      Preferred lab:      Send INR reminders to:      Comments:          Anticoagulation Patient Findings                Health Status Since Last Assessment   Patient denies any new relevant medical problems, ED visits or hospitalizations   Patient denies any embolic events (stroke/tia/systemic embolism)    Adherence with DOAC Therapy   Pt has Not missed any doses in the average week    Bleeding Risk Assessment     Epistaxis   Pt denies any excessive or unusual bleeding/hematomas.  Pt denies any GI bleeds or hematemesis.  Pt denies any concerning daily headache or sub dural hematoma symptoms.     Pt denies any hematuria Denies   Latest Hemoglobin 13.1   ETOH overuse Denies     Creatinine Clearance/Renal Function     Latest ClCr ~50 mL/min, may need reduction in the future.     Hepatic function   Latest LFTs Alk Phos slightly elevated, but consistent.    Pt denies any history of liver dysfunction      Drug Interactions   Platelets: 258   ASA/other antiplatelets none   NSAID Only uses APAP   Other drug interactions not of clinical concern   XVerified no anticonvulsant or azole therapy, education provided for future use.     Examination   Blood Pressure Home BP is averaging <130/80   Symptomatic hypotension none   Significant gait impairment/imbalance/high risk for falls? Pt is in a wheelchair    Final Assessment and Recommendations:   Patient appears stable from the anticoagulation standpoint.     Benefits of continued DOAC therapy outweigh risks for this patient   Recommend pt continue with current DOAC therapy    DOAC is  affordable     Other Actions: cmp/ cbc hemogram ordered prior to next visit    Follow  up:   Will follow up with patient 6  months.     Con Pantoja, ShelleyD

## 2022-06-06 NOTE — PROGRESS NOTES
Subjective:     CC:   Chief Complaint   Patient presents with   • Lab Results   • Follow-Up         HPI:   Liudmila Lebron is a 89-year-old female who presents to follow-up on recent lab results.  She has no acute medical complaints.      Past Medical History:   Diagnosis Date   • Hyperlipidemia    • Hypertension    • Hyponatremia 12/17/2020   • Hypophosphatemia 12/21/2020   • Pancytopenia (HCC) 12/28/2020   • Type 2 diabetes mellitus without complication, without long-term current use of insulin (HCC) 12/18/2020   • Vitamin D deficiency 12/21/2020       Social History     Tobacco Use   • Smoking status: Never Smoker   • Smokeless tobacco: Never Used   Vaping Use   • Vaping Use: Never used   Substance Use Topics   • Alcohol use: Yes     Comment: occ   • Drug use: Never       Current Outpatient Medications Ordered in Epic   Medication Sig Dispense Refill   • CLONIDINE HCL PO Take  by mouth.     • metFORMIN (GLUCOPHAGE) 500 MG Tab Take 1 Tablet by mouth 2 times a day with meals. 180 Tablet 3   • cyanocobalamin (VITAMIN B-12) 100 MCG Tab Take 100 mcg by mouth every day.     • rivaroxaban (XARELTO) 20 MG Tab tablet Take 1 Tablet by mouth with dinner. 90 Tablet 1   • metoprolol SR (TOPROL XL) 100 MG TABLET SR 24 HR Take 100 mg by mouth every day.     • loratadine (CLARITIN) 10 MG Tab Take 10 mg by mouth every day.     • atorvastatin (LIPITOR) 40 MG Tab Take 1 Tab by mouth every evening. 30 Tab 2   • lisinopril (PRINIVIL) 40 MG tablet Take 1 Tab by mouth every day. 30 Tab 2   • acetaminophen (TYLENOL) 325 MG Tab Take 2 Tabs by mouth every 6 hours as needed for Mild Pain. 30 Tab 0   • amLODIPine (NORVASC) 10 MG Tab Take 1 Tab by mouth every day. 30 Tab 2   • senna-docusate (PERICOLACE OR SENOKOT S) 8.6-50 MG Tab Take 1 tablet by mouth every 48 hours. 30 Tab 0   • vitamin D (VITAMIND D3) 1000 UNIT Tab Take 1 Tab by mouth every day. 30 Tab 2   • FIBER PO Take 1 Tablet by mouth every day.     • Ascorbic Acid (VITAMIN C  "PO) Take 1 Tab by mouth every morning.       No current [facility]-ordered facility-administered medications on file.       Allergies:  Meperidine, Demerol, Bloodless, Penicillins, and Erythromycin    Health Maintenance: Completed    ROS:   Denies any recent fevers or chills. No nausea or vomiting. No chest pains or shortness of breath.      Objective:       Exam:  /62 (BP Location: Right arm, Patient Position: Sitting, BP Cuff Size: Adult)   Pulse 60   Temp 37 °C (98.6 °F) (Temporal)   Resp 20   Ht 1.6 m (5' 3\")   Wt 64.5 kg (142 lb 1.6 oz)   SpO2 95%   BMI 25.17 kg/m²  Body mass index is 25.17 kg/m².    Gen: Alert and oriented, No apparent distress.  In wheelchair.  Lungs: Normal effort, CTA bilaterally, no wheezes, rhonchi, or rales  CV: Regular rate and rhythm. No murmurs, rubs, or gallops.  Ext: No clubbing, cyanosis, edema.        Assessment & Plan:     89 y.o. female with the following -     Hyperlipidemia associated with type 2 diabetes mellitus (HCC)  With regard to the patient's type 2 diabetes, chronic medical condition.  Hemoglobin A1c at goal of less than 7. The patient is on metformin 500 mg twice daily.    Hemoglobin A1c from 5/25/2022 was 6.  -Continue metformin 500 mg twice daily  - Comp Metabolic Panel; Future  - HEMOGLOBIN A1C; Future  - MICROALBUMIN CREAT RATIO URINE; Future    With regard to the patient's hyperlipidemia, chronic medical condition.    LDL at goal of less than 70.  The patient is on atorvastatin 40 mg nightly.    She denies statin associated myalgias.  Lipid panel from 5/25/2022 showed a total cholesterol of 120, L LDL 46, HDL 58, triglycerides 80.   -Continue atorvastatin 40 mg nightly  - Comp Metabolic Panel; Future  - Lipid Profile; Future     Longstanding persistent atrial fibrillation (HCC)  Primary hypertension  Chronic medical conditions.  Well-controlled.  She is followed by cardiology at [facility].  The patient is on metoprolol 100 mg twice daily for rate control " Xarelto 20 mg daily for anticoagulation.  She is also on lisinopril 40 mg daily blood pressure and amlodipine 10 mg daily.  -Continue Xarelto 20 mg daily  -Continue metoprolol 100 mg twice daily  -Continue lisinopril 40 mg daily  -Continue amlodipine 10 mg daily    Elevated serum globulin level  Acute medical condition, new diagnosis.  Globulin level from 5/25/2022 was elevated at 4.2.  - MICROALBUMIN CREAT RATIO URINE; Future  - IMMUNOGLOBULINS A/G/M SERUM; Future  - Monoclonal Protein, Ur (24 hr or Random); Future  - SPEP W/REFLEX TO CONSTANTIN, A, G, M; Future    Elevated alkaline phosphatase level  Chronic medical condition.  Improved.  Alkaline phosphatase level from 5/25/2020 was elevated at 121.  Secondary to chronic anticoagulation.    Thyroid disorder screen  - TSH WITH REFLEX TO FT4; Future    Screening for deficiency anemia  - CBC WITH DIFFERENTIAL; Future      Return in about 6 months (around 12/9/2022) for f/u labs.    Please note that this dictation was created using voice recognition software. I have made every reasonable attempt to correct obvious errors, but I expect that there are errors of grammar and possibly content that I did not discover before finalizing the note.

## 2022-11-09 PROBLEM — R93.5 ABNORMAL CT OF THE ABDOMEN: Status: ACTIVE | Noted: 2022-01-01

## 2022-11-09 PROBLEM — G93.40 ENCEPHALOPATHY: Status: ACTIVE | Noted: 2022-01-01

## 2022-11-09 PROBLEM — K74.60 LIVER CIRRHOSIS (HCC): Status: ACTIVE | Noted: 2022-01-01

## 2022-11-09 PROBLEM — Z86.73 HISTORY OF STROKE: Status: ACTIVE | Noted: 2022-01-01

## 2022-11-09 PROBLEM — R19.7 DIARRHEA: Status: ACTIVE | Noted: 2022-01-01

## 2022-11-10 NOTE — ASSESSMENT & PLAN NOTE
Etiology is unclear  C. difficile rule out pending, ordered stool culture as well  Received 1.5 L of crystalloids, will continue IV rehydration  Symptomatic treatment as needed  Have resolved

## 2022-11-10 NOTE — ASSESSMENT & PLAN NOTE
Chronic.  Continue Xarelto, metoprolol  When patient can tolerate p.o. and has adequate p.o. intake reinstitute oral metoprolol in addition to oral Xarelto presently on full dose Lovenox in addition to IV metoprolol

## 2022-11-10 NOTE — ED NOTES
Pt medicated per MAR, Urethral cath placed per order, with urine return. Questions and concerns answered at bedside.

## 2022-11-10 NOTE — ASSESSMENT & PLAN NOTE
Probably metabolic secondary to dehydration, diarrhea, with underlying cognitive disorder.  Plan: CT head without contrast since patient is on Xarelto to rule out bleed  Ammonia, TSH, UA, VBG  Fall, aspiration precautions  Neurochecks every 4 hours  Slowly improving continue with reorientation redirection PT OT.

## 2022-11-10 NOTE — THERAPY
"Physical Therapy   Initial Evaluation     Patient Name: Liudmila Pulido  Age:  89 y.o., Sex:  female  Medical Record #: [id number]  Today's Date: 11/10/2022     Precautions  Precautions: Fall Risk  Comments: confused    Assessment  Patient is 89 y.o. female with  past medical history of CVA 2 years ago, on Xarelto, hypertension, dyslipidemia, type 2 diabetes, who presented 11/9/2022 with complaints of diarrhea watery and multiple bowel movements a day for 3 to 4 days, abdominal discomfort diffusely, nausea but no vomiting.  According to the patient's daughter who is at bedside, patient does not have diagnosis of dementia, but has been confused and lethargic last 3 days.  Additional factors influencing patient status / progress : Today, per nsg, pt has been trying to get OOB all day. Pt is met up in chair, pleasant, but confused. Pt unable to answer questions of PLF, asking if \"we are still in [place]\". Attempted sit to stand, pt needed total assist, actively leaning back into chair, once back in bed saying \"I don't want to do this right now\". PT to follow, will attempt again tomorrow. .      Plan    Recommend Physical Therapy 3 times per week until therapy goals are met for the following treatments:  Bed Mobility, Gait Training, Neuro Re-Education / Balance, Stair Training, and Therapeutic Activities    DC Equipment Recommendations: Unable to determine at this time  Discharge Recommendations: Recommend post-acute placement for additional physical therapy services prior to discharge home          Objective       11/10/22 1426   Charge Group   PT Evaluation PT Evaluation Mod   Total Time Spent   PT Total Time Yes   PT Evaluation Time Spent (Mins) 20   PT Total Time Spent (Calculated) 20   Initial Contact Note    Initial Contact Note Order Received and Verified, Physical Therapy Evaluation in Progress with Full Report to Follow.   Precautions   Precautions Fall Risk   Comments confused   Vitals   O2 (LPM) 2   O2 " "Delivery Device Silicone Nasal Cannula   Pain 0 - 10 Group   Therapist Pain Assessment During Activity;Nurse Notified;0   Prior Living Situation   Prior Services Unable To Determine At This Time   Comments pt unable to answer questions, per chart, 24/7 care in 1 story house with family   Prior Level of Functional Mobility   Bed Mobility Unable To Determine At This Time   Cognition    Level of Consciousness Alert   Comments confused, asking if we are still in [place]   Active ROM Lower Body    Active ROM Lower Body  X   Comments not following cues, but R LE appears weak   Strength Lower Body   Lower Body Strength  X   Comments unable to follow cues for MMT, but R LE appears weak   Balance Assessment   Sitting Balance (Static) Fair   Sitting Balance (Dynamic) Poor +   Gait Analysis   Gait Level Of Assist Unable to Participate   Bed Mobility    Supine to Sit   (up chair)   Functional Mobility   Sit to Stand Total Assist   Comments pt leaning back, not participating. When back in chair, pt says \"I don't want to so this right now\"   How much difficulty does the patient currently have...   Turning over in bed (including adjusting bedclothes, sheets and blankets)? 2   Sitting down on and standing up from a chair with arms (e.g., wheelchair, bedside commode, etc.) 2   Moving from lying on back to sitting on the side of the bed? 2   How much help from another person does the patient currently need...   Moving to and from a bed to a chair (including a wheelchair)? 2   Need to walk in a hospital room? 1   Climbing 3-5 steps with a railing? 1   6 clicks Mobility Score 10   Activity Tolerance   Sitting in Chair 20+   Short Term Goals    Short Term Goal # 1 Pt will perform bed mobility with supervision in 6 visits.   Short Term Goal # 2 Pt will transfer with supervision in 6 visits to improve functional indep.   Short Term Goal # 3 Pt will ambulate x 50 feet using FWW with supervision in 6 vistis to improve functional indep. "   Education Group   Education Provided Role of Physical Therapist   Role of Physical Therapist Patient Response Patient;Acceptance;Explanation;No Learning Evidence   Problem List    Problems Impaired Transfers;Impaired Ambulation;Impaired Balance;Decreased Activity Tolerance;Safety Awareness Deficits / Cognition   Anticipated Discharge Equipment and Recommendations   DC Equipment Recommendations Unable to determine at this time   Discharge Recommendations Recommend post-acute placement for additional physical therapy services prior to discharge home   Interdisciplinary Plan of Care Collaboration   IDT Collaboration with  Nursing   Patient Position at End of Therapy Seated;Call Light within Reach;Tray Table within Reach;Phone within Reach;Chair Alarm On  (sitter in room)   Collaboration Comments nsg updated   Session Information   Date / Session Number  11/10-1 (1/3, 11/16)

## 2022-11-10 NOTE — ED NOTES
Pt to T705/01 via Reyna BYRNES Pt with all belongings in possession. 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 Report has been given to Reyna RODRIGES.

## 2022-11-10 NOTE — H&P
Hospital Medicine History & Physical Note    Date of Service  11/9/2022    Primary Care Physician  Nabila De Jesus M.D.      Code Status  DNAR/DNI    Chief Complaint  Chief Complaint   Patient presents with    Diarrhea     BIB EMS for persistent diarrhea, fatigue, and weakness x1 week. Pt AOx3 to person, event, and day.       History of Presenting Illness  Liudmila Pulido is a 89 y.o. female with past medical history of CVA 2 years ago, on Xarelto, hypertension, dyslipidemia, type 2 diabetes, who presented 11/9/2022 with complaints of diarrhea watery and multiple bowel movements a day for 3 to 4 days, abdominal discomfort diffusely, nausea but no vomiting.  According to the patient's daughter who is at bedside, patient does not have diagnosis of dementia, but has been confused and lethargic last 3 days.  She was refusing food and drinks.  Reportedly, patient's daughter forced her to drink orange juice earlier today because her blood sugar was only 33.  When I saw the patient she would be mumbling incoherently, would not answer questions appropriately, appears to be restless.  Patient was placed on 2 L of oxygen due to desaturation to 85%.  She received 1.5 L of crystalloids.  Blood work showed lactic acid 5.0, troponin 38, BNP 2100.  Bicarb 16.  CT of the abdomen and pelvis with contrast showed 1.9 cm left adrenal mass which could be an adenoma or metastasis, cholelithiasis, possible liver cirrhosis, uterine leiomyomas.  EKG showed A. fib with heart rate 65-95, incomplete LBBB, no significant changes.  .    I discussed the plan of care with family.    Review of Systems  Review of Systems   Unable to perform ROS: Mental status change     Past Medical History   has a past medical history of Hyperlipidemia, Hypertension, Hyponatremia (12/17/2020), Hypophosphatemia (12/21/2020), Pancytopenia (HCC) (12/28/2020), Type 2 diabetes mellitus without complication, without long-term current use of insulin (HCC)  (12/18/2020), and Vitamin D deficiency (12/21/2020).    Surgical History   has no past surgical history on file.     Family History     Family history reviewed with patient. There is no family history that is pertinent to the chief complaint.     Social History   reports that she has never smoked. She has never used smokeless tobacco. She reports current alcohol use. She reports that she does not use drugs.    Allergies  Allergies   Allergen Reactions    Meperidine Unspecified    Demerol Unspecified     Convulsions      Bloodless      Buddhist reason    Penicillins Unspecified     Per daughter, Laura    Erythromycin Vomiting, Nausea and Unspecified       Medications  Prior to Admission Medications   Prescriptions Last Dose Informant Patient Reported? Taking?   Ascorbic Acid (VITAMIN C PO)  Patient Yes No   Sig: Take 1 Tab by mouth every morning.   CLONIDINE HCL PO   Yes No   Sig: Take  by mouth.   FIBER PO  Patient Yes No   Sig: Take 1 Tablet by mouth every day.   XARELTO 20 MG Tab tablet   No No   Sig: TAKE 1 TABLET BY MOUTH WITH DINNER   acetaminophen (TYLENOL) 325 MG Tab   Yes No   Sig: Take 2 Tabs by mouth every 6 hours as needed for Mild Pain.   amLODIPine (NORVASC) 10 MG Tab   No No   Sig: Take 1 Tab by mouth every day.   atorvastatin (LIPITOR) 40 MG Tab   No No   Sig: Take 1 Tab by mouth every evening.   cyanocobalamin (VITAMIN B-12) 100 MCG Tab   Yes No   Sig: Take 100 mcg by mouth every day.   lisinopril (PRINIVIL) 40 MG tablet   No No   Sig: Take 1 Tab by mouth every day.   loratadine (CLARITIN) 10 MG Tab   Yes No   Sig: Take 10 mg by mouth every day.   metFORMIN (GLUCOPHAGE) 500 MG Tab   No No   Sig: Take 1 Tablet by mouth 2 times a day with meals.   metoprolol SR (TOPROL XL) 100 MG TABLET SR 24 HR   Yes No   Sig: Take 100 mg by mouth every day.   senna-docusate (PERICOLACE OR SENOKOT S) 8.6-50 MG Tab   Yes No   Sig: Take 1 tablet by mouth every 48 hours.   vitamin D (VITAMIND D3) 1000 UNIT Tab   Yes No    Sig: Take 1 Tab by mouth every day.      Facility-Administered Medications: None       Physical Exam  Temp:  [36.6 °C (97.9 °F)] 36.6 °C (97.9 °F)  Pulse:  [68-79] 71  Resp:  [20-51] 22  BP: ()/() 120/98  SpO2:  [84 %-96 %] 90 %  Blood Pressure : (!) 120/98   Temperature: 36.6 °C (97.9 °F)   Pulse: 71   Respiration: (!) 22   Pulse Oximetry: 90 %       Physical Exam  Vitals and nursing note reviewed.   Constitutional:       General: She is not in acute distress.     Appearance: She is ill-appearing.   HENT:      Head: Normocephalic and atraumatic.      Nose: Nose normal.      Mouth/Throat:      Mouth: Mucous membranes are dry.   Eyes:      Extraocular Movements: Extraocular movements intact.      Pupils: Pupils are equal, round, and reactive to light.   Cardiovascular:      Rate and Rhythm: Normal rate and regular rhythm.   Pulmonary:      Effort: Pulmonary effort is normal.      Breath sounds: Normal breath sounds.   Abdominal:      General: Abdomen is flat. There is no distension.      Tenderness: There is no abdominal tenderness.   Musculoskeletal:         General: No swelling or deformity. Normal range of motion.      Cervical back: Normal range of motion and neck supple.   Skin:     General: Skin is warm and dry.   Neurological:      General: No focal deficit present.      Mental Status: She is alert. She is disoriented and confused.      Comments: Moves all 4 extremities.  Inconsistently following commands   Psychiatric:      Comments: Appears to be hallucinating       Laboratory:  Recent Labs     11/09/22  1709   WBC 6.0   RBC 4.12*   HEMOGLOBIN 12.8   HEMATOCRIT 37.7   MCV 91.5   MCH 31.1   MCHC 34.0   RDW 55.4*   PLATELETCT 198   MPV 9.8     Recent Labs     11/09/22  1709   SODIUM 135   POTASSIUM 3.5*   CHLORIDE 103   CO2 16*   GLUCOSE 83   BUN 18   CREATININE 0.81   CALCIUM 8.7     Recent Labs     11/09/22  1709   ALTSGPT 21   ASTSGOT 66*   ALKPHOSPHAT 144*   TBILIRUBIN 0.7   GLUCOSE 83          Recent Labs     11/09/22  1709   NTPROBNP 2154*         Recent Labs     11/09/22  1709   TROPONINT 38*       Imaging:  DX-CHEST-PORTABLE (1 VIEW)   Final Result         No acute cardiac or pulmonary abnormality is identified.      CT-ABDOMEN-PELVIS WITH   Final Result      1.  1.9 cm left adrenal mass is identified. Differential diagnosis includes adrenal adenoma as well as metastasis.      2.  There is cholelithiasis.      3.  Liver surface is mildly nodular which could indicate cirrhosis.      4.  Uterine wall masses are identified most of which are densely calcified, consistent with leiomyomas.      5.  Right inguinal hernia contains abdominal fat.      CT-HEAD W/O    (Results Pending)       X-Ray:  I have personally reviewed the images and compared with prior images.    Assessment/Plan:  Justification for Admission Status  I anticipate this patient will require at least two midnights for appropriate medical management, necessitating inpatient admission because of diarrhea, lactic acidosis    Patient will need a Telemetry bed on MEDICAL service .  The need is secondary to diarrhea, elevated troponin.    * Diarrhea- (present on admission)  Assessment & Plan  Etiology is unclear  C. difficile rule out pending, ordered stool culture as well  Received 1.5 L of crystalloids, will continue IV rehydration  Symptomatic treatment as needed    Encephalopathy  Assessment & Plan  Probably metabolic secondary to dehydration, diarrhea, with underlying cognitive disorder.  Plan: CT head without contrast since patient is on Xarelto to rule out bleed  Ammonia, TSH, UA, VBG  Fall, aspiration precautions  Neurochecks every 4 hours    Liver cirrhosis (HCC)  Assessment & Plan  CT of the abdomen pelvis with contrast suggestive for liver cirrhosis  CMP showed elevated AST  Will check INR and ammonia    Abnormal CT of the abdomen  Assessment & Plan  CT of the abdomen pelvis with contrast:  1.  1.9 cm left adrenal mass is identified.  Differential diagnosis includes adrenal adenoma as well as metastasis.      2.  There is cholelithiasis.      3.  Liver surface is mildly nodular which could indicate cirrhosis.      4.  Uterine wall masses are identified most of which are densely calcified, consistent with leiomyomas.      5.  Right inguinal hernia contains abdominal fat.     Follow-up with PCP for routine work-up for incidental renal mass    History of stroke  Assessment & Plan  Continue Xarelto, statin    Primary hypertension- (present on admission)  Assessment & Plan  We will hold lisinopril, clonidine, amlodipine due to presumed hypovolemia  Restart when appropriate.  Monitor blood pressure    Atrial fibrillation (HCC)- (present on admission)  Assessment & Plan  Chronic.  Continue Xarelto, metoprolol    Elevated troponin- (present on admission)  Assessment & Plan  Chronically elevated per chart review.  Denies chest pain.  EKG showed chronic A. fib, intraventricular conduction abnormality, without significant changes.  Monitor on telemetry, repeat troponin  Consider transthoracic echo      VTE prophylaxis: therapeutic anticoagulation with Xarelto

## 2022-11-10 NOTE — ASSESSMENT & PLAN NOTE
CT of the abdomen pelvis with contrast:  1.  1.9 cm left adrenal mass is identified. Differential diagnosis includes adrenal adenoma as well as metastasis.      2.  There is cholelithiasis.      3.  Liver surface is mildly nodular which could indicate cirrhosis.      4.  Uterine wall masses are identified most of which are densely calcified, consistent with leiomyomas.      5.  Right inguinal hernia contains abdominal fat.     Taken in the context of a 89-year-old patient may want to further discuss with family with regards to further work-up

## 2022-11-10 NOTE — ASSESSMENT & PLAN NOTE
Continue Xarelto, statin  Patient presently on full dose Lovenox as she has had poor oral intake.  When appropriate or when patient able reinitiate Xarelto

## 2022-11-10 NOTE — PROGRESS NOTES
HOSPITALIST CROSS COVER    Newly resulted labs show mag 1, phos 1.5. Also noted to have a UTI. Latest lactic acid 4.5. Patient currently on NS with 40 K at 100 ml/hr.    PLAN  - mag 4 mg IV  - Kphos 250 mg q 6 hr x 2 days  - Cefepime IV q 8 hr   - trend lactate

## 2022-11-10 NOTE — ASSESSMENT & PLAN NOTE
We will hold lisinopril, clonidine, amlodipine due to presumed hypovolemia  Restart when appropriate.  Monitor blood pressure  Continue to monitor clinically reinstitute blood pressure medications as tolerated.

## 2022-11-10 NOTE — ED PROVIDER NOTES
ED Provider Note    Scribed for Amor Mckeon M.D. by Enoc Roman. 11/9/2022  5:26 PM    Primary care provider: Nabila De Jesus M.D.  Means of arrival: EMS  History obtained from: Patient  History limited by: Slight Confusion    CHIEF COMPLAINT  Chief Complaint   Patient presents with    Diarrhea     BIB EMS for persistent diarrhea, fatigue, and weakness x1 week. Pt AOx3 to person, event, and day.     HPI  Liudmila Pulido is a 89 y.o. female who presents to the Emergency Department for acute, mild diarrhea onset 3-4 days ago. The patient has been having multiple episodes of diarrhea but is unsure why she is here. She states her daughter contacted EMS. She has associated symptoms of weakness and fatigue, but denies fever, cough, rhinorrhea, chest pain, shortness of breath, or bloody stools. No alleviating or exacerbating factors reported. She believes she has been on antibiotics for the past six weeks.    HPI limited by: Slight Confusion    REVIEW OF SYSTEMS  Pertinent positives include diarrhea, weakness, and fatigue. Pertinent negatives include no fever, cough, rhinorrhea, chest pain, shortness of breath, or bloody stools.    ROS limited by: Slight Confusion    PAST MEDICAL HISTORY   has a past medical history of Hyperlipidemia, Hypertension, Hyponatremia (12/17/2020), Hypophosphatemia (12/21/2020), Pancytopenia (HCC) (12/28/2020), Type 2 diabetes mellitus without complication, without long-term current use of insulin (HCC) (12/18/2020), and Vitamin D deficiency (12/21/2020).    SURGICAL HISTORY  patient denies any surgical history    SOCIAL HISTORY  Social History     Tobacco Use    Smoking status: Never    Smokeless tobacco: Never   Vaping Use    Vaping Use: Never used   Substance Use Topics    Alcohol use: Yes     Comment: occ    Drug use: Never      Social History     Substance and Sexual Activity   Drug Use Never       FAMILY HISTORY  No family history noted.    CURRENT MEDICATIONS  Current  "Outpatient Medications   Medication Instructions    acetaminophen (TYLENOL) 650 mg, Oral, EVERY 6 HOURS PRN    amLODIPine (NORVASC) 10 mg, Oral, DAILY    Ascorbic Acid (VITAMIN C PO) 1 Tablet, Oral, EVERY MORNING    atorvastatin (LIPITOR) 40 mg, Oral, EVERY EVENING    CLONIDINE HCL PO Oral    cyanocobalamin (VITAMIN B-12) 100 mcg, Oral, DAILY    FIBER PO 1 Tablet, Oral, DAILY    lisinopril (PRINIVIL) 40 mg, Oral, DAILY    loratadine (CLARITIN) 10 mg, Oral, DAILY    metFORMIN (GLUCOPHAGE) 500 mg, Oral, 2 TIMES DAILY WITH MEALS    metoprolol SR (TOPROL XL) 100 mg, Oral, DAILY    senna-docusate (PERICOLACE OR SENOKOT S) 8.6-50 MG Tab 1 Tablet, Oral, EVERY 48 HOURS    vitamin D (VITAMIND D3) 1,000 Units, Oral, DAILY    XARELTO 20 MG Tab tablet TAKE 1 TABLET BY MOUTH WITH DINNER     ALLERGIES  Allergies   Allergen Reactions    Meperidine Unspecified    Demerol Unspecified     Convulsions      Bloodless      Anabaptist reason    Penicillins Unspecified     Per daughter, Laura    Erythromycin Vomiting, Nausea and Unspecified       PHYSICAL EXAM  VITAL SIGNS: /64   Pulse 71   Temp 36.6 °C (97.9 °F) (Temporal)   Resp 20   Ht 1.6 m (5' 2.99\")   Wt 64.5 kg (142 lb 3.2 oz)   SpO2 96%   BMI 25.20 kg/m²     Constitutional: Well developed, Well nourished, Mild to moderate distress, Non-toxic appearance.   HENT: Normocephalic, Atraumatic, Bilateral external ears normal, Dry mucous membranes, No oral exudates.   Eyes: PERRLA, EOMI, Droopy eyelids, Conjunctiva normal, No discharge.   Neck: No tenderness, Supple, No stridor.   Lymphatic: No lymphadenopathy noted.   Cardiovascular: Normal heart rate, Normal rhythm.   Thorax & Lungs: Clear to auscultation bilaterally, No respiratory distress, No wheezing, No crackles.   Abdomen: Soft, No tenderness, No masses, No pulsatile masses.   Skin: Pale, Warm, Dry, No erythema, No rash.   Extremities:, No edema No cyanosis.   Musculoskeletal: No tenderness to palpation or major " deformities noted.  Intact distal pulses  Neurologic: Awake, alert. Moves all extremities spontaneously.  Psychiatric: Affect normal, Judgment normal, Mood normal.     LABS  Results for orders placed or performed during the hospital encounter of 11/09/22   LACTIC ACID   Result Value Ref Range    Lactic Acid 5.0 (HH) 0.5 - 2.0 mmol/L   LACTIC ACID   Result Value Ref Range    Lactic Acid 4.5 (HH) 0.5 - 2.0 mmol/L   CBC WITH DIFFERENTIAL   Result Value Ref Range    WBC 6.0 4.8 - 10.8 K/uL    RBC 4.12 (L) 4.20 - 5.40 M/uL    Hemoglobin 12.8 12.0 - 16.0 g/dL    Hematocrit 37.7 37.0 - 47.0 %    MCV 91.5 81.4 - 97.8 fL    MCH 31.1 27.0 - 33.0 pg    MCHC 34.0 33.6 - 35.0 g/dL    RDW 55.4 (H) 35.9 - 50.0 fL    Platelet Count 198 164 - 446 K/uL    MPV 9.8 9.0 - 12.9 fL    Neutrophils-Polys 55.20 44.00 - 72.00 %    Lymphocytes 32.80 22.00 - 41.00 %    Monocytes 8.70 0.00 - 13.40 %    Eosinophils 2.30 0.00 - 6.90 %    Basophils 0.50 0.00 - 1.80 %    Immature Granulocytes 0.50 0.00 - 0.90 %    Nucleated RBC 0.00 /100 WBC    Neutrophils (Absolute) 3.31 2.00 - 7.15 K/uL    Lymphs (Absolute) 1.97 1.00 - 4.80 K/uL    Monos (Absolute) 0.52 0.00 - 0.85 K/uL    Eos (Absolute) 0.14 0.00 - 0.51 K/uL    Baso (Absolute) 0.03 0.00 - 0.12 K/uL    Immature Granulocytes (abs) 0.03 0.00 - 0.11 K/uL    NRBC (Absolute) 0.00 K/uL   COMP METABOLIC PANEL   Result Value Ref Range    Sodium 135 135 - 145 mmol/L    Potassium 3.5 (L) 3.6 - 5.5 mmol/L    Chloride 103 96 - 112 mmol/L    Co2 16 (L) 20 - 33 mmol/L    Anion Gap 16.0 7.0 - 16.0    Glucose 83 65 - 99 mg/dL    Bun 18 8 - 22 mg/dL    Creatinine 0.81 0.50 - 1.40 mg/dL    Calcium 8.7 8.5 - 10.5 mg/dL    AST(SGOT) 66 (H) 12 - 45 U/L    ALT(SGPT) 21 2 - 50 U/L    Alkaline Phosphatase 144 (H) 30 - 99 U/L    Total Bilirubin 0.7 0.1 - 1.5 mg/dL    Albumin 2.4 (L) 3.2 - 4.9 g/dL    Total Protein 6.6 6.0 - 8.2 g/dL    Globulin 4.2 (H) 1.9 - 3.5 g/dL    A-G Ratio 0.6 g/dL   URINALYSIS    Specimen: Urine    Result Value Ref Range    Color Yellow     Character Cloudy (A)     Specific Gravity 1.021 <1.035    Ph 5.5 5.0 - 8.0    Glucose Negative Negative mg/dL    Ketones Trace (A) Negative mg/dL    Protein Negative Negative mg/dL    Bilirubin Negative Negative    Urobilinogen, Urine 1.0 Negative    Nitrite Positive (A) Negative    Leukocyte Esterase Large (A) Negative    Occult Blood Small (A) Negative    Micro Urine Req Microscopic    C Diff by PCR rflx Toxin    Specimen: Stool   Result Value Ref Range    C Diff by PCR Negative Negative    [id number] Presumptive Negative Negative   proBrain Natriuretic Peptide, NT   Result Value Ref Range    NT-proBNP 2154 (H) 0 - 125 pg/mL   TROPONIN   Result Value Ref Range    Troponin T 38 (H) 6 - 19 ng/L   ESTIMATED GFR   Result Value Ref Range    GFR (CKD-EPI) 69 >60 mL/min/1.73 m 2   URINE MICROSCOPIC (W/UA)   Result Value Ref Range    WBC 20-50 (A) /hpf    RBC 2-5 (A) /hpf    Bacteria Many (A) None /hpf    Epithelial Cells Few /hpf    Hyaline Cast 3-5 (A) /lpf    Budding Yeast Present (A) Absent /hpf   MAGNESIUM   Result Value Ref Range    Magnesium 1.0 (L) 1.5 - 2.5 mg/dL   PHOSPHORUS   Result Value Ref Range    Phosphorus 1.5 (L) 2.5 - 4.5 mg/dL   TSH WITH REFLEX TO FT4   Result Value Ref Range    TSH 3.350 0.380 - 5.330 uIU/mL   POCT glucose device results   Result Value Ref Range    POC Glucose, Blood 87 65 - 99 mg/dL        RADIOLOGY  DX-CHEST-PORTABLE (1 VIEW)   Final Result         No acute cardiac or pulmonary abnormality is identified.      CT-ABDOMEN-PELVIS WITH   Final Result      1.  1.9 cm left adrenal mass is identified. Differential diagnosis includes adrenal adenoma as well as metastasis.      2.  There is cholelithiasis.      3.  Liver surface is mildly nodular which could indicate cirrhosis.      4.  Uterine wall masses are identified most of which are densely calcified, consistent with leiomyomas.      5.  Right inguinal hernia contains abdominal fat.       CT-HEAD W/O    (Results Pending)     The radiologist's interpretation of all radiological studies have been reviewed by me.      COURSE & MEDICAL DECISION MAKING  Pertinent Labs & Imaging studies reviewed. (See chart for details)    I reviewed the patient's medical records which showed she has a history of A-fib and is on anticoagulation.    5:26 PM - Patient seen and examined at bedside. Discussed plan of care, including labs, radiology, and admission to the hospital. Patient agrees to the plan of care. Patient will be treated with IV Fluids. Ordered CT-Abdomen-Pelvis w/, DX-Chest, Lactic Acid x2, CBC w/ Diff, CMP, UA, Urine Culture, Blood Culture x2, C Diff, Troponin, and proBNP to evaluate her symptoms. The differential diagnoses include but are not limited to: C diff, Colitis, Sepsis, Pneumonia    7:30 PM - Paged Hospitalist    7:38 PM - I discussed the patient's case and the above findings with Dr. So (Hospitalist) who will assess the patient for hospitalization.     Decision Making:  Patient with dehydration, diarrhea, fatigue.  The patient has also has some respiratory failure of uncertain etiology, chest x-ray is negative, CT scan is unremarkable.  I believe the patient needs to be admitted to the hospital, discussed case with hospitalist for hospitalization.  Give the patient IV fluids due to dehydration and increased lactic acid with improvement of the patient's symptoms.    HYDRATION: Based on the patient's presentation of Dehydration the patient was given IV fluids. IV Hydration was used because oral hydration was not adequate alone. Upon recheck following hydration, the patient was improved.    DISPOSITION:  Patient will be hospitalized by Dr. So in guarded condition.    FINAL IMPRESSION  1. Diarrhea of presumed infectious origin    2. Weakness    3. Dehydration       I, Enoc Roman (Scribe), am scribing for, and in the presence of, Amor Mckeon M.D..    Electronically signed by:  Enoc Roman (Scribe), 11/9/2022    IAmor M.D. personally performed the services described in this documentation, as scribed by Enoc Roman in my presence, and it is both accurate and complete.    The note accurately reflects work and decisions made by me.  Amor Mckeon M.D.  11/9/2022  9:21 PM

## 2022-11-10 NOTE — DISCHARGE PLANNING
Care Transition Team Assessment    Information Source  Orientation Level: Disoriented to time, Disoriented to place, Oriented to person  Information Given By: Relative (Keyonna Martinez, dtr   [phone])  Who is responsible for making decisions for patient? : Adult child  Name(s) of Primary Decision Maker: Keyonna Martinez, [phone].         Elopement Risk  Legal Hold: No  Ambulatory or Self Mobile in Wheelchair: No-Not an Elopement Risk  Elopement Risk: Not at Risk for Elopement    Interdisciplinary Discharge Planning  Lives with - Patient's Self Care Capacity: Adult Children (Ban Puri  [phone])  Support Systems: Children  Housing / Facility: [address] [facility]  Do You Take your Prescribed Medications Regularly: Yes  Able to Return to Previous ADL's: Future Time w/Therapy  Prior Services: Continuous (24 Hour) Care Giving Family  Assistance Needed: Yes  Durable Medical Equipment: Commode, Walker, Other - Specify (shower chair)    Discharge Preparedness  What is your plan after discharge?: Home with help  What are your discharge supports?: Child  Prior Functional Level: Uses Wheelchair  Difficulity with ADLs: Bathing, Brushing teeth, Dressing, Toileting, Walking    Functional Assesment  Prior Functional Level: Uses Wheelchair    Finances  Prescription Coverage: Yes                   Domestic Abuse  Have you ever been the victim of abuse or violence?: No              Anticipated Discharge Information  Discharge Disposition: Discharged to home/self care (01)  Discharge Address: [address] [place] [address]  [id number]  Discharge Contact Phone Number: [phone]  Keyonna

## 2022-11-10 NOTE — ASSESSMENT & PLAN NOTE
Chronically elevated per chart review.  Denies chest pain.  EKG showed chronic A. fib, intraventricular conduction abnormality, without significant changes.  Monitor on telemetry, repeat troponin  Consider transthoracic echo  No gross abnormalities ejection fraction 75%  Likely secondary to demand no further invention indicated

## 2022-11-10 NOTE — PROGRESS NOTES
Patient brought up to room by Reyna RODRIGES, report received and assumed patient care. Pt A&O x 3, disoriented to time, and on 4L NC, saturating 98 %. No signs of distress noted at this time. Pt was placed on a tele box and monitors were notified of arrival. Pt updated on plan of care for the day and has call light within reach. Fall precautions are in place.

## 2022-11-10 NOTE — ASSESSMENT & PLAN NOTE
CT of the abdomen pelvis with contrast suggestive for liver cirrhosis  CMP showed elevated AST  Will check INR and ammonia no evidence of hepatic encephalopathy no indication for further intensification of treatment at present.

## 2022-11-10 NOTE — ED NOTES
Assumed care of patient, report given by ANTELMO Lima. NS bolus administered per MAR. VS-retaken.. Urine sample provided, and sent to lab. Phlebotomist notified for lactic.

## 2022-11-10 NOTE — ED TRIAGE NOTES
Chief Complaint   Patient presents with    Diarrhea     BIB EMS for persistent diarrhea, fatigue, and weakness x1 week. Pt AOx3 to person, event, and day.     POC Glucose resulted 87 mg/dL

## 2022-11-10 NOTE — PROGRESS NOTES
4 Eyes Skin Assessment Completed by ANTELMO Garnica and ANTELMO Hoang.    Head WDL  Ears WDL  Nose WDL  Mouth WDL  Neck WDL  Breast/Chest WDL  Shoulder Blades WDL  Spine WDL  (R) Arm/Elbow/Hand Bruising  (L) Arm/Elbow/Hand Bruising  Abdomen WDL  Groin WDL  Scrotum/Coccyx/Buttocks Redness and Blanching  (R) Leg Bruising  (L) Leg Bruising  (R) Heel/Foot/Toe Redness, Blanching, Boggy, and Scab  (L) Heel/Foot/Toe Redness, Blanching, Boggy, and Scab          Devices In Places Tele Box, Blood Pressure Cuff, Pulse Ox, Hinson, and Nasal Cannula      Interventions In Place Gray Ear Foams, Pillows, and Pressure Redistribution Mattress    Possible Skin Injury Yes    Pictures Uploaded Into Epic Yes  Wound Consult Placed Yes  RN Wound Prevention Protocol Ordered Yes

## 2022-11-11 NOTE — THERAPY
"Occupational Therapy Note    Patient Name: Liudmila Pulido  Age:  89 y.o., Sex:  female  Medical Record #: [id number]  Today's Date: 11/11/2022    Discussed missed therapy with RN    Attempted OT eval. Pt confused and stating \"no\" to all requests and attempts to engage her in a functional task. Will re-attempt OT eval at another time as pt tolerates.  "

## 2022-11-11 NOTE — CARE PLAN
Problem: Knowledge Deficit - Standard  Goal: Patient and family/care givers will demonstrate understanding of plan of care, disease process/condition, diagnostic tests and medications  Outcome: Not Met     Problem: Skin Integrity  Goal: Skin integrity is maintained or improved  Outcome: Progressing     Problem: Fall Risk  Goal: Patient will remain free from falls  Outcome: Progressing   The patient is Stable - Low risk of patient condition declining or worsening    Shift Goals  Clinical Goals: monitor VS, neuro checks  Patient Goals: rest  Family Goals: n/a    Progress made toward(s) clinical / shift goals:  Patient needed to be reoriented frequently. Neuro checks were completed every 4 hours. Patient moves around in bed and bed alarm is on with a sitter for safety.          Problem: Knowledge Deficit - Standard  Goal: Patient and family/care givers will demonstrate understanding of plan of care, disease process/condition, diagnostic tests and medications  Outcome: Not Met

## 2022-11-11 NOTE — HOSPITAL COURSE
Liudmila Pulido is a 89 y.o. female with past medical history of CVA 2 years ago, on Xarelto, hypertension, dyslipidemia, type 2 diabetes, who presented 11/9/2022 with complaints of diarrhea watery and multiple bowel movements a day for 3 to 4 days, abdominal discomfort diffusely, nausea but no vomiting.  According to the patient's daughter who is at bedside, patient does not have diagnosis of dementia, but has been confused and lethargic last 3 days.  She was refusing food and drinks.  Reportedly, patient's daughter forced her to drink orange juice earlier today because her blood sugar was only 33.  When I saw the patient she would be mumbling incoherently, would not answer questions appropriately, appears to be restless.  Patient was placed on 2 L of oxygen due to desaturation to 85%.  She received 1.5 L of crystalloids.  Blood work showed lactic acid 5.0, troponin 38, BNP 2100.  Bicarb 16.  CT of the abdomen and pelvis with contrast showed 1.9 cm left adrenal mass which could be an adenoma or metastasis, cholelithiasis, possible liver cirrhosis, uterine leiomyomas.  EKG showed A. fib with heart rate 65-95, incomplete LBBB, no significant changes.  .

## 2022-11-11 NOTE — PROGRESS NOTES
Critical lab value received from Chloe at lab. Lactic acid 4.8. Kimberlee Haile notified. This lab is being trended.

## 2022-11-11 NOTE — CARE PLAN
The patient is Watcher - Medium risk of patient condition declining or worsening    Shift Goals  Clinical Goals: VSS, reorientation, safety  Patient Goals: Out of bed  Family Goals: n/a    Progress made toward(s) clinical / shift goals:    Problem: Knowledge Deficit - Standard  Goal: Patient and family/care givers will demonstrate understanding of plan of care, disease process/condition, diagnostic tests and medications  Outcome: Progressing     Problem: Skin Integrity  Goal: Skin integrity is maintained or improved  Outcome: Progressing     Problem: Fall Risk  Goal: Patient will remain free from falls  Outcome: Progressing     Problem: Pain - Standard  Goal: Alleviation of pain or a reduction in pain to the patient’s comfort goal  Outcome: Progressing       Patient is not progressing towards the following goals:

## 2022-11-11 NOTE — PROGRESS NOTES
Monitor Summary:   SR 72-91  (R)PVC, (R) couplets and trigeminies   Bundle branch block  .15/.11/.40

## 2022-11-11 NOTE — PROGRESS NOTES
[facility] Medicine Daily Progress Note    Date of Service  11/10/2022    Chief Complaint  Liudmila Pulido is a 89 y.o. female admitted 11/9/2022 with persistent diarrhea and fatigue    Hospital Course  Liudmila Pulido is a 89 y.o. female with past medical history of CVA 2 years ago, on Xarelto, hypertension, dyslipidemia, type 2 diabetes, who presented 11/9/2022 with complaints of diarrhea watery and multiple bowel movements a day for 3 to 4 days, abdominal discomfort diffusely, nausea but no vomiting.  According to the patient's daughter who is at bedside, patient does not have diagnosis of dementia, but has been confused and lethargic last 3 days.  She was refusing food and drinks.  Reportedly, patient's daughter forced her to drink orange juice earlier today because her blood sugar was only 33.  When I saw the patient she would be mumbling incoherently, would not answer questions appropriately, appears to be restless.  Patient was placed on 2 L of oxygen due to desaturation to 85%.  She received 1.5 L of crystalloids.  Blood work showed lactic acid 5.0, troponin 38, BNP 2100.  Bicarb 16.  CT of the abdomen and pelvis with contrast showed 1.9 cm left adrenal mass which could be an adenoma or metastasis, cholelithiasis, possible liver cirrhosis, uterine leiomyomas.  EKG showed A. fib with heart rate 65-95, incomplete LBBB, no significant changes.  .    Interval Problem Update  11/10/2022:  Patient had negative C. difficile result contact precautions were taken off.  Otherwise no acute events overnight patient continues to have persistently elevated lactic acid continue with full fluid resuscitation continue to monitor I's and O's.  Continue with present antimicrobial regimen.  There is no clear source at present as patient had urinalysis not suggestive of infection. .  We will redraw labs in the morning continue with present antimicrobial coverage and adjust antimicrobial coverage as clinically  indicated.    I have discussed this patient's plan of care and discharge plan at IDT rounds today with Case Management, Nursing, Nursing leadership, and other members of the IDT team.    Consultants/Specialty  None    Code Status  DNAR/DNI    Disposition  Patient is not medically cleared for discharge.   Anticipate discharge to to home with organized home healthcare and close outpatient follow-up.  I have placed the appropriate orders for post-discharge needs.    Review of Systems  Review of Systems   Unable to perform ROS: Mental status change      Physical Exam  Temp:  [36.2 °C (97.2 °F)-36.8 °C (98.2 °F)] 36.6 °C (97.9 °F)  Pulse:  [68-79] 71  Resp:  [16-51] 17  BP: ()/(43-98) 118/76  SpO2:  [84 %-100 %] 93 %    Physical Exam  Vitals and nursing note reviewed.   Constitutional:       General: She is not in acute distress.     Appearance: She is ill-appearing.   HENT:      Head: Normocephalic and atraumatic.      Nose: Nose normal.      Mouth/Throat:      Mouth: Mucous membranes are dry.   Eyes:      Extraocular Movements: Extraocular movements intact.      Pupils: Pupils are equal, round, and reactive to light.   Cardiovascular:      Rate and Rhythm: Normal rate and regular rhythm.   Pulmonary:      Effort: Pulmonary effort is normal.      Breath sounds: Normal breath sounds.   Abdominal:      General: Abdomen is flat. There is no distension.      Tenderness: There is no abdominal tenderness.   Musculoskeletal:         General: No swelling or deformity. Normal range of motion.      Cervical back: Normal range of motion and neck supple.   Skin:     General: Skin is warm and dry.   Neurological:      General: No focal deficit present.      Mental Status: She is alert. She is disoriented and confused.      Comments: Moves all 4 extremities.  Inconsistently following commands   Psychiatric:      Comments: Appears to be hallucinating       Fluids    Intake/Output Summary (Last 24 hours) at 11/10/2022 4662  Last  data filed at 11/10/2022 1408  Gross per 24 hour   Intake 3975.29 ml   Output 550 ml   Net 3425.29 ml       Laboratory  Recent Labs     11/09/22  1709 11/10/22  0036   WBC 6.0 6.4   RBC 4.12* 4.63   HEMOGLOBIN 12.8 14.4   HEMATOCRIT 37.7 42.8   MCV 91.5 92.4   MCH 31.1 31.1   MCHC 34.0 33.6   RDW 55.4* 55.3*   PLATELETCT 198 202   MPV 9.8 9.9     Recent Labs     11/09/22  1709 11/10/22  0036   SODIUM 135 133*   POTASSIUM 3.5* 3.8   CHLORIDE 103 103   CO2 16* 15*   GLUCOSE 83 108*   BUN 18 16   CREATININE 0.81 0.67   CALCIUM 8.7 8.8     Recent Labs     11/10/22  0036   INR 1.98*               Imaging  CT-HEAD W/O   Final Result      1.  Chronic ischemic changes.   2.  No acute intracranial abnormality.         DX-CHEST-PORTABLE (1 VIEW)   Final Result         No acute cardiac or pulmonary abnormality is identified.      CT-ABDOMEN-PELVIS WITH   Final Result      1.  1.9 cm left adrenal mass is identified. Differential diagnosis includes adrenal adenoma as well as metastasis.      2.  There is cholelithiasis.      3.  Liver surface is mildly nodular which could indicate cirrhosis.      4.  Uterine wall masses are identified most of which are densely calcified, consistent with leiomyomas.      5.  Right inguinal hernia contains abdominal fat.           Assessment/Plan  * Diarrhea- (present on admission)  Assessment & Plan  Etiology is unclear  C. difficile rule out pending, ordered stool culture as well  Received 1.5 L of crystalloids, will continue IV rehydration  Symptomatic treatment as needed    Encephalopathy  Assessment & Plan  Probably metabolic secondary to dehydration, diarrhea, with underlying cognitive disorder.  Plan: CT head without contrast since patient is on Xarelto to rule out bleed  Ammonia, TSH, UA, VBG  Fall, aspiration precautions  Neurochecks every 4 hours    Liver cirrhosis (HCC)  Assessment & Plan  CT of the abdomen pelvis with contrast suggestive for liver cirrhosis  CMP showed elevated  AST  Will check INR and ammonia    Abnormal CT of the abdomen  Assessment & Plan  CT of the abdomen pelvis with contrast:  1.  1.9 cm left adrenal mass is identified. Differential diagnosis includes adrenal adenoma as well as metastasis.      2.  There is cholelithiasis.      3.  Liver surface is mildly nodular which could indicate cirrhosis.      4.  Uterine wall masses are identified most of which are densely calcified, consistent with leiomyomas.      5.  Right inguinal hernia contains abdominal fat.     Follow-up with PCP for routine work-up for incidental renal mass    History of stroke  Assessment & Plan  Continue Xarelto, statin    Primary hypertension- (present on admission)  Assessment & Plan  We will hold lisinopril, clonidine, amlodipine due to presumed hypovolemia  Restart when appropriate.  Monitor blood pressure    Atrial fibrillation (HCC)- (present on admission)  Assessment & Plan  Chronic.  Continue Xarelto, metoprolol    Elevated troponin- (present on admission)  Assessment & Plan  Chronically elevated per chart review.  Denies chest pain.  EKG showed chronic A. fib, intraventricular conduction abnormality, without significant changes.  Monitor on telemetry, repeat troponin  Consider transthoracic echo     Please note that this dictation was created using voice recognition software. I have made every reasonable attempt to correct obvious errors, but I expect that there are errors of grammar and possibly context that I did not discover before finalizing the note.    VTE prophylaxis: SCDs/TEDs and therapeutic anticoagulation with Xarelto    I have performed a physical exam and reviewed and updated ROS and Plan today (11/10/2022). In review of yesterday's note (11/9/2022), there are no changes except as documented above.

## 2022-11-12 NOTE — PROGRESS NOTES
Report received from day shift RN, assumed care of pt. Pt A&Ox1 to self. Plan of care discussed with pt, labs and chart reviewed. All needs met at this time. Tele box on. On room air. Call light within reach, bed locked and in lowest position. All fall precautions and hourly rounding in place.

## 2022-11-12 NOTE — PROGRESS NOTES
[facility] Medicine Daily Progress Note    Date of Service  11/11/2022    Chief Complaint  Liudmila Pulido is a 89 y.o. female admitted 11/9/2022 with persistent diarrhea and fatigue    Hospital Course  Liudmila Pulido is a 89 y.o. female with past medical history of CVA 2 years ago, on Xarelto, hypertension, dyslipidemia, type 2 diabetes, who presented 11/9/2022 with complaints of diarrhea watery and multiple bowel movements a day for 3 to 4 days, abdominal discomfort diffusely, nausea but no vomiting.  According to the patient's daughter who is at bedside, patient does not have diagnosis of dementia, but has been confused and lethargic last 3 days.  She was refusing food and drinks.  Reportedly, patient's daughter forced her to drink orange juice earlier today because her blood sugar was only 33.  When I saw the patient she would be mumbling incoherently, would not answer questions appropriately, appears to be restless.  Patient was placed on 2 L of oxygen due to desaturation to 85%.  She received 1.5 L of crystalloids.  Blood work showed lactic acid 5.0, troponin 38, BNP 2100.  Bicarb 16.  CT of the abdomen and pelvis with contrast showed 1.9 cm left adrenal mass which could be an adenoma or metastasis, cholelithiasis, possible liver cirrhosis, uterine leiomyomas.  EKG showed A. fib with heart rate 65-95, incomplete LBBB, no significant changes.  .    Interval Problem Update  11/10/2022:  Patient had negative C. difficile result contact precautions were taken off.  Otherwise no acute events overnight patient continues to have persistently elevated lactic acid continue with full fluid resuscitation continue to monitor I's and O's.  Continue with present antimicrobial regimen.  There is no clear source at present as patient had urinalysis not suggestive of infection. .  We will redraw labs in the morning continue with present antimicrobial coverage and adjust antimicrobial coverage as clinically  indicated.    11/11/2022:  Patient has slightly downtrending lactic acid most recent lactic acid presently 2.8 improved since previous.  Continue with fluid resuscitation.  Very strong suspicion the persistently elevated lactic acid represents residual from acute infectious diarrhea prodrome illness.  Repeat labs in the morning continue with fluid resuscitation continue with present antimicrobials.  I have discussed this patient's plan of care and discharge plan at IDT rounds today with Case Management, Nursing, Nursing leadership, and other members of the IDT team.    Consultants/Specialty  None    Code Status  DNAR/DNI    Disposition  Patient is not medically cleared for discharge.   Anticipate discharge to to home with organized home healthcare and close outpatient follow-up.  I have placed the appropriate orders for post-discharge needs.    Review of Systems  Review of Systems   Unable to perform ROS: Mental status change      Physical Exam  Temp:  [36.5 °C (97.7 °F)-36.6 °C (97.9 °F)] 36.5 °C (97.7 °F)  Pulse:  [71-98] 94  Resp:  [12-14] 14  BP: (109-148)/(57-99) 148/95  SpO2:  [91 %-98 %] 95 %    Physical Exam  Vitals and nursing note reviewed.   Constitutional:       General: She is not in acute distress.     Appearance: She is ill-appearing.   HENT:      Head: Normocephalic and atraumatic.      Nose: Nose normal.      Mouth/Throat:      Mouth: Mucous membranes are dry.   Eyes:      Extraocular Movements: Extraocular movements intact.      Pupils: Pupils are equal, round, and reactive to light.   Cardiovascular:      Rate and Rhythm: Normal rate and regular rhythm.   Pulmonary:      Effort: Pulmonary effort is normal.      Breath sounds: Normal breath sounds.   Abdominal:      General: Abdomen is flat. There is no distension.      Tenderness: There is no abdominal tenderness.   Musculoskeletal:         General: No swelling or deformity. Normal range of motion.      Cervical back: Normal range of motion and  neck supple.   Skin:     General: Skin is warm and dry.   Neurological:      General: No focal deficit present.      Mental Status: She is alert. She is disoriented and confused.      Comments: Moves all 4 extremities.  Inconsistently following commands   Psychiatric:      Comments: Appears to be hallucinating       Fluids    Intake/Output Summary (Last 24 hours) at 11/11/2022 1715  Last data filed at 11/11/2022 1143  Gross per 24 hour   Intake 5199.94 ml   Output 1750 ml   Net 3449.94 ml         Laboratory  Recent Labs     11/09/22  1709 11/10/22  0036 11/11/22  0042   WBC 6.0 6.4 5.3   RBC 4.12* 4.63 4.02*   HEMOGLOBIN 12.8 14.4 12.4   HEMATOCRIT 37.7 42.8 36.7*   MCV 91.5 92.4 91.3   MCH 31.1 31.1 30.8   MCHC 34.0 33.6 33.8   RDW 55.4* 55.3* 55.5*   PLATELETCT 198 202 165   MPV 9.8 9.9 9.6       Recent Labs     11/09/22  1709 11/10/22  0036 11/11/22 0042   SODIUM 135 133* 138   POTASSIUM 3.5* 3.8 3.5*   CHLORIDE 103 103 108   CO2 16* 15* 20   GLUCOSE 83 108* 113*   BUN 18 16 9   CREATININE 0.81 0.67 0.47*   CALCIUM 8.7 8.8 8.4*       Recent Labs     11/10/22  0036   INR 1.98*                 Imaging  CT-HEAD W/O   Final Result      1.  Chronic ischemic changes.   2.  No acute intracranial abnormality.         DX-CHEST-PORTABLE (1 VIEW)   Final Result         No acute cardiac or pulmonary abnormality is identified.      CT-ABDOMEN-PELVIS WITH   Final Result      1.  1.9 cm left adrenal mass is identified. Differential diagnosis includes adrenal adenoma as well as metastasis.      2.  There is cholelithiasis.      3.  Liver surface is mildly nodular which could indicate cirrhosis.      4.  Uterine wall masses are identified most of which are densely calcified, consistent with leiomyomas.      5.  Right inguinal hernia contains abdominal fat.             Assessment/Plan  * Diarrhea- (present on admission)  Assessment & Plan  Etiology is unclear  C. difficile rule out pending, ordered stool culture as well  Received  1.5 L of crystalloids, will continue IV rehydration  Symptomatic treatment as needed    Encephalopathy  Assessment & Plan  Probably metabolic secondary to dehydration, diarrhea, with underlying cognitive disorder.  Plan: CT head without contrast since patient is on Xarelto to rule out bleed  Ammonia, TSH, UA, VBG  Fall, aspiration precautions  Neurochecks every 4 hours    Liver cirrhosis (HCC)  Assessment & Plan  CT of the abdomen pelvis with contrast suggestive for liver cirrhosis  CMP showed elevated AST  Will check INR and ammonia    Abnormal CT of the abdomen  Assessment & Plan  CT of the abdomen pelvis with contrast:  1.  1.9 cm left adrenal mass is identified. Differential diagnosis includes adrenal adenoma as well as metastasis.      2.  There is cholelithiasis.      3.  Liver surface is mildly nodular which could indicate cirrhosis.      4.  Uterine wall masses are identified most of which are densely calcified, consistent with leiomyomas.      5.  Right inguinal hernia contains abdominal fat.     Follow-up with PCP for routine work-up for incidental renal mass    History of stroke  Assessment & Plan  Continue Xarelto, statin    Primary hypertension- (present on admission)  Assessment & Plan  We will hold lisinopril, clonidine, amlodipine due to presumed hypovolemia  Restart when appropriate.  Monitor blood pressure    Atrial fibrillation (HCC)- (present on admission)  Assessment & Plan  Chronic.  Continue Xarelto, metoprolol    Elevated troponin- (present on admission)  Assessment & Plan  Chronically elevated per chart review.  Denies chest pain.  EKG showed chronic A. fib, intraventricular conduction abnormality, without significant changes.  Monitor on telemetry, repeat troponin  Consider transthoracic echo       Please note that this dictation was created using voice recognition software. I have made every reasonable attempt to correct obvious errors, but I expect that there are errors of grammar and  possibly context that I did not discover before finalizing the note.    VTE prophylaxis: SCDs/TEDs and therapeutic anticoagulation with Xarelto    I have performed a physical exam and reviewed and updated ROS and Plan today (11/11/2022). In review of yesterday's note (11/10/2022), there are no changes except as documented above.

## 2022-11-12 NOTE — PROGRESS NOTES
Received report from NOC shift RN. Patient is resting and sleeping in bed comfortably, VSS, no signs of distress, sitter at bedside. Bed in lowest and locked position, call light in reach, will continue to monitor.

## 2022-11-12 NOTE — CARE PLAN
The patient is Watcher - Medium risk of patient condition declining or worsening    Shift Goals  Clinical Goals: VSS, neuro checks, safety, reorient  Patient Goals: go home, get OOB  Family Goals: safety    Progress made toward(s) clinical / shift goals:    Problem: Knowledge Deficit - Standard  Goal: Patient and family/care givers will demonstrate understanding of plan of care, disease process/condition, diagnostic tests and medications  Outcome: Progressing     Problem: Skin Integrity  Goal: Skin integrity is maintained or improved  Outcome: Progressing     Problem: Fall Risk  Goal: Patient will remain free from falls  Outcome: Progressing     Problem: Pain - Standard  Goal: Alleviation of pain or a reduction in pain to the patient’s comfort goal  Outcome: Progressing     Problem: Safety - Medical Restraint  Goal: Remains free of injury from restraints (Restraint for Interference with Medical Device)  Outcome: Progressing  Goal: Free from restraint(s) (Restraint for Interference with Medical Device)  Outcome: Progressing       Patient is not progressing towards the following goals:

## 2022-11-12 NOTE — CARE PLAN
The patient is Stable - Low risk of patient condition declining or worsening    Shift Goals  Clinical Goals: neuro checks, safety  Patient Goals: rest  Family Goals: safety    Problem: Fall Risk  Goal: Patient will remain free from falls  Outcome: Progressing     Problem: Knowledge Deficit - Standard  Goal: Patient and family/care givers will demonstrate understanding of plan of care, disease process/condition, diagnostic tests and medications  Outcome: Not Met

## 2022-11-12 NOTE — CARE PLAN
Problem: Skin Integrity  Goal: Skin integrity is maintained or improved  11/12/2022 0143 by Wade Olivares R.N.  Outcome: Progressing  11/12/2022 0143 by Wade Olivares R.N.  Outcome: Progressing     The patient is Stable - Low risk of patient condition declining or worsening    Shift Goals  Clinical Goals: neuro checks, safety  Patient Goals: rest  Family Goals: safety    Progress made toward(s) clinical / shift goals:  Patient is alert and oriented to self only. Frequent reorientations are necessary. Patient was turned every two hours to maintain skin integrity.      Problem: Knowledge Deficit - Standard  Goal: Patient and family/care givers will demonstrate understanding of plan of care, disease process/condition, diagnostic tests and medications  11/12/2022 0143 by Wade Olivares R.N.  Outcome: Not Met  11/12/2022 0143 by Wade Olivares R.N.  Outcome: Progressing

## 2022-11-12 NOTE — WOUND TEAM
[facility]  Inpatient Services  Initial Wound and Skin Care Evaluation    Admission Date: 11/9/2022     Last order of IP CONSULT TO WOUND CARE was found on 11/11/2022 from Hospital Encounter on 11/9/2022     HPI, PMH, SH: Reviewed    No past surgical history on file.  Social History     Tobacco Use    Smoking status: Never    Smokeless tobacco: Never   Substance Use Topics    Alcohol use: Yes     Comment: occ     Chief Complaint   Patient presents with    Diarrhea     BIB EMS for persistent diarrhea, fatigue, and weakness x1 week. Pt AOx3 to person, event, and day.     Diagnosis: Diarrhea [R19.7]    Unit where seen by Wound Team: T705/01     WOUND CONSULT/FOLLOW UP RELATED TO:  Sacrum, Left forearm    WOUND HISTORY:  Patient presented with diarrhea and weakness x1 week. Wounds to left forearm are skin tears from previous IV dressing per bedside RN.     WOUND ASSESSMENT/LDA    Wound 11/12/22 Skin Tear Arm Left Forearm skin tear x2 (Active)   Wound Image    11/12/22 1200   Site Assessment Red 11/12/22 1200   Periwound Assessment Ecchymosis;Fragile 11/12/22 1200   Margins Defined edges;Unattached edges 11/12/22 1200   Closure Secondary intention 11/12/22 1200   Drainage Amount Small 11/12/22 1200   Drainage Description Serosanguineous 11/12/22 1200   Treatments Cleansed;Site care 11/12/22 1200   Wound Cleansing Approved Wound Cleanser 11/12/22 1200   Periwound Protectant Skin Protectant Wipes to Periwound 11/12/22 1200   Dressing Cleansing/Solutions Not Applicable 11/12/22 1200   Dressing Options Petrolatum Gauze (yellow);Silicone Adhesive Foam 11/12/22 1200   Dressing Changed Changed 11/12/22 1200   Dressing Status Clean;Dry;Intact 11/12/22 1200   Dressing Change/Treatment Frequency Daily, and As Needed 11/12/22 1200   NEXT Dressing Change/Treatment Date 11/13/22 11/12/22 1200   NEXT Weekly Photo (Inpatient Only) 11/19/22 11/12/22 1200   Non-staged Wound Description Partial thickness 11/12/22 1200    Shape Irregular x2 11/12/22 1200   Exposed Structures None 11/12/22 1200     Moisture Associated Skin Damage 11/12/22 Midline Sacrum (Active)   Wound Image   11/12/22 1200   NEXT Weekly Photo (Inpatient Only) 11/19/22 11/12/22 1200   Drainage Amount Small 11/12/22 1200   Drainage Description Serosanguineous 11/12/22 1200   Periwound Assessment Blanchable erythema 11/12/22 1200   IAD Cleansing Dimethecone Wipes 11/12/22 1200   Periwound Protectant Barrier Paste 11/12/22 1200   IAD Containment Device Indwelling Catheter 11/12/22 1200   WOUND NURSE ONLY - Time Spent with Patient (mins) 30 11/12/22 1200      Vascular:    SYDNEE:   No results found.    Lab Values:    Lab Results   Component Value Date/Time    WBC 3.9 (L) 11/12/2022 08:22 AM    RBC 3.70 (L) 11/12/2022 08:22 AM    HEMOGLOBIN 11.4 (L) 11/12/2022 08:22 AM    HEMATOCRIT 33.5 (L) 11/12/2022 08:22 AM    HBA1C 6.0 (H) 05/25/2022 01:15 PM        Culture Results show:  No results found for this or any previous visit (from the past 720 hour(s)).    Pain Level/Medicated:  No S/S of discomfort    INTERVENTIONS BY WOUND TEAM:  Chart and images reviewed. Discussed with bedside RN. All areas of concern (based on picture review, LDA review and discussion with bedside RN) have been thoroughly assessed. Documentation of areas based on significant findings. This RN in to assess patient. Performed standard wound care which includes appropriate positioning, dressing removal and non-selective debridement. Pictures and measurements obtained weekly if/when required.    Left Forearm  Preparation for Dressing removal: Removed without difficulty  Non-selectively Debrided with:  Cleanser and gauze.  Sharp debridement: na  Imelda wound: Cleansed with cleanser, Prepped with no sting  Primary Dressing: xeroform  Secondary (Outer) Dressing: silicone adhesive foam    Sacrum - barrier paste, MYRTLE    Interdisciplinary consultation: Patient, Bedside RN (Anahi),     EVALUATION / RATIONALE FOR  TREATMENT:  Most Recent Date:  11/12/22: Left forearm with x2 skin tears. Unable to cover wound beds with skin flap therefore xeroform (yellow) applied to provide an occlusive dressing that incorporates bacteriostatic protection. Patient turned to side with assistance of bedside RN. Sacrum with blanchable erythema and found to have small areas of blanching partial thickness skin damage due to moisture. Patient is incontinent of stool. Barrier paste applied to soothe and provide protective layer.     Goals: Steady decrease in wound area and depth weekly.    WOUND TEAM PLAN OF CARE ([X] for frequency of wound follow up,):   Nursing to follow dressing orders written for wound care. Contact wound team if area fails to progress, deteriorates or with any questions/concerns if something comes up before next scheduled follow up (See below as to whether wound is following and frequency of wound follow up)  Dressing changes by wound team:                   Follow up 3 times weekly:                NPWT change 3 times weekly:     Follow up 1-2 times weekly:      Follow up Bi-Monthly:           Follow up Monthly (High Risk):                        Follow up as needed:   X  Other (explain):     NURSING PLAN OF CARE ORDERS (X):  Dressing changes: See Dressing Care orders: X  Skin care: See Skin Care orders: X  RN Prevention Protocol:X  Rectal tube care: See Rectal Tube Care orders:   Other orders:    RSKIN:   CURRENTLY IN PLACE (X), APPLIED THIS VISIT (A), ORDERED (O):   Q shift Nickolas:  X  Q shift pressure point assessments:  X    Surface/Positioning   Pressure redistribution mattress            Low Airloss          ICU Low Airloss   Bariatric ISABELLE     Waffle cushion        Waffle Overlay        X  Reposition q 2 hours      TAPs Turning system   O  Z Moisés Pillow     Offloading/Redistribution   Sacral Mepilex (Silicone dressing)     Heel Mepilex (Silicone dressing)         Heel float boots (Prevalon boot)             Float Heels off  Bed with Pillows           Respiratory   Silicone O2 tubing         Gray Foam Ear protectors     Cannula fixation Device (Tender )          High flow offloading Clip    Elastic head band offloading device      Anchorfast                                                         Trach with Optifoam split foam             Containment/Moisture Prevention   Rectal tube or BMS    Purwick/Condom Cath        Hinson Catheter  X  Barrier wipes     X      Barrier paste     X  Antifungal tx      Interdry        Mobilization not assessed  Up to chair        Ambulate      PT/OT      Nutrition    Dietician        Diabetes Education      PO     TF     TPN     NPO   # days     Other        Anticipated discharge plans: TBD, does not require advanced wound care on DC  LTACH:        SNF/Rehab:                  Home Health Care:           Outpatient Wound Center:            Self/Family Care:        Other:                  Vac Discharge Needs:   Not Applicable Pt not on a wound vac:   X    Regular Vac while inpatient, alternative dressing at DC:        Regular Vac in use and continued at DC:            Reg. Vac w/ Skin Sub/Biologic in use. Will need to be changed 2x wkly:      Veraflo Vac while inpatient, ok to transition to Regular Vac on Discharge:           Veraflo Vac while inpatient, will need to remain on Veraflo Vac upon discharge:

## 2022-11-12 NOTE — DISCHARGE PLANNING
Case Management Discharge Planning    Admission Date: 11/9/2022  GMLOS: 3.8  ALOS: 2    6-Clicks ADL Score: 18  6-Clicks Mobility Score: 10  PT and/or OT Eval ordered: Yes  Post-acute Referrals Ordered: Yes  Post-acute Choice Obtained: Yes  Has referral(s) been sent to post-acute provider:  Yes    SNF choice:  # 1 [facility], #2 [facility], [facility].    Anticipated Discharge Dispo: Discharge Disposition: Discharged to home/self care (01)  Discharge Address: [address] [place] [address]  Discharge Contact Phone Number: [phone]  Keyonna OZUNA Needed: No    Action(s) Taken: Updated Provider/Nurse on Discharge Plan    Escalations Completed: None    Medically Clear: No    Next Steps: Transition to SNF rehab when medically cleared.      Barriers to Discharge: Medical clearance    Is the patient up for discharge tomorrow: No

## 2022-11-13 NOTE — PROGRESS NOTES
[facility] Medicine Daily Progress Note    Date of Service  11/12/2022    Chief Complaint  Liudmila Pulido is a 89 y.o. female admitted 11/9/2022 with persistent diarrhea and fatigue    Hospital Course  Liudmila Pulido is a 89 y.o. female with past medical history of CVA 2 years ago, on Xarelto, hypertension, dyslipidemia, type 2 diabetes, who presented 11/9/2022 with complaints of diarrhea watery and multiple bowel movements a day for 3 to 4 days, abdominal discomfort diffusely, nausea but no vomiting.  According to the patient's daughter who is at bedside, patient does not have diagnosis of dementia, but has been confused and lethargic last 3 days.  She was refusing food and drinks.  Reportedly, patient's daughter forced her to drink orange juice earlier today because her blood sugar was only 33.  When I saw the patient she would be mumbling incoherently, would not answer questions appropriately, appears to be restless.  Patient was placed on 2 L of oxygen due to desaturation to 85%.  She received 1.5 L of crystalloids.  Blood work showed lactic acid 5.0, troponin 38, BNP 2100.  Bicarb 16.  CT of the abdomen and pelvis with contrast showed 1.9 cm left adrenal mass which could be an adenoma or metastasis, cholelithiasis, possible liver cirrhosis, uterine leiomyomas.  EKG showed A. fib with heart rate 65-95, incomplete LBBB, no significant changes.  .    Interval Problem Update  11/10/2022:  Patient had negative C. difficile result contact precautions were taken off.  Otherwise no acute events overnight patient continues to have persistently elevated lactic acid continue with full fluid resuscitation continue to monitor I's and O's.  Continue with present antimicrobial regimen.  There is no clear source at present as patient had urinalysis not suggestive of infection. .  We will redraw labs in the morning continue with present antimicrobial coverage and adjust antimicrobial coverage as clinically  indicated.    11/11/2022:  Patient has slightly downtrending lactic acid most recent lactic acid presently 2.8 improved since previous.  Continue with fluid resuscitation.  Very strong suspicion the persistently elevated lactic acid represents residual from acute infectious diarrhea prodrome illness.  Repeat labs in the morning continue with fluid resuscitation continue with present antimicrobials.    11/12/2022:  Continue present medical management.  Patient reinitiated on IV antimicrobials patient has minimal p.o. intake.  Continue to reorient continue to mobilize.  Will reevaluate morning of 11/13/2022.  I have discussed this patient's plan of care and discharge plan at IDT rounds today with Case Management, Nursing, Nursing leadership, and other members of the IDT team.    Consultants/Specialty  None    Code Status  DNAR/DNI    Disposition  Patient is not medically cleared for discharge.   Anticipate discharge to to home with organized home healthcare and close outpatient follow-up.  I have placed the appropriate orders for post-discharge needs.    Review of Systems  Review of Systems   Unable to perform ROS: Mental status change      Physical Exam  Temp:  [36.1 °C (97 °F)-36.7 °C (98.1 °F)] 36.3 °C (97.3 °F)  Pulse:  [] 98  Resp:  [16-18] 17  BP: ()/(72-83) 122/83  SpO2:  [90 %-96 %] 90 %    Physical Exam  Vitals and nursing note reviewed.   Constitutional:       General: She is not in acute distress.     Appearance: She is ill-appearing.   HENT:      Head: Normocephalic and atraumatic.      Nose: Nose normal.      Mouth/Throat:      Mouth: Mucous membranes are dry.   Eyes:      Extraocular Movements: Extraocular movements intact.      Pupils: Pupils are equal, round, and reactive to light.   Cardiovascular:      Rate and Rhythm: Normal rate and regular rhythm.   Pulmonary:      Effort: Pulmonary effort is normal.      Breath sounds: Normal breath sounds.   Abdominal:      General: Abdomen is flat.  There is no distension.      Tenderness: There is no abdominal tenderness.   Musculoskeletal:         General: No swelling or deformity. Normal range of motion.      Cervical back: Normal range of motion and neck supple.   Skin:     General: Skin is warm and dry.   Neurological:      General: No focal deficit present.      Mental Status: She is alert. She is disoriented and confused.      Comments: Moves all 4 extremities.  Inconsistently following commands   Psychiatric:      Comments: Appears to be hallucinating       Fluids    Intake/Output Summary (Last 24 hours) at 11/13/2022 1424  Last data filed at 11/13/2022 0936  Gross per 24 hour   Intake 814.74 ml   Output 1100 ml   Net -285.26 ml         Laboratory  Recent Labs     11/11/22  0042 11/12/22  0822 11/13/22  0037   WBC 5.3 3.9* 5.9   RBC 4.02* 3.70* 4.13*   HEMOGLOBIN 12.4 11.4* 12.7   HEMATOCRIT 36.7* 33.5* 37.4   MCV 91.3 90.5 90.6   MCH 30.8 30.8 30.8   MCHC 33.8 34.0 34.0   RDW 55.5* 53.7* 54.9*   PLATELETCT 165 126* 145*   MPV 9.6 9.6 10.5       Recent Labs     11/11/22 0042 11/12/22  0822   SODIUM 138 141   POTASSIUM 3.5* 3.4*   CHLORIDE 108 110   CO2 20 24   GLUCOSE 113* 97   BUN 9 6*   CREATININE 0.47* 0.47*   CALCIUM 8.4* 8.4*                       Imaging  CT-HEAD W/O   Final Result      1.  Chronic ischemic changes.   2.  No acute intracranial abnormality.         DX-CHEST-PORTABLE (1 VIEW)   Final Result         No acute cardiac or pulmonary abnormality is identified.      CT-ABDOMEN-PELVIS WITH   Final Result      1.  1.9 cm left adrenal mass is identified. Differential diagnosis includes adrenal adenoma as well as metastasis.      2.  There is cholelithiasis.      3.  Liver surface is mildly nodular which could indicate cirrhosis.      4.  Uterine wall masses are identified most of which are densely calcified, consistent with leiomyomas.      5.  Right inguinal hernia contains abdominal fat.             Assessment/Plan  * Diarrhea- (present on  admission)  Assessment & Plan  Etiology is unclear  C. difficile rule out pending, ordered stool culture as well  Received 1.5 L of crystalloids, will continue IV rehydration  Symptomatic treatment as needed    Encephalopathy  Assessment & Plan  Probably metabolic secondary to dehydration, diarrhea, with underlying cognitive disorder.  Plan: CT head without contrast since patient is on Xarelto to rule out bleed  Ammonia, TSH, UA, VBG  Fall, aspiration precautions  Neurochecks every 4 hours    Liver cirrhosis (HCC)  Assessment & Plan  CT of the abdomen pelvis with contrast suggestive for liver cirrhosis  CMP showed elevated AST  Will check INR and ammonia    Abnormal CT of the abdomen  Assessment & Plan  CT of the abdomen pelvis with contrast:  1.  1.9 cm left adrenal mass is identified. Differential diagnosis includes adrenal adenoma as well as metastasis.      2.  There is cholelithiasis.      3.  Liver surface is mildly nodular which could indicate cirrhosis.      4.  Uterine wall masses are identified most of which are densely calcified, consistent with leiomyomas.      5.  Right inguinal hernia contains abdominal fat.     Follow-up with PCP for routine work-up for incidental renal mass    History of stroke  Assessment & Plan  Continue Xarelto, statin    Primary hypertension- (present on admission)  Assessment & Plan  We will hold lisinopril, clonidine, amlodipine due to presumed hypovolemia  Restart when appropriate.  Monitor blood pressure    Atrial fibrillation (HCC)- (present on admission)  Assessment & Plan  Chronic.  Continue Xarelto, metoprolol    Elevated troponin- (present on admission)  Assessment & Plan  Chronically elevated per chart review.  Denies chest pain.  EKG showed chronic A. fib, intraventricular conduction abnormality, without significant changes.  Monitor on telemetry, repeat troponin  Consider transthoracic echo       Please note that this dictation was created using voice recognition  software. I have made every reasonable attempt to correct obvious errors, but I expect that there are errors of grammar and possibly context that I did not discover before finalizing the note.    VTE prophylaxis: SCDs/TEDs and therapeutic anticoagulation with Xarelto    I have performed a physical exam and reviewed and updated ROS and Plan today (11/13/2022). In review of yesterday's note (11/12/2022), there are no changes except as documented above.

## 2022-11-13 NOTE — PROGRESS NOTES
[facility] Medicine Daily Progress Note    Date of Service  11/13/2022    Chief Complaint  Liudmila Pulido is a 89 y.o. female admitted 11/9/2022 with persistent diarrhea and fatigue    Hospital Course  Liudmila Pulido is a 89 y.o. female with past medical history of CVA 2 years ago, on Xarelto, hypertension, dyslipidemia, type 2 diabetes, who presented 11/9/2022 with complaints of diarrhea watery and multiple bowel movements a day for 3 to 4 days, abdominal discomfort diffusely, nausea but no vomiting.  According to the patient's daughter who is at bedside, patient does not have diagnosis of dementia, but has been confused and lethargic last 3 days.  She was refusing food and drinks.  Reportedly, patient's daughter forced her to drink orange juice earlier today because her blood sugar was only 33.  When I saw the patient she would be mumbling incoherently, would not answer questions appropriately, appears to be restless.  Patient was placed on 2 L of oxygen due to desaturation to 85%.  She received 1.5 L of crystalloids.  Blood work showed lactic acid 5.0, troponin 38, BNP 2100.  Bicarb 16.  CT of the abdomen and pelvis with contrast showed 1.9 cm left adrenal mass which could be an adenoma or metastasis, cholelithiasis, possible liver cirrhosis, uterine leiomyomas.  EKG showed A. fib with heart rate 65-95, incomplete LBBB, no significant changes.  .    Interval Problem Update  11/10/2022:  Patient had negative C. difficile result contact precautions were taken off.  Otherwise no acute events overnight patient continues to have persistently elevated lactic acid continue with full fluid resuscitation continue to monitor I's and O's.  Continue with present antimicrobial regimen.  There is no clear source at present as patient had urinalysis not suggestive of infection. .  We will redraw labs in the morning continue with present antimicrobial coverage and adjust antimicrobial coverage as clinically  indicated.    11/11/2022:  Patient has slightly downtrending lactic acid most recent lactic acid presently 2.8 improved since previous.  Continue with fluid resuscitation.  Very strong suspicion the persistently elevated lactic acid represents residual from acute infectious diarrhea prodrome illness.  Repeat labs in the morning continue with fluid resuscitation continue with present antimicrobials.    11/12/2022:  Continue present medical management.  Patient reinitiated on IV antimicrobials patient has minimal p.o. intake.  Continue to reorient continue to mobilize.  Will reevaluate morning of 11/13/2022.    11/13/2022:  Continue present medical management IV antimicrobials patient's conditions are improving she is rousable interactive today.  Continue to mobilize continue to reorient continue to encourage diet.  I have discussed this patient's plan of care and discharge plan at IDT rounds today with Case Management, Nursing, Nursing leadership, and other members of the IDT team.    Consultants/Specialty  None    Code Status  DNAR/DNI    Disposition  Patient is not medically cleared for discharge.   Anticipate discharge to to home with organized home healthcare and close outpatient follow-up.  I have placed the appropriate orders for post-discharge needs.    Review of Systems  Review of Systems   Unable to perform ROS: Mental status change      Physical Exam  Temp:  [36.1 °C (97 °F)-36.7 °C (98.1 °F)] 36.3 °C (97.3 °F)  Pulse:  [] 98  Resp:  [16-18] 17  BP: ()/(72-83) 122/83  SpO2:  [90 %-96 %] 90 %    Physical Exam  Vitals and nursing note reviewed.   Constitutional:       General: She is not in acute distress.     Appearance: She is ill-appearing.   HENT:      Head: Normocephalic and atraumatic.      Nose: Nose normal.      Mouth/Throat:      Mouth: Mucous membranes are dry.   Eyes:      Extraocular Movements: Extraocular movements intact.      Pupils: Pupils are equal, round, and reactive to light.    Cardiovascular:      Rate and Rhythm: Normal rate and regular rhythm.   Pulmonary:      Effort: Pulmonary effort is normal.      Breath sounds: Normal breath sounds.   Abdominal:      General: Abdomen is flat. There is no distension.      Tenderness: There is no abdominal tenderness.   Musculoskeletal:         General: No swelling or deformity. Normal range of motion.      Cervical back: Normal range of motion and neck supple.   Skin:     General: Skin is warm and dry.   Neurological:      General: No focal deficit present.      Mental Status: She is alert. She is disoriented and confused.      Comments: Moves all 4 extremities.  Inconsistently following commands   Psychiatric:      Comments: Appears to be hallucinating       Fluids    Intake/Output Summary (Last 24 hours) at 11/13/2022 1434  Last data filed at 11/13/2022 0936  Gross per 24 hour   Intake 814.74 ml   Output 1100 ml   Net -285.26 ml       Laboratory  Recent Labs     11/11/22  0042 11/12/22  0822 11/13/22  0037   WBC 5.3 3.9* 5.9   RBC 4.02* 3.70* 4.13*   HEMOGLOBIN 12.4 11.4* 12.7   HEMATOCRIT 36.7* 33.5* 37.4   MCV 91.3 90.5 90.6   MCH 30.8 30.8 30.8   MCHC 33.8 34.0 34.0   RDW 55.5* 53.7* 54.9*   PLATELETCT 165 126* 145*   MPV 9.6 9.6 10.5     Recent Labs     11/11/22  0042 11/12/22  0822   SODIUM 138 141   POTASSIUM 3.5* 3.4*   CHLORIDE 108 110   CO2 20 24   GLUCOSE 113* 97   BUN 9 6*   CREATININE 0.47* 0.47*   CALCIUM 8.4* 8.4*                     Imaging  CT-HEAD W/O   Final Result      1.  Chronic ischemic changes.   2.  No acute intracranial abnormality.         DX-CHEST-PORTABLE (1 VIEW)   Final Result         No acute cardiac or pulmonary abnormality is identified.      CT-ABDOMEN-PELVIS WITH   Final Result      1.  1.9 cm left adrenal mass is identified. Differential diagnosis includes adrenal adenoma as well as metastasis.      2.  There is cholelithiasis.      3.  Liver surface is mildly nodular which could indicate cirrhosis.      4.   Uterine wall masses are identified most of which are densely calcified, consistent with leiomyomas.      5.  Right inguinal hernia contains abdominal fat.             Assessment/Plan  * Diarrhea- (present on admission)  Assessment & Plan  Etiology is unclear  C. difficile rule out pending, ordered stool culture as well  Received 1.5 L of crystalloids, will continue IV rehydration  Symptomatic treatment as needed    Encephalopathy  Assessment & Plan  Probably metabolic secondary to dehydration, diarrhea, with underlying cognitive disorder.  Plan: CT head without contrast since patient is on Xarelto to rule out bleed  Ammonia, TSH, UA, VBG  Fall, aspiration precautions  Neurochecks every 4 hours  Resolving    Liver cirrhosis (HCC)  Assessment & Plan  CT of the abdomen pelvis with contrast suggestive for liver cirrhosis  CMP showed elevated AST  Will check INR and ammonia    Abnormal CT of the abdomen  Assessment & Plan  CT of the abdomen pelvis with contrast:  1.  1.9 cm left adrenal mass is identified. Differential diagnosis includes adrenal adenoma as well as metastasis.      2.  There is cholelithiasis.      3.  Liver surface is mildly nodular which could indicate cirrhosis.      4.  Uterine wall masses are identified most of which are densely calcified, consistent with leiomyomas.      5.  Right inguinal hernia contains abdominal fat.     Follow-up with PCP for routine work-up for incidental renal mass    History of stroke  Assessment & Plan  Continue Xarelto, statin    Primary hypertension- (present on admission)  Assessment & Plan  We will hold lisinopril, clonidine, amlodipine due to presumed hypovolemia  Restart when appropriate.  Monitor blood pressure    Atrial fibrillation (HCC)- (present on admission)  Assessment & Plan  Chronic.  Continue Xarelto, metoprolol    Elevated troponin- (present on admission)  Assessment & Plan  Chronically elevated per chart review.  Denies chest pain.  EKG showed chronic A.  fib, intraventricular conduction abnormality, without significant changes.  Monitor on telemetry, repeat troponin  Consider transthoracic echo  No gross abnormalities ejection fraction 75%     Please note that this dictation was created using voice recognition software. I have made every reasonable attempt to correct obvious errors, but I expect that there are errors of grammar and possibly context that I did not discover before finalizing the note.    VTE prophylaxis: SCDs/TEDs and therapeutic anticoagulation with Xarelto    I have performed a physical exam and reviewed and updated ROS and Plan today (11/13/2022). In review of yesterday's note (11/12/2022), there are no changes except as documented above.

## 2022-11-13 NOTE — PROGRESS NOTES
Hypoglycemia Intervention    Hypoglycemia protocol intervention:  Blood glucose 48 at 0009.  Intervention: 25 g IV dextrose per MAR   Repeat blood glucose 91 at 0054.  Intervention: Patient NPO, recheck blood glucose in 1 hour   Additional interventions needed: n/a  Dr. Haile notified of the above at 0019.

## 2022-11-13 NOTE — PROGRESS NOTES
Hypoglycemia Intervention    Hypoglycemia protocol intervention:  Blood glucose 53 at 0300.  Intervention: 25 g IV dextrose per MAR   Repeat blood glucose 107 at 0611.    Additional interventions needed: recheck glucose in 1 hr  Kimberlee Haile notified of the above at 0300.

## 2022-11-13 NOTE — PROGRESS NOTES
Report received from day shift RN, assumed care of pt. Pt A&Ox1 to self only. Patient resting in bed with no signs of distress, labs and chart reviewed. All needs met at this time. Tele box on. On room air. Call light within reach, bed locked and in lowest position. Sitter at bedside. All fall precautions and hourly rounding in place.

## 2022-11-13 NOTE — PROGRESS NOTES
Monitor Summary:   SR in and out of AFib  peaking to 161   (F) PVC, bigeminy, trigeminy and couplets  Bundle branch block  .15/.12/.36

## 2022-11-13 NOTE — CARE PLAN
The patient is Stable - Low risk of patient condition declining or worsening    Shift Goals  Clinical Goals: bg stability  Patient Goals: moo  Family Goals: moo    Progress made toward(s) clinical / shift goals:        Problem: Knowledge Deficit - Standard  Goal: Patient and family/care givers will demonstrate understanding of plan of care, disease process/condition, diagnostic tests and medications  Outcome: Progressing     Problem: Skin Integrity  Goal: Skin integrity is maintained or improved  Outcome: Progressing     Problem: Fall Risk  Goal: Patient will remain free from falls  Outcome: Progressing     Problem: Pain - Standard  Goal: Alleviation of pain or a reduction in pain to the patient’s comfort goal  Outcome: Progressing     Problem: Safety - Medical Restraint  Goal: Remains free of injury from restraints (Restraint for Interference with Medical Device)  Outcome: Progressing  Goal: Free from restraint(s) (Restraint for Interference with Medical Device)  Outcome: Progressing       Patient is not progressing towards the following goals:

## 2022-11-13 NOTE — PROGRESS NOTES
Monitor Summary:    SR/ST  in and out of AFIB  (O) + (F) PVC's  Bigem, Trigem, Couplets    .18/.08/.42

## 2022-11-13 NOTE — PROGRESS NOTES
Took routine FSBS and the result was 48. Notified XIMENA Haile of findings. Administered 10% dextrose bolus and rechecked blood sugar. FSBS is now 91. Will continue to monitor patient.

## 2022-11-13 NOTE — DISCHARGE PLANNING
Case Management Discharge Planning    Admission Date: 11/9/2022  GMLOS: 3.8  ALOS: 4    6-Clicks ADL Score: 18  6-Clicks Mobility Score: 10  PT and/or OT Eval ordered: Yes  Post-acute Referrals Ordered: Yes  Post-acute Choice Obtained: Yes  Has referral(s) been sent to post-acute provider:  Yes      Anticipated Discharge Dispo: Discharge Disposition: Discharged to home/self care (01)  Discharge Address: [address] [place] [address]  Discharge Contact Phone Number: [phone]  [place]    DME Needed: No    Action(s) Taken: Updated Provider/Nurse on Discharge Plan, DC Assessment Complete (See below), and Choice obtained    Escalations Completed: None    Medically Clear: No    Next Steps: Pending review and acceptance at [facility] or [facility].    Barriers to Discharge: Medical clearance and Pending Placement    Is the patient up for discharge tomorrow: No

## 2022-11-13 NOTE — CARE PLAN
Problem: Skin Integrity  Goal: Skin integrity is maintained or improved  Outcome: Progressing     Problem: Safety - Medical Restraint  Goal: Remains free of injury from restraints (Restraint for Interference with Medical Device)  Outcome: Progressing   The patient is Stable - Low risk of patient condition declining or worsening    Shift Goals  Clinical Goals: safety, maintain skin integrity  Patient Goals: rest  Family Goals: safety    Progress made toward(s) clinical / shift goals:  Patient was turned and repositioned with pillows every two hours to maintain skin integrity. Patient remained free from injury related to use of restraints.

## 2022-11-14 PROBLEM — N30.90 CYSTITIS: Status: ACTIVE | Noted: 2022-01-01

## 2022-11-14 NOTE — CARE PLAN
The patient is Stable - Low risk of patient condition declining or worsening    Shift Goals  Clinical Goals: mobility more  Patient Goals: rest  Family Goals: moo    Progress made toward(s) clinical / shift goals:        Problem: Knowledge Deficit - Standard  Goal: Patient and family/care givers will demonstrate understanding of plan of care, disease process/condition, diagnostic tests and medications  Outcome: Progressing     Problem: Skin Integrity  Goal: Skin integrity is maintained or improved  Outcome: Progressing     Problem: Fall Risk  Goal: Patient will remain free from falls  Outcome: Progressing     Problem: Pain - Standard  Goal: Alleviation of pain or a reduction in pain to the patient’s comfort goal  Outcome: Progressing     Problem: Safety - Medical Restraint  Goal: Remains free of injury from restraints (Restraint for Interference with Medical Device)  Outcome: Progressing  Goal: Free from restraint(s) (Restraint for Interference with Medical Device)  Outcome: Progressing       Patient is not progressing towards the following goals:

## 2022-11-14 NOTE — THERAPY
Occupational Therapy   Initial Evaluation     Patient Name: Liudmila Pulido  Age:  89 y.o., Sex:  female  Medical Record #: [id number]  Today's Date: 11/14/2022     Precautions  Precautions: Fall Risk  Comments: confused    Assessment  Patient is 89 y.o. female with a diagnosis of ALOC.   Pt currently limited by decreased functional mobility, activity tolerance, cognition, balance, which are affecting pt's ability to complete ADLs/IADLs at baseline. Pt would benefit from OT services in the acute care setting to maximize functional recovery.        Plan    Recommend Occupational Therapy 3 times per week until therapy goals are met for the following treatments:  Self Care/Activities of Daily Living and Therapeutic Activities.       Discharge Recommendations: (P) Recommend post-acute placement for additional occupational therapy services prior to discharge home        11/14/22 0813   Prior Living Situation   Prior Services Unable To Determine At This Time   Housing / Facility [facility] with - Patient's Self Care Capacity Adult Children   Comments unable to answer, per chart   Prior Level of ADL Function   Self Feeding Unable To Determine At This Time   Dressing Unable To Determine At This Time   ADL Assessment   Grooming Maximal Assist   Upper Body Dressing Total Assist   Lower Body Dressing Moderate Assist   Toileting Moderate Assist   Functional Mobility   Sit to Stand Maximal Assist   Bed, Chair, Wheelchair Transfer Unable to Participate   Short Term Goals   Short Term Goal # 1 min A with UB dressing   Short Term Goal # 2 mod A with LB dressing   Short Term Goal # 3 mod A with ADL txfs   Anticipated Discharge Equipment and Recommendations   Discharge Recommendations Recommend post-acute placement for additional occupational therapy services prior to discharge home

## 2022-11-14 NOTE — THERAPY
"Physical Therapy   Daily Treatment     Patient Name: Liudmila Pulido  Age:  89 y.o., Sex:  female  Medical Record #: [id number]  Today's Date: 11/14/2022     Precautions  Precautions: Fall Risk  Comments: confused    Assessment    Pt seen for follow up PT tx. Pt continues to be confused but was cooperative with therapy. Max assist required for bed mobility and max assist x2 for STS 2x with HHA. With minimal tactile cues pt was able to sit statically with fair- balance. Unsafe to transfer to chair due to confusion and lethargy. PT will cont while pt is in acute care setting.     Plan    Continue current treatment plan.    DC Equipment Recommendations: Unable to determine at this time  Discharge Recommendations: Recommend post-acute placement for additional physical therapy services prior to discharge home         11/14/22 0805   Cognition    Cognition / Consciousness X   Level of Consciousness Responds to voice   Ability To Follow Commands 1 Step   Safety Awareness Impaired   New Learning Impaired   Comments kept repeating \"home\"   Other Treatments   Other Treatments Provided assisted with linen and gown change   Balance   Sitting Balance (Static) Fair -   Sitting Balance (Dynamic) Poor +   Standing Balance (Static) Trace   Weight Shift Sitting Poor   Weight Shift Standing Absent   Skilled Intervention Verbal Cuing;Compensatory Strategies   Comments EOB and STS with 2 assist   Gait Analysis   Gait Level Of Assist Unable to Participate   Bed Mobility    Supine to Sit Maximal Assist   Sit to Supine Maximal Assist   Scooting Maximal Assist   Skilled Intervention Verbal Cuing   Comments limited initiation   Functional Mobility   Sit to Stand Maximal Assist   Bed, Chair, Wheelchair Transfer Unable to Participate   Toilet Transfers Unable to Participate   Mobility EOB and STS 2x with 2 assist   Skilled Intervention Verbal Cuing   Short Term Goals    Short Term Goal # 1 Pt will perform bed mobility with supervision in " 6 visits.   Goal Outcome # 1 goal not met   Short Term Goal # 2 Pt will transfer with supervision in 6 visits to improve functional indep.   Goal Outcome # 2 Goal not met   Short Term Goal # 3 Pt will ambulate x 50 feet using FWW with supervision in 6 vistis to improve functional indep.   Goal Outcome # 3 Goal not met   Anticipated Discharge Equipment and Recommendations   DC Equipment Recommendations Unable to determine at this time   Discharge Recommendations Recommend post-acute placement for additional physical therapy services prior to discharge home

## 2022-11-14 NOTE — ASSESSMENT & PLAN NOTE
UA suggestive of possible urinary tract infection patient inadequate historian upon initial intake and a 1 empirically treated patient with improvement.  She is status post antimicrobial treatment now.

## 2022-11-14 NOTE — PROGRESS NOTES
HOSPITALIST NOC CROSS COVER    Patient appears to have conjunctivitis. Will have staff clear eyelids BID with warm cloth, place warm compress QID and artificial tears q 8 hrs.

## 2022-11-15 NOTE — PROGRESS NOTES
Report received from day shift RN, assumed care of pt. Pt A&Ox1 Oriented to self only, somnolent. Plan of care discussed with pt, labs and chart reviewed. All needs met at this time. Tele box on. On RA. Call light within reach, bed locked and in lowest position. All fall precautions and hourly rounding in place.

## 2022-11-15 NOTE — PROGRESS NOTES
NOC HOSPITALIST CROSS COVER    Notified by RN regarding patient becoming bradycardia down into the 30s looking generally unwell.  On presentation to the room, the patient was noted to be pale.  On closer examination the patient was not breathing and was pulseless.  CODE STATUS was listed as DNR/DNI.  The bedside RN, myself, and rapid response RN all confirmed pulselessness via auscultation and palpation of radial pulse for approximately 1 minute.  Bedside RN contacted the patient's daughter.  Once the family was at bedside, they requested to speak to me.  I was able to talk to family, answered questions as able, and provide comfort.  The family was very thankful and understanding of the situation.  Bedside RN to provide choices for mortuary.             -----------------------------------------------------------------------------------------------------------    Electronically signed by:  XIMENA Puga AGACNP-BC  Hospitalist Services

## 2022-11-22 NOTE — DISCHARGE SUMMARY
Death Summary    Cause of Death  Cardiopulmonary arrest due to respiratory failure due to severe sepsis.  Comorbid Conditions at the Time of Death  Principal Problem:    Diarrhea POA: Yes  Active Problems:    Elevated troponin POA: Yes    Atrial fibrillation (HCC) POA: Yes    Primary hypertension POA: Yes    History of stroke POA: Unknown    Abnormal CT of the abdomen POA: Unknown    Liver cirrhosis (HCC) POA: Unknown    Encephalopathy POA: Unknown    Cystitis POA: Unknown  Resolved Problems:    * No resolved hospital problems. *      History of Presenting Illness and Hospital Course  This is a 89 y.o. female admitted 11/9/2022 with altered mentation and decreased oral intake  Liudmila Pulido is a 89 y.o. female with past medical history of CVA 2 years ago, on Xarelto, hypertension, dyslipidemia, type 2 diabetes, who presented 11/9/2022 with complaints of diarrhea watery and multiple bowel movements a day for 3 to 4 days, abdominal discomfort diffusely, nausea but no vomiting.  According to the patient's daughter who is at bedside, patient does not have diagnosis of dementia, but has been confused and lethargic last 3 days.  She was refusing food and drinks.  Reportedly, patient's daughter forced her to drink orange juice earlier today because her blood sugar was only 33.  When I saw the patient she would be mumbling incoherently, would not answer questions appropriately, appears to be restless.  Patient was placed on 2 L of oxygen due to desaturation to 85%.  She received 1.5 L of crystalloids.  Blood work showed lactic acid 5.0, troponin 38, BNP 2100.  Bicarb 16.  CT of the abdomen and pelvis with contrast showed 1.9 cm left adrenal mass which could be an adenoma or metastasis, cholelithiasis, possible liver cirrhosis, uterine leiomyomas.    Patient was placed on broad-spectrum antimicrobials C. difficile testing was negative.  Patient initially had some positive response to fluid resuscitation had  persistently elevated lactic acid which eventually down trended.  Patient was initially placed on IV antimicrobials subsequently transition to oral however patient was not tolerating oral and nor should she take it.  She was subsidy transition back to IV antimicrobials.  Had long discussion with patient's daughter at bedside during hospitalization and updated her with regards to plan of care informed patient's daughter that her mother was significantly ill.  Patient's daughter relayed to me that her general wellbeing had set steadily been declining over the last several months.  I counseled her that hospice may be an option in the future and she was in agreement.  During the night patient became progressively more bradycardic cross coverage APRN was notified.  Cross coverage APRN responded to bedside and discovered that patient was pale and pulseless.  APRN and bedside nursing confirmed the lack of pulse and pronounced time of death.  Family was immediately notified and details were discussed.  Death Date: 11/14/22   Death Time: 2315      Pronounced By (MD): Maya Robison  Pronounced By (RN1): Gris Ba  Pronounced By (RN2): Neena Dumont

## 2022-12-06 NOTE — DOCUMENTATION QUERY
"                                                                         [facility]                                                                       Query Response Note      PATIENT:               APRIL ADRIAN  ACCT #:                  [id number]  MRN:                     [id number]  :                      1932  ADMIT DATE:       2022 4:59 PM  DISCH DATE:        2022 11:15 PM  RESPONDING  PROVIDER #:        [id number]           QUERY TEXT:     Sepsis has been documented in the ED note and severe sepsis documented in the D/C summary.  After further review, please clarify findings.    Note: If an appropriate response is not listed below, please respond with a new note.    Sepsis - real or suspected infection plus 2 or more SIRS criteria  Temp <96.8 or >101  HR >90  RR >20  WBC <4,000 or > 12,000  >10% bandemia         The patient's Clinical Indicators include:  89yo with dx of DM2, diarrhea, metabolic encephalopathy, chronic afib     ED notes /64   Pulse 71   Temp 36.6 °C (97.9 °F) (Temporal)   Resp 20   SpO2 96%     ED notes \"Mild to moderate distress, Non-toxic appearance.\"   D/C summary \"Cardiopulmonary arrest due to respiratory failure due to severe sepsis\"     WBC 3.9   LA 5.0    Risk factors: advanced age, DM2, chronic a fib, diarrhea  Treatments: IVF, labwork, imaging, medication, monitoring    Contact me with questions.    Thank you,  Fara Morales, EVELYNEP, CDI  [email or web address]  Options provided:   -- After further review, sepsis and severe sepsis ruled out   -- Sepsis exists   -- Sepsis without severe sepsis exists   -- Other explanation:Other explanation-, please specify   -- Unable to determine      Query created by: Fara Morales on 2022 12:04 PM    RESPONSE TEXT:    sepsis exists with severe sepsis          Electronically signed by:  BARRERA FALCON MD 2022 7:22 AM              "

## 2023-06-08 NOTE — PROGRESS NOTES
HOSPITALIST NOC CROSS COVER    Notified of patient having hypoglycemia with BG 25, as well as systolic pressure in the 90s.  RN reports patient has had poor oral intake for the last couple days. Given 25 g IV dextrose, which improved BG to 91.    - patient started on low infusion of D5 LR.        Mildly to Moderately Impaired: difficulty hearing in some environments or speaker may need to increase volume or speak distinctly

## 2023-07-07 NOTE — PROGRESS NOTES
Patient discharged to home per order.  Discharge instructions reviewed with patient and family; they verbalize understanding and signed copies placed in chart.  Patient has all belongings; signed copy of form in chart.  Patient left facility at 1030 via wheelchair accompanied by rehab staff and family.  Have enjoyed working with this pleasant patient.     Patient susceptibilities back on stool culture.     Can you call and f/u w/ patient's symptoms - are they improving with the azithromycin?

## 2023-09-15 NOTE — TELEPHONE ENCOUNTER
[facility]      Pt's daughter Laura called to discuss recent labs reviewed by APRN yesterday. AST/ALT remain WNL. Alkphos continues to trend down - currently 138 (211, 461 previously).    Pt's daughter reported that pt had a small fall and landed on her hand - bruising has been resolving. All other questions and concerns were addressed and asked them to contact clinic for anything else.     Confirmed follow up in June 2021.      Jim Cordero, ShelleyD, MSPH     Dutasteride Counseling: Dustasteride Counseling:  I discussed with the patient the risks of use of dutasteride including but not limited to decreased libido, decreased ejaculate volume, and gynecomastia. Women who can become pregnant should not handle medication.  All of the patient's questions and concerns were addressed. Dutasteride Male Counseling: Dustasteride Counseling:  I discussed with the patient the risks of use of dutasteride including but not limited to decreased libido, decreased ejaculate volume, and gynecomastia. Women who can become pregnant should not handle medication.  All of the patient's questions and concerns were addressed.
